# Patient Record
Sex: MALE | Race: WHITE | NOT HISPANIC OR LATINO | Employment: OTHER | URBAN - NONMETROPOLITAN AREA
[De-identification: names, ages, dates, MRNs, and addresses within clinical notes are randomized per-mention and may not be internally consistent; named-entity substitution may affect disease eponyms.]

---

## 2017-10-03 ENCOUNTER — HOSPITAL ENCOUNTER (EMERGENCY)
Facility: HOSPITAL | Age: 47
Discharge: LEFT BEFORE DISCHARGE | End: 2017-10-03
Attending: FAMILY MEDICINE

## 2017-10-03 VITALS
OXYGEN SATURATION: 95 % | DIASTOLIC BLOOD PRESSURE: 79 MMHG | WEIGHT: 178 LBS | BODY MASS INDEX: 25.48 KG/M2 | SYSTOLIC BLOOD PRESSURE: 121 MMHG | TEMPERATURE: 98.8 F | RESPIRATION RATE: 16 BRPM | HEIGHT: 70 IN | HEART RATE: 90 BPM

## 2017-10-03 LAB
ANION GAP SERPL CALCULATED.3IONS-SCNC: 14 MMOL/L (ref 4–13)
BASOPHILS # BLD AUTO: 0.03 10*3/MM3 (ref 0–0.2)
BASOPHILS NFR BLD AUTO: 0.3 % (ref 0–2)
BUN BLD-MCNC: 16 MG/DL (ref 5–21)
BUN/CREAT SERPL: 22.9 (ref 7–25)
CALCIUM SPEC-SCNC: 9.3 MG/DL (ref 8.4–10.4)
CHLORIDE SERPL-SCNC: 98 MMOL/L (ref 98–110)
CO2 SERPL-SCNC: 21 MMOL/L (ref 24–31)
CREAT BLD-MCNC: 0.7 MG/DL (ref 0.5–1.4)
DEPRECATED RDW RBC AUTO: 41.7 FL (ref 40–54)
EOSINOPHIL # BLD AUTO: 0.11 10*3/MM3 (ref 0–0.7)
EOSINOPHIL NFR BLD AUTO: 1.1 % (ref 0–4)
ERYTHROCYTE [DISTWIDTH] IN BLOOD BY AUTOMATED COUNT: 13.6 % (ref 12–15)
GFR SERPL CREATININE-BSD FRML MDRD: 120 ML/MIN/1.73
GLUCOSE BLD-MCNC: 612 MG/DL (ref 70–100)
GLUCOSE BLDC GLUCOMTR-MCNC: 591 MG/DL (ref 70–130)
HCT VFR BLD AUTO: 41.3 % (ref 40–52)
HGB BLD-MCNC: 14.8 G/DL (ref 14–18)
HOLD SPECIMEN: NORMAL
HOLD SPECIMEN: NORMAL
LYMPHOCYTES # BLD AUTO: 2.72 10*3/MM3 (ref 0.72–4.86)
LYMPHOCYTES NFR BLD AUTO: 26.9 % (ref 15–45)
MCH RBC QN AUTO: 30.6 PG (ref 28–32)
MCHC RBC AUTO-ENTMCNC: 35.8 G/DL (ref 33–36)
MCV RBC AUTO: 85.5 FL (ref 82–95)
MONOCYTES # BLD AUTO: 0.76 10*3/MM3 (ref 0.19–1.3)
MONOCYTES NFR BLD AUTO: 7.5 % (ref 4–12)
NEUTROPHILS # BLD AUTO: 6.49 10*3/MM3 (ref 1.87–8.4)
NEUTROPHILS NFR BLD AUTO: 64.2 % (ref 39–78)
PLATELET # BLD AUTO: 242 10*3/MM3 (ref 130–400)
PMV BLD AUTO: 11.5 FL (ref 6–12)
POTASSIUM BLD-SCNC: 4.5 MMOL/L (ref 3.5–5.3)
RBC # BLD AUTO: 4.83 10*6/MM3 (ref 4.8–5.9)
SODIUM BLD-SCNC: 133 MMOL/L (ref 135–145)
WBC NRBC COR # BLD: 10.11 10*3/MM3 (ref 4.8–10.8)
WHOLE BLOOD HOLD SPECIMEN: NORMAL
WHOLE BLOOD HOLD SPECIMEN: NORMAL

## 2017-10-03 PROCEDURE — 80048 BASIC METABOLIC PNL TOTAL CA: CPT | Performed by: FAMILY MEDICINE

## 2017-10-03 PROCEDURE — 85025 COMPLETE CBC W/AUTO DIFF WBC: CPT | Performed by: FAMILY MEDICINE

## 2017-10-03 PROCEDURE — 82962 GLUCOSE BLOOD TEST: CPT

## 2017-10-03 PROCEDURE — 99211 OFF/OP EST MAY X REQ PHY/QHP: CPT

## 2017-10-03 RX ORDER — SODIUM CHLORIDE 0.9 % (FLUSH) 0.9 %
10 SYRINGE (ML) INJECTION AS NEEDED
Status: DISCONTINUED | OUTPATIENT
Start: 2017-10-03 | End: 2017-10-03 | Stop reason: HOSPADM

## 2017-10-03 RX ADMIN — SODIUM CHLORIDE 1000 ML: 9 INJECTION, SOLUTION INTRAVENOUS at 19:11

## 2017-10-04 NOTE — ED NOTES
"Pt. Become upset when this nurse went into room to collect urine sample and told him a flu swab was ordered. \" I've been sitting here for three hours and nobody's done nothing about my sugar. \" pt. Left AMA.     Kari Hou RN  10/03/17 1958    "

## 2019-05-03 ENCOUNTER — OFFICE VISIT (OUTPATIENT)
Dept: FAMILY MEDICINE CLINIC | Age: 49
End: 2019-05-03
Payer: MEDICAID

## 2019-05-03 ENCOUNTER — HOSPITAL ENCOUNTER (EMERGENCY)
Age: 49
Discharge: HOME | End: 2019-05-03
Attending: EMERGENCY MEDICINE
Payer: MEDICAID

## 2019-05-03 VITALS
TEMPERATURE: 97.7 F | RESPIRATION RATE: 16 BRPM | OXYGEN SATURATION: 98 % | BODY MASS INDEX: 24.77 KG/M2 | HEIGHT: 70 IN | HEART RATE: 99 BPM | DIASTOLIC BLOOD PRESSURE: 94 MMHG | WEIGHT: 173 LBS | SYSTOLIC BLOOD PRESSURE: 146 MMHG

## 2019-05-03 VITALS
HEART RATE: 90 BPM | OXYGEN SATURATION: 95 % | SYSTOLIC BLOOD PRESSURE: 134 MMHG | TEMPERATURE: 98 F | DIASTOLIC BLOOD PRESSURE: 80 MMHG | RESPIRATION RATE: 18 BRPM

## 2019-05-03 DIAGNOSIS — E08.65 DIABETES MELLITUS DUE TO UNDERLYING CONDITION WITH HYPERGLYCEMIA, WITH LONG-TERM CURRENT USE OF INSULIN (HCC): Primary | ICD-10-CM

## 2019-05-03 DIAGNOSIS — M79.642 BILATERAL HAND PAIN: ICD-10-CM

## 2019-05-03 DIAGNOSIS — F31.63 BIPOLAR DISORDER, CURRENT EPISODE MIXED, SEVERE, WITHOUT PSYCHOTIC FEATURES (HCC): ICD-10-CM

## 2019-05-03 DIAGNOSIS — F33.2 SEVERE EPISODE OF RECURRENT MAJOR DEPRESSIVE DISORDER, WITHOUT PSYCHOTIC FEATURES (HCC): ICD-10-CM

## 2019-05-03 DIAGNOSIS — Z12.11 SCREENING FOR COLON CANCER: ICD-10-CM

## 2019-05-03 DIAGNOSIS — Z91.14 NONCOMPLIANCE W/MEDICATION TREATMENT DUE TO INTERMIT USE OF MEDICATION: ICD-10-CM

## 2019-05-03 DIAGNOSIS — E11.42 TYPE 2 DIABETES MELLITUS WITH DIABETIC POLYNEUROPATHY, WITH LONG-TERM CURRENT USE OF INSULIN (HCC): Primary | ICD-10-CM

## 2019-05-03 DIAGNOSIS — Z79.4 TYPE 2 DIABETES MELLITUS WITH DIABETIC POLYNEUROPATHY, WITH LONG-TERM CURRENT USE OF INSULIN (HCC): Primary | ICD-10-CM

## 2019-05-03 DIAGNOSIS — M79.641 BILATERAL HAND PAIN: ICD-10-CM

## 2019-05-03 DIAGNOSIS — Z79.4 DIABETES MELLITUS DUE TO UNDERLYING CONDITION WITH HYPERGLYCEMIA, WITH LONG-TERM CURRENT USE OF INSULIN (HCC): Primary | ICD-10-CM

## 2019-05-03 DIAGNOSIS — F41.1 GAD (GENERALIZED ANXIETY DISORDER): ICD-10-CM

## 2019-05-03 LAB
ALBUMIN SERPL-MCNC: 4.3 G/DL (ref 3.5–5.2)
ALP BLD-CCNC: 97 U/L (ref 40–130)
ALT SERPL-CCNC: 17 U/L (ref 5–41)
ANION GAP SERPL CALCULATED.3IONS-SCNC: 14 MMOL/L (ref 7–19)
AST SERPL-CCNC: 13 U/L (ref 5–40)
BASOPHILS ABSOLUTE: 0 K/UL (ref 0–0.2)
BASOPHILS RELATIVE PERCENT: 0.4 % (ref 0–1)
BILIRUB SERPL-MCNC: 0.7 MG/DL (ref 0.2–1.2)
BILIRUBIN URINE: NEGATIVE
BLOOD, URINE: NEGATIVE
BUN BLDV-MCNC: 14 MG/DL (ref 6–20)
CALCIUM SERPL-MCNC: 9.7 MG/DL (ref 8.6–10)
CHLORIDE BLD-SCNC: 91 MMOL/L (ref 98–111)
CLARITY: CLEAR
CO2: 27 MMOL/L (ref 22–29)
COLOR: YELLOW
CREAT SERPL-MCNC: 0.7 MG/DL (ref 0.5–1.2)
EOSINOPHILS ABSOLUTE: 0.1 K/UL (ref 0–0.6)
EOSINOPHILS RELATIVE PERCENT: 0.8 % (ref 0–5)
GFR NON-AFRICAN AMERICAN: >60
GLUCOSE BLD-MCNC: 376 MG/DL
GLUCOSE BLD-MCNC: 376 MG/DL (ref 70–99)
GLUCOSE BLD-MCNC: 442 MG/DL
GLUCOSE BLD-MCNC: 442 MG/DL (ref 70–99)
GLUCOSE BLD-MCNC: 480 MG/DL (ref 74–109)
GLUCOSE URINE: >=1000 MG/DL
HBA1C MFR BLD: 10.8 % (ref 4–6)
HCT VFR BLD CALC: 46.5 % (ref 42–52)
HEMOGLOBIN: 16.2 G/DL (ref 14–18)
KETONES, URINE: NEGATIVE MG/DL
LEUKOCYTE ESTERASE, URINE: NEGATIVE
LYMPHOCYTES ABSOLUTE: 2.5 K/UL (ref 1.1–4.5)
LYMPHOCYTES RELATIVE PERCENT: 29.7 % (ref 20–40)
MCH RBC QN AUTO: 31.4 PG (ref 27–31)
MCHC RBC AUTO-ENTMCNC: 34.8 G/DL (ref 33–37)
MCV RBC AUTO: 90.1 FL (ref 80–94)
MONOCYTES ABSOLUTE: 0.7 K/UL (ref 0–0.9)
MONOCYTES RELATIVE PERCENT: 7.8 % (ref 0–10)
NEUTROPHILS ABSOLUTE: 5.1 K/UL (ref 1.5–7.5)
NEUTROPHILS RELATIVE PERCENT: 60.7 % (ref 50–65)
NITRITE, URINE: NEGATIVE
PDW BLD-RTO: 12.8 % (ref 11.5–14.5)
PERFORMED ON: ABNORMAL
PERFORMED ON: ABNORMAL
PH UA: 6.5 (ref 5–8)
PLATELET # BLD: 255 K/UL (ref 130–400)
PMV BLD AUTO: 10.6 FL (ref 9.4–12.4)
POTASSIUM REFLEX MAGNESIUM: 4.3 MMOL/L (ref 3.5–5)
PROTEIN UA: NEGATIVE MG/DL
RBC # BLD: 5.16 M/UL (ref 4.7–6.1)
SODIUM BLD-SCNC: 132 MMOL/L (ref 136–145)
SPECIFIC GRAVITY UA: 1.03 (ref 1–1.03)
TOTAL PROTEIN: 7.6 G/DL (ref 6.6–8.7)
URINE REFLEX TO CULTURE: ABNORMAL
UROBILINOGEN, URINE: 0.2 E.U./DL
WBC # BLD: 8.4 K/UL (ref 4.8–10.8)

## 2019-05-03 PROCEDURE — 80053 COMPREHEN METABOLIC PANEL: CPT

## 2019-05-03 PROCEDURE — 99204 OFFICE O/P NEW MOD 45 MIN: CPT | Performed by: NURSE PRACTITIONER

## 2019-05-03 PROCEDURE — 82948 REAGENT STRIP/BLOOD GLUCOSE: CPT

## 2019-05-03 PROCEDURE — 99284 EMERGENCY DEPT VISIT MOD MDM: CPT

## 2019-05-03 PROCEDURE — 85025 COMPLETE CBC W/AUTO DIFF WBC: CPT

## 2019-05-03 PROCEDURE — 99284 EMERGENCY DEPT VISIT MOD MDM: CPT | Performed by: NURSE PRACTITIONER

## 2019-05-03 PROCEDURE — 83036 HEMOGLOBIN GLYCOSYLATED A1C: CPT

## 2019-05-03 PROCEDURE — 81003 URINALYSIS AUTO W/O SCOPE: CPT

## 2019-05-03 PROCEDURE — 2580000003 HC RX 258: Performed by: NURSE PRACTITIONER

## 2019-05-03 PROCEDURE — 6370000000 HC RX 637 (ALT 250 FOR IP): Performed by: NURSE PRACTITIONER

## 2019-05-03 PROCEDURE — 96160 PT-FOCUSED HLTH RISK ASSMT: CPT | Performed by: NURSE PRACTITIONER

## 2019-05-03 PROCEDURE — 36415 COLL VENOUS BLD VENIPUNCTURE: CPT

## 2019-05-03 RX ORDER — BLOOD-GLUCOSE METER
1 KIT MISCELLANEOUS DAILY
Qty: 1 KIT | Refills: 0 | Status: SHIPPED | OUTPATIENT
Start: 2019-05-03 | End: 2021-07-26

## 2019-05-03 RX ORDER — LANCETS 21 GAUGE
EACH MISCELLANEOUS
Qty: 100 EACH | Refills: 5 | Status: SHIPPED | OUTPATIENT
Start: 2019-05-03 | End: 2019-12-13 | Stop reason: SDUPTHER

## 2019-05-03 RX ORDER — 0.9 % SODIUM CHLORIDE 0.9 %
1000 INTRAVENOUS SOLUTION INTRAVENOUS ONCE
Status: COMPLETED | OUTPATIENT
Start: 2019-05-03 | End: 2019-05-03

## 2019-05-03 RX ORDER — LISINOPRIL 10 MG/1
10 TABLET ORAL DAILY
Qty: 30 TABLET | Refills: 2 | Status: SHIPPED | OUTPATIENT
Start: 2019-05-03 | End: 2019-05-21

## 2019-05-03 RX ORDER — RISPERIDONE 1 MG/1
1 TABLET, FILM COATED ORAL NIGHTLY
Qty: 30 TABLET | Refills: 5 | Status: SHIPPED | OUTPATIENT
Start: 2019-05-03 | End: 2019-05-21 | Stop reason: SDUPTHER

## 2019-05-03 RX ADMIN — INSULIN HUMAN 10 UNITS: 100 INJECTION, SOLUTION PARENTERAL at 17:56

## 2019-05-03 RX ADMIN — SODIUM CHLORIDE 1000 ML: 9 INJECTION, SOLUTION INTRAVENOUS at 17:56

## 2019-05-03 SDOH — HEALTH STABILITY: MENTAL HEALTH: HOW OFTEN DO YOU HAVE A DRINK CONTAINING ALCOHOL?: NOT ASKED

## 2019-05-03 ASSESSMENT — ENCOUNTER SYMPTOMS
CONSTIPATION: 0
NAUSEA: 0
BLOOD IN STOOL: 0
DIARRHEA: 0
EYE DISCHARGE: 0
PHOTOPHOBIA: 0
ABDOMINAL DISTENTION: 0
WHEEZING: 0
STRIDOR: 0
EYE REDNESS: 0
VOMITING: 0
CHEST TIGHTNESS: 0
RECTAL PAIN: 0
BACK PAIN: 0
SINUS PAIN: 0
SHORTNESS OF BREATH: 0
VOMITING: 0
SHORTNESS OF BREATH: 0
COLOR CHANGE: 0
APNEA: 0
NAUSEA: 0
ABDOMINAL PAIN: 0
SORE THROAT: 0
ABDOMINAL PAIN: 0
EYE PAIN: 0

## 2019-05-03 ASSESSMENT — ANXIETY QUESTIONNAIRES
7. FEELING AFRAID AS IF SOMETHING AWFUL MIGHT HAPPEN: 3-NEARLY EVERY DAY
6. BECOMING EASILY ANNOYED OR IRRITABLE: 3-NEARLY EVERY DAY
3. WORRYING TOO MUCH ABOUT DIFFERENT THINGS: 3-NEARLY EVERY DAY
5. BEING SO RESTLESS THAT IT IS HARD TO SIT STILL: 3-NEARLY EVERY DAY
4. TROUBLE RELAXING: 3-NEARLY EVERY DAY
GAD7 TOTAL SCORE: 21
1. FEELING NERVOUS, ANXIOUS, OR ON EDGE: 3-NEARLY EVERY DAY
2. NOT BEING ABLE TO STOP OR CONTROL WORRYING: 3-NEARLY EVERY DAY

## 2019-05-03 ASSESSMENT — PATIENT HEALTH QUESTIONNAIRE - PHQ9
7. TROUBLE CONCENTRATING ON THINGS, SUCH AS READING THE NEWSPAPER OR WATCHING TELEVISION: 3
SUM OF ALL RESPONSES TO PHQ QUESTIONS 1-9: 27
9. THOUGHTS THAT YOU WOULD BE BETTER OFF DEAD, OR OF HURTING YOURSELF: 3
6. FEELING BAD ABOUT YOURSELF - OR THAT YOU ARE A FAILURE OR HAVE LET YOURSELF OR YOUR FAMILY DOWN: 3
1. LITTLE INTEREST OR PLEASURE IN DOING THINGS: 3
4. FEELING TIRED OR HAVING LITTLE ENERGY: 3
2. FEELING DOWN, DEPRESSED OR HOPELESS: 3
10. IF YOU CHECKED OFF ANY PROBLEMS, HOW DIFFICULT HAVE THESE PROBLEMS MADE IT FOR YOU TO DO YOUR WORK, TAKE CARE OF THINGS AT HOME, OR GET ALONG WITH OTHER PEOPLE: 3
3. TROUBLE FALLING OR STAYING ASLEEP: 3
8. MOVING OR SPEAKING SO SLOWLY THAT OTHER PEOPLE COULD HAVE NOTICED. OR THE OPPOSITE, BEING SO FIGETY OR RESTLESS THAT YOU HAVE BEEN MOVING AROUND A LOT MORE THAN USUAL: 3
5. POOR APPETITE OR OVEREATING: 3
SUM OF ALL RESPONSES TO PHQ9 QUESTIONS 1 & 2: 6
SUM OF ALL RESPONSES TO PHQ QUESTIONS 1-9: 27

## 2019-05-03 ASSESSMENT — PAIN DESCRIPTION - LOCATION: LOCATION: FOOT

## 2019-05-03 ASSESSMENT — PAIN DESCRIPTION - PAIN TYPE: TYPE: ACUTE PAIN

## 2019-05-03 NOTE — ED TRIAGE NOTES
Pt has been without any medicines for about 2-3 days, some medications longer. Pt trying to get established with PCP for chronic illnesses.  Glucose over 600, told to come to ED

## 2019-05-03 NOTE — PROGRESS NOTES
[address] [place] [address]  Dept: [phone]  Dept Fax: [phone]  Loc: [phone]    Amos Primrose is a 46 y.o. male who presentstoday for his medical conditions/complaints as noted below. Amos Primrose is c/o of Diabetes (out of insulin x 2 days. states was running 500-600)        HPI:     HPI  Pt is new to practice. He moved from [facility]. He has been taking 40 units of lantus for his diabetes management from [facility] in [facility]. I have no records to review. He states he ran out of insulin \"a few days ago\" and his glucose has been running 500-600 lately, but when asked about other readings, he says his glucometer is broken and he needs a new one. He also states he quit all of his medications cold turkey due to personal choice. He c/o numbness and tingling in both hands and both feet. He c/o ADHD symptoms and wants adderall for that. He is a  and he cannot stay focused. He is depressed and banda thinks of suicide on his PHQ9. He c/o bug bites on his arms and legs from camping over the last few days. He says he picked several ticks off of himself.      Past Medical History:   Diagnosis Date    ADHD     Anxiety     Depression     Diabetes ([address]       Past Surgical History:   Procedure Laterality Date    NECK SURGERY         Family History   Adopted: Yes       Social History     Tobacco Use    Smoking status: Current Every Day Smoker     Packs/day: 1.50     Types: Cigarettes     Start date: 5/3/1999    Smokeless tobacco: Former User   Substance Use Topics    Alcohol use: Not Currently      Current Outpatient Medications   Medication Sig Dispense Refill    insulin glargine (LANTUS) 100 UNIT/ML injection pen Inject 30 Units into the skin nightly 15 pen 2    risperiDONE (RISPERDAL) 1 MG tablet Take 1 tablet by mouth nightly 30 tablet 5    lisinopril (PRINIVIL;ZESTRIL) 10 MG tablet Take 1 tablet by mouth are equal, round, and reactive to light. Conjunctivae are normal.   Neck: Normal range of motion. Neck supple. Cardiovascular: Normal rate, regular rhythm and normal heart sounds. Pulmonary/Chest: Effort normal and breath sounds normal. No respiratory distress. Neurological: He is alert and oriented to person, place, and time. Skin: Skin is warm and dry. He is not diaphoretic. Psychiatric: His behavior is normal. Thought content normal.   PHQ9, anxiety scale and mood disorder all show severe and positive for bipolar disorder. Nursing note and vitals reviewed. BP (!) 146/94 (Site: Right Upper Arm, Position: Sitting, Cuff Size: Medium Adult)   Pulse 99   Temp 97.7 °F (36.5 °C) (Oral)   Resp 16   Ht 5' 9.5\" (1.765 m)   Wt 173 lb (78.5 kg)   SpO2 98%   BMI 25.18 kg/m²     Assessment:       Diagnosis Orders   1. Type 2 diabetes mellitus with diabetic polyneuropathy, with long-term current use of insulin ([facility])  CBC    Comprehensive Metabolic Panel    Lipid Panel    Urinalysis    TSH without Reflex    Testosterone Free and Total Male    Hemoglobin A1C    Microalbumin / Creatinine Urine Ratio    Urine Drug Screen    Vitamin D 25 Hydroxy    HIV Rapid 1&2   2. Bipolar disorder, current episode mixed, severe, without psychotic features [facility])  [id number] [facility]   3. Severe episode of recurrent major depressive disorder, without psychotic features [facility])  [address] [facility]   4. KRYSTLE (generalized anxiety disorder)  [id number] [facility]   5.  Screening for colon cancer  [facility]  Dillon Sanabria MD, Gastroenterology, [place]       Plan:      Orders Placed This Encounter   Procedures    CBC     Standing Status:   Future     Standing Expiration Date:   5/2/2020    Comprehensive Metabolic Panel     Standing Status:   Future     Standing Expiration Date:   5/3/2020    Lipid Panel     Standing Status:   Future     Standing Expiration Date:   5/2/2020     Order Specific Question:   Is Patient Fasting?/# of Hours     Answer:   15    Urinalysis     Standing Status:   Future     Standing Expiration Date:   5/2/2020     Order Specific Question:   SPECIFY(EX-CATH,MIDSTREAM,CYSTO,ETC)? Answer:   midstream    TSH without Reflex     Standing Status:   Future     Standing Expiration Date:   5/2/2020    Testosterone Free and Total Male     Standing Status:   Future     Standing Expiration Date:   5/3/2020    Hemoglobin A1C     Standing Status:   Future     Standing Expiration Date:   5/2/2020    Microalbumin / Creatinine Urine Ratio     Standing Status:   Future     Standing Expiration Date:   5/3/2020    Urine Drug Screen     Standing Status:   Future     Standing Expiration Date:   5/3/2020    Vitamin D 25 Hydroxy     Standing Status:   Future     Standing Expiration Date:   5/3/2020    HIV Rapid 1&2     Standing Status:   Future     Standing Expiration Date:   5/3/2020     Order Specific Question:   Specify Date & Time of Incident     Answer:   [address] [facility], [place]     Referral Priority:   Routine     Referral Type:   Eval and Treat     Referral Reason:   Specialty Services Required     Requested Specialty:   Psychiatry     Number of Visits Requested:   1   Amira Cee MD, Gastroenterology, [place]     Referral Priority:   Routine     Referral Type:   Eval and Treat     Referral Reason:   Specialty Services Required     Referred to Provider:   Julia Vargas MD     Requested Specialty:   Gastroenterology     Number of Visits Requested:   1       No follow-ups on file.     Orders Placed This Encounter   Procedures    CBC     Standing Status:   Future     Standing Expiration Date:   5/2/2020    Comprehensive Metabolic Panel     Standing Status:   Future     Standing Expiration Date:   5/3/2020    Lipid Panel     Standing Status:   Future     Standing Expiration Date:   5/2/2020     Order Specific Question:   Is Patient Fasting?/# of Hours     Answer:   15    Urinalysis Standing Status:   Future     Standing Expiration Date:   5/2/2020     Order Specific Question:   SPECIFY(EX-CATH,MIDSTREAM,CYSTO,ETC)?      Answer:   midstream    TSH without Reflex     Standing Status:   Future     Standing Expiration Date:   5/2/2020    Testosterone Free and Total Male     Standing Status:   Future     Standing Expiration Date:   5/3/2020    Hemoglobin A1C     Standing Status:   Future     Standing Expiration Date:   5/2/2020    Microalbumin / Creatinine Urine Ratio     Standing Status:   Future     Standing Expiration Date:   5/3/2020    Urine Drug Screen     Standing Status:   Future     Standing Expiration Date:   5/3/2020    Vitamin D 25 Hydroxy     Standing Status:   Future     Standing Expiration Date:   5/3/2020    HIV Rapid 1&2     Standing Status:   Future     Standing Expiration Date:   5/3/2020     Order Specific Question:   Specify Date & Time of Incident     Answer:   [facility]     Referral Priority:   Routine     Referral Type:   Eval and Treat     Referral Reason:   Specialty Services Required     Requested Specialty:   Psychiatry     Number of Visits Requested:   1   Shruthi Donovan MD, Gastroenterology, [place]     Referral Priority:   Routine     Referral Type:   Eval and Treat     Referral Reason:   Specialty Services Required     Referred to Provider:   Mateo Junior MD     Requested Specialty:   Gastroenterology     Number of Visits Requested:   1     Orders Placed This Encounter   Medications    insulin glargine (LANTUS) 100 UNIT/ML injection pen     Sig: Inject 30 Units into the skin nightly     Dispense:  15 pen     Refill:  2    risperiDONE (RISPERDAL) 1 MG tablet     Sig: Take 1 tablet by mouth nightly     Dispense:  30 tablet     Refill:  5    lisinopril (PRINIVIL;ZESTRIL) 10 MG tablet     Sig: Take 1 tablet by mouth daily     Dispense:  30 tablet     Refill:  2    EASY TOUCH SAFETY LANCETS 21G MISC     Sig: CHECK GLUCOSE TID AND PRN     Dispense:  100 each     Refill:  5    blood glucose test strips (ASCENSIA AUTODISC VI;ONE TOUCH ULTRA TEST VI) strip     Si each by In Vitro route daily DX: Diabetes  PLEASE DISPENSE WHATEVER BRAND FORMULARY PREFERS     Dispense:  100 each     Refill:  5    blood glucose test strips (ASCENSIA AUTODISC VI;ONE TOUCH ULTRA TEST VI) strip     Si each by In Vitro route daily DX: Diabetes     Dispense:  100 each     Refill:  5    glucose monitoring kit (FREESTYLE) monitoring kit     Si kit by Does not apply route daily     Dispense:  1 kit     Refill:  0       records release needed. Refer to [facility] for counseling re: medication adherence importance and possibly ADHD testing. Refer to psych due to severe mental/emotional issues. Refer to ER for glucose today: unable to register on office glucometer. Pt refused ambulance, wants to go with female friend to [place], glucose and suicidal ideation will need to be addressed and treated inpatient. Add lisinopril to daily meds. Monitor glucose tid and prn. Colon cancer screening needed. Fasting labwork due.            Electronically signed by ALEX Mojica on 5/3/2019 at 4:15 PM

## 2019-05-03 NOTE — ED PROVIDER NOTES
[address] [facility] EMERGENCY DEPT  eMERGENCYdEPARTMENT eNCOUnter      Pt Name: Jose Manzo  MRN: [id number]  [place] 1970  Date of evaluation: 5/3/2019  ALEX Yancey CNP    CHIEF COMPLAINT       Chief Complaint   Patient presents with    Hyperglycemia         HISTORY OF PRESENT ILLNESS  (Location/Symptom, Timing/Onset, Context/Setting, Quality, Duration, Modifying Factors, Severity.)   Jose Manzo is a 46 y.o. male who presents to the emergency department with complaints of hyperglycemia. Patient says that he went to a urgent care in [address] [place] [address] today and was told his blood sugar was over 600 and that he needed to go to the emergency department for evaluation. He says that he does not have a primary care establishment has been going to different urgent cares for med refills. He says he has been noncompliant on his medications and is currently only taking Lantus. As he takes Lantus 40 units in the a.m. and 40 units at night and that he has been using Lantus for sliding scale. He says his vision has changed in that it is blurry, he complains of polyuria and polydipsia. The history is provided by the patient. Nursing Notes were reviewed and I agree. REVIEW OF SYSTEMS    (2-9 systems for level 4, 10 or more for level 5)     Review of Systems   Constitutional: Negative for activity change, appetite change, chills, diaphoresis, fatigue and fever. HENT: Negative for congestion, ear pain, nosebleeds, sinus pain and sore throat. Eyes: Negative for photophobia, pain, discharge and redness. Respiratory: Negative for apnea, chest tightness, shortness of breath, wheezing and stridor. Cardiovascular: Negative for chest pain, palpitations and leg swelling. Gastrointestinal: Negative for abdominal distention, abdominal pain, blood in stool, constipation, diarrhea, nausea, rectal pain and vomiting. Endocrine: Positive for polydipsia, polyphagia and polyuria.  Negative for cold metFORMIN (GLUCOPHAGE) 500 MG tablet Take 1 tablet by mouth 2 times daily (with meals), Disp-60 tablet, R-5Normal       !! - Potential duplicate medications found. Please discuss with provider. ALLERGIES     Patient has no known allergies. FAMILY HISTORY       Family History   Adopted: Yes          SOCIAL HISTORY       Social History     Socioeconomic History    Marital status: Single     Spouse name: None    Number of children: None    Years of education: None    Highest education level: None   Occupational History    None   Social Needs    Financial resource strain: None    Food insecurity:     Worry: None     Inability: None    Transportation needs:     Medical: None     Non-medical: None   Tobacco Use    Smoking status: Current Every Day Smoker     Packs/day: 1.50     Types: Cigarettes     Start date: 5/3/1999    Smokeless tobacco: Former User   Substance and Sexual Activity    Alcohol use: Not Currently    Drug use: None    Sexual activity: None   Lifestyle    Physical activity:     Days per week: None     Minutes per session: None    Stress: None   Relationships    Social connections:     Talks on phone: None     Gets together: None     Attends Alevism service: None     Active member of club or organization: None     Attends meetings of clubs or organizations: None     Relationship status: None    Intimate partner violence:     Fear of current or ex partner: None     Emotionally abused: None     Physically abused: None     Forced sexual activity: None   Other Topics Concern    None   Social History Narrative    None       SCREENINGS           PHYSICAL EXAM    (up to 7 forlevel 4, 8 or more for level 5)     ED Triage Vitals [05/03/19 1645]   BP Temp Temp src Pulse Resp SpO2 Height Weight   (!) 146/94 97.8 °F (36.6 °C) -- 94 18 96 % -- --       Physical Exam   Constitutional: He is oriented to person, place, and time. He appears well-developed and well-nourished.  No distress. HENT:   Head: Normocephalic and atraumatic. Nose: Nose normal.   Mouth/Throat: Oropharynx is clear and moist.   Eyes: Pupils are equal, round, and reactive to light. Conjunctivae and EOM are normal. Right eye exhibits no discharge. Left eye exhibits no discharge. No scleral icterus. Neck: Normal range of motion. Neck supple. No JVD present. No tracheal deviation present. Cardiovascular: Normal rate, regular rhythm, normal heart sounds and intact distal pulses. Exam reveals no gallop and no friction rub. No murmur heard. Pulmonary/Chest: Effort normal and breath sounds normal. No stridor. No respiratory distress. He has no wheezes. He has no rales. He exhibits no tenderness. Abdominal: Soft. Bowel sounds are normal. He exhibits no distension and no mass. There is no tenderness. There is no rebound and no guarding. No hernia. Musculoskeletal: Normal range of motion. He exhibits no edema, tenderness or deformity. Neurological: He is alert and oriented to person, place, and time. A sensory deficit (Neuropathy noted to bilateral feet- had decreased sensation to palpation) is present. No cranial nerve deficit. Coordination normal.   Skin: Skin is warm and dry. Capillary refill takes less than 2 seconds. No rash noted. He is not diaphoretic. No erythema. There were no plans or open areas on the lower extremities. Psychiatric: He has a normal mood and affect. His behavior is normal.   Nursing note and vitals reviewed.         DIAGNOSTIC RESULTS     RADIOLOGY:   Non-plain film images such as CT, Ultrasound and MRI are read by the radiologist. Plain radiographic images are visualized and preliminarilyinterpreted by No att. providers found with the below findings:      Interpretation per the Radiologist below, if available at the time of this note:    No orders to display       LABS:  Labs Reviewed   CBC WITH AUTO DIFFERENTIAL - Abnormal; Notable for the following components:       Result Value MCH 31.4 (*)     All other components within normal limits   COMPREHENSIVE METABOLIC PANEL W/ REFLEX TO MG FOR LOW K - Abnormal; Notable for the following components:    Sodium 132 (*)     Chloride 91 (*)     Glucose 480 (*)     All other components within normal limits   HEMOGLOBIN A1C - Abnormal; Notable for the following components:    Hemoglobin A1C 10.8 (*)     All other components within normal limits   URINE RT REFLEX TO CULTURE - Abnormal; Notable for the following components:    Glucose, Ur >=1000 (*)     All other components within normal limits   POCT GLUCOSE - Abnormal; Notable for the following components:    POC Glucose 442 (*)     All other components within normal limits   POCT GLUCOSE - Abnormal; Notable for the following components:    POC Glucose 376 (*)     All other components within normal limits   POCT GLUCOSE - Normal   POCT GLUCOSE - Normal       All other labs were within normal range or notreturned as of this dictation. RE-ASSESSMENT        EMERGENCY DEPARTMENT COURSE and DIFFERENTIAL DIAGNOSIS/MDM:   Vitals:    Vitals:    05/03/19 1822 05/03/19 1832 05/03/19 1901 05/03/19 1937   BP:  (!) 156/87 (!) 143/80 134/80   Pulse:    90   Resp:    18   Temp:    98 °F (36.7 °C)   SpO2: 95% 91% 94% 95%           MDM  Number of Diagnoses or Management Options  Diabetes mellitus due to underlying condition with hyperglycemia, with long-term current use of insulin [facility]):   Diagnosis management comments: Patient presented to the ED for evaluation of hyperglycemia. He reported non-compliance but says he wants to start taking medication as directed and acheive better control of glucose. Patient was given 1 L normal saline and 10 units of regular insulin. Which are decreased from 442 to 376. CBC, CMP unremarkable. A hemoglobin A1c was obtained and was 10.8. Dr. GODOY [facility] was consulted on the case. Discussed with patient the importance of diabetic compliance.  Discussed both microvascular and microvascular complications that could result from noncompliance. Discussed proper foot care. Stressed importance of following up with primary care. Patient says he wants to start seeing primary care at the [address] [place] [address] in [place], [place]. Patient was seen in urgent care today and had meds called in to confirm this with [facility] before he left that he did have refills on his medications awaiting him. Patient is well-appearing, stable for discharge and follow-up without fail with his/her medical doctor. I had a detailed discussion with the patient and/or guardian regarding the historical points, exam findings, and any diagnostic results supporting the discharge diagnosis. The patient was educated on care and need for follow-up. Strict return instructions including red flag signs and symptoms were discussed with the patient. Medications for discharge discussed, and adverse effects reviewed. Questions invited and answered. Patient shows understanding of the discharge information and agrees to follow-up. PROCEDURES:    Procedures      FINAL IMPRESSION      1.  Diabetes mellitus due to underlying condition with hyperglycemia, with long-term current use of insulin [facility])          DISPOSITION/PLAN   DISPOSITION Decision To Discharge 05/03/2019 07:15:59 PM      PATIENT REFERRED TO:  Estiven Mckee MD  [address] [place] [address]  [phone]          [address] [facility] EMERGENCY DEPT  [facility]  [phone]    As needed, If symptoms worsen      DISCHARGE MEDICATIONS:  Discharge Medication List as of 5/3/2019  7:16 PM          (Please note that portions of this note were completed with a voice recognition program.  Efforts were made to edit the dictations but occasionallywords are mis-transcribed.)    ALEX Alfred CNP, APRN - CNP  05/03/19 7834

## 2019-05-21 ENCOUNTER — HOSPITAL ENCOUNTER (OUTPATIENT)
Dept: GENERAL RADIOLOGY | Age: 49
Discharge: HOME | End: 2019-05-21
Payer: MEDICAID

## 2019-05-21 ENCOUNTER — OFFICE VISIT (OUTPATIENT)
Dept: FAMILY MEDICINE CLINIC | Age: 49
End: 2019-05-21
Payer: MEDICAID

## 2019-05-21 VITALS
WEIGHT: 172 LBS | DIASTOLIC BLOOD PRESSURE: 84 MMHG | TEMPERATURE: 97.8 F | OXYGEN SATURATION: 99 % | RESPIRATION RATE: 20 BRPM | SYSTOLIC BLOOD PRESSURE: 152 MMHG | HEART RATE: 96 BPM | BODY MASS INDEX: 25.04 KG/M2

## 2019-05-21 DIAGNOSIS — Z79.4 TYPE 2 DIABETES MELLITUS WITH DIABETIC POLYNEUROPATHY, WITH LONG-TERM CURRENT USE OF INSULIN (HCC): ICD-10-CM

## 2019-05-21 DIAGNOSIS — E11.42 TYPE 2 DIABETES MELLITUS WITH DIABETIC POLYNEUROPATHY, WITH LONG-TERM CURRENT USE OF INSULIN (HCC): ICD-10-CM

## 2019-05-21 DIAGNOSIS — Z79.4 TYPE 2 DIABETES MELLITUS WITH COMPLICATION, WITH LONG-TERM CURRENT USE OF INSULIN (HCC): Primary | ICD-10-CM

## 2019-05-21 DIAGNOSIS — R07.81 RIB PAIN: ICD-10-CM

## 2019-05-21 DIAGNOSIS — F31.63 BIPOLAR DISORDER, CURRENT EPISODE MIXED, SEVERE, WITHOUT PSYCHOTIC FEATURES (HCC): ICD-10-CM

## 2019-05-21 DIAGNOSIS — I10 ESSENTIAL HYPERTENSION: ICD-10-CM

## 2019-05-21 DIAGNOSIS — E11.8 TYPE 2 DIABETES MELLITUS WITH COMPLICATION, WITH LONG-TERM CURRENT USE OF INSULIN (HCC): Primary | ICD-10-CM

## 2019-05-21 PROCEDURE — 71100 X-RAY EXAM RIBS UNI 2 VIEWS: CPT

## 2019-05-21 PROCEDURE — 99214 OFFICE O/P EST MOD 30 MIN: CPT | Performed by: NURSE PRACTITIONER

## 2019-05-21 RX ORDER — LANCETS 21 GAUGE
1 EACH MISCELLANEOUS 2 TIMES DAILY
Qty: 100 EACH | Refills: 5 | Status: SHIPPED | OUTPATIENT
Start: 2019-05-21 | End: 2019-08-05 | Stop reason: SDUPTHER

## 2019-05-21 RX ORDER — RISPERIDONE 2 MG/1
2 TABLET, FILM COATED ORAL NIGHTLY
Qty: 30 TABLET | Refills: 0 | Status: ON HOLD | OUTPATIENT
Start: 2019-05-21 | End: 2019-07-15 | Stop reason: HOSPADM

## 2019-05-21 RX ORDER — LISINOPRIL 10 MG/1
5 TABLET ORAL DAILY
Qty: 30 TABLET | Refills: 2 | Status: SHIPPED | OUTPATIENT
Start: 2019-05-21 | End: 2019-12-13 | Stop reason: SDUPTHER

## 2019-05-21 RX ORDER — GLUCOSAMINE HCL/CHONDROITIN SU 500-400 MG
CAPSULE ORAL
Qty: 100 STRIP | Refills: 5 | Status: SHIPPED | OUTPATIENT
Start: 2019-05-21 | End: 2019-06-19 | Stop reason: SDUPTHER

## 2019-05-21 RX ORDER — NABUMETONE 500 MG/1
500 TABLET, FILM COATED ORAL 2 TIMES DAILY
Qty: 60 TABLET | Refills: 3 | Status: SHIPPED | OUTPATIENT
Start: 2019-05-21 | End: 2019-12-13 | Stop reason: SDUPTHER

## 2019-05-21 RX ORDER — GLIMEPIRIDE 2 MG/1
2 TABLET ORAL
Qty: 30 TABLET | Refills: 5 | Status: SHIPPED | OUTPATIENT
Start: 2019-05-21 | End: 2019-12-13 | Stop reason: SDUPTHER

## 2019-05-21 NOTE — PROGRESS NOTES
SUBJECTIVE:  Here today for Diabetic   Patient ID: Wesley Salas is a 46 y.o. male. HPI:   HPI   Long discussion patient went to the emergency room;   he is not taking his lisinopril states he does not have blood pressure problems unless his blood sugar is elevated however his glucose is always elevated I told him his A1c which was 10.8. And he told me that was good. He states that in [place] he was on like a another type of insulin that he took along with his meals I told him he needed to get in checking his blood sugars in order to see where with the adjustments need to be made he's come in here stating that his girlfriend took his monitor  I have made arrangements for him to get another one. He also states the Risperdal is not helping him with his minutes I showed him where the referrals have been made to neurology for the pain in his hands the psychiatry for his mood and GI for a colonoscopy screening. He is unable to take the Glucophage was it's causing severe diarrhea so I switched him on to Amaryl. He states he's having some burning of his feet and he used to be on Neurontin which is very possible he needed to go back on it however I would like to see what his blood sugar does in his feet pain if it improves when his blood sugar goes down  He is to keep a record of his blood sugar for a week and then return to his PCP  Taking Lantus 40 in am and 40 at night. Lost meter due to girlfriend took stuff. Does know what readings are. Metformin causes diarrhea   He also states yesterday he was trying to put a transmission in a vehicle and the abi slipped and he caught the transmission with his left arm hitting his chest wall and Islas Terrance now he's having tenderness and rib pain    Past Medical History:   Diagnosis Date    ADHD     Anxiety     Depression     Diabetes ([facility] [address]       Prior to Visit Medications    Medication Sig Taking?  Authorizing Provider   EASY TOUCH SAFETY LANCETS 21G MISC 1 Device by Does Normal rate, regular rhythm and normal heart sounds. Pulmonary/Chest: Effort normal and breath sounds normal.       Abdominal: Normal appearance. Neurological: He is alert and oriented to person, place, and time. Skin: Skin is warm and dry. Psychiatric: He has a normal mood and affect. His behavior is normal.      BP (!) 152/84 (Site: Right Upper Arm, Position: Sitting, Cuff Size: Medium Adult)   Pulse 96   Temp 97.8 °F (36.6 °C) (Oral)   Resp 20   Wt 172 lb (78 kg)   SpO2 99%   BMI 25.04 kg/m²      ASSESSMENT:      ICD-10-CM    1. Type 2 diabetes mellitus with complication, with long-term current use of insulin (HCC) E11.8 DME Order for Glucometer as OP    Z79.4 EASY TOUCH SAFETY LANCETS 21G MISC     blood glucose monitor strips     glimepiride (AMARYL) 2 MG tablet   2. Bipolar disorder, current episode mixed, severe, without psychotic features ([facility] [address] F31.63 risperiDONE (RISPERDAL) 2 MG tablet   3. Rib pain R07.81 XR RIBS LEFT (2 VIEWS)     nabumetone (RELAFEN) 500 MG tablet   4. Type 2 diabetes mellitus with diabetic polyneuropathy, with long-term current use of insulin (HCC) E11.42 lisinopril (PRINIVIL;ZESTRIL) 10 MG tablet    Z79.4    5. Essential hypertension I10 lisinopril (PRINIVIL;ZESTRIL) 10 MG tablet       PLAN:    Julio Primrose: Pain (widespread pain all over . earlier this month saw Diogo Jeffries she sent him to ER for elevated blood sugar . he was to follow up . he has pain in feet , out of focus , C/O ADHD . feels like he has a cracked rib . ) and Diabetes (elevated blood sugars )      Diagnosis and orders for this visit are above.

## 2019-06-05 ENCOUNTER — OFFICE VISIT (OUTPATIENT)
Dept: PSYCHIATRY | Age: 49
End: 2019-06-05
Payer: MEDICAID

## 2019-06-05 VITALS
HEART RATE: 95 BPM | OXYGEN SATURATION: 100 % | HEIGHT: 70 IN | SYSTOLIC BLOOD PRESSURE: 149 MMHG | DIASTOLIC BLOOD PRESSURE: 94 MMHG | WEIGHT: 170 LBS | BODY MASS INDEX: 24.34 KG/M2

## 2019-06-05 DIAGNOSIS — F31.32 BIPOLAR AFFECTIVE DISORDER, CURRENTLY DEPRESSED, MODERATE (HCC): ICD-10-CM

## 2019-06-05 DIAGNOSIS — F41.9 ANXIETY: Primary | ICD-10-CM

## 2019-06-05 PROCEDURE — 90791 PSYCH DIAGNOSTIC EVALUATION: CPT | Performed by: COUNSELOR

## 2019-06-05 NOTE — PROGRESS NOTES
Initial Session Note  Елена Floor Monica Sampson 52, [facility]  6/5/2019  11:28 AM      Time spent with Patient: 38 minutes  This is patient's first  Therapy appointment. Reason for Consult:  depression, anxiety and stress  Referring Provider: Oneyda Lopez, APRN  [facility], [address]    Pt provided informed consent for the behavioral health program. Discussed with patient model of service to include the limits of confidentiality (i.e. abuse reporting, suicide intervention, etc.) and short-term intervention focused approach. Discussed no show and late cancellation policy. Pt indicated understanding. Randy Hobbs ,a 46 y.o. male, for initial evaluation visit. Reason:    Pt was referred for medication management as he's been off psychiatric medications for 16 months. He reports he had been taking Klonopin, Adderall and \"mood pills\" for anger, aggression, depression and lack of focus. He has passive SI frequently that are fleeting. Denies intent or plan at this time. Denies HI and AVH. Social History:  Originally from [place], lived in [place] for a while and recently moved back to [place].     Current Medications:  Scheduled Meds:   Current Outpatient Medications:     EASY TOUCH SAFETY LANCETS 21G MISC, 1 Device by Does not apply route 2 times daily, Disp: 100 each, Rfl: 5    blood glucose monitor strips, Test 2 times a day & as needed for symptoms of irregular blood glucose.  E11.8, Disp: 100 strip, Rfl: 5    glimepiride (AMARYL) 2 MG tablet, Take 1 tablet by mouth every morning (before breakfast), Disp: 30 tablet, Rfl: 5    risperiDONE (RISPERDAL) 2 MG tablet, Take 1 tablet by mouth nightly, Disp: 30 tablet, Rfl: 0    nabumetone (RELAFEN) 500 MG tablet, Take 1 tablet by mouth 2 times daily, Disp: 60 tablet, Rfl: 3    lisinopril (PRINIVIL;ZESTRIL) 10 MG tablet, Take 0.5 tablets by mouth daily, Disp: 30 tablet, Rfl: 2    insulin glargine (LANTUS) 100 UNIT/ML injection pen, Inject 30 Units into the skin nightly, Disp: 15 pen, Rfl: 2    EASY TOUCH SAFETY LANCETS 21G MISC, CHECK GLUCOSE TID AND PRN, Disp: 100 each, Rfl: 5    blood glucose test strips (ASCENSIA AUTODISC VI;ONE TOUCH ULTRA TEST VI) strip, 1 each by In Vitro route daily DX: Diabetes  PLEASE DISPENSE WHATEVER BRAND FORMULARY PREFERS, Disp: 100 each, Rfl: 5    blood glucose test strips (ASCENSIA AUTODISC VI;ONE TOUCH ULTRA TEST VI) strip, 1 each by In Vitro route daily DX: Diabetes, Disp: 100 each, Rfl: 5    glucose monitoring kit (FREESTYLE) monitoring kit, 1 kit by Does not apply route daily, Disp: 1 kit, Rfl: 0      History:     Past Psychiatric History:   Previous therapy: Dr. Christiane Amos in [place], [place]  Previous psychiatric treatment and medication trials: yes  [facility] hospitalizations: [facility]  Previous diagnoses: depression, anxiety, ADHD  Previous suicide attempts: 2-3 years ago tried to overdose with heroin but it clotted in his veins. Family history of mental illness: unknown due to being adopted  History of violence: throws tantrums like a kid- throw things/hit things  Education: GED- 12th grade education  Other Pertinent History: found out he was adopted when he was 3yo because his sister was mad at him and told him he was adopted. Legal Issues- Any current charges/court dates: meth and child support charges. 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   Use of Alcohol: denies  Tobacco use: sometimes 2ppd  Legal consequences of chemical use: yes  Patient feels she ought to cut down on drinking and/or drug use:no  Patient has been annoyed by others criticizing her drinking or drug use: yes  Patient has felt bad or guilty about her drinking or drug use:yes  Patient has had a drink or used drugs as an eye opener first thing in the morning to steady nerves, get rid of a hangover or get the day started: denies  Use of OTC: denies    There is no problem list on file for this patient. Social History     Socioeconomic History    Marital status: Single     Spouse name: Not on file    Number of children: Not on file    Years of education: Not on file    Highest education level: Not on file   Occupational History    Not on file   Social Needs    Financial resource strain: Not on file    Food insecurity:     Worry: Not on file     Inability: Not on file    Transportation needs:     Medical: Not on file     Non-medical: Not on file   Tobacco Use    Smoking status: Current Every Day Smoker     Packs/day: 1.50     Types: Cigarettes     Start date: 5/3/1999    Smokeless tobacco: Former User   Substance and Sexual Activity    Alcohol use: Not Currently    Drug use: Not on file    Sexual activity: Not on file   Lifestyle    Physical activity:     Days per week: Not on file     Minutes per session: Not on file    Stress: Not on file   Relationships    Social connections:     Talks on phone: Not on file     Gets together: Not on file     Attends Jain service: Not on file     Active member of club or organization: Not on file     Attends meetings of clubs or organizations: Not on file     Relationship status: Not on file    Intimate partner violence:     Fear of current or ex partner: Not on file     Emotionally abused: Not on file     Physically abused: Not on file     Forced sexual activity: Not on file   Other Topics Concern    Not on file   Social History Narrative    Not on file       Psychiatric Review Of Systems:   Sleep (specify as to how it has changed): interrupted sleep  appetite changes (specify): snacks all day  weight changes (specify): fluctuates  energy/anergy: low  interest/pleasure/anhedonia: no  anxiety/panic: yes  guilty/hopeless: yes  S.I.B.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  If he gets angry he will take off in one of the cars going 100mph. any drugs: no  alcohol: no     Mental Status Evaluation:     Appearance:  age appropriate, bearded, casually dressed and tattooed   Behavior:  Within Normal Limits   Speech:  normal pitch and normal volume   Mood:  anxious and depressed   Affect:  labile   Thought Process:  within normal limits   Thought Content:  Passive SI   Sensorium:  person, place, time/date and situation   Cognition:  grossly intact   Insight:  good   Judgment:  good     Suicidal Intentions: No  Suicidal Plan:  No    Other Pertinent Information:  Social Support system: mother Danis Crocker  [phone]  Current relationship:yes- Lesly Gonzalez on others for help: yes, staying with his mother currently   Independent self care:yes   Employment history:  full-time    Assessment - Diagnosis - Goals: Axis I: Depression, Anxiety and ADHD  Axis II: Deferred  Axis III: See above    Plan:  1. Scheduled to see NP for med management  2.  CBT to target depression/anxiety     Pt interventions:  Provided education, Discussed self-care (sleep, nutrition, rewarding activities, social support, exercise), Established rapport, Conducted functional assessment, [place]-setting to identify pt's primary goals for [facility] visit / overall health, Supportive techniques and Emphasized self-care as important for managing overall health       Elayne Fuller, [facility]

## 2019-06-14 NOTE — TELEPHONE ENCOUNTER
Pt called and states is at pharmacy and needs pen needles for his insulin.  Called pharmacy and authorized pen needles to go with insulin order in may for 15 pens with 2 refills

## 2019-06-19 ENCOUNTER — OFFICE VISIT (OUTPATIENT)
Dept: FAMILY MEDICINE CLINIC | Age: 49
End: 2019-06-19
Payer: MEDICAID

## 2019-06-19 VITALS
BODY MASS INDEX: 23.96 KG/M2 | WEIGHT: 167 LBS | SYSTOLIC BLOOD PRESSURE: 126 MMHG | TEMPERATURE: 98 F | RESPIRATION RATE: 16 BRPM | HEART RATE: 100 BPM | OXYGEN SATURATION: 98 % | DIASTOLIC BLOOD PRESSURE: 88 MMHG

## 2019-06-19 DIAGNOSIS — E11.65 TYPE 2 DIABETES MELLITUS WITH HYPERGLYCEMIA, WITH LONG-TERM CURRENT USE OF INSULIN (HCC): Primary | ICD-10-CM

## 2019-06-19 DIAGNOSIS — G63 POLYNEUROPATHY ASSOCIATED WITH UNDERLYING DISEASE (HCC): ICD-10-CM

## 2019-06-19 DIAGNOSIS — E11.65 TYPE 2 DIABETES MELLITUS WITH HYPERGLYCEMIA, WITH LONG-TERM CURRENT USE OF INSULIN (HCC): ICD-10-CM

## 2019-06-19 DIAGNOSIS — Z79.4 TYPE 2 DIABETES MELLITUS WITH HYPERGLYCEMIA, WITH LONG-TERM CURRENT USE OF INSULIN (HCC): Primary | ICD-10-CM

## 2019-06-19 DIAGNOSIS — Z79.4 TYPE 2 DIABETES MELLITUS WITH HYPERGLYCEMIA, WITH LONG-TERM CURRENT USE OF INSULIN (HCC): ICD-10-CM

## 2019-06-19 LAB
AMPHETAMINE SCREEN, URINE: NEGATIVE
ANION GAP SERPL CALCULATED.3IONS-SCNC: 12 MMOL/L (ref 7–19)
BARBITURATE SCREEN URINE: NEGATIVE
BENZODIAZEPINE SCREEN, URINE: NEGATIVE
BILIRUBIN URINE: NEGATIVE
BLOOD, URINE: NEGATIVE
BUN BLDV-MCNC: 15 MG/DL (ref 6–20)
CALCIUM SERPL-MCNC: 8.8 MG/DL (ref 8.6–10)
CANNABINOID SCREEN URINE: NEGATIVE
CHLORIDE BLD-SCNC: 95 MMOL/L (ref 98–111)
CLARITY: CLEAR
CO2: 23 MMOL/L (ref 22–29)
COCAINE METABOLITE SCREEN URINE: NEGATIVE
COLOR: YELLOW
CREAT SERPL-MCNC: 0.8 MG/DL (ref 0.5–1.2)
GFR NON-AFRICAN AMERICAN: >60
GLUCOSE BLD-MCNC: 601 MG/DL (ref 74–109)
GLUCOSE URINE: >=1000 MG/DL
HBA1C MFR BLD: 11.6 % (ref 4–6)
KETONES, URINE: NEGATIVE MG/DL
LEUKOCYTE ESTERASE, URINE: NEGATIVE
Lab: NORMAL
NITRITE, URINE: NEGATIVE
OPIATE SCREEN URINE: NEGATIVE
PH UA: 6 (ref 5–8)
POTASSIUM SERPL-SCNC: 4.4 MMOL/L (ref 3.5–5)
PROTEIN UA: NEGATIVE MG/DL
SODIUM BLD-SCNC: 130 MMOL/L (ref 136–145)
SPECIFIC GRAVITY UA: 1.04 (ref 1–1.03)
UROBILINOGEN, URINE: 0.2 E.U./DL

## 2019-06-19 PROCEDURE — 99214 OFFICE O/P EST MOD 30 MIN: CPT | Performed by: NURSE PRACTITIONER

## 2019-06-19 RX ORDER — GLUCOSAMINE HCL/CHONDROITIN SU 500-400 MG
CAPSULE ORAL
Qty: 200 STRIP | Refills: 5 | Status: SHIPPED | OUTPATIENT
Start: 2019-06-19 | End: 2019-08-05 | Stop reason: SDUPTHER

## 2019-06-19 RX ORDER — LANCETS 30 GAUGE
1 EACH MISCELLANEOUS 4 TIMES DAILY
Qty: 200 EACH | Refills: 5 | Status: SHIPPED | OUTPATIENT
Start: 2019-06-19 | End: 2021-07-26

## 2019-06-19 RX ORDER — GABAPENTIN 300 MG/1
300 CAPSULE ORAL NIGHTLY
Qty: 90 CAPSULE | Refills: 0 | Status: ON HOLD | OUTPATIENT
Start: 2019-06-19 | End: 2019-07-15 | Stop reason: HOSPADM

## 2019-06-19 ASSESSMENT — ENCOUNTER SYMPTOMS: SHORTNESS OF BREATH: 0
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
## 2019-06-19 NOTE — PROGRESS NOTES
[facility] It is the [employer] EZ. Pt states needs strips and lancets. Called #100 test strips and lancets to [facility] in [place] with 5 refills. Pt to test 4 times a day .

## 2019-06-20 ENCOUNTER — TELEPHONE (OUTPATIENT)
Dept: FAMILY MEDICINE CLINIC | Age: 49
End: 2019-06-20

## 2019-06-20 NOTE — TELEPHONE ENCOUNTER
Patient's girlfriend (not on hipaa) left a message saying that Anisha Ac was supposed to send in a glucometer, test strips, lancets and humulin to the pharmacy. I have tried to call patient and the home number is his mother (not on hipaa). I have left a voicemail on cell phone asking patient to call the office. Medication and diabetic supplies were sent to [facility] on 6/19/19.

## 2019-06-25 ENCOUNTER — OFFICE VISIT (OUTPATIENT)
Dept: PSYCHIATRY | Age: 49
End: 2019-06-25
Payer: MEDICAID

## 2019-06-25 VITALS
WEIGHT: 170 LBS | BODY MASS INDEX: 24.34 KG/M2 | OXYGEN SATURATION: 93 % | DIASTOLIC BLOOD PRESSURE: 83 MMHG | HEIGHT: 70 IN | SYSTOLIC BLOOD PRESSURE: 154 MMHG | HEART RATE: 106 BPM

## 2019-06-25 DIAGNOSIS — F41.9 ANXIETY: ICD-10-CM

## 2019-06-25 DIAGNOSIS — F31.32 BIPOLAR AFFECTIVE DISORDER, CURRENTLY DEPRESSED, MODERATE (HCC): Primary | ICD-10-CM

## 2019-06-25 PROCEDURE — 90832 PSYTX W PT 30 MINUTES: CPT | Performed by: COUNSELOR

## 2019-06-25 NOTE — PROGRESS NOTES
Therapy Progress Note  [facility]  6/25/2019  9:07 AM      Time spent with Patient: 28 minutes  This is patient's second  Therapy appointment. Reason for Consult:  depression, anxiety, stress and agitation  Referring Provider: No referring provider defined for this encounter. Irene Barone ,a 46 y.o. male, for initial evaluation visit. Pt provided informed consent for the behavioral health program. Discussed with patient model of service to include the limits of confidentiality (i.e. abuse reporting, suicide intervention, etc.) and short-term intervention focused approach. Discussed no show and late cancellation policy. Pt indicated understanding. S:  Pt's biggest stressor is his diabetes. \"I told the doctor It stays so high I never have energy and I just can't keep living like this. I'm in pain all the time. \" He complains of frustration with having decreased energy and having more work piled on him because another employee was fired. Pt's goal is to get his own place, keep a steady income to be able to get custody of his 3 yo and 3 yo as their mother lost custody of them 2 weeks ago. Denies SI, HI and AVH at this time. MSE:    Appearance    alert, cooperative; hunches over when getting out of chair due to pain, casually dressed, shaky at times.   Appetite fluctuates  Sleep disturbance Yes  Fatigue Yes  Loss of pleasure No  Impulsive behavior No  Speech    Slurred, hyperverbal  Mood    Anxious  Depressed  Low self-esteem  Irritability  Affect    depressed affect  Thought Content    cognitive distortions  Thought Process    linear  Associations    logical connections  Insight    Good  Judgment    Intact  Orientation    oriented to person, place, time, and general circumstances  Memory    recent and remote memory intact  Attention/Concentration    impaired  Morbid ideation No  Suicide Assessment    no suicidal ideation      History:  Social History     Socioeconomic History    Marital status: Single     Spouse name: Not on file    Number of children: Not on file    Years of education: Not on file    Highest education level: Not on file   Occupational History    Not on file   Social Needs    Financial resource strain: Not on file    Food insecurity:     Worry: Not on file     Inability: Not on file    Transportation needs:     Medical: Not on file     Non-medical: Not on file   Tobacco Use    Smoking status: Current Every Day Smoker     Packs/day: 1.50     Types: Cigarettes     Start date: 5/3/1999    Smokeless tobacco: Former User   Substance and Sexual Activity    Alcohol use: Not Currently    Drug use: Not on file    Sexual activity: Not on file   Lifestyle    Physical activity:     Days per week: Not on file     Minutes per session: Not on file    Stress: Not on file   Relationships    Social connections:     Talks on phone: Not on file     Gets together: Not on file     Attends Religion service: Not on file     Active member of club or organization: Not on file     Attends meetings of clubs or organizations: Not on file     Relationship status: Not on file    Intimate partner violence:     Fear of current or ex partner: Not on file     Emotionally abused: Not on file     Physically abused: Not on file     Forced sexual activity: Not on file   Other Topics Concern    Not on file   Social History Narrative    Not on file       Medications:   Current Outpatient Medications   Medication Sig Dispense Refill    gabapentin (NEURONTIN) 300 MG capsule Take 1 capsule by mouth nightly for 90 days. 90 capsule 0    insulin regular (HUMULIN R) 100 UNIT/ML injection See Sliding Scale orders 1 vial 5    glucagon 1 MG injection Inject 1 mg into the muscle See Admin Instructions Follow package directions for low blood sugar.  1 kit 3    Insulin Syringe-Needle U-100 29G X 1/2\" 0.3 ML MISC 1 each by Does not apply route daily 100 each 3    Insulin Syringes, Disposable, U-100 0.3 ML MISC 1 each by Start date: 5/3/1999    Smokeless tobacco: Former User   Substance and Sexual Activity    Alcohol use: Not Currently    Drug use: Not on file    Sexual activity: Not on file   Lifestyle    Physical activity:     Days per week: Not on file     Minutes per session: Not on file    Stress: Not on file   Relationships    Social connections:     Talks on phone: Not on file     Gets together: Not on file     Attends Cheondoism service: Not on file     Active member of club or organization: Not on file     Attends meetings of clubs or organizations: Not on file     Relationship status: Not on file    Intimate partner violence:     Fear of current or ex partner: Not on file     Emotionally abused: Not on file     Physically abused: Not on file     Forced sexual activity: Not on file   Other Topics Concern    Not on file   Social History Narrative    Not on file       TOBACCO:   reports that he has been smoking cigarettes. He started smoking about 20 years ago. He has been smoking about 1.50 packs per day. He has quit using smokeless tobacco.  ETOH:   reports that he drank alcohol. Family History:   Family History   Adopted: Yes       Diagnosis:        Diagnosis Date    ADHD     Anxiety     Depression     Diabetes ([facility]      Problems with primary support group, Problems related to the social environment, Occupational problems, Housing problems, Economic problems, Problems related to interaction with the legal system/ crime and Other psychosocial and environmental problems    Plan:  1. Scheduled to see NP for medication management  2. CBT to target depression and anxiety  3.  Discuss triggers    Pt interventions:  Provided education, Discussed self-care (sleep, nutrition, rewarding activities, social support, exercise), Established rapport, Supportive techniques, Emphasized self-care as important for managing overall health, CBT to target depression/anxiety and Identified maladaptive thoughts      Elayne Fuller

## 2019-07-09 ENCOUNTER — OFFICE VISIT (OUTPATIENT)
Dept: PSYCHIATRY | Age: 49
End: 2019-07-09
Payer: MEDICAID

## 2019-07-09 ENCOUNTER — HOSPITAL ENCOUNTER (INPATIENT)
Age: 49
LOS: 5 days | Discharge: HOME | DRG: 885 | End: 2019-07-15
Attending: EMERGENCY MEDICINE | Admitting: PSYCHIATRY & NEUROLOGY
Payer: MEDICAID

## 2019-07-09 DIAGNOSIS — F31.4 BIPOLAR DISORDER, CURRENT EPISODE DEPRESSED, SEVERE, WITHOUT PSYCHOTIC FEATURES (HCC): Primary | ICD-10-CM

## 2019-07-09 DIAGNOSIS — L02.414 ABSCESS OF ARM, LEFT: ICD-10-CM

## 2019-07-09 DIAGNOSIS — R45.850 HOMICIDAL IDEATION: Primary | ICD-10-CM

## 2019-07-09 DIAGNOSIS — E11.65 TYPE 2 DIABETES MELLITUS WITH HYPERGLYCEMIA, WITH LONG-TERM CURRENT USE OF INSULIN (HCC): ICD-10-CM

## 2019-07-09 DIAGNOSIS — F41.9 ANXIETY: ICD-10-CM

## 2019-07-09 DIAGNOSIS — Z79.4 TYPE 2 DIABETES MELLITUS WITH HYPERGLYCEMIA, WITH LONG-TERM CURRENT USE OF INSULIN (HCC): ICD-10-CM

## 2019-07-09 LAB
ACETAMINOPHEN LEVEL: <15 UG/ML
ALBUMIN SERPL-MCNC: 4 G/DL (ref 3.5–5.2)
ALP BLD-CCNC: 93 U/L (ref 40–130)
ALT SERPL-CCNC: 18 U/L (ref 5–41)
AMPHETAMINE SCREEN, URINE: POSITIVE
ANION GAP SERPL CALCULATED.3IONS-SCNC: 10 MMOL/L (ref 7–19)
AST SERPL-CCNC: 12 U/L (ref 5–40)
BARBITURATE SCREEN URINE: NEGATIVE
BASOPHILS ABSOLUTE: 0 K/UL (ref 0–0.2)
BASOPHILS RELATIVE PERCENT: 0.4 % (ref 0–1)
BENZODIAZEPINE SCREEN, URINE: NEGATIVE
BILIRUB SERPL-MCNC: 0.7 MG/DL (ref 0.2–1.2)
BUN BLDV-MCNC: 19 MG/DL (ref 6–20)
CALCIUM SERPL-MCNC: 9.4 MG/DL (ref 8.6–10)
CANNABINOID SCREEN URINE: NEGATIVE
CHLORIDE BLD-SCNC: 94 MMOL/L (ref 98–111)
CO2: 28 MMOL/L (ref 22–29)
COCAINE METABOLITE SCREEN URINE: NEGATIVE
CREAT SERPL-MCNC: 0.7 MG/DL (ref 0.5–1.2)
EOSINOPHILS ABSOLUTE: 0.1 K/UL (ref 0–0.6)
EOSINOPHILS RELATIVE PERCENT: 1 % (ref 0–5)
ETHANOL: <10 MG/DL (ref 0–0.08)
GFR NON-AFRICAN AMERICAN: >60
GLUCOSE BLD-MCNC: 199 MG/DL
GLUCOSE BLD-MCNC: 199 MG/DL (ref 70–99)
GLUCOSE BLD-MCNC: 236 MG/DL (ref 70–99)
GLUCOSE BLD-MCNC: 249 MG/DL
GLUCOSE BLD-MCNC: 249 MG/DL (ref 70–99)
GLUCOSE BLD-MCNC: 444 MG/DL (ref 74–109)
HCT VFR BLD CALC: 44.2 % (ref 42–52)
HEMOGLOBIN: 15.2 G/DL (ref 14–18)
LYMPHOCYTES ABSOLUTE: 3.3 K/UL (ref 1.1–4.5)
LYMPHOCYTES RELATIVE PERCENT: 31.5 % (ref 20–40)
Lab: ABNORMAL
MCH RBC QN AUTO: 30.8 PG (ref 27–31)
MCHC RBC AUTO-ENTMCNC: 34.4 G/DL (ref 33–37)
MCV RBC AUTO: 89.5 FL (ref 80–94)
MONOCYTES ABSOLUTE: 0.6 K/UL (ref 0–0.9)
MONOCYTES RELATIVE PERCENT: 5.5 % (ref 0–10)
NEUTROPHILS ABSOLUTE: 6.4 K/UL (ref 1.5–7.5)
NEUTROPHILS RELATIVE PERCENT: 61.2 % (ref 50–65)
OPIATE SCREEN URINE: NEGATIVE
PDW BLD-RTO: 12.8 % (ref 11.5–14.5)
PERFORMED ON: ABNORMAL
PLATELET # BLD: 293 K/UL (ref 130–400)
PMV BLD AUTO: 10.4 FL (ref 9.4–12.4)
POTASSIUM REFLEX MAGNESIUM: 4.5 MMOL/L (ref 3.5–5)
RBC # BLD: 4.94 M/UL (ref 4.7–6.1)
SALICYLATE, SERUM: 3.1 MG/DL (ref 3–10)
SODIUM BLD-SCNC: 132 MMOL/L (ref 136–145)
TOTAL PROTEIN: 7.3 G/DL (ref 6.6–8.7)
WBC # BLD: 10.4 K/UL (ref 4.8–10.8)

## 2019-07-09 PROCEDURE — 99285 EMERGENCY DEPT VISIT HI MDM: CPT

## 2019-07-09 PROCEDURE — 82948 REAGENT STRIP/BLOOD GLUCOSE: CPT

## 2019-07-09 PROCEDURE — 6360000002 HC RX W HCPCS: Performed by: EMERGENCY MEDICINE

## 2019-07-09 PROCEDURE — 96372 THER/PROPH/DIAG INJ SC/IM: CPT

## 2019-07-09 PROCEDURE — 2580000003 HC RX 258: Performed by: NURSE PRACTITIONER

## 2019-07-09 PROCEDURE — 85025 COMPLETE CBC W/AUTO DIFF WBC: CPT

## 2019-07-09 PROCEDURE — G0480 DRUG TEST DEF 1-7 CLASSES: HCPCS

## 2019-07-09 PROCEDURE — 96376 TX/PRO/DX INJ SAME DRUG ADON: CPT

## 2019-07-09 PROCEDURE — 6370000000 HC RX 637 (ALT 250 FOR IP): Performed by: EMERGENCY MEDICINE

## 2019-07-09 PROCEDURE — 80053 COMPREHEN METABOLIC PANEL: CPT

## 2019-07-09 PROCEDURE — 36415 COLL VENOUS BLD VENIPUNCTURE: CPT

## 2019-07-09 PROCEDURE — 6370000000 HC RX 637 (ALT 250 FOR IP)

## 2019-07-09 PROCEDURE — 80307 DRUG TEST PRSMV CHEM ANLYZR: CPT

## 2019-07-09 PROCEDURE — 96374 THER/PROPH/DIAG INJ IV PUSH: CPT

## 2019-07-09 PROCEDURE — 6370000000 HC RX 637 (ALT 250 FOR IP): Performed by: NURSE PRACTITIONER

## 2019-07-09 PROCEDURE — 90839 PSYTX CRISIS INITIAL 60 MIN: CPT | Performed by: COUNSELOR

## 2019-07-09 RX ORDER — ZIPRASIDONE MESYLATE 20 MG/ML
20 INJECTION, POWDER, LYOPHILIZED, FOR SOLUTION INTRAMUSCULAR ONCE
Status: COMPLETED | OUTPATIENT
Start: 2019-07-09 | End: 2019-07-09

## 2019-07-09 RX ORDER — 0.9 % SODIUM CHLORIDE 0.9 %
1000 INTRAVENOUS SOLUTION INTRAVENOUS ONCE
Status: COMPLETED | OUTPATIENT
Start: 2019-07-09 | End: 2019-07-09

## 2019-07-09 RX ORDER — OLANZAPINE 10 MG/1
10 TABLET, ORALLY DISINTEGRATING ORAL ONCE
Status: COMPLETED | OUTPATIENT
Start: 2019-07-09 | End: 2019-07-09

## 2019-07-09 RX ORDER — LORAZEPAM 1 MG/1
1 TABLET ORAL ONCE
Status: COMPLETED | OUTPATIENT
Start: 2019-07-09 | End: 2019-07-09

## 2019-07-09 RX ORDER — NICOTINE 21 MG/24HR
1 PATCH, TRANSDERMAL 24 HOURS TRANSDERMAL DAILY
Status: DISCONTINUED | OUTPATIENT
Start: 2019-07-09 | End: 2019-07-15 | Stop reason: HOSPADM

## 2019-07-09 RX ORDER — NICOTINE 21 MG/24HR
PATCH, TRANSDERMAL 24 HOURS TRANSDERMAL
Status: COMPLETED
Start: 2019-07-09 | End: 2019-07-10

## 2019-07-09 RX ADMIN — SODIUM CHLORIDE 1000 ML: 9 INJECTION, SOLUTION INTRAVENOUS at 15:38

## 2019-07-09 RX ADMIN — INSULIN HUMAN 10 UNITS: 100 INJECTION, SOLUTION PARENTERAL at 15:38

## 2019-07-09 RX ADMIN — INSULIN HUMAN 5 UNITS: 100 INJECTION, SOLUTION PARENTERAL at 18:14

## 2019-07-09 RX ADMIN — LORAZEPAM 1 MG: 1 TABLET ORAL at 14:58

## 2019-07-09 RX ADMIN — OLANZAPINE 10 MG: 10 TABLET, ORALLY DISINTEGRATING ORAL at 18:15

## 2019-07-09 RX ADMIN — ZIPRASIDONE MESYLATE 20 MG: 20 INJECTION, POWDER, LYOPHILIZED, FOR SOLUTION INTRAMUSCULAR at 22:25

## 2019-07-09 ASSESSMENT — ENCOUNTER SYMPTOMS
WHEEZING: 0
SORE THROAT: 0
APNEA: 0
ABDOMINAL PAIN: 0
COLOR CHANGE: 0
BACK PAIN: 0
CHEST TIGHTNESS: 0
STRIDOR: 0
EYE PAIN: 0
CONSTIPATION: 0
SINUS PAIN: 0
NAUSEA: 0
ABDOMINAL DISTENTION: 0
DIARRHEA: 0
RECTAL PAIN: 0
SHORTNESS OF BREATH: 0
PHOTOPHOBIA: 0
EYE REDNESS: 0
VOMITING: 0
EYE DISCHARGE: 0
BLOOD IN STOOL: 0

## 2019-07-09 ASSESSMENT — PAIN SCALES - GENERAL: PAINLEVEL_OUTOF10: 4

## 2019-07-09 NOTE — PROGRESS NOTES
capsule 0    insulin regular (HUMULIN R) 100 UNIT/ML injection See Sliding Scale orders 1 vial 5    glucagon 1 MG injection Inject 1 mg into the muscle See Admin Instructions Follow package directions for low blood sugar. 1 kit 3    Insulin Syringe-Needle U-100 29G X 1/2\" 0.3 ML MISC 1 each by Does not apply route daily 100 each 3    Insulin Syringes, Disposable, U-100 0.3 ML MISC 1 each by Does not apply route daily 100 each 3    blood glucose monitor kit and supplies True metrix glucose monitor, test strips and lancets. Pt may test 4 times a day. DX:E11.65, Z79.4 1 kit 0    blood glucose monitor strips Test 4 times a day & as needed for symptoms of irregular blood glucose. Please give True Metrix test strips and lancets 200 strip 5    Lancets MISC 1 each by Does not apply route 4 times daily Test 4 times daily. DX: E11.65, Z79.4  True Metrix 200 each 5    EASY TOUCH SAFETY LANCETS 21G MISC 1 Device by Does not apply route 2 times daily 100 each 5    glimepiride (AMARYL) 2 MG tablet Take 1 tablet by mouth every morning (before breakfast) 30 tablet 5    risperiDONE (RISPERDAL) 2 MG tablet Take 1 tablet by mouth nightly 30 tablet 0    nabumetone (RELAFEN) 500 MG tablet Take 1 tablet by mouth 2 times daily 60 tablet 3    lisinopril (PRINIVIL;ZESTRIL) 10 MG tablet Take 0.5 tablets by mouth daily 30 tablet 2    insulin glargine (LANTUS) 100 UNIT/ML injection pen Inject 30 Units into the skin nightly (Patient taking differently: Inject 40 Units into the skin 2 times daily ) 15 pen 2    EASY TOUCH SAFETY LANCETS 21G MISC CHECK GLUCOSE TID AND  each 5    glucose monitoring kit (FREESTYLE) monitoring kit 1 kit by Does not apply route daily 1 kit 0     No current facility-administered medications for this visit.         Social History:   Social History     Socioeconomic History    Marital status: Single     Spouse name: Not on file    Number of children: Not on file    Years of education: Not on file  Highest education level: Not on file   Occupational History    Not on file   Social Needs    Financial resource strain: Not on file    Food insecurity:     Worry: Not on file     Inability: Not on file    Transportation needs:     Medical: Not on file     Non-medical: Not on file   Tobacco Use    Smoking status: Current Every Day Smoker     Packs/day: 1.50     Types: Cigarettes     Start date: 5/3/1999    Smokeless tobacco: Former User   Substance and Sexual Activity    Alcohol use: Not Currently    Drug use: Not on file    Sexual activity: Not on file   Lifestyle    Physical activity:     Days per week: Not on file     Minutes per session: Not on file    Stress: Not on file   Relationships    Social connections:     Talks on phone: Not on file     Gets together: Not on file     Attends Mandaeism service: Not on file     Active member of club or organization: Not on file     Attends meetings of clubs or organizations: Not on file     Relationship status: Not on file    Intimate partner violence:     Fear of current or ex partner: Not on file     Emotionally abused: Not on file     Physically abused: Not on file     Forced sexual activity: Not on file   Other Topics Concern    Not on file   Social History Narrative    Not on file       TOBACCO:   reports that he has been smoking cigarettes. He started smoking about 20 years ago. He has been smoking about 1.50 packs per day. He has quit using smokeless tobacco.  ETOH:   reports that he drank alcohol. Family History:   Family History   Adopted: Yes       Diagnosis:    Bipolar D/O      Diagnosis Date    ADHD     Anxiety     Depression     Diabetes ([facility] [address]      Problems related to the social environment, Occupational problems, Housing problems, Economic problems and Other psychosocial and environmental problems    Plan:  Taken to ER for mental health evaluation. Pt is voluntary for admission.  He is unable to see a medication provider in this

## 2019-07-09 NOTE — ED NOTES
No beds available at this facility.  Will need to transfer per Dr Sheryl Stallworth, RN  07/09/19 0510

## 2019-07-09 NOTE — ED PROVIDER NOTES
[facility] [place] EMERGENCY DEPT  eMERGENCYdEPARTMENT eNCOUnter      Pt Name: Timmy Oscar  MRN: [id number]  [place] 1970  Date of evaluation: 7/9/2019  ALEX Daniels NP    CHIEF COMPLAINT       Chief Complaint   Patient presents with    Mental Health Problem     Pt c/o anger issues with HI and SI Pt sent per [address] provider states increased anxiety and depression. HISTORY OF PRESENT ILLNESS  (Location/Symptom, Timing/Onset, Context/Setting, Quality, Duration, Modifying Factors, Severity.)   Timmy Oscar is a 46 y.o. male who presents to the emergency department for evaluation of worsening bipolar symptoms with increasing anger. Patient says he has been diagnosed with bipolar and ADHD in the past and has recently been off medications because he has moved. Patient denies suicidal or homicidal ideation at this time but says his anger is worsening. Patient says he has been unable to sleep at night due to racing thoughts. Complains of worsening anxiety. Says he is attempted suicide approximately 2 years ago but denies any new attempts. Patient says he wants to get help before his anger gets out of control. The history is provided by the patient. Nursing Notes were reviewed and I agree. REVIEW OF SYSTEMS    (2-9 systems for level 4, 10 or more for level 5)     Review of Systems   Constitutional: Negative for activity change, appetite change, chills, diaphoresis, fatigue and fever. HENT: Negative for congestion, ear pain, nosebleeds, sinus pain and sore throat. Eyes: Negative for photophobia, pain, discharge and redness. Respiratory: Negative for apnea, chest tightness, shortness of breath, wheezing and stridor. Cardiovascular: Negative for chest pain, palpitations and leg swelling. Gastrointestinal: Negative for abdominal distention, abdominal pain, blood in stool, constipation, diarrhea, nausea, rectal pain and vomiting.    Endocrine: Negative for cold is agitated. Thought content is not paranoid and not delusional. Cognition and memory are normal. He expresses no homicidal and no suicidal ideation. He expresses no suicidal plans and no homicidal plans. Nursing note and vitals reviewed. DIAGNOSTIC RESULTS     RADIOLOGY:   Non-plain film images such as CT, Ultrasound and MRI are read by the radiologist. Plain radiographic images are visualized and preliminarilyinterpreted by Nina Sofia MD with the below findings:        Interpretation per the Radiologist below, if available at the time of this note:    No orders to display       LABS:  Labs Reviewed   COMPREHENSIVE METABOLIC PANEL W/ REFLEX TO MG FOR LOW K - Abnormal; Notable for the following components:       Result Value    Sodium 132 (*)     Chloride 94 (*)     Glucose 444 (*)     All other components within normal limits   URINE DRUG SCREEN - Abnormal; Notable for the following components:    Amphetamine Screen, Urine Positive (*)     All other components within normal limits   POCT GLUCOSE - Abnormal; Notable for the following components:    POC Glucose 236 (*)     All other components within normal limits   POCT GLUCOSE - Abnormal; Notable for the following components:    POC Glucose 249 (*)     All other components within normal limits   POCT GLUCOSE - Abnormal; Notable for the following components:    POC Glucose 199 (*)     All other components within normal limits   POCT GLUCOSE - Normal   POCT GLUCOSE - Normal   CBC WITH AUTO DIFFERENTIAL   ETHANOL   SALICYLATE LEVEL   ACETAMINOPHEN LEVEL       All other labs were within normal range or notreturned as of this dictation.     RE-ASSESSMENT          EMERGENCY DEPARTMENT COURSE and DIFFERENTIAL DIAGNOSIS/MDM:   Vitals:    Vitals:    07/09/19 1416 07/09/19 1431 07/09/19 2240   BP: 139/86 (!) 144/85 (!) 147/91   Pulse: 98 89 88   Resp: 20 16 16   Temp: 98.2 °F (36.8 °C) 98.7 °F (37.1 °C) 98.6 °F (37 °C)   TempSrc:  Oral Temporal   SpO2: 95% 93% 96%

## 2019-07-10 ENCOUNTER — TELEPHONE (OUTPATIENT)
Dept: PSYCHIATRY | Age: 49
End: 2019-07-10

## 2019-07-10 PROBLEM — F31.32 BIPOLAR 1 DISORDER, DEPRESSED, MODERATE (HCC): Status: ACTIVE | Noted: 2019-07-10

## 2019-07-10 LAB
GLUCOSE BLD-MCNC: 203 MG/DL (ref 74–109)
GLUCOSE BLD-MCNC: 220 MG/DL (ref 70–99)
GLUCOSE BLD-MCNC: 239 MG/DL (ref 70–99)
GLUCOSE BLD-MCNC: 274 MG/DL
GLUCOSE BLD-MCNC: 274 MG/DL (ref 70–99)
GLUCOSE BLD-MCNC: 322 MG/DL
GLUCOSE BLD-MCNC: 322 MG/DL (ref 70–99)
GLUCOSE BLD-MCNC: 384 MG/DL (ref 70–99)
PERFORMED ON: ABNORMAL

## 2019-07-10 PROCEDURE — 99285 EMERGENCY DEPT VISIT HI MDM: CPT | Performed by: EMERGENCY MEDICINE

## 2019-07-10 PROCEDURE — 6370000000 HC RX 637 (ALT 250 FOR IP): Performed by: FAMILY MEDICINE

## 2019-07-10 PROCEDURE — 82948 REAGENT STRIP/BLOOD GLUCOSE: CPT

## 2019-07-10 PROCEDURE — 6370000000 HC RX 637 (ALT 250 FOR IP): Performed by: NURSE PRACTITIONER

## 2019-07-10 PROCEDURE — 6370000000 HC RX 637 (ALT 250 FOR IP): Performed by: EMERGENCY MEDICINE

## 2019-07-10 PROCEDURE — 36415 COLL VENOUS BLD VENIPUNCTURE: CPT

## 2019-07-10 PROCEDURE — 82947 ASSAY GLUCOSE BLOOD QUANT: CPT

## 2019-07-10 PROCEDURE — 1240000000 HC EMOTIONAL WELLNESS R&B

## 2019-07-10 RX ORDER — INSULIN GLARGINE 100 [IU]/ML
30 INJECTION, SOLUTION SUBCUTANEOUS NIGHTLY
Status: DISCONTINUED | OUTPATIENT
Start: 2019-07-10 | End: 2019-07-10

## 2019-07-10 RX ORDER — RISPERIDONE 1 MG/1
2 TABLET, FILM COATED ORAL NIGHTLY
Status: DISCONTINUED | OUTPATIENT
Start: 2019-07-10 | End: 2019-07-11

## 2019-07-10 RX ORDER — GABAPENTIN 300 MG/1
300 CAPSULE ORAL NIGHTLY
Status: DISCONTINUED | OUTPATIENT
Start: 2019-07-10 | End: 2019-07-11

## 2019-07-10 RX ORDER — GLIMEPIRIDE 2 MG/1
2 TABLET ORAL ONCE
Status: COMPLETED | OUTPATIENT
Start: 2019-07-10 | End: 2019-07-10

## 2019-07-10 RX ORDER — LISINOPRIL 5 MG/1
5 TABLET ORAL DAILY
Status: DISCONTINUED | OUTPATIENT
Start: 2019-07-10 | End: 2019-07-15 | Stop reason: HOSPADM

## 2019-07-10 RX ORDER — GLIMEPIRIDE 2 MG/1
2 TABLET ORAL
Status: DISCONTINUED | OUTPATIENT
Start: 2019-07-11 | End: 2019-07-15 | Stop reason: HOSPADM

## 2019-07-10 RX ORDER — INSULIN GLARGINE 100 [IU]/ML
40 INJECTION, SOLUTION SUBCUTANEOUS NIGHTLY
Status: DISCONTINUED | OUTPATIENT
Start: 2019-07-10 | End: 2019-07-13

## 2019-07-10 RX ORDER — NICOTINE POLACRILEX 4 MG
15 LOZENGE BUCCAL PRN
Status: DISCONTINUED | OUTPATIENT
Start: 2019-07-10 | End: 2019-07-15 | Stop reason: HOSPADM

## 2019-07-10 RX ORDER — ACETAMINOPHEN 325 MG/1
650 TABLET ORAL ONCE
Status: COMPLETED | OUTPATIENT
Start: 2019-07-10 | End: 2019-07-10

## 2019-07-10 RX ORDER — ACETAMINOPHEN 325 MG/1
650 TABLET ORAL EVERY 4 HOURS PRN
Status: DISCONTINUED | OUTPATIENT
Start: 2019-07-10 | End: 2019-07-15 | Stop reason: HOSPADM

## 2019-07-10 RX ORDER — INSULIN GLARGINE 100 [IU]/ML
40 INJECTION, SOLUTION SUBCUTANEOUS ONCE
Status: COMPLETED | OUTPATIENT
Start: 2019-07-10 | End: 2019-07-10

## 2019-07-10 RX ADMIN — GABAPENTIN 300 MG: 300 CAPSULE ORAL at 21:25

## 2019-07-10 RX ADMIN — INSULIN GLARGINE 40 UNITS: 100 INJECTION, SOLUTION SUBCUTANEOUS at 11:53

## 2019-07-10 RX ADMIN — RISPERIDONE 2 MG: 1 TABLET, FILM COATED ORAL at 21:25

## 2019-07-10 RX ADMIN — INSULIN GLARGINE 40 UNITS: 100 INJECTION, SOLUTION SUBCUTANEOUS at 21:25

## 2019-07-10 RX ADMIN — LISINOPRIL 5 MG: 5 TABLET ORAL at 18:24

## 2019-07-10 RX ADMIN — ACETAMINOPHEN 650 MG: 325 TABLET ORAL at 08:39

## 2019-07-10 RX ADMIN — GLIMEPIRIDE 2 MG: 2 TABLET ORAL at 08:35

## 2019-07-10 ASSESSMENT — SLEEP AND FATIGUE QUESTIONNAIRES
DIFFICULTY FALLING ASLEEP: YES
DO YOU USE A SLEEP AID: NO
SLEEP PATTERN: DIFFICULTY FALLING ASLEEP
DIFFICULTY ARISING: NO
RESTFUL SLEEP: NO
DIFFICULTY STAYING ASLEEP: YES
DO YOU HAVE DIFFICULTY SLEEPING: YES
AVERAGE NUMBER OF SLEEP HOURS: 2

## 2019-07-10 ASSESSMENT — LIFESTYLE VARIABLES: HISTORY_ALCOHOL_USE: NO

## 2019-07-10 ASSESSMENT — PATIENT HEALTH QUESTIONNAIRE - PHQ9: SUM OF ALL RESPONSES TO PHQ QUESTIONS 1-9: 18

## 2019-07-10 ASSESSMENT — PAIN SCALES - GENERAL: PAINLEVEL_OUTOF10: 2
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
## 2019-07-11 PROBLEM — F31.9 BIPOLAR DEPRESSION (HCC): Status: ACTIVE | Noted: 2019-07-11

## 2019-07-11 LAB
CHOLESTEROL, TOTAL: 163 MG/DL (ref 160–199)
GLUCOSE BLD-MCNC: 148 MG/DL (ref 74–109)
GLUCOSE BLD-MCNC: 164 MG/DL (ref 70–99)
GLUCOSE BLD-MCNC: 222 MG/DL (ref 70–99)
GLUCOSE BLD-MCNC: 272 MG/DL (ref 70–99)
GLUCOSE BLD-MCNC: 301 MG/DL (ref 74–109)
GLUCOSE BLD-MCNC: 363 MG/DL (ref 70–99)
HBA1C MFR BLD: 11 % (ref 4–6)
HDLC SERPL-MCNC: 28 MG/DL (ref 55–121)
LDL CHOLESTEROL CALCULATED: ABNORMAL MG/DL
LDL CHOLESTEROL DIRECT: 100 MG/DL
PERFORMED ON: ABNORMAL
TRIGL SERPL-MCNC: 433 MG/DL (ref 0–149)
TSH REFLEX FT4: 1.56 UIU/ML (ref 0.35–5.5)
VITAMIN B-12: 484 PG/ML (ref 211–946)
VITAMIN D 25-HYDROXY: 19.8 NG/ML

## 2019-07-11 PROCEDURE — 84443 ASSAY THYROID STIM HORMONE: CPT

## 2019-07-11 PROCEDURE — 82607 VITAMIN B-12: CPT

## 2019-07-11 PROCEDURE — 99223 1ST HOSP IP/OBS HIGH 75: CPT | Performed by: PSYCHIATRY & NEUROLOGY

## 2019-07-11 PROCEDURE — 80061 LIPID PANEL: CPT

## 2019-07-11 PROCEDURE — 6370000000 HC RX 637 (ALT 250 FOR IP): Performed by: NURSE PRACTITIONER

## 2019-07-11 PROCEDURE — 83036 HEMOGLOBIN GLYCOSYLATED A1C: CPT

## 2019-07-11 PROCEDURE — 82948 REAGENT STRIP/BLOOD GLUCOSE: CPT

## 2019-07-11 PROCEDURE — 6370000000 HC RX 637 (ALT 250 FOR IP): Performed by: PSYCHIATRY & NEUROLOGY

## 2019-07-11 PROCEDURE — 82947 ASSAY GLUCOSE BLOOD QUANT: CPT

## 2019-07-11 PROCEDURE — 82306 VITAMIN D 25 HYDROXY: CPT

## 2019-07-11 PROCEDURE — 36415 COLL VENOUS BLD VENIPUNCTURE: CPT

## 2019-07-11 PROCEDURE — 83721 ASSAY OF BLOOD LIPOPROTEIN: CPT

## 2019-07-11 PROCEDURE — 6370000000 HC RX 637 (ALT 250 FOR IP): Performed by: FAMILY MEDICINE

## 2019-07-11 PROCEDURE — 1240000000 HC EMOTIONAL WELLNESS R&B

## 2019-07-11 RX ORDER — ERGOCALCIFEROL (VITAMIN D2) 1250 MCG
50000 CAPSULE ORAL WEEKLY
Qty: 11 CAPSULE | Refills: 0 | Status: SHIPPED | OUTPATIENT
Start: 2019-07-11 | End: 2019-07-15

## 2019-07-11 RX ORDER — TRAZODONE HYDROCHLORIDE 100 MG/1
100 TABLET ORAL NIGHTLY
Status: DISCONTINUED | OUTPATIENT
Start: 2019-07-11 | End: 2019-07-15 | Stop reason: HOSPADM

## 2019-07-11 RX ORDER — GABAPENTIN 300 MG/1
300 CAPSULE ORAL 3 TIMES DAILY
Status: DISCONTINUED | OUTPATIENT
Start: 2019-07-11 | End: 2019-07-12

## 2019-07-11 RX ORDER — ERGOCALCIFEROL 1.25 MG/1
50000 CAPSULE ORAL WEEKLY
Status: DISCONTINUED | OUTPATIENT
Start: 2019-07-11 | End: 2019-07-15 | Stop reason: HOSPADM

## 2019-07-11 RX ORDER — LANOLIN ALCOHOL/MO/W.PET/CERES
3 CREAM (GRAM) TOPICAL NIGHTLY
Status: DISCONTINUED | OUTPATIENT
Start: 2019-07-11 | End: 2019-07-12

## 2019-07-11 RX ORDER — BUPROPION HYDROCHLORIDE 100 MG/1
100 TABLET, EXTENDED RELEASE ORAL 2 TIMES DAILY
Status: DISCONTINUED | OUTPATIENT
Start: 2019-07-11 | End: 2019-07-12

## 2019-07-11 RX ORDER — LANOLIN ALCOHOL/MO/W.PET/CERES
3 CREAM (GRAM) TOPICAL NIGHTLY PRN
Status: DISCONTINUED | OUTPATIENT
Start: 2019-07-11 | End: 2019-07-11

## 2019-07-11 RX ORDER — HYDROXYZINE HYDROCHLORIDE 25 MG/1
25 TABLET, FILM COATED ORAL EVERY 6 HOURS PRN
Status: DISCONTINUED | OUTPATIENT
Start: 2019-07-11 | End: 2019-07-15 | Stop reason: HOSPADM

## 2019-07-11 RX ADMIN — GLIMEPIRIDE 2 MG: 2 TABLET ORAL at 08:31

## 2019-07-11 RX ADMIN — HYDROXYZINE HYDROCHLORIDE 25 MG: 25 TABLET, FILM COATED ORAL at 17:04

## 2019-07-11 RX ADMIN — TRAZODONE HYDROCHLORIDE 100 MG: 100 TABLET ORAL at 20:55

## 2019-07-11 RX ADMIN — HYDROXYZINE HYDROCHLORIDE 25 MG: 25 TABLET, FILM COATED ORAL at 10:53

## 2019-07-11 RX ADMIN — INSULIN LISPRO 1 UNITS: 100 INJECTION, SOLUTION INTRAVENOUS; SUBCUTANEOUS at 12:25

## 2019-07-11 RX ADMIN — GABAPENTIN 300 MG: 300 CAPSULE ORAL at 20:55

## 2019-07-11 RX ADMIN — INSULIN LISPRO 3 UNITS: 100 INJECTION, SOLUTION INTRAVENOUS; SUBCUTANEOUS at 08:31

## 2019-07-11 RX ADMIN — ERGOCALCIFEROL 50000 UNITS: 1.25 CAPSULE ORAL at 16:51

## 2019-07-11 RX ADMIN — INSULIN LISPRO 2 UNITS: 100 INJECTION, SOLUTION INTRAVENOUS; SUBCUTANEOUS at 16:52

## 2019-07-11 RX ADMIN — INSULIN LISPRO 3 UNITS: 100 INJECTION, SOLUTION INTRAVENOUS; SUBCUTANEOUS at 20:56

## 2019-07-11 RX ADMIN — LISINOPRIL 5 MG: 5 TABLET ORAL at 08:31

## 2019-07-11 RX ADMIN — Medication 3 MG: at 20:55

## 2019-07-11 RX ADMIN — LURASIDONE HYDROCHLORIDE 40 MG: 40 TABLET, FILM COATED ORAL at 16:51

## 2019-07-11 RX ADMIN — INSULIN GLARGINE 40 UNITS: 100 INJECTION, SOLUTION SUBCUTANEOUS at 20:55

## 2019-07-11 RX ADMIN — METFORMIN HYDROCHLORIDE 500 MG: 500 TABLET ORAL at 16:51

## 2019-07-11 RX ADMIN — METFORMIN HYDROCHLORIDE 500 MG: 500 TABLET ORAL at 08:31

## 2019-07-11 RX ADMIN — BUPROPION HYDROCHLORIDE 100 MG: 100 TABLET, FILM COATED, EXTENDED RELEASE ORAL at 16:51

## 2019-07-11 RX ADMIN — GABAPENTIN 300 MG: 300 CAPSULE ORAL at 15:13

## 2019-07-11 NOTE — PROGRESS NOTES
SW placed call to patients friend for collateral and number is not reachable for [employer] at [phone]
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
## 2019-07-11 NOTE — H&P
HISTORY and PHYSICAL      CHIEF COMPLAINT:  Depression, SI    Reason for Admission:  Depression, SI    History Obtained From:  patient    HISTORY OF PRESENT ILLNESS:      The patient is a 46 y.o. male who is admitted to the [place] [address] unit with worsening mood issues. He has no c/o CP or SOA. No abdominal pain or N/V. No dysuria. No weakness or HA. No new pain issues. No fevers. Past Medical History:        Diagnosis Date    ADHD     Anxiety     Depression     Diabetes ([address]      Past Surgical History:        Procedure Laterality Date    NECK SURGERY           Medications Prior to Admission:    Medications Prior to Admission: metFORMIN (GLUCOPHAGE) 500 MG tablet, Take 500 mg by mouth 2 times daily (with meals)  insulin glargine (BASAGLAR KWIKPEN) 100 UNIT/ML injection pen, Inject 30 Units into the skin nightly  blood glucose monitor kit and supplies, True metrix glucose monitor, test strips and lancets. Pt may test 4 times a day. DX:E11.65, Z79.4  gabapentin (NEURONTIN) 300 MG capsule, Take 1 capsule by mouth nightly for 90 days. insulin regular (HUMULIN R) 100 UNIT/ML injection, See Sliding Scale orders  glucagon 1 MG injection, Inject 1 mg into the muscle See Admin Instructions Follow package directions for low blood sugar. Insulin Syringe-Needle U-100 29G X 1/2\" 0.3 ML MISC, 1 each by Does not apply route daily  Insulin Syringes, Disposable, U-100 0.3 ML MISC, 1 each by Does not apply route daily  blood glucose monitor strips, Test 4 times a day & as needed for symptoms of irregular blood glucose. Please give True Metrix test strips and lancets  Lancets MISC, 1 each by Does not apply route 4 times daily Test 4 times daily.  DX: E11.65, Z79.4 True Metrix  EASY TOUCH SAFETY LANCETS 21G MISC, 1 Device by Does not apply route 2 times daily  glimepiride (AMARYL) 2 MG tablet, Take 1 tablet by mouth every morning (before breakfast)  risperiDONE (RISPERDAL) 2

## 2019-07-11 NOTE — PROGRESS NOTES
[facility] Adult Unit Daily Assessment  Nursing Progress Note    Room: [facility]   Name: Rosa Bautista   Age: 46 y.o. Gender: male   Dx: <principal problem not specified>  Precautions: close watch and suicide risk  Inpatient Status: voluntary       Sleep: reports poor sleep prior to arrival   Sleep Quality Poor   Hours Slept: see rounding sheet   Sleep Medications: Yes  PRN Sleep Meds: No       Other PRN Meds: No   Med Compliant: Yes   Accu-Chek: Yes AC & HS   Oxygen: No  CIWA/CINA: No          SI denies suicidal ideation    HI Negative for homicidal ideation        AVH:Absent      Depression: 10   Anxiety: 10       Appetite: no change from normal    Social: No   Speech: normal   Appearance: appropriately dressed and healthy looking    Group Participation: No  Participation LevelNone    Participation QualityResistant    Notes: Pt calm and cooperative, A&Ox4 at med pass. Pt in bed most of the evening. Reports a bump on his arm he would like to show Dr. Luis Eduardo Best.     Electronically signed by Broderick Cheney RN on 7/10/19 at 11:32 PM

## 2019-07-12 LAB
GLUCOSE BLD-MCNC: 162 MG/DL (ref 70–99)
GLUCOSE BLD-MCNC: 199 MG/DL (ref 70–99)
GLUCOSE BLD-MCNC: 218 MG/DL (ref 70–99)
GLUCOSE BLD-MCNC: 362 MG/DL (ref 70–99)
PERFORMED ON: ABNORMAL

## 2019-07-12 PROCEDURE — 1240000000 HC EMOTIONAL WELLNESS R&B

## 2019-07-12 PROCEDURE — 6370000000 HC RX 637 (ALT 250 FOR IP): Performed by: FAMILY MEDICINE

## 2019-07-12 PROCEDURE — 99233 SBSQ HOSP IP/OBS HIGH 50: CPT | Performed by: PSYCHIATRY & NEUROLOGY

## 2019-07-12 PROCEDURE — 6370000000 HC RX 637 (ALT 250 FOR IP): Performed by: PSYCHIATRY & NEUROLOGY

## 2019-07-12 PROCEDURE — 6370000000 HC RX 637 (ALT 250 FOR IP): Performed by: NURSE PRACTITIONER

## 2019-07-12 PROCEDURE — 82948 REAGENT STRIP/BLOOD GLUCOSE: CPT

## 2019-07-12 RX ORDER — LANOLIN ALCOHOL/MO/W.PET/CERES
6 CREAM (GRAM) TOPICAL NIGHTLY
Status: DISCONTINUED | OUTPATIENT
Start: 2019-07-12 | End: 2019-07-15 | Stop reason: HOSPADM

## 2019-07-12 RX ORDER — BUPROPION HYDROCHLORIDE 150 MG/1
150 TABLET, EXTENDED RELEASE ORAL 2 TIMES DAILY
Status: DISCONTINUED | OUTPATIENT
Start: 2019-07-12 | End: 2019-07-15 | Stop reason: HOSPADM

## 2019-07-12 RX ORDER — GABAPENTIN 400 MG/1
400 CAPSULE ORAL 3 TIMES DAILY
Status: DISCONTINUED | OUTPATIENT
Start: 2019-07-12 | End: 2019-07-15 | Stop reason: HOSPADM

## 2019-07-12 RX ORDER — OXYMETAZOLINE HYDROCHLORIDE 0.05 G/100ML
2 SPRAY NASAL 2 TIMES DAILY PRN
Status: DISPENSED | OUTPATIENT
Start: 2019-07-12 | End: 2019-07-15

## 2019-07-12 RX ADMIN — INSULIN LISPRO 1 UNITS: 100 INJECTION, SOLUTION INTRAVENOUS; SUBCUTANEOUS at 18:24

## 2019-07-12 RX ADMIN — Medication 6 MG: at 20:48

## 2019-07-12 RX ADMIN — INSULIN LISPRO 1 UNITS: 100 INJECTION, SOLUTION INTRAVENOUS; SUBCUTANEOUS at 12:12

## 2019-07-12 RX ADMIN — GABAPENTIN 300 MG: 300 CAPSULE ORAL at 09:06

## 2019-07-12 RX ADMIN — BUPROPION HYDROCHLORIDE 150 MG: 150 TABLET, EXTENDED RELEASE ORAL at 15:10

## 2019-07-12 RX ADMIN — GLIMEPIRIDE 2 MG: 2 TABLET ORAL at 06:15

## 2019-07-12 RX ADMIN — ACETAMINOPHEN 650 MG: 325 TABLET ORAL at 09:01

## 2019-07-12 RX ADMIN — LURASIDONE HYDROCHLORIDE 40 MG: 40 TABLET, FILM COATED ORAL at 09:05

## 2019-07-12 RX ADMIN — Medication 2 SPRAY: at 15:10

## 2019-07-12 RX ADMIN — INSULIN LISPRO 2 UNITS: 100 INJECTION, SOLUTION INTRAVENOUS; SUBCUTANEOUS at 09:10

## 2019-07-12 RX ADMIN — INSULIN LISPRO 3 UNITS: 100 INJECTION, SOLUTION INTRAVENOUS; SUBCUTANEOUS at 20:53

## 2019-07-12 RX ADMIN — LISINOPRIL 5 MG: 5 TABLET ORAL at 09:07

## 2019-07-12 RX ADMIN — INSULIN GLARGINE 40 UNITS: 100 INJECTION, SOLUTION SUBCUTANEOUS at 20:54

## 2019-07-12 RX ADMIN — GABAPENTIN 400 MG: 400 CAPSULE ORAL at 20:48

## 2019-07-12 RX ADMIN — GABAPENTIN 400 MG: 400 CAPSULE ORAL at 15:10

## 2019-07-12 RX ADMIN — HYDROXYZINE HYDROCHLORIDE 25 MG: 25 TABLET, FILM COATED ORAL at 09:04

## 2019-07-12 RX ADMIN — BUPROPION HYDROCHLORIDE 100 MG: 100 TABLET, FILM COATED, EXTENDED RELEASE ORAL at 09:06

## 2019-07-12 RX ADMIN — TRAZODONE HYDROCHLORIDE 100 MG: 100 TABLET ORAL at 20:47

## 2019-07-12 ASSESSMENT — PAIN SCALES - GENERAL: PAINLEVEL_OUTOF10: 7

## 2019-07-12 NOTE — PROGRESS NOTES
3. Disposition:    Discharge patient home when he will be in stable mental condition and adjustment psychotropic medications will be done.      Amount of time spent with patient:    35 minutes with greater than 50% of the time spent in counseling and collaboration of care

## 2019-07-12 NOTE — PROGRESS NOTES
[facility] Adult Unit Daily Assessment  Nursing Progress Note    Room: [address] [place] [address]   Name: Rick Sapp   Age: 46 y.o. Gender: male   Dx: <principal problem not specified>  Precautions: suicide risk and AP  Inpatient Status: voluntary       Sleep: Yes,   Sleep Quality Good   Hours Slept: asleep at this time   Sleep Medications: Yes, Melatonin 3mg & Trazodone 100mg at HS  PRN Sleep Meds: No       Other PRN Meds: No   Med Compliant: Yes   Accu-Chek: Yes, 363 this HS   Oxygen: No  CIWA/CINA: No          SI denies suicidal ideation    HI Negative for homicidal ideation        AVH:Absent      Depression: 10   Anxiety: 10       Appetite: good    Social: No   Speech: normal   Appearance: appropriately dressed and healthy looking    Group Participation: No  Participation LevelNone    Participation Mai Sauce, refused to complete wrap-up sheet    Notes: Pt stayed in his room this evening, talking with roommate at times. Pt irritable. Pt suspicious of insulin.       Electronically signed by Carmine Helton RN on 7/11/2019 at 11:58 PM

## 2019-07-12 NOTE — PLAN OF CARE
Problem: Anger Management/Homicidal Ideation:  Goal: Able to display appropriate communication and problem solving  Description  Able to display appropriate communication and problem solving  Outcome: Ongoing  Goal: Ability to verbalize frustrations and anger appropriately will improve  Description  Ability to verbalize frustrations and anger appropriately will improve  Outcome: Ongoing  Goal: Absence of angry outbursts  Description  Absence of angry outbursts  Outcome: Ongoing  Goal: Absence of homicidal ideation  Description  Absence of homicidal ideation  Outcome: Ongoing  Goal: Participates in care planning  Description  Participates in care planning  Outcome: Ongoing  Goal: Patient specific goal  Description  Patient specific goal  Outcome: Ongoing     Problem: Altered Mood, Depressive Behavior:  Goal: Able to verbalize acceptance of life and situations over which he or she has no control  Description  Able to verbalize acceptance of life and situations over which he or she has no control  7/11/2019 1912 by Tirso Carrasco RN  Outcome: Ongoing  7/11/2019 1525 by Kita Goncalves  Outcome: Ongoing  Note:                                                                       Group Therapy Note    Date: 7/11/2019  Start Time: 1430  End Time:  1530  Number of Participants: 7    Type of Group: Recovery    Wellness Binder Information  Module Name:  Social Wellness  Session Number:  4    Patient's Goal:  To raise awareness of aspects of healthy relationships    Notes:  Pt demonstrated improved awareness of aspects of healthy relationships by actively participating in group discussion.     Status After Intervention:  Unchanged    Participation Level: Interactive    Participation Quality: Attentive      Speech:  normal      Thought Process/Content: Logical      Affective Functioning: Congruent      Mood: anxious and depressed      Level of consciousness:  Alert and Oriented x4      Response to Learning: Able to verbalize self-harm  Description  Absence of self-harm  Outcome: Ongoing  Goal: Patient specific goal  Description  Patient specific goal  Outcome: Ongoing  Goal: Participates in care planning  Description  Participates in care planning  Outcome: Ongoing

## 2019-07-13 PROBLEM — F31.9 BIPOLAR DEPRESSION (HCC): Status: RESOLVED | Noted: 2019-07-11 | Resolved: 2019-07-13

## 2019-07-13 LAB
GLUCOSE BLD-MCNC: 180 MG/DL (ref 70–99)
GLUCOSE BLD-MCNC: 265 MG/DL (ref 70–99)
GLUCOSE BLD-MCNC: 311 MG/DL (ref 70–99)
GLUCOSE BLD-MCNC: 333 MG/DL (ref 70–99)
PERFORMED ON: ABNORMAL

## 2019-07-13 PROCEDURE — 87075 CULTR BACTERIA EXCEPT BLOOD: CPT

## 2019-07-13 PROCEDURE — 6370000000 HC RX 637 (ALT 250 FOR IP): Performed by: FAMILY MEDICINE

## 2019-07-13 PROCEDURE — 99231 SBSQ HOSP IP/OBS SF/LOW 25: CPT | Performed by: NURSE PRACTITIONER

## 2019-07-13 PROCEDURE — 6370000000 HC RX 637 (ALT 250 FOR IP): Performed by: NURSE PRACTITIONER

## 2019-07-13 PROCEDURE — 99252 IP/OBS CONSLTJ NEW/EST SF 35: CPT | Performed by: SURGERY

## 2019-07-13 PROCEDURE — 6370000000 HC RX 637 (ALT 250 FOR IP): Performed by: PSYCHIATRY & NEUROLOGY

## 2019-07-13 PROCEDURE — 2500000003 HC RX 250 WO HCPCS: Performed by: SURGERY

## 2019-07-13 PROCEDURE — 87070 CULTURE OTHR SPECIMN AEROBIC: CPT

## 2019-07-13 PROCEDURE — 87205 SMEAR GRAM STAIN: CPT

## 2019-07-13 PROCEDURE — 6370000000 HC RX 637 (ALT 250 FOR IP): Performed by: SURGERY

## 2019-07-13 PROCEDURE — 82948 REAGENT STRIP/BLOOD GLUCOSE: CPT

## 2019-07-13 PROCEDURE — 1240000000 HC EMOTIONAL WELLNESS R&B

## 2019-07-13 PROCEDURE — 10061 I&D ABSCESS COMP/MULTIPLE: CPT | Performed by: SURGERY

## 2019-07-13 RX ORDER — IBUPROFEN 400 MG/1
400 TABLET ORAL EVERY 6 HOURS PRN
Status: DISCONTINUED | OUTPATIENT
Start: 2019-07-13 | End: 2019-07-15 | Stop reason: HOSPADM

## 2019-07-13 RX ORDER — LIDOCAINE HYDROCHLORIDE 10 MG/ML
5 INJECTION, SOLUTION EPIDURAL; INFILTRATION; INTRACAUDAL; PERINEURAL ONCE
Status: COMPLETED | OUTPATIENT
Start: 2019-07-13 | End: 2019-07-13

## 2019-07-13 RX ORDER — HYDROCODONE BITARTRATE AND ACETAMINOPHEN 5; 325 MG/1; MG/1
1 TABLET ORAL EVERY 6 HOURS PRN
Status: DISCONTINUED | OUTPATIENT
Start: 2019-07-13 | End: 2019-07-14

## 2019-07-13 RX ORDER — INSULIN GLARGINE 100 [IU]/ML
45 INJECTION, SOLUTION SUBCUTANEOUS NIGHTLY
Status: DISCONTINUED | OUTPATIENT
Start: 2019-07-13 | End: 2019-07-15 | Stop reason: HOSPADM

## 2019-07-13 RX ORDER — SULFAMETHOXAZOLE AND TRIMETHOPRIM 800; 160 MG/1; MG/1
1 TABLET ORAL EVERY 12 HOURS SCHEDULED
Status: DISCONTINUED | OUTPATIENT
Start: 2019-07-13 | End: 2019-07-15 | Stop reason: HOSPADM

## 2019-07-13 RX ADMIN — GABAPENTIN 400 MG: 400 CAPSULE ORAL at 20:40

## 2019-07-13 RX ADMIN — LIDOCAINE HYDROCHLORIDE 5 ML: 10 INJECTION, SOLUTION EPIDURAL; INFILTRATION; INTRACAUDAL; PERINEURAL at 14:36

## 2019-07-13 RX ADMIN — METFORMIN HYDROCHLORIDE 500 MG: 500 TABLET ORAL at 16:52

## 2019-07-13 RX ADMIN — IBUPROFEN 400 MG: 400 TABLET ORAL at 11:00

## 2019-07-13 RX ADMIN — INSULIN LISPRO 3 UNITS: 100 INJECTION, SOLUTION INTRAVENOUS; SUBCUTANEOUS at 16:57

## 2019-07-13 RX ADMIN — HYDROXYZINE HYDROCHLORIDE 25 MG: 25 TABLET, FILM COATED ORAL at 18:02

## 2019-07-13 RX ADMIN — HYDROCODONE BITARTRATE AND ACETAMINOPHEN 1 TABLET: 5; 325 TABLET ORAL at 16:51

## 2019-07-13 RX ADMIN — INSULIN LISPRO 4 UNITS: 100 INJECTION, SOLUTION INTRAVENOUS; SUBCUTANEOUS at 08:15

## 2019-07-13 RX ADMIN — SULFAMETHOXAZOLE AND TRIMETHOPRIM 1 TABLET: 800; 160 TABLET ORAL at 21:05

## 2019-07-13 RX ADMIN — ACETAMINOPHEN 650 MG: 325 TABLET ORAL at 20:52

## 2019-07-13 RX ADMIN — INSULIN LISPRO 2 UNITS: 100 INJECTION, SOLUTION INTRAVENOUS; SUBCUTANEOUS at 20:49

## 2019-07-13 RX ADMIN — GABAPENTIN 400 MG: 400 CAPSULE ORAL at 08:16

## 2019-07-13 RX ADMIN — BUPROPION HYDROCHLORIDE 150 MG: 150 TABLET, EXTENDED RELEASE ORAL at 16:52

## 2019-07-13 RX ADMIN — GLIMEPIRIDE 2 MG: 2 TABLET ORAL at 06:50

## 2019-07-13 RX ADMIN — Medication 6 MG: at 20:51

## 2019-07-13 RX ADMIN — INSULIN LISPRO 1 UNITS: 100 INJECTION, SOLUTION INTRAVENOUS; SUBCUTANEOUS at 12:03

## 2019-07-13 RX ADMIN — LISINOPRIL 5 MG: 5 TABLET ORAL at 08:17

## 2019-07-13 RX ADMIN — SULFAMETHOXAZOLE AND TRIMETHOPRIM 1 TABLET: 800; 160 TABLET ORAL at 11:00

## 2019-07-13 RX ADMIN — GABAPENTIN 400 MG: 400 CAPSULE ORAL at 14:34

## 2019-07-13 RX ADMIN — TRAZODONE HYDROCHLORIDE 100 MG: 100 TABLET ORAL at 20:40

## 2019-07-13 RX ADMIN — LURASIDONE HYDROCHLORIDE 40 MG: 40 TABLET, FILM COATED ORAL at 08:17

## 2019-07-13 RX ADMIN — BUPROPION HYDROCHLORIDE 150 MG: 150 TABLET, EXTENDED RELEASE ORAL at 08:17

## 2019-07-13 RX ADMIN — INSULIN GLARGINE 45 UNITS: 100 INJECTION, SOLUTION SUBCUTANEOUS at 20:47

## 2019-07-13 ASSESSMENT — ENCOUNTER SYMPTOMS
EYE PAIN: 0
ABDOMINAL DISTENTION: 0
SHORTNESS OF BREATH: 0
COUGH: 0
ABDOMINAL PAIN: 0
COLOR CHANGE: 1
EYE REDNESS: 0
SORE THROAT: 0

## 2019-07-13 ASSESSMENT — PAIN SCALES - GENERAL
PAINLEVEL_OUTOF10: 8
PAINLEVEL_OUTOF10: 5
PAINLEVEL_OUTOF10: 4

## 2019-07-13 NOTE — PROGRESS NOTES
Progress Note  Carla Ku  7/13/2019 5:22 AM  Subjective:   Admit Date:   7/9/2019      CC/ADMIT DX:       Interval History:   Reviewed overnight events and nursing notes. He has no medical concerns. I have reviewed all labs/diagnostics from the last 24hrs. ROS:   I have done a 10 point ROS and all are negative, except what is mentioned in the HPI. DIET CARB CONTROL;    Medications:      insulin glargine  45 Units Subcutaneous Nightly    melatonin  6 mg Oral Nightly    buPROPion  150 mg Oral BID    gabapentin  400 mg Oral TID    vitamin D  50,000 Units Oral Weekly    lurasidone  40 mg Oral Daily    traZODone  100 mg Oral Nightly    glimepiride  2 mg Oral QAM AC    lisinopril  5 mg Oral Daily    metFORMIN  500 mg Oral BID WC    insulin lispro  0-6 Units Subcutaneous TID WC    insulin lispro  0-3 Units Subcutaneous Nightly    nicotine  1 patch Transdermal Daily           Objective:   Vitals: /78   Pulse 85   Temp 97.6 °F (36.4 °C) (Temporal)   Resp 20   Ht 5' 11\" (1.803 m)   Wt 180 lb (81.6 kg)   SpO2 100%   BMI 25.10 kg/m²  No intake or output data in the 24 hours ending 07/13/19 0522  General appearance: alert and cooperative with exam  Lungs: clear to auscultation bilaterally  Heart: RRR  Abdomen: soft, non-tender; bowel sounds normal; no masses,  no organomegaly  Extremities: extremities normal, atraumatic, no cyanosis or edema  Neurologic:  No obvious focal neurologic deficits. Assessment and Plan: Active Problems:    Bipolar 1 disorder, depressed, moderate (HCC)    Bipolar depression ([facility] [address]  Resolved Problems:    * No resolved hospital problems. *    Vit D Def    DM2    Plan:  1. Continue present medication(s)   2. Follow with Psych  3. Adjust insulin and follow        Discharge planning:   home  Reviewed treatment plans with the patient and/or family.              Electronically signed by William Tejeda MD on 7/13/2019 at 5:22 AM

## 2019-07-13 NOTE — PLAN OF CARE
Patient has remained free of self harm this shift. Electronically signed by Octavio Hermosillo RN on 7/13/2019 at 11:10 AM

## 2019-07-13 NOTE — PROGRESS NOTES
[address] [place] [address]      Psychiatric Progress Note    Name:  Marya Tijerina  Date:  7/13/2019  Age:  46 y.o. Sex:  male  Ethnicity:   Primary Care Physician:  Carolyn Pearce MD   Patient Care Team:  Patient Care Team:  Medhat Fitzgerald MD as PCP - General (Family Medicine)  Medhat Fitzgerald MD as PCP - [facility] Empaneled Provider  Chief Complaint: Ralph Mason was feeling pretty depressed. \"        Historian:patient  Complaint Type: anxiety, decreased appetite, depression, fatigue, loss of interest in favorite activities, sleep disturbance and tobacco use  Course of Symptoms: ongoing  Precipitating Factors: history of mental illness        Subjective  Patient reports sleep as \" not that good but that is normal for me. \" He reports passive SI. He denies HI at this time. He reports auditory hallucinations when he is \"alone. \" Today he reports pain in his left upper extremity and has an abscess. He reports that this is his third abscess that he has had to have drained, one that was located on his neck and the other on his left ankle. He reports that he is not \"feeling well today. \"  Patient has been calm and cooperative with staff and peers. Patient has been compliant with medications. Patient has been attending groups. Patient reports appetite as \"good. \"  Patient reports no side effects from medications.           Previous Psychiatric/Substance Use History      Medical History:  Past Medical History:   Diagnosis Date    ADHD     Anxiety     Depression     Diabetes ([facility] [address]         WARNER History:   Social History     Substance and Sexual Activity   Alcohol Use Not Currently         Social History     Substance and Sexual Activity   Drug Use Not on file        Social History     Tobacco Use   Smoking Status Current Every Day Smoker    Packs/day: 1.50    Types: Cigarettes    Start date: 5/3/1999   Smokeless Tobacco Former User        Family History:     Family History   Adopted: Yes         Vital Signs:  Last set of tests and vitals:  Vitals:    07/13/19 0726   BP: 120/78   Pulse: 80   Resp: 20   Temp: 97.3 °F (36.3 °C)   SpO2: 98%          Mental Status:  Level of consciousness:  within normal limits and awake  Appearance:  well-appearing, street clothes, in chair, good grooming and good hygiene  Behavior/Motor:  no abnormalities noted  Attitude toward examiner:  cooperative, attentive and good eye contact  Speech:  normal rate and normal volume  Mood: \"I am feeling the same. \"  Affect:  flat  Thought processes:  linear and goal directed  Thought content:  Homocidal ideation :denies  Suicidal Ideation:  passive  Delusions:  no evidence of delusions  Perceptual Disturbance:  auditory  Cognition:  oriented to person, place, and time  Concentration : fair  Memory intact for recent and remote  Fund of knowledge:  average  Abstract thinking:  adequate  Insight:  fair  Judgment:  fair     insulin glargine  45 Units Subcutaneous Nightly    melatonin  6 mg Oral Nightly    buPROPion  150 mg Oral BID    gabapentin  400 mg Oral TID    vitamin D  50,000 Units Oral Weekly    lurasidone  40 mg Oral Daily    traZODone  100 mg Oral Nightly    glimepiride  2 mg Oral QAM AC    lisinopril  5 mg Oral Daily    metFORMIN  500 mg Oral BID WC    insulin lispro  0-6 Units Subcutaneous TID WC    insulin lispro  0-3 Units Subcutaneous Nightly    nicotine  1 patch Transdermal Daily       Current Medications:  Current Facility-Administered Medications   Medication Dose Route Frequency Provider Last Rate Last Dose    insulin glargine (LANTUS) injection vial 45 Units  45 Units Subcutaneous Nightly Namrata Ratliff MD        ibuprofen (ADVIL;MOTRIN) tablet 400 mg  400 mg Oral Q6H PRN ALEX Gordon        oxymetazoline (AFRIN) 0.05 % nasal spray 2 spray  2 spray Each Nostril BID PRN Leonard Walton MD   2 spray at 07/12/19 1510    melatonin tablet 6 mg  6 mg Oral Nightly Leonard Walton MD   6 mg at 07/12/19 2048    buPROPion [facility] Plan:    Encourage group therapy  15 minute safety checks  Medical monitoring by Dr. Moi Antoine and associates  Continue current therapy and medications  Consult Surgeon on call for left upper arm abcess    Amount of time spent with patient:  15 minutes with greater than 50% of the time spent in counseling and collaboration of care.     ALEX Barrett  Clinician Signature: signed electronically

## 2019-07-14 LAB
GLUCOSE BLD-MCNC: 159 MG/DL (ref 70–99)
GLUCOSE BLD-MCNC: 207 MG/DL (ref 70–99)
GLUCOSE BLD-MCNC: 231 MG/DL (ref 70–99)
GLUCOSE BLD-MCNC: 246 MG/DL (ref 70–99)
PERFORMED ON: ABNORMAL

## 2019-07-14 PROCEDURE — 6370000000 HC RX 637 (ALT 250 FOR IP): Performed by: NURSE PRACTITIONER

## 2019-07-14 PROCEDURE — 6370000000 HC RX 637 (ALT 250 FOR IP): Performed by: PSYCHIATRY & NEUROLOGY

## 2019-07-14 PROCEDURE — 6370000000 HC RX 637 (ALT 250 FOR IP): Performed by: SURGERY

## 2019-07-14 PROCEDURE — 6370000000 HC RX 637 (ALT 250 FOR IP): Performed by: FAMILY MEDICINE

## 2019-07-14 PROCEDURE — 82948 REAGENT STRIP/BLOOD GLUCOSE: CPT

## 2019-07-14 PROCEDURE — 1240000000 HC EMOTIONAL WELLNESS R&B

## 2019-07-14 RX ORDER — HYDROCODONE BITARTRATE AND ACETAMINOPHEN 7.5; 325 MG/1; MG/1
1 TABLET ORAL EVERY 6 HOURS PRN
Status: DISCONTINUED | OUTPATIENT
Start: 2019-07-14 | End: 2019-07-15 | Stop reason: HOSPADM

## 2019-07-14 RX ADMIN — IBUPROFEN 400 MG: 400 TABLET ORAL at 13:34

## 2019-07-14 RX ADMIN — HYDROCODONE BITARTRATE AND ACETAMINOPHEN 1 TABLET: 7.5; 325 TABLET ORAL at 23:03

## 2019-07-14 RX ADMIN — GABAPENTIN 400 MG: 400 CAPSULE ORAL at 19:53

## 2019-07-14 RX ADMIN — SULFAMETHOXAZOLE AND TRIMETHOPRIM 1 TABLET: 800; 160 TABLET ORAL at 19:53

## 2019-07-14 RX ADMIN — IBUPROFEN 400 MG: 400 TABLET ORAL at 19:52

## 2019-07-14 RX ADMIN — GABAPENTIN 400 MG: 400 CAPSULE ORAL at 13:34

## 2019-07-14 RX ADMIN — LISINOPRIL 5 MG: 5 TABLET ORAL at 08:45

## 2019-07-14 RX ADMIN — TRAZODONE HYDROCHLORIDE 100 MG: 100 TABLET ORAL at 19:53

## 2019-07-14 RX ADMIN — HYDROXYZINE HYDROCHLORIDE 25 MG: 25 TABLET, FILM COATED ORAL at 17:03

## 2019-07-14 RX ADMIN — HYDROCODONE BITARTRATE AND ACETAMINOPHEN 1 TABLET: 7.5; 325 TABLET ORAL at 16:52

## 2019-07-14 RX ADMIN — INSULIN LISPRO 1 UNITS: 100 INJECTION, SOLUTION INTRAVENOUS; SUBCUTANEOUS at 20:01

## 2019-07-14 RX ADMIN — GLIMEPIRIDE 2 MG: 2 TABLET ORAL at 06:32

## 2019-07-14 RX ADMIN — HYDROCODONE BITARTRATE AND ACETAMINOPHEN 1 TABLET: 5; 325 TABLET ORAL at 06:36

## 2019-07-14 RX ADMIN — Medication 6 MG: at 19:53

## 2019-07-14 RX ADMIN — LURASIDONE HYDROCHLORIDE 40 MG: 40 TABLET, FILM COATED ORAL at 08:45

## 2019-07-14 RX ADMIN — HYDROXYZINE HYDROCHLORIDE 25 MG: 25 TABLET, FILM COATED ORAL at 11:36

## 2019-07-14 RX ADMIN — SULFAMETHOXAZOLE AND TRIMETHOPRIM 1 TABLET: 800; 160 TABLET ORAL at 08:45

## 2019-07-14 RX ADMIN — GABAPENTIN 400 MG: 400 CAPSULE ORAL at 08:45

## 2019-07-14 RX ADMIN — BUPROPION HYDROCHLORIDE 150 MG: 150 TABLET, EXTENDED RELEASE ORAL at 08:45

## 2019-07-14 RX ADMIN — INSULIN LISPRO 2 UNITS: 100 INJECTION, SOLUTION INTRAVENOUS; SUBCUTANEOUS at 08:44

## 2019-07-14 RX ADMIN — INSULIN GLARGINE 45 UNITS: 100 INJECTION, SOLUTION SUBCUTANEOUS at 20:00

## 2019-07-14 RX ADMIN — HYDROCODONE BITARTRATE AND ACETAMINOPHEN 1 TABLET: 5; 325 TABLET ORAL at 11:36

## 2019-07-14 RX ADMIN — BUPROPION HYDROCHLORIDE 150 MG: 150 TABLET, EXTENDED RELEASE ORAL at 16:51

## 2019-07-14 RX ADMIN — INSULIN LISPRO 2 UNITS: 100 INJECTION, SOLUTION INTRAVENOUS; SUBCUTANEOUS at 11:28

## 2019-07-14 ASSESSMENT — PAIN SCALES - GENERAL
PAINLEVEL_OUTOF10: 4
PAINLEVEL_OUTOF10: 8
PAINLEVEL_OUTOF10: 10
PAINLEVEL_OUTOF10: 5
PAINLEVEL_OUTOF10: 7
PAINLEVEL_OUTOF10: 7

## 2019-07-14 NOTE — PROGRESS NOTES
Noted slight redness,swollen,warm to touch and tender with touch to left upper arm. No drainage. Patient stated he had notice the redness and swelling about six days ago.

## 2019-07-14 NOTE — GROUP NOTE
Group Therapy Note    Date: July 14    Group Start Time: 1430  Group End Time: 1525  Group Topic: Cognitive Skills    [facility]    Josh Lambert             Patient's Goal: Building Happiness    Notes:  SW used verbal prompts to encourage pt to attend group but pt refused.       Discipline Responsible: Psychoeducational Specialist      Signature:  Josh Lambert

## 2019-07-15 VITALS
TEMPERATURE: 96.9 F | WEIGHT: 180 LBS | OXYGEN SATURATION: 99 % | BODY MASS INDEX: 25.2 KG/M2 | RESPIRATION RATE: 20 BRPM | HEART RATE: 78 BPM | SYSTOLIC BLOOD PRESSURE: 103 MMHG | DIASTOLIC BLOOD PRESSURE: 61 MMHG | HEIGHT: 71 IN

## 2019-07-15 LAB
GLUCOSE BLD-MCNC: 189 MG/DL (ref 70–99)
GLUCOSE BLD-MCNC: 408 MG/DL (ref 70–99)
PERFORMED ON: ABNORMAL
PERFORMED ON: ABNORMAL

## 2019-07-15 PROCEDURE — 99239 HOSP IP/OBS DSCHRG MGMT >30: CPT | Performed by: PSYCHIATRY & NEUROLOGY

## 2019-07-15 PROCEDURE — 6370000000 HC RX 637 (ALT 250 FOR IP): Performed by: NURSE PRACTITIONER

## 2019-07-15 PROCEDURE — 6370000000 HC RX 637 (ALT 250 FOR IP): Performed by: SURGERY

## 2019-07-15 PROCEDURE — 5130000000 HC BRIDGE APPOINTMENT

## 2019-07-15 PROCEDURE — 82948 REAGENT STRIP/BLOOD GLUCOSE: CPT

## 2019-07-15 PROCEDURE — 6370000000 HC RX 637 (ALT 250 FOR IP): Performed by: PSYCHIATRY & NEUROLOGY

## 2019-07-15 RX ORDER — BUPROPION HYDROCHLORIDE 150 MG/1
150 TABLET, EXTENDED RELEASE ORAL 2 TIMES DAILY
Qty: 60 TABLET | Refills: 1 | Status: SHIPPED | OUTPATIENT
Start: 2019-07-15 | End: 2019-12-13 | Stop reason: SDUPTHER

## 2019-07-15 RX ORDER — GABAPENTIN 400 MG/1
400 CAPSULE ORAL 3 TIMES DAILY
Qty: 84 CAPSULE | Refills: 0 | Status: ON HOLD | OUTPATIENT
Start: 2019-07-15 | End: 2020-10-30 | Stop reason: HOSPADM

## 2019-07-15 RX ORDER — HYDROXYZINE HYDROCHLORIDE 25 MG/1
25 TABLET, FILM COATED ORAL EVERY 6 HOURS PRN
Qty: 120 TABLET | Refills: 0 | Status: SHIPPED | OUTPATIENT
Start: 2019-07-15 | End: 2019-08-14

## 2019-07-15 RX ORDER — ERGOCALCIFEROL (VITAMIN D2) 1250 MCG
50000 CAPSULE ORAL WEEKLY
Qty: 11 CAPSULE | Refills: 0 | Status: ON HOLD | OUTPATIENT
Start: 2019-07-15 | End: 2020-10-30

## 2019-07-15 RX ORDER — HYDROCODONE BITARTRATE AND ACETAMINOPHEN 7.5; 325 MG/1; MG/1
1 TABLET ORAL EVERY 6 HOURS PRN
Qty: 12 TABLET | Refills: 0 | Status: SHIPPED | OUTPATIENT
Start: 2019-07-15 | End: 2019-07-18

## 2019-07-15 RX ORDER — INSULIN GLARGINE 100 [IU]/ML
45 INJECTION, SOLUTION SUBCUTANEOUS NIGHTLY
Qty: 1 VIAL | Refills: 3 | Status: SHIPPED | OUTPATIENT
Start: 2019-07-15 | End: 2019-08-05 | Stop reason: SDUPTHER

## 2019-07-15 RX ORDER — LANOLIN ALCOHOL/MO/W.PET/CERES
6 CREAM (GRAM) TOPICAL NIGHTLY
Qty: 60 TABLET | Refills: 1 | Status: SHIPPED | OUTPATIENT
Start: 2019-07-15 | End: 2021-07-26

## 2019-07-15 RX ORDER — GABAPENTIN 400 MG/1
400 CAPSULE ORAL 3 TIMES DAILY
Qty: 42 CAPSULE | Refills: 0 | Status: SHIPPED | OUTPATIENT
Start: 2019-07-15 | End: 2019-07-15

## 2019-07-15 RX ORDER — TRAZODONE HYDROCHLORIDE 100 MG/1
100 TABLET ORAL NIGHTLY
Qty: 30 TABLET | Refills: 1 | Status: SHIPPED | OUTPATIENT
Start: 2019-07-15 | End: 2019-12-13 | Stop reason: SDUPTHER

## 2019-07-15 RX ORDER — SULFAMETHOXAZOLE AND TRIMETHOPRIM 800; 160 MG/1; MG/1
1 TABLET ORAL EVERY 12 HOURS SCHEDULED
Qty: 20 TABLET | Refills: 0 | Status: SHIPPED | OUTPATIENT
Start: 2019-07-15 | End: 2019-07-25

## 2019-07-15 RX ADMIN — HYDROCODONE BITARTRATE AND ACETAMINOPHEN 1 TABLET: 7.5; 325 TABLET ORAL at 06:17

## 2019-07-15 RX ADMIN — LURASIDONE HYDROCHLORIDE 40 MG: 40 TABLET, FILM COATED ORAL at 09:00

## 2019-07-15 RX ADMIN — LISINOPRIL 5 MG: 5 TABLET ORAL at 09:00

## 2019-07-15 RX ADMIN — GABAPENTIN 400 MG: 400 CAPSULE ORAL at 09:00

## 2019-07-15 RX ADMIN — SULFAMETHOXAZOLE AND TRIMETHOPRIM 1 TABLET: 800; 160 TABLET ORAL at 08:59

## 2019-07-15 RX ADMIN — BUPROPION HYDROCHLORIDE 150 MG: 150 TABLET, EXTENDED RELEASE ORAL at 09:00

## 2019-07-15 RX ADMIN — GLIMEPIRIDE 2 MG: 2 TABLET ORAL at 06:17

## 2019-07-15 RX ADMIN — HYDROCODONE BITARTRATE AND ACETAMINOPHEN 1 TABLET: 7.5; 325 TABLET ORAL at 11:22

## 2019-07-15 ASSESSMENT — PAIN SCALES - GENERAL
PAINLEVEL_OUTOF10: 8
PAINLEVEL_OUTOF10: 10

## 2019-07-15 NOTE — DISCHARGE SUMMARY
Medication changes were made and patient tolerated well with no side effects. During the hospital stay patient's home medications have been restarted. Patient has been started on Latuda 40 mg once a day for bipolar depression. Also patient has been started on bupropion SR for depression and symptom of ADHD. Dose of bupropion has been titrated up as indicated and tolerated by patient. At time of discharge dose of bupropion SR is 150 mg twice a day. Dose of gabapentin has been increased from 300 mg at bedtime to 400 mg 3 times a day for peripheral neuropathic pain, related to poorly controlled diabetes. Also, patient has been started on trazodone 100 mg and melatonin 6 mg at bedtime for insomnia with positive effect. While in the hospital patient has been diagnosed with vitamin D deficiency and he has been started on vitamin D 17256 units weekly. During the hospital stay patient complained for headache due to having chronic sinusitis and he has been provided Afrin 0.05% nasal spray twice a day with moderate beneficial effect. On 07/13/19 patient started to complain to left upper extremity pain. He has been evaluated and it was found that patient has abscess of the left upper extremity. Patient has been consulted by surgery, which performed I&D of the abscess, and recommended Bactrim 800-160 mg twice a day for next 10 days for infection and NORCO 7.5-325 mg every 6 hours as needed for pain. Patient attended and participated in groups. The patient did interact well with other patients and staff on the unit. Behaviorally he was not a problem. Patient was compliant with his medications. Patient was sleeping through the night. This patient is not suicidal, homicidal or psychotic at discharge. He does not present a danger to self or others. With the above mentioned medications changes as well as psychotherapeutic interventions, the patient reported considerable improvement in his condition.  On 07/15/19 it Content:  No Suicidal Ideation, No Homicidal Ideation, No Auditory or Visual Hallucinations, NO Overt Delusions  Insight:  Present  Judgement:  Normal  Memory is intact for both remote and recent  Intellectual Functioning:  Within the [address] of Knowledge:  Adequate  Attention and Concentration:  Adequate      Discharge Exam:  Please, see medical note    Disposition: home    Patient Instructions:   Current Discharge Medication List      START taking these medications    Details   ergocalciferol (ERGOCALCIFEROL) 17966 units capsule Take 1 capsule by mouth once a week  Qty: 11 capsule, Refills: 0      sulfamethoxazole-trimethoprim (BACTRIM DS;SEPTRA DS) 800-160 MG per tablet Take 1 tablet by mouth every 12 hours for 10 days  Qty: 20 tablet, Refills: 0      melatonin 3 MG TABS tablet Take 2 tablets by mouth nightly  Qty: 60 tablet, Refills: 1      lurasidone (LATUDA) 40 MG TABS tablet Take 1 tablet by mouth daily  Qty: 30 tablet, Refills: 1      traZODone (DESYREL) 100 MG tablet Take 1 tablet by mouth nightly  Qty: 30 tablet, Refills: 1      buPROPion (WELLBUTRIN SR) 150 MG extended release tablet Take 1 tablet by mouth 2 times daily  Qty: 60 tablet, Refills: 1      hydrOXYzine (ATARAX) 25 MG tablet Take 1 tablet by mouth every 6 hours as needed for Anxiety  Qty: 120 tablet, Refills: 0      HYDROcodone-acetaminophen (NORCO) 7.5-325 MG per tablet Take 1 tablet by mouth every 6 hours as needed for Pain for up to 3 days. Qty: 12 tablet, Refills: 0    Comments: Reduce doses taken as pain becomes manageable  Associated Diagnoses: Abscess of arm, left         CONTINUE these medications which have CHANGED    Details   insulin glargine (LANTUS) 100 UNIT/ML injection vial Inject 45 Units into the skin nightly  Qty: 1 vial, Refills: 3      gabapentin (NEURONTIN) 400 MG capsule Take 1 capsule by mouth 3 times daily for 14 days.   Qty: 42 capsule, Refills: 0    Associated Diagnoses: Type 2 diabetes mellitus with each, Refills: 5    Associated Diagnoses: Type 2 diabetes mellitus with hyperglycemia, with long-term current use of insulin ([address]      ! ! EASY TOUCH SAFETY LANCETS 21G MISC 1 Device by Does not apply route 2 times daily  Qty: 100 each, Refills: 5    Associated Diagnoses: Type 2 diabetes mellitus with complication, with long-term current use of insulin ([facility])      glimepiride (AMARYL) 2 MG tablet Take 1 tablet by mouth every morning (before breakfast)  Qty: 30 tablet, Refills: 5    Associated Diagnoses: Type 2 diabetes mellitus with complication, with long-term current use of insulin ([facility])      nabumetone (RELAFEN) 500 MG tablet Take 1 tablet by mouth 2 times daily  Qty: 60 tablet, Refills: 3    Associated Diagnoses: Rib pain      lisinopril (PRINIVIL;ZESTRIL) 10 MG tablet Take 0.5 tablets by mouth daily  Qty: 30 tablet, Refills: 2    Associated Diagnoses: Type 2 diabetes mellitus with diabetic polyneuropathy, with long-term current use of insulin ([address] Essential hypertension      !! EASY TOUCH SAFETY LANCETS 21G MISC CHECK GLUCOSE TID AND PRN  Qty: 100 each, Refills: 5    Associated Diagnoses: Type 2 diabetes mellitus with diabetic polyneuropathy, with long-term current use of insulin ([facility])      glucose monitoring kit (FREESTYLE) monitoring kit 1 kit by Does not apply route daily  Qty: 1 kit, Refills: 0    Associated Diagnoses: Type 2 diabetes mellitus with diabetic polyneuropathy, with long-term current use of insulin ([address]       ! ! - Potential duplicate medications found. Please discuss with provider. STOP taking these medications       metFORMIN (GLUCOPHAGE) 500 MG tablet Comments:   Reason for Stopping:         risperiDONE (RISPERDAL) 2 MG tablet Comments:   Reason for Stopping:             Activity: activity as tolerated  Diet: diabetic diet  Wound Care: as directed    Follow-up with Dedrick Lang provider in [facility] outpatient clinic in 2 weeks.     Time worked: More than 31 minutes    Participation:

## 2019-07-15 NOTE — PROGRESS NOTES
Bridge Appointment completed: Reviewed Discharge Instructions with patient. Patient verbalizes understanding and agreement with the discharge plan using the teachback method. Pt received and verbalizes understanding of discharge instructions including follow up appointments and medication instructions. Pt denies SI, HI, and AVH at time of discharge. Pt discharged with documented belongings.     Referral for Outpatient Tobacco Cessation Counseling, upon discharge (omar X if applicable and completed):    ( )  Hospital staff assisted patient to call Quit Line or faxed referral                                   during hospitalization                  ( )  Recognizing danger situations (included triggers and roadblocks), if not completed on admission                    ( )  Coping skills (new ways to manage stress, exercise, relaxation techniques, changing routine, distraction), if not completed on admission                                                           ( )  Basic information about quitting (benefits of quitting, techniques in how to quit, available resources, if not completed on admission  ( ) Referral for counseling faxed to [facility]   ( ) Patient refused referral  (x) Patient refused counseling  ( ) Patient refused smoking cessation medication upon discharge    Vaccinations (omar X if applicable and completed): N/A  ( ) Patient states already received influenza vaccine elsewhere  ( ) Patient received influenza vaccine during this hospitalization  ( ) Patient refused influenza vaccine at this time

## 2019-07-17 LAB
ANAEROBIC CULTURE: ABNORMAL
GRAM STAIN RESULT: ABNORMAL
ORGANISM: ABNORMAL
WOUND/ABSCESS: ABNORMAL

## 2019-07-22 ENCOUNTER — OFFICE VISIT (OUTPATIENT)
Dept: PSYCHIATRY | Age: 49
End: 2019-07-22
Payer: MEDICAID

## 2019-07-22 DIAGNOSIS — F31.32 BIPOLAR AFFECTIVE DISORDER, CURRENTLY DEPRESSED, MODERATE (HCC): Primary | ICD-10-CM

## 2019-07-22 DIAGNOSIS — F63.81 INTERMITTENT EXPLOSIVE DISORDER IN ADULT: ICD-10-CM

## 2019-07-22 PROCEDURE — 90832 PSYTX W PT 30 MINUTES: CPT | Performed by: COUNSELOR

## 2019-07-22 NOTE — PROGRESS NOTES
by mouth nightly 30 tablet 1    buPROPion (WELLBUTRIN SR) 150 MG extended release tablet Take 1 tablet by mouth 2 times daily 60 tablet 1    hydrOXYzine (ATARAX) 25 MG tablet Take 1 tablet by mouth every 6 hours as needed for Anxiety 120 tablet 0    gabapentin (NEURONTIN) 400 MG capsule Take 1 capsule by mouth 3 times daily for 28 days. 84 capsule 0    insulin regular (HUMULIN R) 100 UNIT/ML injection See Sliding Scale orders 1 vial 5    glucagon 1 MG injection Inject 1 mg into the muscle See Admin Instructions Follow package directions for low blood sugar. 1 kit 3    Insulin Syringe-Needle U-100 29G X 1/2\" 0.3 ML MISC 1 each by Does not apply route daily 100 each 3    Insulin Syringes, Disposable, U-100 0.3 ML MISC 1 each by Does not apply route daily 100 each 3    blood glucose monitor kit and supplies True metrix glucose monitor, test strips and lancets. Pt may test 4 times a day. DX:E11.65, Z79.4 1 kit 0    blood glucose monitor strips Test 4 times a day & as needed for symptoms of irregular blood glucose. Please give True Metrix test strips and lancets 200 strip 5    Lancets MISC 1 each by Does not apply route 4 times daily Test 4 times daily. DX: E11.65, Z79.4  True Metrix 200 each 5    EASY TOUCH SAFETY LANCETS 21G MISC 1 Device by Does not apply route 2 times daily 100 each 5    glimepiride (AMARYL) 2 MG tablet Take 1 tablet by mouth every morning (before breakfast) 30 tablet 5    nabumetone (RELAFEN) 500 MG tablet Take 1 tablet by mouth 2 times daily 60 tablet 3    lisinopril (PRINIVIL;ZESTRIL) 10 MG tablet Take 0.5 tablets by mouth daily 30 tablet 2    EASY TOUCH SAFETY LANCETS 21G MISC CHECK GLUCOSE TID AND  each 5    glucose monitoring kit (FREESTYLE) monitoring kit 1 kit by Does not apply route daily 1 kit 0     No current facility-administered medications for this visit.         Social History:   Social History     Socioeconomic History    Marital status: Single     Spouse name: Not on file    Number of children: Not on file    Years of education: Not on file    Highest education level: Not on file   Occupational History    Not on file   Social Needs    Financial resource strain: Not on file    Food insecurity:     Worry: Not on file     Inability: Not on file    Transportation needs:     Medical: Not on file     Non-medical: Not on file   Tobacco Use    Smoking status: Current Every Day Smoker     Packs/day: 1.50     Types: Cigarettes     Start date: 5/3/1999    Smokeless tobacco: Former User   Substance and Sexual Activity    Alcohol use: Not Currently    Drug use: Not on file    Sexual activity: Not on file   Lifestyle    Physical activity:     Days per week: Not on file     Minutes per session: Not on file    Stress: Not on file   Relationships    Social connections:     Talks on phone: Not on file     Gets together: Not on file     Attends Jainism service: Not on file     Active member of club or organization: Not on file     Attends meetings of clubs or organizations: Not on file     Relationship status: Not on file    Intimate partner violence:     Fear of current or ex partner: Not on file     Emotionally abused: Not on file     Physically abused: Not on file     Forced sexual activity: Not on file   Other Topics Concern    Not on file   Social History Narrative    Not on file       TOBACCO:   reports that he has been smoking cigarettes. He started smoking about 20 years ago. He has been smoking about 1.50 packs per day. He has quit using smokeless tobacco.  ETOH:   reports that he drank alcohol. Family History:   Family History   Adopted: Yes       Diagnosis:        Diagnosis Date    ADHD     Anxiety     Depression     [facility] ([facility]      Problems related to the social environment, Occupational problems, Economic problems and Other psychosocial and environmental problems    Plan:  1. Continue medication management  2. CBT to target depression/anxiety  3.

## 2019-08-05 ENCOUNTER — OFFICE VISIT (OUTPATIENT)
Dept: PSYCHIATRY | Age: 49
End: 2019-08-05
Payer: MEDICAID

## 2019-08-05 ENCOUNTER — TELEPHONE (OUTPATIENT)
Dept: FAMILY MEDICINE CLINIC | Age: 49
End: 2019-08-05

## 2019-08-05 DIAGNOSIS — F63.81 INTERMITTENT EXPLOSIVE DISORDER IN ADULT: ICD-10-CM

## 2019-08-05 DIAGNOSIS — E11.8 TYPE 2 DIABETES MELLITUS WITH COMPLICATION, WITH LONG-TERM CURRENT USE OF INSULIN (HCC): ICD-10-CM

## 2019-08-05 DIAGNOSIS — Z79.4 TYPE 2 DIABETES MELLITUS WITH HYPERGLYCEMIA, WITH LONG-TERM CURRENT USE OF INSULIN (HCC): ICD-10-CM

## 2019-08-05 DIAGNOSIS — F31.32 BIPOLAR AFFECTIVE DISORDER, CURRENTLY DEPRESSED, MODERATE (HCC): Primary | ICD-10-CM

## 2019-08-05 DIAGNOSIS — E11.65 TYPE 2 DIABETES MELLITUS WITH HYPERGLYCEMIA, WITH LONG-TERM CURRENT USE OF INSULIN (HCC): ICD-10-CM

## 2019-08-05 DIAGNOSIS — Z79.4 TYPE 2 DIABETES MELLITUS WITH COMPLICATION, WITH LONG-TERM CURRENT USE OF INSULIN (HCC): ICD-10-CM

## 2019-08-05 PROCEDURE — 90832 PSYTX W PT 30 MINUTES: CPT | Performed by: COUNSELOR

## 2019-08-05 RX ORDER — LANCETS 21 GAUGE
1 EACH MISCELLANEOUS 2 TIMES DAILY
Qty: 100 EACH | Refills: 5 | Status: SHIPPED | OUTPATIENT
Start: 2019-08-05 | End: 2021-07-26

## 2019-08-05 RX ORDER — INSULIN GLARGINE 100 [IU]/ML
45 INJECTION, SOLUTION SUBCUTANEOUS NIGHTLY
Qty: 1 VIAL | Refills: 3 | Status: SHIPPED | OUTPATIENT
Start: 2019-08-05 | End: 2019-08-06 | Stop reason: SDUPTHER

## 2019-08-05 RX ORDER — GLUCOSAMINE HCL/CHONDROITIN SU 500-400 MG
CAPSULE ORAL
Qty: 200 STRIP | Refills: 5 | Status: SHIPPED | OUTPATIENT
Start: 2019-08-05 | End: 2019-08-06 | Stop reason: SDUPTHER

## 2019-08-05 NOTE — PROGRESS NOTES
Therapy Progress Note  [facility]  8/5/2019  2:45 PM      Time spent with Patient: 24 minutes  This is patient's fifth  Therapy appointment. Reason for Consult:  depression, anxiety and stress  Referring Provider: No referring provider defined for this encounter. Ernestina Otero ,a 44 y.o. male, for initial evaluation visit. Pt provided informed consent for the behavioral health program. Discussed with patient model of service to include the limits of confidentiality (i.e. abuse reporting, suicide intervention, etc.) and short-term intervention focused approach. Discussed no show and late cancellation policy. Pt indicated understanding. S:  Pt is happy to report he got his own place. States he always gets into trouble when he is alone so he needs to find a support system. Discussed [facility] and [facility]. He use to go to [employer] but said it wasn't for him. He's trying to find a job. Denies SI, HI and AVH at this time. MSE:    Appearance    alert, cooperative, smiling; casually dressed  Appetite \"I'm not eating. \"  Sleep disturbance \"it's gone to hell. \"  Fatigue No  Loss of pleasure No  Impulsive behavior No  Speech    normal rate and normal volume  Mood    Anxious  Depressed  Low self-esteem  Affect    anxiety  Thought Content    cognitive distortions  Thought Process    linear  Associations    logical connections  Insight    Good  Judgment    Intact  Orientation    oriented to person, place, time, and general circumstances  Memory    recent and remote memory intact  Attention/Concentration    intact  Morbid ideation No  Suicide Assessment    no suicidal ideation      History:  Social History     Socioeconomic History    Marital status: Single     Spouse name: Not on file    Number of children: Not on file    Years of education: Not on file    Highest education level: Not on file   Occupational History    Not on file   Social Needs    Financial resource strain: Not on file   [facility] Food insecurity:     Worry: Not on file     Inability: Not on file    Transportation needs:     Medical: Not on file     Non-medical: Not on file   Tobacco Use    Smoking status: Current Every Day Smoker     Packs/day: 1.50     Types: Cigarettes     Start date: 5/3/1999    Smokeless tobacco: Former User   Substance and Sexual Activity    Alcohol use: Not Currently    Drug use: Not on file    Sexual activity: Not on file   Lifestyle    Physical activity:     Days per week: Not on file     Minutes per session: Not on file    Stress: Not on file   Relationships    Social connections:     Talks on phone: Not on file     Gets together: Not on file     Attends Buddhism service: Not on file     Active member of club or organization: Not on file     Attends meetings of clubs or organizations: Not on file     Relationship status: Not on file    Intimate partner violence:     Fear of current or ex partner: Not on file     Emotionally abused: Not on file     Physically abused: Not on file     Forced sexual activity: Not on file   Other Topics Concern    Not on file   Social History Narrative    Not on file       Medications:   Current Outpatient Medications   Medication Sig Dispense Refill    ergocalciferol (ERGOCALCIFEROL) 54008 units capsule Take 1 capsule by mouth once a week 11 capsule 0    melatonin 3 MG TABS tablet Take 2 tablets by mouth nightly 60 tablet 1    lurasidone (LATUDA) 40 MG TABS tablet Take 1 tablet by mouth daily 30 tablet 1    insulin glargine (LANTUS) 100 UNIT/ML injection vial Inject 45 Units into the skin nightly 1 vial 3    traZODone (DESYREL) 100 MG tablet Take 1 tablet by mouth nightly 30 tablet 1    buPROPion (WELLBUTRIN SR) 150 MG extended release tablet Take 1 tablet by mouth 2 times daily 60 tablet 1    hydrOXYzine (ATARAX) 25 MG tablet Take 1 tablet by mouth every 6 hours as needed for Anxiety 120 tablet 0    gabapentin (NEURONTIN) 400 MG capsule Take 1 capsule by mouth 3

## 2019-08-06 ENCOUNTER — TELEPHONE (OUTPATIENT)
Dept: PSYCHIATRY | Age: 49
End: 2019-08-06

## 2019-08-06 ENCOUNTER — TELEPHONE (OUTPATIENT)
Dept: FAMILY MEDICINE CLINIC | Age: 49
End: 2019-08-06

## 2019-08-06 DIAGNOSIS — Z79.4 TYPE 2 DIABETES MELLITUS WITH HYPERGLYCEMIA, WITH LONG-TERM CURRENT USE OF INSULIN (HCC): ICD-10-CM

## 2019-08-06 DIAGNOSIS — E11.65 TYPE 2 DIABETES MELLITUS WITH HYPERGLYCEMIA, WITH LONG-TERM CURRENT USE OF INSULIN (HCC): ICD-10-CM

## 2019-08-06 RX ORDER — GLUCOSAMINE HCL/CHONDROITIN SU 500-400 MG
CAPSULE ORAL
Qty: 200 STRIP | Refills: 5 | Status: SHIPPED | OUTPATIENT
Start: 2019-08-06 | End: 2019-12-13 | Stop reason: SDUPTHER

## 2019-08-06 RX ORDER — INSULIN GLARGINE 100 [IU]/ML
45 INJECTION, SOLUTION SUBCUTANEOUS NIGHTLY
Qty: 1 VIAL | Refills: 3 | Status: SHIPPED | OUTPATIENT
Start: 2019-08-06 | End: 2019-12-13 | Stop reason: SDUPTHER

## 2019-08-06 NOTE — TELEPHONE ENCOUNTER
Received fax from [address] regarding pt's Lantus not covered by insurance and requesting an alternative. Called the pharmacy and informed staff that fax was sent to Dr Georgiana Nava who is an inpt psychiatrist and that the RX now needs to be filled by pt's PCP and that pt had appt with Dr Gina Saldaña in July. She will explore the issue.

## 2019-08-22 ENCOUNTER — TELEPHONE (OUTPATIENT)
Dept: PSYCHIATRY | Age: 49
End: 2019-08-22

## 2019-09-17 ENCOUNTER — TELEPHONE (OUTPATIENT)
Dept: PSYCHIATRY | Age: 49
End: 2019-09-17

## 2019-12-13 ENCOUNTER — NURSE ONLY (OUTPATIENT)
Dept: FAMILY MEDICINE CLINIC | Age: 49
End: 2019-12-13
Payer: MEDICAID

## 2019-12-13 DIAGNOSIS — E11.65 TYPE 2 DIABETES MELLITUS WITH HYPERGLYCEMIA, WITH LONG-TERM CURRENT USE OF INSULIN (HCC): ICD-10-CM

## 2019-12-13 DIAGNOSIS — Z79.4 TYPE 2 DIABETES MELLITUS WITH HYPERGLYCEMIA, WITH LONG-TERM CURRENT USE OF INSULIN (HCC): ICD-10-CM

## 2019-12-13 DIAGNOSIS — Z79.4 TYPE 2 DIABETES MELLITUS WITH DIABETIC POLYNEUROPATHY, WITH LONG-TERM CURRENT USE OF INSULIN (HCC): ICD-10-CM

## 2019-12-13 DIAGNOSIS — I10 ESSENTIAL HYPERTENSION: ICD-10-CM

## 2019-12-13 DIAGNOSIS — E11.42 TYPE 2 DIABETES MELLITUS WITH DIABETIC POLYNEUROPATHY, WITH LONG-TERM CURRENT USE OF INSULIN (HCC): ICD-10-CM

## 2019-12-13 DIAGNOSIS — Z79.4 TYPE 2 DIABETES MELLITUS WITH COMPLICATION, WITH LONG-TERM CURRENT USE OF INSULIN (HCC): ICD-10-CM

## 2019-12-13 DIAGNOSIS — E11.8 TYPE 2 DIABETES MELLITUS WITH COMPLICATION, WITH LONG-TERM CURRENT USE OF INSULIN (HCC): ICD-10-CM

## 2019-12-13 DIAGNOSIS — Z91.89 COMPLIANCE WITH MEDICATION REGIMEN: ICD-10-CM

## 2019-12-13 DIAGNOSIS — R07.81 RIB PAIN: ICD-10-CM

## 2019-12-13 DIAGNOSIS — Z91.14 NONCOMPLIANCE W/MEDICATION TREATMENT DUE TO INTERMIT USE OF MEDICATION: Primary | ICD-10-CM

## 2019-12-13 LAB
ALBUMIN SERPL-MCNC: 4.4 G/DL (ref 3.5–5.2)
ALP BLD-CCNC: 66 U/L (ref 40–130)
ALT SERPL-CCNC: 15 U/L (ref 5–41)
AMPHETAMINE SCREEN, URINE: ABNORMAL
ANION GAP SERPL CALCULATED.3IONS-SCNC: 15 MMOL/L (ref 7–19)
AST SERPL-CCNC: 11 U/L (ref 5–40)
BARBITURATE SCREEN, URINE: ABNORMAL
BENZODIAZEPINE SCREEN, URINE: ABNORMAL
BILIRUB SERPL-MCNC: 1 MG/DL (ref 0.2–1.2)
BILIRUBIN URINE: NEGATIVE
BLOOD, URINE: NEGATIVE
BUN BLDV-MCNC: 21 MG/DL (ref 6–20)
BUPRENORPHINE URINE: ABNORMAL
CALCIUM SERPL-MCNC: 9.6 MG/DL (ref 8.6–10)
CHLORIDE BLD-SCNC: 101 MMOL/L (ref 98–111)
CLARITY: CLEAR
CO2: 25 MMOL/L (ref 22–29)
COCAINE METABOLITE SCREEN URINE: ABNORMAL
COLOR: YELLOW
CREAT SERPL-MCNC: 0.6 MG/DL (ref 0.5–1.2)
GABAPENTIN SCREEN, URINE: ABNORMAL
GFR NON-AFRICAN AMERICAN: >60
GLUCOSE BLD-MCNC: 234 MG/DL (ref 74–109)
GLUCOSE URINE: >=1000 MG/DL
HBA1C MFR BLD: 6.6 % (ref 4–6)
HCT VFR BLD CALC: 42.9 % (ref 42–52)
HEMOGLOBIN: 14.5 G/DL (ref 14–18)
KETONES, URINE: NEGATIVE MG/DL
LEUKOCYTE ESTERASE, URINE: NEGATIVE
MCH RBC QN AUTO: 31.5 PG (ref 27–31)
MCHC RBC AUTO-ENTMCNC: 33.8 G/DL (ref 33–37)
MCV RBC AUTO: 93.1 FL (ref 80–94)
MDMA URINE: ABNORMAL
METHADONE SCREEN, URINE: ABNORMAL
METHAMPHETAMINE, URINE: ABNORMAL
NITRITE, URINE: NEGATIVE
OPIATE SCREEN URINE: ABNORMAL
OXYCODONE SCREEN URINE: ABNORMAL
PDW BLD-RTO: 14 % (ref 11.5–14.5)
PH UA: 7 (ref 5–8)
PHENCYCLIDINE SCREEN URINE: ABNORMAL
PLATELET # BLD: 220 K/UL (ref 130–400)
PMV BLD AUTO: 11 FL (ref 9.4–12.4)
POTASSIUM SERPL-SCNC: 3.9 MMOL/L (ref 3.5–5)
PROPOXYPHENE SCREEN, URINE: ABNORMAL
PROTEIN UA: NEGATIVE MG/DL
RBC # BLD: 4.61 M/UL (ref 4.7–6.1)
SODIUM BLD-SCNC: 141 MMOL/L (ref 136–145)
SPECIFIC GRAVITY UA: 1.04 (ref 1–1.03)
THC SCREEN, URINE: ABNORMAL
TOTAL PROTEIN: 7.5 G/DL (ref 6.6–8.7)
TRICYCLIC ANTIDEPRESSANTS, UR: ABNORMAL
UROBILINOGEN, URINE: 0.2 E.U./DL
WBC # BLD: 9.5 K/UL (ref 4.8–10.8)

## 2019-12-13 PROCEDURE — 80305 DRUG TEST PRSMV DIR OPT OBS: CPT | Performed by: NURSE PRACTITIONER

## 2019-12-13 RX ORDER — BUPROPION HYDROCHLORIDE 150 MG/1
150 TABLET, EXTENDED RELEASE ORAL 2 TIMES DAILY
Qty: 60 TABLET | Refills: 2 | Status: ON HOLD | OUTPATIENT
Start: 2019-12-13 | End: 2020-10-30 | Stop reason: HOSPADM

## 2019-12-13 RX ORDER — GLIMEPIRIDE 2 MG/1
2 TABLET ORAL
Qty: 30 TABLET | Refills: 5 | Status: ON HOLD | OUTPATIENT
Start: 2019-12-13 | End: 2020-10-30 | Stop reason: HOSPADM

## 2019-12-13 RX ORDER — LISINOPRIL 10 MG/1
10 TABLET ORAL DAILY
Qty: 30 TABLET | Refills: 2 | Status: ON HOLD | OUTPATIENT
Start: 2019-12-13 | End: 2020-10-30 | Stop reason: HOSPADM

## 2019-12-13 RX ORDER — NABUMETONE 500 MG/1
500 TABLET, FILM COATED ORAL 2 TIMES DAILY
Qty: 60 TABLET | Refills: 2 | Status: ON HOLD | OUTPATIENT
Start: 2019-12-13 | End: 2020-10-30 | Stop reason: HOSPADM

## 2019-12-13 RX ORDER — TRAZODONE HYDROCHLORIDE 100 MG/1
100 TABLET ORAL NIGHTLY
Qty: 30 TABLET | Refills: 2 | Status: ON HOLD | OUTPATIENT
Start: 2019-12-13 | End: 2020-10-30 | Stop reason: HOSPADM

## 2019-12-13 RX ORDER — LANCETS 21 GAUGE
EACH MISCELLANEOUS
Qty: 100 EACH | Refills: 5 | Status: SHIPPED | OUTPATIENT
Start: 2019-12-13 | End: 2021-07-26

## 2019-12-13 RX ORDER — GLUCOSAMINE HCL/CHONDROITIN SU 500-400 MG
CAPSULE ORAL
Qty: 200 STRIP | Refills: 5 | Status: SHIPPED | OUTPATIENT
Start: 2019-12-13 | End: 2021-07-26

## 2019-12-13 RX ORDER — INSULIN GLARGINE 100 [IU]/ML
45 INJECTION, SOLUTION SUBCUTANEOUS NIGHTLY
Qty: 1 VIAL | Refills: 3 | Status: ON HOLD | OUTPATIENT
Start: 2019-12-13 | End: 2020-10-30 | Stop reason: HOSPADM

## 2020-05-12 RX ORDER — INSULIN GLARGINE 100 [IU]/ML
INJECTION, SOLUTION SUBCUTANEOUS
Qty: 15 ML | Refills: 0 | OUTPATIENT
Start: 2020-05-12

## 2020-06-29 NOTE — TELEPHONE ENCOUNTER
Left message instructing him to come in to get refills. He needs to establish care with another physician before medications can be sent in for him.

## 2020-10-07 ENCOUNTER — NURSE TRIAGE (OUTPATIENT)
Dept: OTHER | Facility: CLINIC | Age: 50
End: 2020-10-07

## 2020-10-07 NOTE — TELEPHONE ENCOUNTER
Caller complaining of confusion, bilateral numbness to hands / feet, difficulty breathing, changes in speech and vision. States he has had these symptoms for \"awhile\". Unable to elaborate as to an accurate time frame. Recommended to call 911. Caller declines and states his family who present with him will drive him to the ED now. Reason for Disposition   [1] SEVERE weakness (i.e., unable to walk or barely able to walk, requires support) AND [2] new onset or worsening    Answer Assessment - Initial Assessment Questions  1. SYMPTOM: \"What is the main symptom you are concerned about? \" (e.g., weakness, numbness)      Confusion, weakness to hands and feet, fatigue. 2. ONSET: \"When did this start? \" (minutes, hours, days; while sleeping)  Unsure. 3. LAST NORMAL: \"When was the last time you were normal (no symptoms)? \"     Unsure. 4. PATTERN \"Does this come and go, or has it been constant since it started? \"  \"Is it present now? \"     Constant. Yes is present now. 5. CARDIAC SYMPTOMS: \"Have you had any of the following symptoms: chest pain, difficulty breathing, palpitations? \"    Difficulty breathing. 6. NEUROLOGIC SYMPTOMS: \"Have you had any of the following symptoms: headache, dizziness, vision loss, double vision, changes in speech, unsteady on your feet? \"      Unsteady on feet, changes in speech, vision loss, double vision. 7. OTHER SYMPTOMS: \"Do you have any other symptoms? \"      Numbness to hands and feet. 8. PREGNANCY: \"Is there any chance you are pregnant? \" \"When was your last menstrual period? \"   Denies. Protocols used: NEUROLOGIC DEFICIT-ADULT-AH    Please do not respond to the triage nurse through this encounter. Any subsequent communication should be directly with the patient.

## 2020-10-22 ENCOUNTER — OFFICE VISIT (OUTPATIENT)
Dept: PRIMARY CARE CLINIC | Age: 50
End: 2020-10-22
Payer: MEDICAID

## 2020-10-22 ENCOUNTER — HOSPITAL ENCOUNTER (INPATIENT)
Age: 50
LOS: 3 days | Discharge: PSYCHIATRIC OR SUBSTANCE USE FACILITY | DRG: 638 | End: 2020-10-25
Attending: EMERGENCY MEDICINE | Admitting: INTERNAL MEDICINE
Payer: MEDICAID

## 2020-10-22 VITALS
HEART RATE: 112 BPM | BODY MASS INDEX: 21.73 KG/M2 | DIASTOLIC BLOOD PRESSURE: 86 MMHG | HEIGHT: 70 IN | SYSTOLIC BLOOD PRESSURE: 122 MMHG | TEMPERATURE: 97.6 F | WEIGHT: 151.8 LBS | RESPIRATION RATE: 14 BRPM | OXYGEN SATURATION: 97 %

## 2020-10-22 PROBLEM — R73.9 HYPERGLYCEMIA: Status: ACTIVE | Noted: 2020-10-22

## 2020-10-22 PROBLEM — E11.65 CONTROLLED TYPE 2 DIABETES MELLITUS WITH HYPERGLYCEMIA (HCC): Status: ACTIVE | Noted: 2020-10-22

## 2020-10-22 PROBLEM — E11.42 DIABETIC PERIPHERAL NEUROPATHY (HCC): Status: ACTIVE | Noted: 2020-10-22

## 2020-10-22 PROBLEM — R45.851 SUICIDAL IDEATION: Status: ACTIVE | Noted: 2020-10-22

## 2020-10-22 LAB
ALBUMIN SERPL-MCNC: 3.8 G/DL (ref 3.5–5.2)
ALP BLD-CCNC: 91 U/L (ref 40–130)
ALT SERPL-CCNC: 29 U/L (ref 5–41)
AMPHETAMINE SCREEN, URINE: NEGATIVE
ANION GAP SERPL CALCULATED.3IONS-SCNC: 11 MMOL/L (ref 7–19)
AST SERPL-CCNC: 15 U/L (ref 5–40)
BARBITURATE SCREEN URINE: NEGATIVE
BASOPHILS ABSOLUTE: 0 K/UL (ref 0–0.2)
BASOPHILS RELATIVE PERCENT: 0.2 % (ref 0–1)
BENZODIAZEPINE SCREEN, URINE: NEGATIVE
BILIRUB SERPL-MCNC: 0.8 MG/DL (ref 0.2–1.2)
BILIRUBIN URINE: NEGATIVE
BLOOD, URINE: NEGATIVE
BUN BLDV-MCNC: 16 MG/DL (ref 6–20)
CALCIUM SERPL-MCNC: 9.4 MG/DL (ref 8.6–10)
CANNABINOID SCREEN URINE: NEGATIVE
CHLORIDE BLD-SCNC: 90 MMOL/L (ref 98–111)
CHP ED QC CHECK: NORMAL
CLARITY: CLEAR
CO2: 24 MMOL/L (ref 22–29)
COCAINE METABOLITE SCREEN URINE: NEGATIVE
COLOR: YELLOW
CREAT SERPL-MCNC: 0.7 MG/DL (ref 0.5–1.2)
EOSINOPHILS ABSOLUTE: 0.1 K/UL (ref 0–0.6)
EOSINOPHILS RELATIVE PERCENT: 1 % (ref 0–5)
ETHANOL: <10 MG/DL (ref 0–0.08)
GFR AFRICAN AMERICAN: >59
GFR NON-AFRICAN AMERICAN: >60
GLUCOSE BLD-MCNC: 280 MG/DL (ref 70–99)
GLUCOSE BLD-MCNC: 335 MG/DL (ref 70–99)
GLUCOSE BLD-MCNC: 509 MG/DL (ref 70–99)
GLUCOSE BLD-MCNC: 634 MG/DL (ref 74–109)
GLUCOSE BLD-MCNC: NORMAL MG/DL
GLUCOSE URINE: =>1000 MG/DL
HBA1C MFR BLD: 13.1 % (ref 4–6)
HCT VFR BLD CALC: 43.2 % (ref 42–52)
HEMOGLOBIN: 15.2 G/DL (ref 14–18)
IMMATURE GRANULOCYTES #: 0.1 K/UL
KETONES, URINE: NEGATIVE MG/DL
LEUKOCYTE ESTERASE, URINE: NEGATIVE
LYMPHOCYTES ABSOLUTE: 3.2 K/UL (ref 1.1–4.5)
LYMPHOCYTES RELATIVE PERCENT: 30.3 % (ref 20–40)
Lab: NORMAL
MCH RBC QN AUTO: 30.4 PG (ref 27–31)
MCHC RBC AUTO-ENTMCNC: 35.2 G/DL (ref 33–37)
MCV RBC AUTO: 86.4 FL (ref 80–94)
MONOCYTES ABSOLUTE: 0.5 K/UL (ref 0–0.9)
MONOCYTES RELATIVE PERCENT: 5.1 % (ref 0–10)
NEUTROPHILS ABSOLUTE: 6.6 K/UL (ref 1.5–7.5)
NEUTROPHILS RELATIVE PERCENT: 62.9 % (ref 50–65)
NITRITE, URINE: NEGATIVE
OPIATE SCREEN URINE: NEGATIVE
PDW BLD-RTO: 13 % (ref 11.5–14.5)
PERFORMED ON: ABNORMAL
PH UA: 5 (ref 5–8)
PLATELET # BLD: 296 K/UL (ref 130–400)
PMV BLD AUTO: 10 FL (ref 9.4–12.4)
POTASSIUM REFLEX MAGNESIUM: 4.7 MMOL/L (ref 3.5–5)
PROTEIN UA: NEGATIVE MG/DL
RBC # BLD: 5 M/UL (ref 4.7–6.1)
SODIUM BLD-SCNC: 125 MMOL/L (ref 136–145)
SPECIFIC GRAVITY UA: 1.04 (ref 1–1.03)
TOTAL PROTEIN: 7 G/DL (ref 6.6–8.7)
UROBILINOGEN, URINE: 0.2 E.U./DL
WBC # BLD: 10.5 K/UL (ref 4.8–10.8)

## 2020-10-22 PROCEDURE — 2580000003 HC RX 258: Performed by: PHYSICIAN ASSISTANT

## 2020-10-22 PROCEDURE — 85025 COMPLETE CBC W/AUTO DIFF WBC: CPT

## 2020-10-22 PROCEDURE — 1210000000 HC MED SURG R&B

## 2020-10-22 PROCEDURE — 96372 THER/PROPH/DIAG INJ SC/IM: CPT

## 2020-10-22 PROCEDURE — 99284 EMERGENCY DEPT VISIT MOD MDM: CPT

## 2020-10-22 PROCEDURE — G0444 DEPRESSION SCREEN ANNUAL: HCPCS | Performed by: NURSE PRACTITIONER

## 2020-10-22 PROCEDURE — 36415 COLL VENOUS BLD VENIPUNCTURE: CPT

## 2020-10-22 PROCEDURE — 93005 ELECTROCARDIOGRAM TRACING: CPT | Performed by: EMERGENCY MEDICINE

## 2020-10-22 PROCEDURE — 80307 DRUG TEST PRSMV CHEM ANLYZR: CPT

## 2020-10-22 PROCEDURE — 6370000000 HC RX 637 (ALT 250 FOR IP): Performed by: PHYSICIAN ASSISTANT

## 2020-10-22 PROCEDURE — 99214 OFFICE O/P EST MOD 30 MIN: CPT | Performed by: NURSE PRACTITIONER

## 2020-10-22 PROCEDURE — G0378 HOSPITAL OBSERVATION PER HR: HCPCS

## 2020-10-22 PROCEDURE — 6360000002 HC RX W HCPCS: Performed by: PHYSICIAN ASSISTANT

## 2020-10-22 PROCEDURE — G0480 DRUG TEST DEF 1-7 CLASSES: HCPCS

## 2020-10-22 PROCEDURE — 99999 PR OFFICE/OUTPT VISIT,PROCEDURE ONLY: CPT | Performed by: EMERGENCY MEDICINE

## 2020-10-22 PROCEDURE — 83036 HEMOGLOBIN GLYCOSYLATED A1C: CPT

## 2020-10-22 PROCEDURE — 80053 COMPREHEN METABOLIC PANEL: CPT

## 2020-10-22 PROCEDURE — 82962 GLUCOSE BLOOD TEST: CPT | Performed by: NURSE PRACTITIONER

## 2020-10-22 PROCEDURE — 82947 ASSAY GLUCOSE BLOOD QUANT: CPT

## 2020-10-22 PROCEDURE — 81003 URINALYSIS AUTO W/O SCOPE: CPT

## 2020-10-22 PROCEDURE — 2580000003 HC RX 258: Performed by: EMERGENCY MEDICINE

## 2020-10-22 PROCEDURE — 6370000000 HC RX 637 (ALT 250 FOR IP): Performed by: EMERGENCY MEDICINE

## 2020-10-22 RX ORDER — NICOTINE 21 MG/24HR
1 PATCH, TRANSDERMAL 24 HOURS TRANSDERMAL DAILY
Status: DISCONTINUED | OUTPATIENT
Start: 2020-10-22 | End: 2020-10-25 | Stop reason: HOSPADM

## 2020-10-22 RX ORDER — NICOTINE POLACRILEX 4 MG
15 LOZENGE BUCCAL PRN
Status: DISCONTINUED | OUTPATIENT
Start: 2020-10-22 | End: 2020-10-25 | Stop reason: HOSPADM

## 2020-10-22 RX ORDER — 0.9 % SODIUM CHLORIDE 0.9 %
1000 INTRAVENOUS SOLUTION INTRAVENOUS ONCE
Status: COMPLETED | OUTPATIENT
Start: 2020-10-22 | End: 2020-10-22

## 2020-10-22 RX ORDER — POTASSIUM CHLORIDE 7.45 MG/ML
10 INJECTION INTRAVENOUS PRN
Status: DISCONTINUED | OUTPATIENT
Start: 2020-10-22 | End: 2020-10-25 | Stop reason: HOSPADM

## 2020-10-22 RX ORDER — POLYETHYLENE GLYCOL 3350 17 G/17G
17 POWDER, FOR SOLUTION ORAL DAILY PRN
Status: DISCONTINUED | OUTPATIENT
Start: 2020-10-22 | End: 2020-10-25 | Stop reason: HOSPADM

## 2020-10-22 RX ORDER — SODIUM CHLORIDE 9 MG/ML
INJECTION, SOLUTION INTRAVENOUS CONTINUOUS
Status: DISCONTINUED | OUTPATIENT
Start: 2020-10-22 | End: 2020-10-25

## 2020-10-22 RX ORDER — INSULIN GLARGINE 100 [IU]/ML
20 INJECTION, SOLUTION SUBCUTANEOUS NIGHTLY
Status: DISCONTINUED | OUTPATIENT
Start: 2020-10-22 | End: 2020-10-23

## 2020-10-22 RX ORDER — ACETAMINOPHEN 325 MG/1
650 TABLET ORAL EVERY 6 HOURS PRN
Status: DISCONTINUED | OUTPATIENT
Start: 2020-10-22 | End: 2020-10-25 | Stop reason: HOSPADM

## 2020-10-22 RX ORDER — TRAZODONE HYDROCHLORIDE 50 MG/1
100 TABLET ORAL NIGHTLY
Status: DISCONTINUED | OUTPATIENT
Start: 2020-10-22 | End: 2020-10-25 | Stop reason: HOSPADM

## 2020-10-22 RX ORDER — DEXTROSE MONOHYDRATE 50 MG/ML
100 INJECTION, SOLUTION INTRAVENOUS PRN
Status: DISCONTINUED | OUTPATIENT
Start: 2020-10-22 | End: 2020-10-25 | Stop reason: HOSPADM

## 2020-10-22 RX ORDER — LISINOPRIL 10 MG/1
10 TABLET ORAL DAILY
Status: DISCONTINUED | OUTPATIENT
Start: 2020-10-22 | End: 2020-10-24

## 2020-10-22 RX ORDER — POTASSIUM CHLORIDE 20 MEQ/1
40 TABLET, EXTENDED RELEASE ORAL PRN
Status: DISCONTINUED | OUTPATIENT
Start: 2020-10-22 | End: 2020-10-25 | Stop reason: HOSPADM

## 2020-10-22 RX ORDER — LANOLIN ALCOHOL/MO/W.PET/CERES
6 CREAM (GRAM) TOPICAL NIGHTLY
Status: DISCONTINUED | OUTPATIENT
Start: 2020-10-22 | End: 2020-10-25 | Stop reason: HOSPADM

## 2020-10-22 RX ORDER — MAGNESIUM SULFATE IN WATER 40 MG/ML
2 INJECTION, SOLUTION INTRAVENOUS PRN
Status: DISCONTINUED | OUTPATIENT
Start: 2020-10-22 | End: 2020-10-25 | Stop reason: HOSPADM

## 2020-10-22 RX ORDER — ACETAMINOPHEN 650 MG/1
650 SUPPOSITORY RECTAL EVERY 6 HOURS PRN
Status: DISCONTINUED | OUTPATIENT
Start: 2020-10-22 | End: 2020-10-25 | Stop reason: HOSPADM

## 2020-10-22 RX ORDER — SODIUM CHLORIDE 0.9 % (FLUSH) 0.9 %
10 SYRINGE (ML) INJECTION EVERY 12 HOURS SCHEDULED
Status: DISCONTINUED | OUTPATIENT
Start: 2020-10-22 | End: 2020-10-25 | Stop reason: HOSPADM

## 2020-10-22 RX ORDER — ONDANSETRON 2 MG/ML
4 INJECTION INTRAMUSCULAR; INTRAVENOUS EVERY 6 HOURS PRN
Status: DISCONTINUED | OUTPATIENT
Start: 2020-10-22 | End: 2020-10-25 | Stop reason: HOSPADM

## 2020-10-22 RX ORDER — DEXTROSE MONOHYDRATE 25 G/50ML
12.5 INJECTION, SOLUTION INTRAVENOUS PRN
Status: DISCONTINUED | OUTPATIENT
Start: 2020-10-22 | End: 2020-10-25 | Stop reason: HOSPADM

## 2020-10-22 RX ORDER — SODIUM CHLORIDE 0.9 % (FLUSH) 0.9 %
10 SYRINGE (ML) INJECTION PRN
Status: DISCONTINUED | OUTPATIENT
Start: 2020-10-22 | End: 2020-10-25 | Stop reason: HOSPADM

## 2020-10-22 RX ORDER — PROMETHAZINE HYDROCHLORIDE 25 MG/1
12.5 TABLET ORAL EVERY 6 HOURS PRN
Status: DISCONTINUED | OUTPATIENT
Start: 2020-10-22 | End: 2020-10-25 | Stop reason: HOSPADM

## 2020-10-22 RX ADMIN — INSULIN GLARGINE 20 UNITS: 100 INJECTION, SOLUTION SUBCUTANEOUS at 22:21

## 2020-10-22 RX ADMIN — Medication 6 MG: at 22:20

## 2020-10-22 RX ADMIN — SODIUM CHLORIDE: 9 INJECTION, SOLUTION INTRAVENOUS at 22:22

## 2020-10-22 RX ADMIN — ENOXAPARIN SODIUM 40 MG: 40 INJECTION SUBCUTANEOUS at 22:21

## 2020-10-22 RX ADMIN — ACETAMINOPHEN 650 MG: 325 TABLET ORAL at 22:20

## 2020-10-22 RX ADMIN — INSULIN LISPRO 20 UNITS: 100 INJECTION, SOLUTION INTRAVENOUS; SUBCUTANEOUS at 17:42

## 2020-10-22 RX ADMIN — TRAZODONE HYDROCHLORIDE 100 MG: 50 TABLET ORAL at 22:20

## 2020-10-22 RX ADMIN — SODIUM CHLORIDE, PRESERVATIVE FREE 10 ML: 5 INJECTION INTRAVENOUS at 22:25

## 2020-10-22 RX ADMIN — SODIUM CHLORIDE 1000 ML: 9 INJECTION, SOLUTION INTRAVENOUS at 17:43

## 2020-10-22 RX ADMIN — INSULIN LISPRO 5 UNITS: 100 INJECTION, SOLUTION INTRAVENOUS; SUBCUTANEOUS at 22:21

## 2020-10-22 RX ADMIN — LISINOPRIL 10 MG: 10 TABLET ORAL at 22:20

## 2020-10-22 ASSESSMENT — PAIN DESCRIPTION - DESCRIPTORS
DESCRIPTORS_2: BURNING
DESCRIPTORS: BURNING

## 2020-10-22 ASSESSMENT — PAIN DESCRIPTION - ORIENTATION
ORIENTATION: RIGHT
ORIENTATION_2: LEFT

## 2020-10-22 ASSESSMENT — ENCOUNTER SYMPTOMS
SINUS PRESSURE: 0
BLOOD IN STOOL: 0
EYE DISCHARGE: 0
SHORTNESS OF BREATH: 0
EYE DISCHARGE: 0
PHOTOPHOBIA: 0
SORE THROAT: 0
CHEST TIGHTNESS: 0
CONSTIPATION: 0
EYE REDNESS: 0
ABDOMINAL PAIN: 0
FOREIGN BODY SENSATION: 0
DIARRHEA: 0
NAUSEA: 0
CHOKING: 0
SINUS PAIN: 0
SORE THROAT: 0
SHORTNESS OF BREATH: 0
COUGH: 0
EYE ITCHING: 0
APNEA: 0
FACIAL SWELLING: 0
RHINORRHEA: 0
BLURRED VISION: 1
DOUBLE VISION: 0
DIARRHEA: 0
VOICE CHANGE: 0

## 2020-10-22 ASSESSMENT — COLUMBIA-SUICIDE SEVERITY RATING SCALE - C-SSRS
5. HAVE YOU STARTED TO WORK OUT OR WORKED OUT THE DETAILS OF HOW TO KILL YOURSELF? DO YOU INTEND TO CARRY OUT THIS PLAN?: YES
4. HAVE YOU HAD THESE THOUGHTS AND HAD SOME INTENTION OF ACTING ON THEM?: YES
1. WITHIN THE PAST MONTH, HAVE YOU WISHED YOU WERE DEAD OR WISHED YOU COULD GO TO SLEEP AND NOT WAKE UP?: YES
2. HAVE YOU ACTUALLY HAD ANY THOUGHTS OF KILLING YOURSELF?: YES
7. DID THIS OCCUR IN THE LAST THREE MONTHS: YES
3. HAVE YOU BEEN THINKING ABOUT HOW YOU MIGHT KILL YOURSELF?: YES
6. HAVE YOU EVER DONE ANYTHING, STARTED TO DO ANYTHING, OR PREPARED TO DO ANYTHING TO END YOUR LIFE?: YES

## 2020-10-22 ASSESSMENT — PAIN DESCRIPTION - ONSET
ONSET_2: ON-GOING
ONSET: ON-GOING

## 2020-10-22 ASSESSMENT — PATIENT HEALTH QUESTIONNAIRE - PHQ9
8. MOVING OR SPEAKING SO SLOWLY THAT OTHER PEOPLE COULD HAVE NOTICED. OR THE OPPOSITE, BEING SO FIGETY OR RESTLESS THAT YOU HAVE BEEN MOVING AROUND A LOT MORE THAN USUAL: 2
SUM OF ALL RESPONSES TO PHQ QUESTIONS 1-9: 18
SUM OF ALL RESPONSES TO PHQ QUESTIONS 1-9: 21
4. FEELING TIRED OR HAVING LITTLE ENERGY: 3
SUM OF ALL RESPONSES TO PHQ QUESTIONS 1-9: 21
5. POOR APPETITE OR OVEREATING: 2
3. TROUBLE FALLING OR STAYING ASLEEP: 2
6. FEELING BAD ABOUT YOURSELF - OR THAT YOU ARE A FAILURE OR HAVE LET YOURSELF OR YOUR FAMILY DOWN: 3
7. TROUBLE CONCENTRATING ON THINGS, SUCH AS READING THE NEWSPAPER OR WATCHING TELEVISION: 3
DEPRESSION UNABLE TO ASSESS: FUNCTIONAL CAPACITY MOTIVATION LIMITS ACCURACY
10. IF YOU CHECKED OFF ANY PROBLEMS, HOW DIFFICULT HAVE THESE PROBLEMS MADE IT FOR YOU TO DO YOUR WORK, TAKE CARE OF THINGS AT HOME, OR GET ALONG WITH OTHER PEOPLE: 3
2. FEELING DOWN, DEPRESSED OR HOPELESS: 3
9. THOUGHTS THAT YOU WOULD BE BETTER OFF DEAD, OR OF HURTING YOURSELF: 3

## 2020-10-22 ASSESSMENT — PAIN SCALES - GENERAL
PAINLEVEL_OUTOF10: 10
PAINLEVEL_OUTOF10: 3

## 2020-10-22 ASSESSMENT — PAIN DESCRIPTION - LOCATION
LOCATION_2: LEG
LOCATION: LEG

## 2020-10-22 ASSESSMENT — PAIN DESCRIPTION - PROGRESSION
CLINICAL_PROGRESSION_2: NOT CHANGED
CLINICAL_PROGRESSION: NOT CHANGED

## 2020-10-22 ASSESSMENT — PAIN DESCRIPTION - DURATION: DURATION_2: INTERMITTENT

## 2020-10-22 ASSESSMENT — PAIN DESCRIPTION - PAIN TYPE
TYPE: CHRONIC PAIN
TYPE_2: CHRONIC PAIN

## 2020-10-22 ASSESSMENT — PAIN DESCRIPTION - FREQUENCY: FREQUENCY: INTERMITTENT

## 2020-10-22 ASSESSMENT — PAIN - FUNCTIONAL ASSESSMENT: PAIN_FUNCTIONAL_ASSESSMENT: PREVENTS OR INTERFERES SOME ACTIVE ACTIVITIES AND ADLS

## 2020-10-22 ASSESSMENT — PAIN DESCRIPTION - INTENSITY: RATING_2: 3

## 2020-10-22 NOTE — ED TRIAGE NOTES
SI with plan to shoot himself HI as well no particular problem.  AH/VH variety of voices and visions per patient

## 2020-10-22 NOTE — ED PROVIDER NOTES
[facility]  eMERGENCY dEPARTMENT eNCOUnter      Pt Name: Mohit Maxwell  MRN: [id number]  [place] 1970  Date of evaluation: 10/22/2020  Provider: Phuong Maher MD    CHIEF COMPLAINT       Chief Complaint   Patient presents with    Mental Health Problem         HISTORY OF PRESENT ILLNESS   (Location/Symptom, Timing/Onset,Context/Setting, Quality, Duration, Modifying Factors, Severity)  Note limiting factors. Mohit Maxwell is a 44 y.o. male who presents to the emergency department mental health evaluation    61-year-old male with depression and suicidal ideations. States he lost his will to live. Stopped all medications including his insulin and psychiatric meds about 2 months ago. Records show admission about 14 months ago here to behavioral health. Losing weight. Has been out of bed in several days. Mother called a friend to check on him. And encouraged him to come in for care. Patient is voluntary at this time. The history is provided by the patient and medical records. NursingNotes were reviewed. REVIEW OF SYSTEMS    (2-9 systems for level 4, 10 or more for level 5)     Review of Systems   Constitutional: Positive for unexpected weight change. HENT: Negative for congestion, drooling, facial swelling, nosebleeds, sinus pressure, sore throat and voice change. Eyes: Negative for discharge. Respiratory: Negative for apnea, choking and shortness of breath. Cardiovascular: Negative for chest pain and leg swelling. Gastrointestinal: Negative for blood in stool, constipation, diarrhea and nausea. Endocrine: Positive for polydipsia. Genitourinary: Negative for dysuria and enuresis. Musculoskeletal: Negative for joint swelling. Skin: Negative for rash and wound. Neurological: Negative for seizures and syncope. Psychiatric/Behavioral: Positive for dysphoric mood and suicidal ideas. Negative for behavioral problems and hallucinations.    All other systems reviewed and are negative. A complete review of systems was performed and is negative except as noted above in the HPI. PAST MEDICAL HISTORY     Past Medical History:   Diagnosis Date    ADHD     Anxiety     Depression     Diabetes ([facility] [address]          SURGICAL HISTORY       Past Surgical History:   Procedure Laterality Date    NECK SURGERY           CURRENT MEDICATIONS       Previous Medications    BLOOD GLUCOSE MONITOR KIT AND SUPPLIES    True metrix glucose monitor, test strips and lancets. Pt may test 4 times a day. DX:E11.65, Z79.4    BLOOD GLUCOSE MONITOR STRIPS    Test 4 times a day & as needed for symptoms of irregular blood glucose. Please give True Metrix test strips and lancets    BUPROPION (WELLBUTRIN SR) 150 MG EXTENDED RELEASE TABLET    Take 1 tablet by mouth 2 times daily    EASY TOUCH SAFETY LANCETS 21G MISC    1 Device by Does not apply route 2 times daily    EASY TOUCH SAFETY LANCETS 21G MISC    CHECK GLUCOSE TID AND PRN    ERGOCALCIFEROL (ERGOCALCIFEROL) 87596 UNITS CAPSULE    Take 1 capsule by mouth once a week    GABAPENTIN (NEURONTIN) 400 MG CAPSULE    Take 1 capsule by mouth 3 times daily for 28 days. GLIMEPIRIDE (AMARYL) 2 MG TABLET    Take 1 tablet by mouth every morning (before breakfast)    GLUCAGON 1 MG INJECTION    Inject 1 mg into the muscle See Admin Instructions Follow package directions for low blood sugar. GLUCOSE MONITORING KIT (FREESTYLE) MONITORING KIT    1 kit by Does not apply route daily    INSULIN GLARGINE (LANTUS) 100 UNIT/ML INJECTION VIAL    Inject 45 Units into the skin nightly    INSULIN REGULAR (HUMULIN R) 100 UNIT/ML INJECTION    See Sliding Scale orders    INSULIN SYRINGE-NEEDLE U-100 29G X 1/2\" 0.3 ML MISC    1 each by Does not apply route daily    INSULIN SYRINGES, DISPOSABLE, U-100 0.3 ML MISC    1 each by Does not apply route daily    LANCETS MISC    1 each by Does not apply route 4 times daily Test 4 times daily.  DX: E11.65, Z79.4  True Metrix    LISINOPRIL (PRINIVIL;ZESTRIL) 10 MG TABLET    Take 1 tablet by mouth daily    LURASIDONE (LATUDA) 40 MG TABS TABLET    Take 1 tablet by mouth daily    MELATONIN 3 MG TABS TABLET    Take 2 tablets by mouth nightly    NABUMETONE (RELAFEN) 500 MG TABLET    Take 1 tablet by mouth 2 times daily For pain/inflammation    TRAZODONE (DESYREL) 100 MG TABLET    Take 1 tablet by mouth nightly       ALLERGIES     Patient has no known allergies.     FAMILY HISTORY       Family History   Adopted: Yes          SOCIAL HISTORY       Social History     Socioeconomic History    Marital status: Single     Spouse name: Not on file    Number of children: Not on file    Years of education: Not on file    Highest education level: Not on file   Occupational History    Not on file   Social Needs    Financial resource strain: Not on file    Food insecurity     Worry: Not on file     Inability: Not on file    Transportation needs     Medical: Not on file     Non-medical: Not on file   Tobacco Use    Smoking status: Current Every Day Smoker     Packs/day: 1.50     Types: Cigarettes     Start date: 5/3/1999    Smokeless tobacco: Former User   Substance and Sexual Activity    Alcohol use: Not Currently    Drug use: Yes     Types: Methamphetamines     Comment: a week a go    Sexual activity: Not on file   Lifestyle    Physical activity     Days per week: Not on file     Minutes per session: Not on file    Stress: Not on file   Relationships    Social connections     Talks on phone: Not on file     Gets together: Not on file     Attends Oriental orthodox service: Not on file     Active member of club or organization: Not on file     Attends meetings of clubs or organizations: Not on file     Relationship status: Not on file    Intimate partner violence     Fear of current or ex partner: Not on file     Emotionally abused: Not on file     Physically abused: Not on file     Forced sexual activity: Not on file   Other Topics Concern    Not on file Social History Narrative    Not on file       SCREENINGS             PHYSICAL EXAM    (up to 7 for level 4, 8 or more for level 5)     ED Triage Vitals   BP Temp Temp src Pulse Resp SpO2 Height Weight   10/22/20 1512 10/22/20 1510 -- 10/22/20 1512 10/22/20 1512 10/22/20 1512 10/22/20 1512 10/22/20 1512   (!) 142/92 98.1 °F (36.7 °C)  107 18 98 % 5' 10\" (1.778 m) 151 lb (68.5 kg)       Physical Exam  Vitals signs and nursing note reviewed. Constitutional:       General: He is not in acute distress. Appearance: He is well-developed. HENT:      Head: Normocephalic and atraumatic. Right Ear: External ear normal.      Left Ear: External ear normal.   Eyes:      General: Lids are normal.      Extraocular Movements: Extraocular movements intact. Conjunctiva/sclera: Conjunctivae normal.      Pupils: Pupils are equal, round, and reactive to light. Comments: I do not see much light reflex in the left eye. There is a small rim superiorly of retinal reflex. Full retinal reflex of the right eye visualized. No eye pain. But the pupils are reactive equally   Neck:      Musculoskeletal: Normal range of motion and neck supple. Cardiovascular:      Rate and Rhythm: Normal rate and regular rhythm. Heart sounds: Normal heart sounds. Pulmonary:      Effort: Pulmonary effort is normal.      Breath sounds: Normal breath sounds. Abdominal:      General: Bowel sounds are normal.      Palpations: Abdomen is soft. Musculoskeletal: Normal range of motion. Skin:     General: Skin is warm and dry. Neurological:      Mental Status: He is alert and oriented to person, place, and time.          DIAGNOSTIC RESULTS     EKG: All EKG's are interpreted by the Emergency Department Physician who either signs or Co-signs this chart in the absence of a cardiologist.        RADIOLOGY:   Non-plain film images such as CT, Ultrasound and MRI are read by the radiologist. Plainradiographic images are visualized and preliminarily interpreted by the emergency physician with the below findings:        Interpretation per the Radiologist below, if available at the time of this note:    No orders to display         ED BEDSIDE ULTRASOUND:   Performed by ED Physician - none    LABS:  Labs Reviewed   COMPREHENSIVE METABOLIC PANEL W/ REFLEX TO MG FOR LOW K - Abnormal; Notable for the following components:       Result Value    Sodium 125 (*)     Chloride 90 (*)     Glucose 634 (*)     All other components within normal limits    Narrative:     CALL  Delatorre  [facility] tel. ,  Chemistry results called to and read back by Mami/RN/ER, 10/22/2020 17:14,  by BRI   HEMOGLOBIN A1C - Abnormal; Notable for the following components:    Hemoglobin A1C 13.1 (*)     All other components within normal limits   POCT GLUCOSE - Abnormal; Notable for the following components:    POC Glucose 509 (*)     All other components within normal limits   CBC WITH AUTO DIFFERENTIAL   ETHANOL   URINE RT REFLEX TO CULTURE   URINE DRUG SCREEN   POCT GLUCOSE   POCT GLUCOSE   POCT GLUCOSE   POCT GLUCOSE   POCT GLUCOSE   POCT GLUCOSE   POCT GLUCOSE   POCT GLUCOSE   POCT GLUCOSE   POCT GLUCOSE   POCT GLUCOSE   POCT GLUCOSE   POCT GLUCOSE       All other labs were within normal range or not returned as of this dictation. EMERGENCY DEPARTMENT COURSE and DIFFERENTIALDIAGNOSIS/MDM:   Vitals:    Vitals:    10/22/20 1510 10/22/20 1512   BP:  (!) 142/92   Pulse:  107   Resp:  18   Temp: 98.1 °F (36.7 °C)    SpO2:  98%   Weight:  151 lb (68.5 kg)   Height:  5' 10\" (1.778 m)       MDM  Number of Diagnoses or Management Options  Depression with suicidal ideation:   Uncontrolled type 2 diabetes mellitus with hyperglycemia [facility]):   Diagnosis management comments: Psychiatry was consulted but they suggest medical admission and stability before they see they will be happy to see as a consult. I spoke with Dr. Ailyn De Paz of ophthalmology on-call at [facility].   He states the patient needs to be medically stabilized and could see the retinal specialist Dr. Olvin Morgan next week if that could be part of his discharge planning. CONSULTS:  IP CONSULT TO PSYCHIATRY  IP CONSULT TO HOSPITALIST  IP CONSULT TO OPHTHALMOLOGY    PROCEDURES:  Unless otherwise notedbelow, none     Procedures    FINAL IMPRESSION     1. Depression with suicidal ideation    2. Uncontrolled type 2 diabetes mellitus with hyperglycemia ([address]    3. Vision loss, left eye    4.  Diabetic retinopathy of left eye associated with type 2 diabetes mellitus, macular edema presence unspecified, unspecified retinopathy severity ([address]          DISPOSITION/PLAN   DISPOSITION Decision To Admit 10/22/2020 05:37:58 PM      PATIENT REFERRED TO:  @FUP@    DISCHARGE MEDICATIONS:  New Prescriptions    No medications on file          (Please note that portions of this note were completed with a voice recognition program.  Efforts were made to edit the dictations butoccasionally words are mis-transcribed.)    Aaliyah Reed MD (electronically signed)  AttendingEmergency Physician          Edwar Clifton MD  10/22/20 0183

## 2020-10-22 NOTE — H&P
10/22/2020 12/13/19   ALEX Choudhury   glimepiride (AMARYL) 2 MG tablet Take 1 tablet by mouth every morning (before breakfast)  Patient not taking: Reported on 10/22/2020 12/13/19   ALEX Choudhury   insulin glargine (LANTUS) 100 UNIT/ML injection vial Inject 45 Units into the skin nightly  Patient not taking: Reported on 10/22/2020 12/13/19   ALEX Choudhury   insulin regular (HUMULIN R) 100 UNIT/ML injection See Sliding Scale orders  Patient not taking: Reported on 10/22/2020 12/13/19   ALEX Choudhury   Insulin Syringe-Needle U-100 29G X 1/2\" 0.3 ML MISC 1 each by Does not apply route daily  Patient not taking: Reported on 10/22/2020 12/13/19   ALEX Choudhury   Insulin Syringes, Disposable, U-100 0.3 ML MISC 1 each by Does not apply route daily  Patient not taking: Reported on 10/22/2020 12/13/19   ALEX Choudhury   lisinopril (PRINIVIL;ZESTRIL) 10 MG tablet Take 1 tablet by mouth daily  Patient not taking: Reported on 10/22/2020 12/13/19   ALEX Choudhury   traZODone (DESYREL) 100 MG tablet Take 1 tablet by mouth nightly  Patient not taking: Reported on 10/22/2020 12/13/19   ALEX Choudhury   buPROPion [facility]) 150 MG extended release tablet Take 1 tablet by mouth 2 times daily  Patient not taking: Reported on 10/22/2020 12/13/19   ALEX Choudhury   nabumetone (RELAFEN) 500 MG tablet Take 1 tablet by mouth 2 times daily For pain/inflammation  Patient not taking: Reported on 10/22/2020 12/13/19   ALEX Choudhury   lurasidone (LATUDA) 40 MG TABS tablet Take 1 tablet by mouth daily  Patient not taking: Reported on 10/22/2020 12/13/19   ALEX Choudhury   EASY TOUCH SAFETY LANCETS 21G MISC 1 Device by Does not apply route 2 times daily  Patient not taking: Reported on 10/22/2020 8/5/19   ALEX Choudhury   ergocalciferol (ERGOCALCIFEROL) 56937 units capsule Take 1 capsule by mouth once a week 7/15/19 9/24/19  Chepe Schreiber MD   melatonin 3 MG TABS tablet Take 2 tablets by mouth nightly  Patient not taking: Reported on 10/22/2020 7/15/19   Anusha Kirk MD   gabapentin (NEURONTIN) 400 MG capsule Take 1 capsule by mouth 3 times daily for 28 days. 7/15/19 8/12/19  Anusha Kirk MD   glucagon 1 MG injection Inject 1 mg into the muscle See Admin Instructions Follow package directions for low blood sugar. Patient not taking: Reported on 10/22/2020 6/19/19   ALEX Presley   Lancets MISC 1 each by Does not apply route 4 times daily Test 4 times daily. DX: E11.65, Z79.4  True Metrix  Patient not taking: Reported on 10/22/2020 6/19/19   ALEX Presley   glucose monitoring kit (FREESTYLE) monitoring kit 1 kit by Does not apply route daily  Patient not taking: Reported on 10/22/2020 5/3/19   ALEX Presley     Allergies:    Patient has no known allergies. Social History:    The patient currently lives at home. Tobacco:   reports that he has been smoking cigarettes. He started smoking about 21 years ago. He has been smoking about 1.50 packs per day. He has quit using smokeless tobacco.  Alcohol:   reports previous alcohol use.   Illicit Drugs: denies    Family History:      Adopted: Yes     Review of Systems:   Constitutional / general: Denies fever / chills / sweats +fatigue  Head:  Denies headache / neck stiffness / trauma / visual change  Eyes:  Denies blurry vision / acute visual change or loss / itching / redness  ENT: Denies sore throat / hoarseness / nasal drainage / ear pain  CV:  Denies chest pain / palpitations/ orthopnea   Respiratory:  Denies cough / shortness of breath / sputum / hemoptysis  GI: Denies nausea / vomiting / abdominal pain / diarrhea / constipation  :  Denies dysuria / hesitancy / urgency / hematuria   Neuro: Denies paralysis / syncope / seizure / dysphagia / headache / paresthesias  Musculoskeletal: +muscle weakness /Denies joint stiffness / pain  Vascular: Denies edema / claudication / varicosities  Heme / endocrine: Denies easy bruising / bleeding / excessive sweating / heat or cold intolerance  Psychiatric:  + depression / +anxiety /+ insomnia /  +auditory hallucinations  Skin:  Denies new rashes / lesions / skin hair or nail changes    14 point review of systems is negative except as specifically addressed above. Physical Examination:  BP (!) 142/92   Pulse 107   Temp 98.1 °F (36.7 °C)   Resp 18   Ht 5' 10\" (1.778 m)   Wt 151 lb (68.5 kg)   SpO2 98%   BMI 21.67 kg/m²   General appearance: 44year old male, no acute distress, resting comfortably in bed, well nourished  Head: Normocephalic, without obvious abnormality, atraumatic  Eyes: conjunctivae/corneas clear. PERRL, EOM's intact. Ears: normal external ears and nose, throat without exudate  Neck: no adenopathy, no carotid bruit, no JVD, supple, symmetrical, trachea midline   Lungs: clear to auscultation bilaterally,no rales or wheezes   Heart: tachycardic, regular rhythm, S1, S2 normal, no murmur  Abdomen:soft, non-tender; non-distended, normal bowel sounds no masses, no organomegaly  Extremities: No lower extremity edema,  No erythema, no tenderness to palpation  Skin: Skin color, texture, turgor normal. No rashes or lesions  Lymphatic: No palpable lymph node enlargment  Neurologic: Alert and oriented X 3, normal strength and tone. Normal symmetric reflexes. No facial asymmetry or lateralizing weakness.  He does have decreased sensation BLE's as well as bilateral finger tips   Psychiatric: Flat affect, appears melancholic, makes appropriate eye contact, endorses suicidal ideation, auditory hallucinations    Diagnostic Data:  CBC:  Recent Labs     10/22/20  1644   WBC 10.5   HGB 15.2   HCT 43.2        BMP:  Recent Labs     10/22/20  1644   *   K 4.7   CL 90*   CO2 24   BUN 16   CREATININE 0.7   CALCIUM 9.4     Recent Labs     10/22/20  1644   AST 15   ALT 29   BILITOT 0.8   ALKPHOS 91     Urinalysis:  Lab Results   Component Value Date    NITRU Negative 10/22/2020    BLOODU Negative 10/22/2020    SPECGRAV 1.038 10/22/2020    GLUCOSEU =>1000 10/22/2020     A1C:   Recent Labs     10/22/20  1633   LABA1C 13.1*     Assessment/Plan:  Principal Problem:    Uncontrolled Diabetes mellitus II with Hyperglycemia w/ likely pseudohyponatremia-continue IVF's, SSI, Lantus, Hypoglycemia tx orders, accuchecks. No anion gap, no ketones on UA, he is not acidotic. Aggressive treatment hyperglycemia with close monitoring, repeat BMP later this evening. A1C 13.1.  Check lipid panel in AM     Active Problems:    Bipolar 1 disorder, depressed, moderate (HCC)-noted      Suicidal ideation-will consult Psychiatry for admission to [facility] unit once medically stable      Left eye pain w/ blurred vision and loss of red reflex-Ophthalmology contacted and stated patient OK for admission to Hospital and once medically stable for discharge can follow up as OP in their office with Dr. Chelle Vera      Diabetic peripheral neuropathy-noted     Further orders per clinical course/attending     Signed:  Isidro Lane PA-C

## 2020-10-22 NOTE — PATIENT INSTRUCTIONS
Patient is being released with sister and Mother to be transported to [facility] for evaluation. Glucose too high for meter to read here in the office. Also needs psychiatric evaluation for depression and suicidal ideations. Patient is agreeable to go to the ER for evaluation.

## 2020-10-22 NOTE — PROGRESS NOTES
[address] [facility]  [address] [place] [address] [address] [facility] [address]  Dept: [phone]  Dept Fax: [phone]  Loc: [phone]    Malgorzata Ornelas is a 44 y.o. male who presents today for his medical conditions/complaintsas noted below. Malgorzata Ornelas is c/o of Eye Problem (left eye is blurry, can not see out of it at all. ); Diabetes (diabetic and has not kept up with medication); Fatigue (x2-3 weeks ); and Depression (Patient states he is suicidal x 3 months)      HPI:   Has not been taking his medications for about two months. He states that he has been depressed for the past two months and has just wants to give up on everything. He stopped taking his diabetes medication and all of his Depression medications. He states that he has had suicidal thoughts in the past. Patient states that he does not have a plan to harm himself today but needs to see someone. He has not been taking his diabetes medication for about two months and is now is having trouble with his vision. He states that he just wants to stay in bed. His mother had a friend come and see about him today because he has not returned her phone call and she was worried about him. He states that he just wants to give up. Eye Problem    Both eyes are affected. This is a recurrent problem. The current episode started 1 to 4 weeks ago. The problem occurs constantly. The problem has been unchanged. There was no injury mechanism. Associated symptoms include blurred vision. Pertinent negatives include no eye discharge, double vision, eye redness, fever, foreign body sensation, itching, photophobia or recent URI. He has tried nothing for the symptoms. Diabetes   His disease course has been worsening. There are no hypoglycemic associated symptoms. Pertinent negatives for hypoglycemia include no confusion, dizziness or headaches. Associated symptoms include blurred vision, polydipsia and polyuria.  Pertinent negatives for diabetes include no chest pain, no fatigue and no foot paresthesias. There are no hypoglycemic complications. Symptoms are worsening. Current diabetic treatments: He stopped taking his diabetic medications. Past Medical History:   Diagnosis Date    ADHD     Anxiety     Depression     Diabetes ([address]      Past Surgical History:   Procedure Laterality Date    NECK SURGERY       Family History   Adopted: Yes     Social History     Tobacco Use    Smoking status: Current Every Day Smoker     Packs/day: 1.50     Types: Cigarettes     Start date: 5/3/1999    Smokeless tobacco: Former User   Substance Use Topics    Alcohol use: Not Currently      Current Outpatient Medications on File Prior to Visit   Medication Sig Dispense Refill    blood glucose monitor kit and supplies True metrix glucose monitor, test strips and lancets. Pt may test 4 times a day. DX:E11.65, Z79.4 (Patient not taking: Reported on 10/22/2020) 1 kit 0    blood glucose monitor strips Test 4 times a day & as needed for symptoms of irregular blood glucose.  Please give True Metrix test strips and lancets (Patient not taking: Reported on 10/22/2020) 200 strip 5    EASY TOUCH SAFETY LANCETS 21G MISC CHECK GLUCOSE TID AND PRN (Patient not taking: Reported on 10/22/2020) 100 each 5    glimepiride (AMARYL) 2 MG tablet Take 1 tablet by mouth every morning (before breakfast) (Patient not taking: Reported on 10/22/2020) 30 tablet 5    insulin glargine (LANTUS) 100 UNIT/ML injection vial Inject 45 Units into the skin nightly (Patient not taking: Reported on 10/22/2020) 1 vial 3    insulin regular (HUMULIN R) 100 UNIT/ML injection See Sliding Scale orders (Patient not taking: Reported on 10/22/2020) 1 vial 5    Insulin Syringe-Needle U-100 29G X 1/2\" 0.3 ML MISC 1 each by Does not apply route daily (Patient not taking: Reported on 10/22/2020) 100 each 3    Insulin Syringes, Disposable, U-100 0.3 ML MISC 1 each by Does not apply route daily (Patient not taking: Reported on 10/22/2020) 100 each 3    lisinopril (PRINIVIL;ZESTRIL) 10 MG tablet Take 1 tablet by mouth daily (Patient not taking: Reported on 10/22/2020) 30 tablet 2    traZODone (DESYREL) 100 MG tablet Take 1 tablet by mouth nightly (Patient not taking: Reported on 10/22/2020) 30 tablet 2    buPROPion (WELLBUTRIN SR) 150 MG extended release tablet Take 1 tablet by mouth 2 times daily (Patient not taking: Reported on 10/22/2020) 60 tablet 2    nabumetone (RELAFEN) 500 MG tablet Take 1 tablet by mouth 2 times daily For pain/inflammation (Patient not taking: Reported on 10/22/2020) 60 tablet 2    lurasidone (LATUDA) 40 MG TABS tablet Take 1 tablet by mouth daily (Patient not taking: Reported on 10/22/2020) 30 tablet 2    EASY TOUCH SAFETY LANCETS 21G MISC 1 Device by Does not apply route 2 times daily (Patient not taking: Reported on 10/22/2020) 100 each 5    ergocalciferol (ERGOCALCIFEROL) 49357 units capsule Take 1 capsule by mouth once a week 11 capsule 0    melatonin 3 MG TABS tablet Take 2 tablets by mouth nightly (Patient not taking: Reported on 10/22/2020) 60 tablet 1    gabapentin (NEURONTIN) 400 MG capsule Take 1 capsule by mouth 3 times daily for 28 days. 84 capsule 0    glucagon 1 MG injection Inject 1 mg into the muscle See Admin Instructions Follow package directions for low blood sugar. (Patient not taking: Reported on 10/22/2020) 1 kit 3    Lancets MISC 1 each by Does not apply route 4 times daily Test 4 times daily. DX: E11.65, Z79.4  True Metrix (Patient not taking: Reported on 10/22/2020) 200 each 5    glucose monitoring kit (FREESTYLE) monitoring kit 1 kit by Does not apply route daily (Patient not taking: Reported on 10/22/2020) 1 kit 0     No current facility-administered medications on file prior to visit.        No Known Allergies  Health Maintenance   Topic Date Due    Pneumococcal 0-64 years Vaccine (1 of 1 - PPSV23) 07/27/1974    Diabetic foot exam  07/27/1978    Diabetic retinal exam  07/27/1978    HIV screen  07/27/1983    Diabetic microalbuminuria test  07/27/1986    Hepatitis B vaccine (1 of 3 - Risk 3-dose series) 07/27/1987    DTaP/Tdap/Td vaccine (1 - Tdap) 07/27/1987    Shingles Vaccine (1 of 2) 07/27/2018    Colon cancer screen colonoscopy  07/27/2018    Lipid screen  07/11/2020    Flu vaccine (1) 09/01/2020    A1C test (Diabetic or Prediabetic)  12/13/2020    Potassium monitoring  12/13/2020    Creatinine monitoring  12/13/2020    Hepatitis A vaccine  Aged Out    Hib vaccine  Aged Out    Meningococcal (ACWY) vaccine  Aged Out       Subjective:   Review of Systems   Constitutional: Negative for chills, fatigue and fever. HENT: Negative for congestion, ear pain, postnasal drip, rhinorrhea, sinus pain and sore throat. Eyes: Positive for blurred vision. Negative for double vision, photophobia, discharge, redness and itching. Respiratory: Negative for cough, chest tightness and shortness of breath. Cardiovascular: Negative for chest pain. Gastrointestinal: Negative for abdominal pain and diarrhea. Endocrine: Positive for polydipsia and polyuria. Genitourinary: Negative for dysuria, frequency, hematuria and urgency. Musculoskeletal: Positive for myalgias. Negative for arthralgias. Skin: Negative for rash. Neurological: Negative for dizziness, light-headedness, numbness and headaches. Psychiatric/Behavioral: Positive for suicidal ideas (Patient states that he has had suicidial ideations in the past.  He does not have plan today to harm himself or anyone else. ). Negative for agitation, behavioral problems and confusion. Objective:   /86 (Site: Right Upper Arm)   Pulse 112   Temp 97.6 °F (36.4 °C) (Temporal)   Resp 14   Ht 5' 10\" (1.778 m)   Wt 151 lb 12.8 oz (68.9 kg)   SpO2 97%   BMI 21.78 kg/m²    Physical Exam  Vitals signs and nursing note reviewed. Constitutional:       General: He is not in acute distress. Appearance: Normal appearance. He is not ill-appearing. HENT:      Head: Normocephalic. Right Ear: Tympanic membrane and ear canal normal.      Left Ear: Tympanic membrane and ear canal normal.      Mouth/Throat:      Mouth: Mucous membranes are moist.   Eyes:      Extraocular Movements: Extraocular movements intact. Conjunctiva/sclera: Conjunctivae normal.      Pupils: Pupils are equal, round, and reactive to light. Neck:      Musculoskeletal: Normal range of motion and neck supple. Cardiovascular:      Rate and Rhythm: Normal rate and regular rhythm. Pulses: Normal pulses. Heart sounds: Normal heart sounds. No murmur. Pulmonary:      Effort: Pulmonary effort is normal. No respiratory distress. Breath sounds: Normal breath sounds. No wheezing. Abdominal:      General: Bowel sounds are normal.   Skin:     General: Skin is warm and dry. Capillary Refill: Capillary refill takes less than 2 seconds. Neurological:      Mental Status: He is alert. Psychiatric:         Attention and Perception: Attention normal.         Mood and Affect: Mood is depressed. Mood is not anxious. Speech: Speech normal.         Behavior: Behavior is cooperative. Thought Content: Thought content does not include suicidal (No plan today but has stated that he has had thoughts in the past.) plan. Cognition and Memory: Cognition normal.         Results for orders placed or performed in visit on 10/22/20   POCT Glucose   Result Value Ref Range    Glucose      QC OK? Assessment:      Diagnosis Orders   1. Depression, unspecified depression type     2. Other specified diabetes mellitus with other specified complication, unspecified whether long term insulin use ([facility] [address]     3. Suicidal risk         Plan:   Allison Tavares was seen today for eye problem, diabetes, fatigue and depression.     Diagnoses and all orders for this visit:    Depression, unspecified depression type    Other specified diabetes mellitus with other specified complication, unspecified whether long term insulin use ([facility] [address]    POCT glucose was too high for machine to read in the office today. Patient refused ambulance. (Refusal of treatment for scanned into chart) Mother and sister will bring him to the ER for stat labs and evaluation of blood sugar. Suicidal risk   Patient denies plan to harm himself or anyone else today however he has had suicidal thoughts in the past and he has stopped taking his insulin and other diabetes medications for the last couple of months. He called his mother and sister to come to the clinic. He refused ambulance to take him to the ER for evaluation, but stated that he would go with his sister and mother. Mother and sister are at the clinic and are agreeable to bring him to [place] ER for evaluation of his blood sugar as well as psychiatric evaluation. Patient is stable and calm in the office and willing to go to the ER for help. Mother and sister are agreeable to transport him to [place]. Other orders  -     POCT Glucose  Glucose too high for meter in office to read. No follow-ups on file. Patient given educational materials- see patient instructions. Discussed use, benefit, and side effects of prescribedmedications. All patient questions answered. Pt voiced understanding.      Electronically signed by ALEX Mosqueda CNP on 10/22/2020 at 2:40 PM

## 2020-10-23 LAB
ANION GAP SERPL CALCULATED.3IONS-SCNC: 10 MMOL/L (ref 7–19)
ANION GAP SERPL CALCULATED.3IONS-SCNC: 7 MMOL/L (ref 7–19)
BUN BLDV-MCNC: 16 MG/DL (ref 6–20)
BUN BLDV-MCNC: 19 MG/DL (ref 6–20)
CALCIUM SERPL-MCNC: 8.6 MG/DL (ref 8.6–10)
CALCIUM SERPL-MCNC: 8.7 MG/DL (ref 8.6–10)
CHLORIDE BLD-SCNC: 103 MMOL/L (ref 98–111)
CHLORIDE BLD-SCNC: 98 MMOL/L (ref 98–111)
CHOLESTEROL, FASTING: 157 MG/DL (ref 160–199)
CO2: 25 MMOL/L (ref 22–29)
CO2: 25 MMOL/L (ref 22–29)
CREAT SERPL-MCNC: 0.7 MG/DL (ref 0.5–1.2)
CREAT SERPL-MCNC: 0.7 MG/DL (ref 0.5–1.2)
EKG P AXIS: 66 DEGREES
EKG P-R INTERVAL: 136 MS
EKG Q-T INTERVAL: 342 MS
EKG QRS DURATION: 92 MS
EKG QTC CALCULATION (BAZETT): 422 MS
EKG T AXIS: 62 DEGREES
GFR AFRICAN AMERICAN: >59
GFR AFRICAN AMERICAN: >59
GFR NON-AFRICAN AMERICAN: >60
GFR NON-AFRICAN AMERICAN: >60
GLUCOSE BLD-MCNC: 232 MG/DL (ref 74–109)
GLUCOSE BLD-MCNC: 265 MG/DL (ref 70–99)
GLUCOSE BLD-MCNC: 266 MG/DL (ref 70–99)
GLUCOSE BLD-MCNC: 267 MG/DL (ref 70–99)
GLUCOSE BLD-MCNC: 316 MG/DL (ref 70–99)
GLUCOSE BLD-MCNC: 329 MG/DL (ref 70–99)
GLUCOSE BLD-MCNC: 334 MG/DL (ref 74–109)
HCT VFR BLD CALC: 37.7 % (ref 42–52)
HDLC SERPL-MCNC: 31 MG/DL (ref 55–121)
HEMOGLOBIN: 13.2 G/DL (ref 14–18)
LDL CHOLESTEROL CALCULATED: 75 MG/DL
MCH RBC QN AUTO: 30.6 PG (ref 27–31)
MCHC RBC AUTO-ENTMCNC: 35 G/DL (ref 33–37)
MCV RBC AUTO: 87.3 FL (ref 80–94)
PDW BLD-RTO: 12.8 % (ref 11.5–14.5)
PERFORMED ON: ABNORMAL
PLATELET # BLD: 259 K/UL (ref 130–400)
PMV BLD AUTO: 9.9 FL (ref 9.4–12.4)
POTASSIUM REFLEX MAGNESIUM: 4.3 MMOL/L (ref 3.5–5)
POTASSIUM SERPL-SCNC: 4 MMOL/L (ref 3.5–5)
RBC # BLD: 4.32 M/UL (ref 4.7–6.1)
SODIUM BLD-SCNC: 133 MMOL/L (ref 136–145)
SODIUM BLD-SCNC: 135 MMOL/L (ref 136–145)
TRIGLYCERIDE, FASTING: 257 MG/DL (ref 0–149)
WBC # BLD: 9.2 K/UL (ref 4.8–10.8)

## 2020-10-23 PROCEDURE — 80048 BASIC METABOLIC PNL TOTAL CA: CPT

## 2020-10-23 PROCEDURE — 93010 ELECTROCARDIOGRAM REPORT: CPT | Performed by: INTERNAL MEDICINE

## 2020-10-23 PROCEDURE — 96372 THER/PROPH/DIAG INJ SC/IM: CPT

## 2020-10-23 PROCEDURE — 6370000000 HC RX 637 (ALT 250 FOR IP): Performed by: EMERGENCY MEDICINE

## 2020-10-23 PROCEDURE — 6360000002 HC RX W HCPCS: Performed by: PHYSICIAN ASSISTANT

## 2020-10-23 PROCEDURE — 6370000000 HC RX 637 (ALT 250 FOR IP): Performed by: PSYCHIATRY & NEUROLOGY

## 2020-10-23 PROCEDURE — 2580000003 HC RX 258: Performed by: PHYSICIAN ASSISTANT

## 2020-10-23 PROCEDURE — 36415 COLL VENOUS BLD VENIPUNCTURE: CPT

## 2020-10-23 PROCEDURE — 99253 IP/OBS CNSLTJ NEW/EST LOW 45: CPT | Performed by: PSYCHIATRY & NEUROLOGY

## 2020-10-23 PROCEDURE — 6370000000 HC RX 637 (ALT 250 FOR IP): Performed by: HOSPITALIST

## 2020-10-23 PROCEDURE — 1210000000 HC MED SURG R&B

## 2020-10-23 PROCEDURE — 82947 ASSAY GLUCOSE BLOOD QUANT: CPT

## 2020-10-23 PROCEDURE — G0378 HOSPITAL OBSERVATION PER HR: HCPCS

## 2020-10-23 PROCEDURE — 85027 COMPLETE CBC AUTOMATED: CPT

## 2020-10-23 PROCEDURE — 80061 LIPID PANEL: CPT

## 2020-10-23 PROCEDURE — 6370000000 HC RX 637 (ALT 250 FOR IP): Performed by: PHYSICIAN ASSISTANT

## 2020-10-23 RX ORDER — GABAPENTIN 400 MG/1
400 CAPSULE ORAL 3 TIMES DAILY
Status: DISCONTINUED | OUTPATIENT
Start: 2020-10-23 | End: 2020-10-25

## 2020-10-23 RX ORDER — INSULIN GLARGINE 100 [IU]/ML
20 INJECTION, SOLUTION SUBCUTANEOUS 2 TIMES DAILY
Status: DISCONTINUED | OUTPATIENT
Start: 2020-10-23 | End: 2020-10-24

## 2020-10-23 RX ORDER — BUPROPION HYDROCHLORIDE 100 MG/1
100 TABLET, EXTENDED RELEASE ORAL 2 TIMES DAILY
Status: DISCONTINUED | OUTPATIENT
Start: 2020-10-23 | End: 2020-10-25 | Stop reason: HOSPADM

## 2020-10-23 RX ORDER — GABAPENTIN 400 MG/1
400 CAPSULE ORAL 3 TIMES DAILY
Status: DISCONTINUED | OUTPATIENT
Start: 2020-10-23 | End: 2020-10-23

## 2020-10-23 RX ORDER — OXYCODONE HYDROCHLORIDE 5 MG/1
2.5 TABLET ORAL ONCE
Status: COMPLETED | OUTPATIENT
Start: 2020-10-23 | End: 2020-10-23

## 2020-10-23 RX ORDER — NALOXONE HYDROCHLORIDE 0.4 MG/ML
0.4 INJECTION, SOLUTION INTRAMUSCULAR; INTRAVENOUS; SUBCUTANEOUS PRN
Status: DISCONTINUED | OUTPATIENT
Start: 2020-10-23 | End: 2020-10-25 | Stop reason: HOSPADM

## 2020-10-23 RX ORDER — OXYCODONE HYDROCHLORIDE 5 MG/1
2.5 TABLET ORAL EVERY 4 HOURS PRN
Status: DISCONTINUED | OUTPATIENT
Start: 2020-10-23 | End: 2020-10-25 | Stop reason: HOSPADM

## 2020-10-23 RX ADMIN — OXYCODONE 2.5 MG: 5 TABLET ORAL at 04:32

## 2020-10-23 RX ADMIN — SODIUM CHLORIDE: 9 INJECTION, SOLUTION INTRAVENOUS at 22:23

## 2020-10-23 RX ADMIN — SODIUM CHLORIDE, PRESERVATIVE FREE 10 ML: 5 INJECTION INTRAVENOUS at 22:23

## 2020-10-23 RX ADMIN — INSULIN LISPRO 4 UNITS: 100 INJECTION, SOLUTION INTRAVENOUS; SUBCUTANEOUS at 22:23

## 2020-10-23 RX ADMIN — INSULIN GLARGINE 20 UNITS: 100 INJECTION, SOLUTION SUBCUTANEOUS at 12:46

## 2020-10-23 RX ADMIN — INSULIN LISPRO 8 UNITS: 100 INJECTION, SOLUTION INTRAVENOUS; SUBCUTANEOUS at 10:05

## 2020-10-23 RX ADMIN — INSULIN LISPRO 6 UNITS: 100 INJECTION, SOLUTION INTRAVENOUS; SUBCUTANEOUS at 12:47

## 2020-10-23 RX ADMIN — BUPROPION HYDROCHLORIDE 100 MG: 100 TABLET, FILM COATED, EXTENDED RELEASE ORAL at 12:44

## 2020-10-23 RX ADMIN — INSULIN GLARGINE 20 UNITS: 100 INJECTION, SOLUTION SUBCUTANEOUS at 22:23

## 2020-10-23 RX ADMIN — GABAPENTIN 400 MG: 400 CAPSULE ORAL at 17:59

## 2020-10-23 RX ADMIN — GABAPENTIN 400 MG: 400 CAPSULE ORAL at 22:24

## 2020-10-23 RX ADMIN — LISINOPRIL 10 MG: 10 TABLET ORAL at 10:04

## 2020-10-23 RX ADMIN — INSULIN LISPRO 6 UNITS: 100 INJECTION, SOLUTION INTRAVENOUS; SUBCUTANEOUS at 17:59

## 2020-10-23 RX ADMIN — ENOXAPARIN SODIUM 40 MG: 40 INJECTION SUBCUTANEOUS at 22:24

## 2020-10-23 RX ADMIN — GABAPENTIN 400 MG: 400 CAPSULE ORAL at 10:04

## 2020-10-23 RX ADMIN — BUPROPION HYDROCHLORIDE 100 MG: 100 TABLET, FILM COATED, EXTENDED RELEASE ORAL at 17:59

## 2020-10-23 RX ADMIN — OXYCODONE 2.5 MG: 5 TABLET ORAL at 22:24

## 2020-10-23 RX ADMIN — GABAPENTIN 400 MG: 400 CAPSULE ORAL at 04:32

## 2020-10-23 RX ADMIN — TRAZODONE HYDROCHLORIDE 100 MG: 50 TABLET ORAL at 22:24

## 2020-10-23 RX ADMIN — OXYCODONE 2.5 MG: 5 TABLET ORAL at 02:10

## 2020-10-23 RX ADMIN — LURASIDONE HYDROCHLORIDE 40 MG: 40 TABLET, FILM COATED ORAL at 12:44

## 2020-10-23 RX ADMIN — Medication 6 MG: at 22:24

## 2020-10-23 ASSESSMENT — PAIN DESCRIPTION - ORIENTATION
ORIENTATION: RIGHT
ORIENTATION_2: LEFT
ORIENTATION: RIGHT

## 2020-10-23 ASSESSMENT — PAIN - FUNCTIONAL ASSESSMENT
PAIN_FUNCTIONAL_ASSESSMENT: PREVENTS OR INTERFERES SOME ACTIVE ACTIVITIES AND ADLS

## 2020-10-23 ASSESSMENT — PAIN DESCRIPTION - INTENSITY
RATING_2: 3
RATING_2: 10
RATING_2: 10
RATING_2: 3

## 2020-10-23 ASSESSMENT — PAIN DESCRIPTION - PROGRESSION
CLINICAL_PROGRESSION: NOT CHANGED
CLINICAL_PROGRESSION_2: NOT CHANGED
CLINICAL_PROGRESSION: NOT CHANGED

## 2020-10-23 ASSESSMENT — PAIN DESCRIPTION - FREQUENCY
FREQUENCY: INTERMITTENT

## 2020-10-23 ASSESSMENT — PAIN DESCRIPTION - DESCRIPTORS
DESCRIPTORS_2: BURNING
DESCRIPTORS_2: BURNING
DESCRIPTORS: BURNING
DESCRIPTORS: BURNING
DESCRIPTORS_2: BURNING
DESCRIPTORS: BURNING
DESCRIPTORS_2: BURNING
DESCRIPTORS: BURNING

## 2020-10-23 ASSESSMENT — PAIN DESCRIPTION - LOCATION
LOCATION_2: LEG
LOCATION: LEG
LOCATION_2: LEG
LOCATION: LEG
LOCATION_2: LEG
LOCATION: LEG
LOCATION: LEG
LOCATION_2: LEG

## 2020-10-23 ASSESSMENT — PAIN SCALES - GENERAL
PAINLEVEL_OUTOF10: 0
PAINLEVEL_OUTOF10: 3
PAINLEVEL_OUTOF10: 10
PAINLEVEL_OUTOF10: 3
PAINLEVEL_OUTOF10: 0
PAINLEVEL_OUTOF10: 0
PAINLEVEL_OUTOF10: 10

## 2020-10-23 ASSESSMENT — PAIN DESCRIPTION - PAIN TYPE
TYPE: CHRONIC PAIN
TYPE: CHRONIC PAIN
TYPE_2: CHRONIC PAIN
TYPE: CHRONIC PAIN
TYPE_2: CHRONIC PAIN
TYPE_2: CHRONIC PAIN
TYPE: CHRONIC PAIN
TYPE_2: CHRONIC PAIN

## 2020-10-23 ASSESSMENT — PAIN DESCRIPTION - ONSET
ONSET: ON-GOING
ONSET_2: ON-GOING
ONSET: ON-GOING
ONSET_2: ON-GOING
ONSET: ON-GOING
ONSET: ON-GOING
ONSET_2: ON-GOING
ONSET_2: ON-GOING

## 2020-10-23 ASSESSMENT — PAIN DESCRIPTION - DURATION
DURATION_2: INTERMITTENT

## 2020-10-23 NOTE — PLAN OF CARE
Problem: Suicide risk  Description: Suicide risk  Goal: Provide patient with safe environment  Description: Provide patient with safe environment  Outcome: Ongoing     Problem: Pain:  Description: Pain management should include both nonpharmacologic and pharmacologic interventions. Goal: Pain level will decrease  Description: Pain level will decrease  Outcome: Ongoing  Goal: Control of acute pain  Description: Control of acute pain  Outcome: Ongoing  Goal: Control of chronic pain  Description: Control of chronic pain  Outcome: Ongoing     Problem: Falls - Risk of:  Goal: Will remain free from falls  Description: Will remain free from falls  Outcome: Ongoing  Goal: Absence of physical injury  Description: Absence of physical injury  Outcome: Ongoing     Problem: Bleeding:  Goal: Will show no signs and symptoms of excessive bleeding  Description: Will show no signs and symptoms of excessive bleeding  Outcome: Ongoing     Problem:  Activity:  Goal: Risk for activity intolerance will decrease  Description: Risk for activity intolerance will decrease  Outcome: Ongoing     Problem: Coping:  Goal: Ability to adjust to condition or change in health will improve  Description: Ability to adjust to condition or change in health will improve  Outcome: Ongoing     Problem: Fluid Volume:  Goal: Ability to maintain a balanced intake and output will improve  Description: Ability to maintain a balanced intake and output will improve  Outcome: Ongoing  Goal: Ability to achieve a balanced intake and output will improve  Description: Ability to achieve a balanced intake and output will improve  Outcome: Ongoing     Problem: Health Behavior:  Goal: Ability to identify and utilize available resources and services will improve  Description: Ability to identify and utilize available resources and services will improve  Outcome: Ongoing  Goal: Ability to manage health-related needs will improve  Description: Ability to manage health-related needs will improve  Outcome: Ongoing  Goal: Ability to identify and utilize available support systems will improve  Description: Ability to identify and utilize available support systems will improve  Outcome: Ongoing  Goal: Compliance with therapeutic regimen will improve  Description: Compliance with therapeutic regimen will improve  Outcome: Ongoing  Goal: Ability to keep healthcare appointments will improve  Description: Ability to keep healthcare appointments will improve  Outcome: Ongoing     Problem: Metabolic:  Goal: Ability to maintain appropriate glucose levels will improve  Description: Ability to maintain appropriate glucose levels will improve  Outcome: Ongoing     Problem: Nutritional:  Goal: Maintenance of adequate nutrition will improve  Description: Maintenance of adequate nutrition will improve  Outcome: Ongoing  Goal: Progress toward achieving an optimal weight will improve  Description: Progress toward achieving an optimal weight will improve  Outcome: Ongoing     Problem: Physical Regulation:  Goal: Complications related to the disease process, condition or treatment will be avoided or minimized  Description: Complications related to the disease process, condition or treatment will be avoided or minimized  Outcome: Ongoing  Goal: Diagnostic test results will improve  Description: Diagnostic test results will improve  Outcome: Ongoing  Goal: Ability to maintain clinical measurements within normal limits will improve  Description: Ability to maintain clinical measurements within normal limits will improve  Outcome: Ongoing  Goal: Will show no signs and symptoms of electrolyte imbalance  Description: Will show no signs and symptoms of electrolyte imbalance  Outcome: Ongoing     Problem: Skin Integrity:  Goal: Risk for impaired skin integrity will decrease  Description: Risk for impaired skin integrity will decrease  Outcome: Ongoing     Problem: Tissue Perfusion:  Goal: Adequacy of tissue perfusion will improve  Description: Adequacy of tissue perfusion will improve  Outcome: Ongoing     Problem: Discharge Planning:  Goal: Discharged to appropriate level of care  Description: Discharged to appropriate level of care  Outcome: Ongoing     Problem: Serum Glucose Level - Abnormal:  Goal: Ability to maintain appropriate glucose levels will improve  Description: Ability to maintain appropriate glucose levels will improve  Outcome: Ongoing     Problem: Sensory Perception - Impaired:  Goal: Ability to maintain a stable neurologic state will improve  Description: Ability to maintain a stable neurologic state will improve  Outcome: Ongoing     Problem: ABCDS Injury Assessment  Goal: Absence of physical injury  Outcome: Ongoing     Problem: SAFETY  Goal: Free from accidental physical injury  Outcome: Ongoing  Goal: Free from intentional harm  Outcome: Ongoing     Problem: DAILY CARE  Goal: Daily care needs are met  Outcome: Ongoing     Problem: PAIN  Goal: Patient's pain/discomfort is manageable  Outcome: Ongoing     Problem: SKIN INTEGRITY  Goal: Skin integrity is maintained or improved  Outcome: Ongoing     Problem: KNOWLEDGE DEFICIT  Goal: Patient/S.O. demonstrates understanding of disease process, treatment plan, medications, and discharge instructions.   Outcome: Ongoing     Problem: DISCHARGE BARRIERS  Goal: Patient's continuum of care needs are met  Outcome: Ongoing     Problem: Health Maintenance - Impaired:  Goal: Ability to perform activities of daily living will improve  Description: Ability to perform activities of daily living will improve  Outcome: Ongoing  Goal: Able to sleep without medication for appropriate length of time  Description: Able to sleep without medication for appropriate length of time  Outcome: Ongoing     Problem: Mood - Altered:  Goal: Mood stable  Description: Mood stable  Outcome: Ongoing     Problem: Self-Esteem - Low:  Goal: Demonstrates positive self-esteem  Description: Demonstrates positive self-esteem  Outcome: Ongoing     Problem: Cerebrospinal Fluid Leakage - Risk Of:  Goal: Demonstration of organized thought processes  Description: Demonstration of organized thought processes  Outcome: Ongoing     Problem: Violence - Risk of, Self/Other-Directed:  Goal: Knowledge of developmental care interventions  Description: Absence of violence  Outcome: Ongoing

## 2020-10-23 NOTE — PLAN OF CARE
Nutrition Problem #1: Unintended weight loss, Altered nutrition-related lab values  Intervention: Food and/or Nutrient Delivery: Modify Current Diet  Nutritional Goals: po intake 75% or greater. Weight stable.   Accuchek's under 200
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
## 2020-10-23 NOTE — PROGRESS NOTES
4 Eyes Skin Assessment    Karolyn Steel is being assessed upon: Admission    I agree that I, Johann Deshpande, along with Pop Quinteros RN (either 2 RN's or 1 LPN and 1 RN) have performed a thorough Head to Toe Skin Assessment on the patient. ALL assessment sites listed below have been assessed. Areas assessed by both nurses:     [x]   Head, Face, and Ears   [x]   Shoulders, Back, and Chest  [x]   Arms, Elbows, and Hands   [x]   Coccyx, Sacrum, and Ischium  [x]   Legs, Feet, and Heels    Does the Patient have Skin Breakdown?  No    Isaiah Prevention initiated: NA  Wound Care Orders initiated: NA    WOC nurse consulted for Pressure Injury (Stage 3,4, Unstageable, DTI, NWPT, and Complex wounds) and New or Established Ostomies: NA        Primary Nurse eSignature: Johann Deshpande RN on 10/23/2020 at 1:03 AM      Co-Signer eSignature: {Esignature:[id number]}

## 2020-10-23 NOTE — PROGRESS NOTES
Comprehensive Nutrition Assessment    Type and Reason for Visit:  Initial, Positive Nutrition Screen    Nutrition Recommendations/Plan: double protein portions. When accuchek's decrease, give 5 carb's per meal    Nutrition Assessment:  Pt appears this. Aware has not been eating well or taking medication for about 2 months. Pt meets criteria for moderate malnutrition AEB depletion of fat and muscle mass. PO intake is good. Pt asking for additional food \"I'm hungry. \"  Started double protein portions. Malnutrition Assessment:  Malnutrition Status: Moderate malnutrition    Context:  Chronic Illness     Findings of the 6 clinical characteristics of malnutrition:  Energy Intake:  No significant decrease in energy intake  Weight Loss:  7 - Greater than 7.5% over 3 months     Body Fat Loss:  1 - Mild body fat loss     Muscle Mass Loss:  1 - Mild muscle mass loss    Fluid Accumulation:  No significant fluid accumulation Extremities   Strength:  Not Performed    Estimated Daily Nutrient Needs:  Energy (kcal):  6490-0311 kcals (25-30 kcals/kg); Weight Used for Energy Requirements:  Current     Protein (g):  68-136g; Weight Used for Protein Requirements:  Current        Fluid (ml/day):  1712-2055ml; Weight Used for Fluid Requirements:  Current      Nutrition Related Findings:  thin appearing gentleman      Wounds:  None       Current Nutrition Therapies:    DIET CARB CONTROL; Safety Tray; Safety Tray (Disposables)    Anthropometric Measures:  · Height: 5' 10\" (177.8 cm)  · Current Body Weight: 151 lb (68.5 kg)   · Admission Body Weight: 151 lb 12 oz (68.8 kg)    · Usual Body Weight: (170-180 lbs)     · Ideal Body Weight: 166 lbs; % Ideal Body Weight 91 %   · BMI: 21.7  · BMI Categories: Normal Weight (BMI 18.5-24. 9)       Nutrition Diagnosis:   · Unintended weight loss, Altered nutrition-related lab values related to inadequate protein-energy intake, endocrine dysfuntion, other (comment)(noncompliance) as evidenced by lab values, weight loss 7.5% in 3 months      Nutrition Interventions:   Food and/or Nutrient Delivery:  Modify Current Diet  Nutrition Education/Counseling:  Counseling initiated(education declined)   Coordination of Nutrition Care:  Continued Inpatient Monitoring    Goals:  po intake 75% or greater. Weight stable.   Accuchek's under 200       Nutrition Monitoring and Evaluation:   Behavioral-Environmental Outcomes:  Readiness for Change   Food/Nutrient Intake Outcomes:  Food and Nutrient Intake  Physical Signs/Symptoms Outcomes:  Biochemical Data, Skin, Weight     Discharge Planning:    Continue current diet     Electronically signed by Rianna Olivas MS, RD, LD on 10/23/20 at 1:47 PM CDT    Contact: [phone]

## 2020-10-23 NOTE — PROGRESS NOTES
Ivette Israel arrived to room # 12  Presented with: SUICIDAL IDEATION  Mental Status: Patient is oriented, alert, coherent, logical, thought processes intact and able to concentrate and follow conversation. Vitals:    10/22/20 2157   BP: 132/83   Pulse: 100   Resp: 18   Temp: 98.7 °F (37.1 °C)   SpO2: 97%     Patient safety contract and falls prevention contract reviewed with patient Yes. Oriented Patient to room. Call light within reach. Yes.   Needs, issues or concerns expressed at this time: no.      Electronically signed by Mary Montano RN on 10/23/2020 at 1:02 AM

## 2020-10-23 NOTE — PROGRESS NOTES
appears stated age, cooperative and no distress  Head: Normocephalic, without obvious abnormality, atraumatic  Eyes: conjunctivae/corneas clear. PERRL, EOM's intact. Ears: normal external ears and nose, throat without exudate  Neck: no adenopathy, no carotid bruit, no JVD, supple, symmetrical, trachea midline   Lungs: clear to auscultation bilaterally,no rales or wheezes   Heart: regular rate and rhythm, S1, S2 normal, no murmur  Abdomen:soft, non-tender; non-distended, normal bowel sounds no masses, no organomegaly  Extremities:No lower extremity edema,  No erythema, no tenderness to palpation  Skin: Skin color, texture, turgor normal. No rashes or lesions  Lymphatic: No palpable lymph node enlargment  Neurologic: Alert and oriented X 3, normal strength and tone.  No focal deficits  Psychiatric: Alert and oriented, thought content appropriate, normal insight, mood appropriate    Medications:      sodium chloride 125 mL/hr at 10/22/20 2222    dextrose        gabapentin  400 mg Oral TID    insulin glargine  20 Units Subcutaneous BID    buPROPion  100 mg Oral BID    lurasidone  40 mg Oral Daily    nicotine  1 patch Transdermal Daily    traZODone  100 mg Oral Nightly    melatonin  6 mg Oral Nightly    sodium chloride flush  10 mL Intravenous 2 times per day    enoxaparin  40 mg Subcutaneous Nightly    lisinopril  10 mg Oral Daily    insulin lispro  0-12 Units Subcutaneous TID WC    insulin lispro  0-6 Units Subcutaneous Nightly     oxyCODONE, naloxone, sodium chloride flush, acetaminophen **OR** acetaminophen, polyethylene glycol, promethazine **OR** ondansetron, potassium chloride **OR** potassium alternative oral replacement **OR** potassium chloride, magnesium sulfate, glucose, dextrose, glucagon (rDNA), dextrose  DIET CARB CONTROL; Safety Tray; Safety Tray (Disposables)     Lab and other Data:     Recent Labs     10/22/20  1644 10/23/20  0507   WBC 10.5 9.2   HGB 15.2 13.2*    259     Recent Labs     10/22/20  1644 10/23/20  0507 10/23/20  1402   * 133* 135*   K 4.7 4.3 4.0   CL 90* 98 103   CO2 24 25 25   BUN 16 19 16   CREATININE 0.7 0.7 0.7   GLUCOSE 634* 334* 232*     Recent Labs     10/22/20  1644   AST 15   ALT 29   BILITOT 0.8   ALKPHOS 91     UA:  Recent Labs     10/22/20  1640   COLORU YELLOW   PHUR 5.0   CLARITYU Clear   SPECGRAV 1.038   LEUKOCYTESUR Negative   UROBILINOGEN 0.2   BILIRUBINUR Negative   BLOODU Negative   GLUCOSEU =>1000     A1C:   Recent Labs     10/22/20  1633   LABA1C 13.1*       Assessment/Plan   Principal Problem:    Uncontrolled Diabetes mellitus II with Hyperglycemia w/ pseudohyponatremia-continue IVF's, SSI, Lantus increased to 20 BID, Hypoglycemia tx orders, accuchecks. A1C 13.1. Glucose with improving control.  Consider statin with Triglycerides 257     Active Problems:    Bipolar 1 disorder, depressed, moderate (HCC)-noted, psychiatry following       Suicidal ideation-Plans to admit to [facility] unit in AM if stable overnight       Left eye pain w/ blurred vision and loss of red reflex-Ophthalmology contacted and stated patient OK for admission to Hospital and once medically stable for discharge can follow up as OP in their office with Dr. Mukund Cordero Hypertension-zestril continued, well controleld       Diabetic peripheral neuropathy-noted     DVT Prophylaxis: Lovenox    Manisha Daniel PA-C

## 2020-10-24 LAB
ANION GAP SERPL CALCULATED.3IONS-SCNC: 7 MMOL/L (ref 7–19)
BUN BLDV-MCNC: 16 MG/DL (ref 6–20)
CALCIUM SERPL-MCNC: 7.9 MG/DL (ref 8.6–10)
CHLORIDE BLD-SCNC: 108 MMOL/L (ref 98–111)
CO2: 25 MMOL/L (ref 22–29)
CREAT SERPL-MCNC: 0.6 MG/DL (ref 0.5–1.2)
GFR AFRICAN AMERICAN: >59
GFR NON-AFRICAN AMERICAN: >60
GLUCOSE BLD-MCNC: 229 MG/DL (ref 74–109)
GLUCOSE BLD-MCNC: 243 MG/DL (ref 70–99)
GLUCOSE BLD-MCNC: 282 MG/DL (ref 70–99)
GLUCOSE BLD-MCNC: 298 MG/DL (ref 70–99)
GLUCOSE BLD-MCNC: 325 MG/DL (ref 70–99)
HCT VFR BLD CALC: 38.7 % (ref 42–52)
HEMOGLOBIN: 12.8 G/DL (ref 14–18)
MCH RBC QN AUTO: 30.7 PG (ref 27–31)
MCHC RBC AUTO-ENTMCNC: 33.1 G/DL (ref 33–37)
MCV RBC AUTO: 92.8 FL (ref 80–94)
PDW BLD-RTO: 13.1 % (ref 11.5–14.5)
PERFORMED ON: ABNORMAL
PLATELET # BLD: 226 K/UL (ref 130–400)
PMV BLD AUTO: 10.2 FL (ref 9.4–12.4)
POTASSIUM REFLEX MAGNESIUM: 4.1 MMOL/L (ref 3.5–5)
RBC # BLD: 4.17 M/UL (ref 4.7–6.1)
REASON FOR REJECTION: NORMAL
REJECTED TEST: NORMAL
SODIUM BLD-SCNC: 140 MMOL/L (ref 136–145)
WBC # BLD: 8.4 K/UL (ref 4.8–10.8)

## 2020-10-24 PROCEDURE — 6360000002 HC RX W HCPCS: Performed by: PHYSICIAN ASSISTANT

## 2020-10-24 PROCEDURE — 6370000000 HC RX 637 (ALT 250 FOR IP): Performed by: PHYSICIAN ASSISTANT

## 2020-10-24 PROCEDURE — 6370000000 HC RX 637 (ALT 250 FOR IP): Performed by: PSYCHIATRY & NEUROLOGY

## 2020-10-24 PROCEDURE — 96372 THER/PROPH/DIAG INJ SC/IM: CPT

## 2020-10-24 PROCEDURE — 80048 BASIC METABOLIC PNL TOTAL CA: CPT

## 2020-10-24 PROCEDURE — 6370000000 HC RX 637 (ALT 250 FOR IP): Performed by: EMERGENCY MEDICINE

## 2020-10-24 PROCEDURE — 85027 COMPLETE CBC AUTOMATED: CPT

## 2020-10-24 PROCEDURE — G0378 HOSPITAL OBSERVATION PER HR: HCPCS

## 2020-10-24 PROCEDURE — 6370000000 HC RX 637 (ALT 250 FOR IP): Performed by: INTERNAL MEDICINE

## 2020-10-24 PROCEDURE — 82947 ASSAY GLUCOSE BLOOD QUANT: CPT

## 2020-10-24 PROCEDURE — 6370000000 HC RX 637 (ALT 250 FOR IP): Performed by: HOSPITALIST

## 2020-10-24 PROCEDURE — 36415 COLL VENOUS BLD VENIPUNCTURE: CPT

## 2020-10-24 PROCEDURE — 1210000000 HC MED SURG R&B

## 2020-10-24 RX ORDER — INSULIN GLARGINE 100 [IU]/ML
25 INJECTION, SOLUTION SUBCUTANEOUS 2 TIMES DAILY
Status: DISCONTINUED | OUTPATIENT
Start: 2020-10-24 | End: 2020-10-25

## 2020-10-24 RX ORDER — ATORVASTATIN CALCIUM 20 MG/1
20 TABLET, FILM COATED ORAL NIGHTLY
Status: DISCONTINUED | OUTPATIENT
Start: 2020-10-24 | End: 2020-10-25 | Stop reason: HOSPADM

## 2020-10-24 RX ORDER — LISINOPRIL 5 MG/1
5 TABLET ORAL DAILY
Status: DISCONTINUED | OUTPATIENT
Start: 2020-10-25 | End: 2020-10-25 | Stop reason: HOSPADM

## 2020-10-24 RX ADMIN — INSULIN LISPRO 4 UNITS: 100 INJECTION, SOLUTION INTRAVENOUS; SUBCUTANEOUS at 12:38

## 2020-10-24 RX ADMIN — OXYCODONE 2.5 MG: 5 TABLET ORAL at 09:46

## 2020-10-24 RX ADMIN — INSULIN LISPRO 6 UNITS: 100 INJECTION, SOLUTION INTRAVENOUS; SUBCUTANEOUS at 17:24

## 2020-10-24 RX ADMIN — BUPROPION HYDROCHLORIDE 100 MG: 100 TABLET, FILM COATED, EXTENDED RELEASE ORAL at 17:24

## 2020-10-24 RX ADMIN — INSULIN HUMAN 5 UNITS: 100 INJECTION, SOLUTION PARENTERAL at 12:39

## 2020-10-24 RX ADMIN — GABAPENTIN 400 MG: 400 CAPSULE ORAL at 09:41

## 2020-10-24 RX ADMIN — INSULIN GLARGINE 25 UNITS: 100 INJECTION, SOLUTION SUBCUTANEOUS at 22:30

## 2020-10-24 RX ADMIN — TRAZODONE HYDROCHLORIDE 100 MG: 50 TABLET ORAL at 22:04

## 2020-10-24 RX ADMIN — INSULIN LISPRO 8 UNITS: 100 INJECTION, SOLUTION INTRAVENOUS; SUBCUTANEOUS at 09:41

## 2020-10-24 RX ADMIN — OXYCODONE 2.5 MG: 5 TABLET ORAL at 17:25

## 2020-10-24 RX ADMIN — INSULIN GLARGINE 20 UNITS: 100 INJECTION, SOLUTION SUBCUTANEOUS at 12:38

## 2020-10-24 RX ADMIN — BUPROPION HYDROCHLORIDE 100 MG: 100 TABLET, FILM COATED, EXTENDED RELEASE ORAL at 09:41

## 2020-10-24 RX ADMIN — ENOXAPARIN SODIUM 40 MG: 40 INJECTION SUBCUTANEOUS at 22:04

## 2020-10-24 RX ADMIN — GABAPENTIN 400 MG: 400 CAPSULE ORAL at 12:38

## 2020-10-24 RX ADMIN — GABAPENTIN 400 MG: 400 CAPSULE ORAL at 22:04

## 2020-10-24 RX ADMIN — LISINOPRIL 10 MG: 10 TABLET ORAL at 09:40

## 2020-10-24 RX ADMIN — ATORVASTATIN CALCIUM 20 MG: 20 TABLET, FILM COATED ORAL at 22:04

## 2020-10-24 RX ADMIN — Medication 6 MG: at 22:04

## 2020-10-24 RX ADMIN — LURASIDONE HYDROCHLORIDE 40 MG: 40 TABLET, FILM COATED ORAL at 09:40

## 2020-10-24 RX ADMIN — INSULIN HUMAN 5 UNITS: 100 INJECTION, SOLUTION PARENTERAL at 17:24

## 2020-10-24 RX ADMIN — INSULIN LISPRO 5 UNITS: 100 INJECTION, SOLUTION INTRAVENOUS; SUBCUTANEOUS at 22:20

## 2020-10-24 ASSESSMENT — PAIN DESCRIPTION - DESCRIPTORS
DESCRIPTORS: BURNING
DESCRIPTORS: BURNING

## 2020-10-24 ASSESSMENT — PAIN DESCRIPTION - PAIN TYPE
TYPE: CHRONIC PAIN
TYPE: CHRONIC PAIN

## 2020-10-24 ASSESSMENT — PAIN DESCRIPTION - ORIENTATION
ORIENTATION: RIGHT
ORIENTATION: RIGHT

## 2020-10-24 ASSESSMENT — PAIN SCALES - GENERAL
PAINLEVEL_OUTOF10: 0
PAINLEVEL_OUTOF10: 3
PAINLEVEL_OUTOF10: 3
PAINLEVEL_OUTOF10: 2
PAINLEVEL_OUTOF10: 0
PAINLEVEL_OUTOF10: 0

## 2020-10-24 ASSESSMENT — PAIN DESCRIPTION - FREQUENCY
FREQUENCY: INTERMITTENT
FREQUENCY: INTERMITTENT

## 2020-10-24 ASSESSMENT — PAIN DESCRIPTION - PROGRESSION
CLINICAL_PROGRESSION: NOT CHANGED
CLINICAL_PROGRESSION: NOT CHANGED

## 2020-10-24 ASSESSMENT — PAIN - FUNCTIONAL ASSESSMENT
PAIN_FUNCTIONAL_ASSESSMENT: PREVENTS OR INTERFERES SOME ACTIVE ACTIVITIES AND ADLS
PAIN_FUNCTIONAL_ASSESSMENT: PREVENTS OR INTERFERES SOME ACTIVE ACTIVITIES AND ADLS

## 2020-10-24 ASSESSMENT — PAIN DESCRIPTION - ONSET
ONSET: ON-GOING
ONSET: ON-GOING

## 2020-10-24 ASSESSMENT — PAIN DESCRIPTION - LOCATION
LOCATION: LEG
LOCATION: LEG

## 2020-10-24 NOTE — PLAN OF CARE
Problem: Suicide risk  Description: Suicide risk  Goal: Provide patient with safe environment  Description: Provide patient with safe environment  Outcome: Ongoing     Problem: Pain:  Description: Pain management should include both nonpharmacologic and pharmacologic interventions. Goal: Pain level will decrease  Description: Pain level will decrease  Outcome: Ongoing  Goal: Control of acute pain  Description: Control of acute pain  Outcome: Ongoing  Goal: Control of chronic pain  Description: Control of chronic pain  Outcome: Ongoing     Problem: Falls - Risk of:  Goal: Will remain free from falls  Description: Will remain free from falls  Outcome: Ongoing  Goal: Absence of physical injury  Description: Absence of physical injury  Outcome: Ongoing     Problem: Bleeding:  Goal: Will show no signs and symptoms of excessive bleeding  Description: Will show no signs and symptoms of excessive bleeding  Outcome: Ongoing     Problem:  Activity:  Goal: Risk for activity intolerance will decrease  Description: Risk for activity intolerance will decrease  Outcome: Ongoing     Problem: Coping:  Goal: Ability to adjust to condition or change in health will improve  Description: Ability to adjust to condition or change in health will improve  Outcome: Ongoing     Problem: Fluid Volume:  Goal: Ability to maintain a balanced intake and output will improve  Description: Ability to maintain a balanced intake and output will improve  Outcome: Ongoing  Goal: Ability to achieve a balanced intake and output will improve  Description: Ability to achieve a balanced intake and output will improve  Outcome: Ongoing     Problem: Health Behavior:  Goal: Ability to identify and utilize available resources and services will improve  Description: Ability to identify and utilize available resources and services will improve  Outcome: Ongoing  Goal: Ability to manage health-related needs will improve  Description: Ability to manage tissue perfusion will improve  Description: Adequacy of tissue perfusion will improve  Outcome: Ongoing     Problem: Discharge Planning:  Goal: Discharged to appropriate level of care  Description: Discharged to appropriate level of care  Outcome: Ongoing     Problem: Serum Glucose Level - Abnormal:  Goal: Ability to maintain appropriate glucose levels will improve  Description: Ability to maintain appropriate glucose levels will improve  Outcome: Ongoing     Problem: Sensory Perception - Impaired:  Goal: Ability to maintain a stable neurologic state will improve  Description: Ability to maintain a stable neurologic state will improve  Outcome: Ongoing     Problem: ABCDS Injury Assessment  Goal: Absence of physical injury  Outcome: Ongoing     Problem: SAFETY  Goal: Free from accidental physical injury  Outcome: Ongoing  Goal: Free from intentional harm  Outcome: Ongoing     Problem: DAILY CARE  Goal: Daily care needs are met  Outcome: Ongoing     Problem: PAIN  Goal: Patient's pain/discomfort is manageable  Outcome: Ongoing     Problem: SKIN INTEGRITY  Goal: Skin integrity is maintained or improved  Outcome: Ongoing     Problem: KNOWLEDGE DEFICIT  Goal: Patient/S.O. demonstrates understanding of disease process, treatment plan, medications, and discharge instructions.   Outcome: Ongoing     Problem: DISCHARGE BARRIERS  Goal: Patient's continuum of care needs are met  Outcome: Ongoing     Problem: Health Maintenance - Impaired:  Goal: Ability to perform activities of daily living will improve  Description: Ability to perform activities of daily living will improve  Outcome: Ongoing  Goal: Able to sleep without medication for appropriate length of time  Description: Able to sleep without medication for appropriate length of time  Outcome: Ongoing     Problem: Mood - Altered:  Goal: Mood stable  Description: Mood stable  Outcome: Ongoing     Problem: Self-Esteem - Low:  Goal: Demonstrates positive self-esteem  Description: Demonstrates positive self-esteem  Outcome: Ongoing     Problem: Cerebrospinal Fluid Leakage - Risk Of:  Goal: Demonstration of organized thought processes  Description: Demonstration of organized thought processes  Outcome: Ongoing     Problem: Violence - Risk of, Self/Other-Directed:  Goal: Knowledge of developmental care interventions  Description: Absence of violence  Outcome: Ongoing     Problem: Nutrition  Goal: Optimal nutrition therapy  Outcome: Ongoing

## 2020-10-24 NOTE — PLAN OF CARE
Problem: Suicide risk  Goal: Provide patient with safe environment  Description: Provide patient with safe environment  10/24/2020 1543 by Maria Del Carmen Hampton RN  Outcome: Ongoing  10/24/2020 0934 by Carina Marquez RN  Outcome: Ongoing     Problem: Pain:  Goal: Pain level will decrease  Description: Pain level will decrease  10/24/2020 1543 by Maria Del Carmen Hampton RN  Outcome: Ongoing  10/24/2020 0934 by Carina Marquez RN  Outcome: Ongoing  Goal: Control of acute pain  Description: Control of acute pain  10/24/2020 1543 by Maria Del Carmen Hampton RN  Outcome: Ongoing  10/24/2020 0934 by Carina Marquez RN  Outcome: Ongoing  Goal: Control of chronic pain  Description: Control of chronic pain  10/24/2020 1543 by Maria Del Carmen Hampton RN  Outcome: Ongoing  10/24/2020 0934 by Carina Marquez RN  Outcome: Ongoing     Problem: Falls - Risk of:  Goal: Will remain free from falls  Description: Will remain free from falls  10/24/2020 1543 by Maria Del Carmen Hampton RN  Outcome: Ongoing  10/24/2020 0934 by Carina Marquez RN  Outcome: Ongoing  Goal: Absence of physical injury  Description: Absence of physical injury  10/24/2020 1543 by Maria Del Carmen Hampton RN  Outcome: Ongoing  10/24/2020 0934 by Carina Marquez RN  Outcome: Ongoing     Problem: Bleeding:  Goal: Will show no signs and symptoms of excessive bleeding  Description: Will show no signs and symptoms of excessive bleeding  10/24/2020 1543 by Maria Del Carmen Hampton RN  Outcome: Ongoing  10/24/2020 0934 by Carina Marquez RN  Outcome: Ongoing     Problem:  Activity:  Goal: Risk for activity intolerance will decrease  Description: Risk for activity intolerance will decrease  10/24/2020 1543 by Maria Del Carmen Hampton RN  Outcome: Ongoing  10/24/2020 0934 by Carina Marquez RN  Outcome: Ongoing     Problem: Coping:  Goal: Ability to adjust to condition or change in health will improve  Description: Ability to adjust to condition or change in health will improve  10/24/2020 1543 by Maria Del Carmen Hampton RN  Outcome: Ongoing  10/24/2020 0934 by Kirk Cuellar RN  Outcome: Ongoing     Problem: Fluid Volume:  Goal: Ability to maintain a balanced intake and output will improve  Description: Ability to maintain a balanced intake and output will improve  10/24/2020 1543 by Gisela Tyson RN  Outcome: Ongoing  10/24/2020 0934 by Kirk Cuellar RN  Outcome: Ongoing  Goal: Ability to achieve a balanced intake and output will improve  Description: Ability to achieve a balanced intake and output will improve  10/24/2020 1543 by Gisela Tyson RN  Outcome: Ongoing  10/24/2020 0934 by Kirk Cuellar RN  Outcome: Ongoing     Problem: Health Behavior:  Goal: Ability to identify and utilize available resources and services will improve  Description: Ability to identify and utilize available resources and services will improve  10/24/2020 1543 by Gisela Tyson RN  Outcome: Ongoing  10/24/2020 0934 by Kirk Cuellar RN  Outcome: Ongoing  Goal: Ability to manage health-related needs will improve  Description: Ability to manage health-related needs will improve  10/24/2020 1543 by Gisela Tyson RN  Outcome: Ongoing  10/24/2020 0934 by Kirk Cuellar RN  Outcome: Ongoing  Goal: Ability to identify and utilize available support systems will improve  Description: Ability to identify and utilize available support systems will improve  10/24/2020 1543 by Gisela Tyson RN  Outcome: Ongoing  10/24/2020 0934 by Kirk Cuellar RN  Outcome: Ongoing  Goal: Compliance with therapeutic regimen will improve  Description: Compliance with therapeutic regimen will improve  10/24/2020 1543 by Gisela Tyson RN  Outcome: Ongoing  10/24/2020 0934 by Kirk Cuellar RN  Outcome: Ongoing  Goal: Ability to keep healthcare appointments will improve  Description: Ability to keep healthcare appointments will improve  10/24/2020 1543 by Gisela Tyson RN  Outcome: Ongoing  10/24/2020 0934 by Kirk Cuellar RN  Outcome: Ongoing     Problem: Metabolic:  Goal: Ability to maintain appropriate glucose levels will improve  Description: Ability to maintain appropriate glucose levels will improve  10/24/2020 1543 by Oly Gardner RN  Outcome: Ongoing  10/24/2020 0934 by Nick Nix RN  Outcome: Ongoing     Problem: Nutritional:  Goal: Maintenance of adequate nutrition will improve  Description: Maintenance of adequate nutrition will improve  10/24/2020 1543 by Oly Gardner RN  Outcome: Ongoing  10/24/2020 0934 by Nick Nix RN  Outcome: Ongoing  Goal: Progress toward achieving an optimal weight will improve  Description: Progress toward achieving an optimal weight will improve  10/24/2020 1543 by Oly Gardner RN  Outcome: Ongoing  10/24/2020 0934 by Nick Nix RN  Outcome: Ongoing     Problem: Physical Regulation:  Goal: Complications related to the disease process, condition or treatment will be avoided or minimized  Description: Complications related to the disease process, condition or treatment will be avoided or minimized  10/24/2020 1543 by Oly Gardner RN  Outcome: Ongoing  10/24/2020 0934 by Nick Nix RN  Outcome: Ongoing  Goal: Diagnostic test results will improve  Description: Diagnostic test results will improve  10/24/2020 1543 by Oly Gardner RN  Outcome: Ongoing  10/24/2020 0934 by Nick Nix RN  Outcome: Ongoing  Goal: Ability to maintain clinical measurements within normal limits will improve  Description: Ability to maintain clinical measurements within normal limits will improve  10/24/2020 1543 by Oly Gardner RN  Outcome: Ongoing  10/24/2020 0934 by Nick Nix RN  Outcome: Ongoing  Goal: Will show no signs and symptoms of electrolyte imbalance  Description: Will show no signs and symptoms of electrolyte imbalance  10/24/2020 1543 by Oly Gardner RN  Outcome: Ongoing  10/24/2020 0934 by Nick Nix RN  Outcome: Ongoing     Problem: Skin Integrity:  Goal: Risk for impaired skin integrity will decrease  Description: Risk for impaired skin integrity will decrease  10/24/2020 1543 by Prashant Menjivar RN  Outcome: Ongoing  10/24/2020 0934 by Ursula Preciado RN  Outcome: Ongoing     Problem: Tissue Perfusion:  Goal: Adequacy of tissue perfusion will improve  Description: Adequacy of tissue perfusion will improve  10/24/2020 1543 by Prashant Menjivar RN  Outcome: Ongoing  10/24/2020 0934 by Ursula Preciado RN  Outcome: Ongoing     Problem: Discharge Planning:  Goal: Discharged to appropriate level of care  Description: Discharged to appropriate level of care  10/24/2020 1543 by Prashant Menjivar RN  Outcome: Ongoing  10/24/2020 0934 by Ursula Preciado RN  Outcome: Ongoing     Problem: Serum Glucose Level - Abnormal:  Goal: Ability to maintain appropriate glucose levels will improve  Description: Ability to maintain appropriate glucose levels will improve  10/24/2020 1543 by Prashant Menjivar RN  Outcome: Ongoing  10/24/2020 0934 by Ursula Preciado RN  Outcome: Ongoing     Problem: Sensory Perception - Impaired:  Goal: Ability to maintain a stable neurologic state will improve  Description: Ability to maintain a stable neurologic state will improve  10/24/2020 1543 by Prashant Menjivar RN  Outcome: Ongoing  10/24/2020 0934 by Ursula Preciado RN  Outcome: Ongoing     Problem: ABCDS Injury Assessment  Goal: Absence of physical injury  10/24/2020 1543 by Prashant Menjivar RN  Outcome: Ongoing  10/24/2020 0934 by Ursula Preciado RN  Outcome: Ongoing     Problem: SAFETY  Goal: Free from accidental physical injury  10/24/2020 1543 by Prashant Menjivar RN  Outcome: Ongoing  10/24/2020 0934 by Ursula Preciado RN  Outcome: Ongoing  Goal: Free from intentional harm  10/24/2020 1543 by Prashant Menjivar RN  Outcome: Ongoing  10/24/2020 0934 by Ursula Preciado RN  Outcome: Ongoing     Problem: DAILY CARE  Goal: Daily care needs are met  10/24/2020 1543 by Prashant Menjivar RN  Outcome: Ongoing  10/24/2020 0934 by Ursula Preciado RN  Outcome: Ongoing     Problem: PAIN  Goal: Patient's pain/discomfort is manageable  10/24/2020 1543 by Rafael Simon RN  Outcome: Ongoing  10/24/2020 0934 by Jerica Hairston RN  Outcome: Ongoing     Problem: SKIN INTEGRITY  Goal: Skin integrity is maintained or improved  10/24/2020 1543 by Rafael Simon RN  Outcome: Ongoing  10/24/2020 0934 by Jerica Hairston RN  Outcome: Ongoing     Problem: KNOWLEDGE DEFICIT  Goal: Patient/S.O. demonstrates understanding of disease process, treatment plan, medications, and discharge instructions.   10/24/2020 1543 by Rafael Simon RN  Outcome: Ongoing  10/24/2020 0934 by Jerica Hairston RN  Outcome: Ongoing     Problem: DISCHARGE BARRIERS  Goal: Patient's continuum of care needs are met  10/24/2020 1543 by Rafael Simon RN  Outcome: Ongoing  10/24/2020 0934 by Jerica Hairston RN  Outcome: Ongoing     Problem: Health Maintenance - Impaired:  Goal: Ability to perform activities of daily living will improve  Description: Ability to perform activities of daily living will improve  10/24/2020 1543 by Rafael Simon RN  Outcome: Ongoing  10/24/2020 0934 by Jerica Hairston RN  Outcome: Ongoing  Goal: Able to sleep without medication for appropriate length of time  Description: Able to sleep without medication for appropriate length of time  10/24/2020 1543 by Rafael Simon RN  Outcome: Ongoing  10/24/2020 0934 by Jerica Hairston RN  Outcome: Ongoing     Problem: Mood - Altered:  Goal: Mood stable  Description: Mood stable  10/24/2020 1543 by Rafael Simon RN  Outcome: Ongoing  10/24/2020 0934 by Jerica Hairston RN  Outcome: Ongoing     Problem: Self-Esteem - Low:  Goal: Demonstrates positive self-esteem  Description: Demonstrates positive self-esteem  10/24/2020 1543 by Rafael Simon RN  Outcome: Ongoing  10/24/2020 0934 by Jerica Hairston RN  Outcome: Ongoing     Problem: Cerebrospinal Fluid Leakage - Risk Of:  Goal: Demonstration of organized thought processes  Description: Demonstration of organized thought processes  10/24/2020 1543 by Oly Gardner RN  Outcome: Ongoing  10/24/2020 0934 by Nick Nix RN  Outcome: Ongoing     Problem: Violence - Risk of, Self/Other-Directed:  Goal: Knowledge of developmental care interventions  Description: Absence of violence  10/24/2020 1543 by Oly Gardner RN  Outcome: Ongoing  10/24/2020 0934 by Nick Nix RN  Outcome: Ongoing     Problem: Nutrition  Goal: Optimal nutrition therapy  10/24/2020 0934 by Nick Nix, RN  Outcome: Ongoing

## 2020-10-24 NOTE — PROGRESS NOTES
[facility]      Patient:  Mohit Maxwell  YOB: 1970  Date of Service: 10/24/2020  MRN: [id number]   Acct: [de-identified]   Primary Care Physician: Kirsten Patel MD  Advance Directive: Full Code  Admit Date: 10/22/2020       Hospital Day: 2  Portions of this note have been copied forward, however, changed to reflect the most current clinical status of this patient. CHIEF COMPLAINT Suicidal ideation    SUBJECTIVE: Mr. Amara Hendrickson complains of neuropathic pain in bilateral lower extremities. Otherwise has no new complaints. Denies N/V, abdominal pain or dyspnea. Cumulative Hospital Course: The patient is a 44 y.o. male with PMH uncontrolled DM II, ADHD, Anxiety, depression who presented to [address] [place] [address] ED complaining of depression and suicidal ideation. Patient states he cannot see his children often which makes him feel depressed. He states he feels depressed and has been hearing voices on occasion. States he needs mental help. He appears melancholic. Stopped taking any medications at least 2 months ago but states it has possibly been longer. Has had decreased appetite with increase thirst. Stated he has thought about shooting himself. He additionally complains of left eye pain and blurry vision that has has been worsening over the past several months with loss of red reflex on left side. Ophthalmology was contacted by ED Physician who state patient is OK for admission and follow up can be arranged with Dr. Tiff Stephens once patient is discharged and medically stable. Patient was admitted to Hospitalist service, continued on IVF's, Lantus, sliding scale and meal time insulin. Pseudohyponatremia resolved with improvement in glucose. Psychiatry was consulted for suicidal ideation. Review of Systems:   14 point review of systems is negative except as specifically addressed above.     Objective:   VITALS:  /75   Pulse 64   Temp 98.2 °F (36.8 °C) (Temporal)   Resp 18   Ht 5' 10\" (1.778 m)   Wt 151 lb (68.5 alternative oral replacement **OR** potassium chloride, magnesium sulfate, glucose, dextrose, glucagon (rDNA), dextrose  DIET CARB CONTROL; Safety Tray; Safety Tray (Disposables)     Lab and other Data:     Recent Labs     10/22/20  1644 10/23/20  0507 10/24/20  0243   WBC 10.5 9.2 8.4   HGB 15.2 13.2* 12.8*    259 226     Recent Labs     10/23/20  0507 10/23/20  1402 10/24/20  0803   * 135* 140   K 4.3 4.0 4.1   CL 98 103 108   CO2 25 25 25   BUN 19 16 16   CREATININE 0.7 0.7 0.6   GLUCOSE 334* 232* 229*     Recent Labs     10/22/20  1644   AST 15   ALT 29   BILITOT 0.8   ALKPHOS 91     UA:  Recent Labs     10/22/20  1640   COLORU YELLOW   PHUR 5.0   CLARITYU Clear   SPECGRAV 1.038   LEUKOCYTESUR Negative   UROBILINOGEN 0.2   BILIRUBINUR Negative   BLOODU Negative   GLUCOSEU =>1000     A1C:   Recent Labs     10/22/20  1633   LABA1C 13.1*       Assessment/Plan   Principal Problem:    Uncontrolled Diabetes mellitus II with Hyperglycemia w/ pseudohyponatremia-continue IVF's, SSI, Lantus increased to 25 BID, Hypoglycemia tx orders, meal time insulin, accuchecks. A1C 13.1.  Will add statin for dyslipidemia        Active Problems:    Bipolar 1 disorder, depressed, moderate (HCC)-noted, psychiatry following       Suicidal ideation-admit to [facility] unit       Left eye pain w/ blurred vision and loss of red reflex-Ophthalmology contacted and stated patient OK for admission to Hospital and once medically stable for discharge can follow up as OP in their office with Dr. Anna Arreguin Hypertension-zestril continued, well controlled        Diabetic peripheral neuropathy-noted, Neurontin continued     DVT Prophylaxis: Lovenox    Joycelyn Mcqueen PA-C

## 2020-10-25 ENCOUNTER — HOSPITAL ENCOUNTER (INPATIENT)
Age: 50
LOS: 5 days | Discharge: HOME | DRG: 638 | End: 2020-10-30
Attending: PSYCHIATRY & NEUROLOGY | Admitting: PSYCHIATRY & NEUROLOGY
Payer: MEDICAID

## 2020-10-25 VITALS
HEIGHT: 70 IN | TEMPERATURE: 98.5 F | HEART RATE: 87 BPM | BODY MASS INDEX: 21.62 KG/M2 | DIASTOLIC BLOOD PRESSURE: 73 MMHG | OXYGEN SATURATION: 98 % | RESPIRATION RATE: 18 BRPM | WEIGHT: 151 LBS | SYSTOLIC BLOOD PRESSURE: 130 MMHG

## 2020-10-25 LAB
ANION GAP SERPL CALCULATED.3IONS-SCNC: 7 MMOL/L (ref 7–19)
BUN BLDV-MCNC: 15 MG/DL (ref 6–20)
CALCIUM SERPL-MCNC: 8.5 MG/DL (ref 8.6–10)
CHLORIDE BLD-SCNC: 107 MMOL/L (ref 98–111)
CO2: 26 MMOL/L (ref 22–29)
CREAT SERPL-MCNC: 0.6 MG/DL (ref 0.5–1.2)
GFR AFRICAN AMERICAN: >59
GFR NON-AFRICAN AMERICAN: >60
GLUCOSE BLD-MCNC: 146 MG/DL (ref 70–99)
GLUCOSE BLD-MCNC: 151 MG/DL (ref 70–99)
GLUCOSE BLD-MCNC: 221 MG/DL (ref 74–109)
GLUCOSE BLD-MCNC: 278 MG/DL (ref 70–99)
GLUCOSE BLD-MCNC: 369 MG/DL (ref 70–99)
HCT VFR BLD CALC: 35.7 % (ref 42–52)
HEMOGLOBIN: 12.3 G/DL (ref 14–18)
MCH RBC QN AUTO: 30.4 PG (ref 27–31)
MCHC RBC AUTO-ENTMCNC: 34.5 G/DL (ref 33–37)
MCV RBC AUTO: 88.4 FL (ref 80–94)
PDW BLD-RTO: 13.1 % (ref 11.5–14.5)
PERFORMED ON: ABNORMAL
PLATELET # BLD: 212 K/UL (ref 130–400)
PMV BLD AUTO: 10.4 FL (ref 9.4–12.4)
POTASSIUM REFLEX MAGNESIUM: 4 MMOL/L (ref 3.5–5)
RBC # BLD: 4.04 M/UL (ref 4.7–6.1)
SARS-COV-2, NAAT: NOT DETECTED
SODIUM BLD-SCNC: 140 MMOL/L (ref 136–145)
WBC # BLD: 8.3 K/UL (ref 4.8–10.8)

## 2020-10-25 PROCEDURE — 82947 ASSAY GLUCOSE BLOOD QUANT: CPT

## 2020-10-25 PROCEDURE — 36415 COLL VENOUS BLD VENIPUNCTURE: CPT

## 2020-10-25 PROCEDURE — 85027 COMPLETE CBC AUTOMATED: CPT

## 2020-10-25 PROCEDURE — U0002 COVID-19 LAB TEST NON-CDC: HCPCS

## 2020-10-25 PROCEDURE — 6370000000 HC RX 637 (ALT 250 FOR IP): Performed by: INTERNAL MEDICINE

## 2020-10-25 PROCEDURE — 6370000000 HC RX 637 (ALT 250 FOR IP): Performed by: PSYCHIATRY & NEUROLOGY

## 2020-10-25 PROCEDURE — 6370000000 HC RX 637 (ALT 250 FOR IP): Performed by: EMERGENCY MEDICINE

## 2020-10-25 PROCEDURE — 1240000000 HC EMOTIONAL WELLNESS R&B

## 2020-10-25 PROCEDURE — 80048 BASIC METABOLIC PNL TOTAL CA: CPT

## 2020-10-25 PROCEDURE — 2580000003 HC RX 258: Performed by: PHYSICIAN ASSISTANT

## 2020-10-25 PROCEDURE — 6370000000 HC RX 637 (ALT 250 FOR IP): Performed by: HOSPITALIST

## 2020-10-25 PROCEDURE — G0378 HOSPITAL OBSERVATION PER HR: HCPCS

## 2020-10-25 PROCEDURE — 6370000000 HC RX 637 (ALT 250 FOR IP): Performed by: FAMILY MEDICINE

## 2020-10-25 PROCEDURE — 6370000000 HC RX 637 (ALT 250 FOR IP): Performed by: PHYSICIAN ASSISTANT

## 2020-10-25 RX ORDER — TRAZODONE HYDROCHLORIDE 100 MG/1
100 TABLET ORAL NIGHTLY
Status: CANCELLED | OUTPATIENT
Start: 2020-10-25

## 2020-10-25 RX ORDER — INSULIN GLARGINE 100 [IU]/ML
30 INJECTION, SOLUTION SUBCUTANEOUS 2 TIMES DAILY
Status: DISCONTINUED | OUTPATIENT
Start: 2020-10-25 | End: 2020-10-25 | Stop reason: HOSPADM

## 2020-10-25 RX ORDER — ACETAMINOPHEN 650 MG/1
650 SUPPOSITORY RECTAL EVERY 6 HOURS PRN
Status: CANCELLED | OUTPATIENT
Start: 2020-10-25

## 2020-10-25 RX ORDER — LISINOPRIL 5 MG/1
5 TABLET ORAL DAILY
Status: CANCELLED | OUTPATIENT
Start: 2020-10-26

## 2020-10-25 RX ORDER — ACETAMINOPHEN 325 MG/1
650 TABLET ORAL EVERY 6 HOURS PRN
Status: CANCELLED | OUTPATIENT
Start: 2020-10-25

## 2020-10-25 RX ORDER — INSULIN GLARGINE 100 [IU]/ML
30 INJECTION, SOLUTION SUBCUTANEOUS 2 TIMES DAILY
Status: DISCONTINUED | OUTPATIENT
Start: 2020-10-25 | End: 2020-10-27

## 2020-10-25 RX ORDER — BUPROPION HYDROCHLORIDE 100 MG/1
100 TABLET, EXTENDED RELEASE ORAL 2 TIMES DAILY
Status: CANCELLED | OUTPATIENT
Start: 2020-10-25

## 2020-10-25 RX ORDER — RISPERIDONE 1 MG/1
1 TABLET, FILM COATED ORAL NIGHTLY
Status: ON HOLD | COMMUNITY
End: 2020-10-30 | Stop reason: HOSPADM

## 2020-10-25 RX ORDER — POLYETHYLENE GLYCOL 3350 17 G/17G
17 POWDER, FOR SOLUTION ORAL DAILY PRN
Status: CANCELLED | OUTPATIENT
Start: 2020-10-25

## 2020-10-25 RX ORDER — TRAZODONE HYDROCHLORIDE 50 MG/1
50 TABLET ORAL NIGHTLY
Status: DISCONTINUED | OUTPATIENT
Start: 2020-10-25 | End: 2020-10-26

## 2020-10-25 RX ORDER — NICOTINE POLACRILEX 4 MG
15 LOZENGE BUCCAL PRN
Status: CANCELLED | OUTPATIENT
Start: 2020-10-25

## 2020-10-25 RX ORDER — NICOTINE 21 MG/24HR
1 PATCH, TRANSDERMAL 24 HOURS TRANSDERMAL DAILY
Status: DISCONTINUED | OUTPATIENT
Start: 2020-10-25 | End: 2020-10-30 | Stop reason: HOSPADM

## 2020-10-25 RX ORDER — NICOTINE 21 MG/24HR
1 PATCH, TRANSDERMAL 24 HOURS TRANSDERMAL DAILY
Status: CANCELLED | OUTPATIENT
Start: 2020-10-26

## 2020-10-25 RX ORDER — ACETAMINOPHEN 325 MG/1
650 TABLET ORAL EVERY 4 HOURS PRN
Status: DISCONTINUED | OUTPATIENT
Start: 2020-10-25 | End: 2020-10-30 | Stop reason: HOSPADM

## 2020-10-25 RX ORDER — LANOLIN ALCOHOL/MO/W.PET/CERES
6 CREAM (GRAM) TOPICAL NIGHTLY
Status: CANCELLED | OUTPATIENT
Start: 2020-10-25

## 2020-10-25 RX ORDER — POLYETHYLENE GLYCOL 3350 17 G/17G
17 POWDER, FOR SOLUTION ORAL DAILY PRN
Status: DISCONTINUED | OUTPATIENT
Start: 2020-10-25 | End: 2020-10-30 | Stop reason: HOSPADM

## 2020-10-25 RX ORDER — NALOXONE HYDROCHLORIDE 0.4 MG/ML
0.4 INJECTION, SOLUTION INTRAMUSCULAR; INTRAVENOUS; SUBCUTANEOUS PRN
Status: CANCELLED | OUTPATIENT
Start: 2020-10-25

## 2020-10-25 RX ORDER — CHOLECALCIFEROL (VITAMIN D3) 125 MCG
5 CAPSULE ORAL DAILY
Status: DISCONTINUED | OUTPATIENT
Start: 2020-10-25 | End: 2020-10-25

## 2020-10-25 RX ORDER — ATORVASTATIN CALCIUM 20 MG/1
20 TABLET, FILM COATED ORAL NIGHTLY
Status: CANCELLED | OUTPATIENT
Start: 2020-10-25

## 2020-10-25 RX ORDER — GABAPENTIN 600 MG/1
600 TABLET ORAL 3 TIMES DAILY
Status: CANCELLED | OUTPATIENT
Start: 2020-10-25

## 2020-10-25 RX ORDER — INSULIN GLARGINE 100 [IU]/ML
30 INJECTION, SOLUTION SUBCUTANEOUS 2 TIMES DAILY
Status: CANCELLED | OUTPATIENT
Start: 2020-10-25

## 2020-10-25 RX ORDER — DEXTROSE MONOHYDRATE 25 G/50ML
12.5 INJECTION, SOLUTION INTRAVENOUS PRN
Status: CANCELLED | OUTPATIENT
Start: 2020-10-25

## 2020-10-25 RX ORDER — GABAPENTIN 600 MG/1
600 TABLET ORAL 3 TIMES DAILY
Status: DISCONTINUED | OUTPATIENT
Start: 2020-10-25 | End: 2020-10-25 | Stop reason: HOSPADM

## 2020-10-25 RX ORDER — DEXTROSE MONOHYDRATE 50 MG/ML
100 INJECTION, SOLUTION INTRAVENOUS PRN
Status: CANCELLED | OUTPATIENT
Start: 2020-10-25

## 2020-10-25 RX ORDER — GABAPENTIN 300 MG/1
600 CAPSULE ORAL 3 TIMES DAILY
Status: DISCONTINUED | OUTPATIENT
Start: 2020-10-25 | End: 2020-10-28

## 2020-10-25 RX ORDER — OXYCODONE HYDROCHLORIDE 5 MG/1
2.5 TABLET ORAL EVERY 4 HOURS PRN
Status: CANCELLED | OUTPATIENT
Start: 2020-10-25

## 2020-10-25 RX ORDER — MECOBALAMIN 5000 MCG
5 TABLET,DISINTEGRATING ORAL DAILY
Status: DISCONTINUED | OUTPATIENT
Start: 2020-10-25 | End: 2020-10-30

## 2020-10-25 RX ADMIN — GABAPENTIN 600 MG: 300 CAPSULE ORAL at 23:24

## 2020-10-25 RX ADMIN — Medication 5 MG: at 21:13

## 2020-10-25 RX ADMIN — INSULIN LISPRO 6 UNITS: 100 INJECTION, SOLUTION INTRAVENOUS; SUBCUTANEOUS at 12:15

## 2020-10-25 RX ADMIN — LURASIDONE HYDROCHLORIDE 40 MG: 40 TABLET, FILM COATED ORAL at 07:57

## 2020-10-25 RX ADMIN — TRAZODONE HYDROCHLORIDE 50 MG: 50 TABLET ORAL at 21:13

## 2020-10-25 RX ADMIN — INSULIN GLARGINE 25 UNITS: 100 INJECTION, SOLUTION SUBCUTANEOUS at 07:57

## 2020-10-25 RX ADMIN — INSULIN HUMAN 5 UNITS: 100 INJECTION, SOLUTION PARENTERAL at 07:56

## 2020-10-25 RX ADMIN — INSULIN LISPRO 3 UNITS: 100 INJECTION, SOLUTION INTRAVENOUS; SUBCUTANEOUS at 21:13

## 2020-10-25 RX ADMIN — OXYCODONE 2.5 MG: 5 TABLET ORAL at 08:02

## 2020-10-25 RX ADMIN — Medication 30 UNITS: at 21:13

## 2020-10-25 RX ADMIN — GABAPENTIN 600 MG: 600 TABLET, FILM COATED ORAL at 14:45

## 2020-10-25 RX ADMIN — BUPROPION HYDROCHLORIDE 100 MG: 100 TABLET, FILM COATED, EXTENDED RELEASE ORAL at 07:57

## 2020-10-25 RX ADMIN — INSULIN HUMAN 8 UNITS: 100 INJECTION, SOLUTION PARENTERAL at 12:16

## 2020-10-25 RX ADMIN — GABAPENTIN 400 MG: 400 CAPSULE ORAL at 07:57

## 2020-10-25 RX ADMIN — LISINOPRIL 5 MG: 5 TABLET ORAL at 07:57

## 2020-10-25 RX ADMIN — INSULIN LISPRO 9 UNITS: 100 INJECTION, SOLUTION INTRAVENOUS; SUBCUTANEOUS at 07:57

## 2020-10-25 RX ADMIN — SODIUM CHLORIDE: 9 INJECTION, SOLUTION INTRAVENOUS at 00:04

## 2020-10-25 ASSESSMENT — SLEEP AND FATIGUE QUESTIONNAIRES
DO YOU HAVE DIFFICULTY SLEEPING: NO
DO YOU USE A SLEEP AID: NO

## 2020-10-25 ASSESSMENT — LIFESTYLE VARIABLES: HISTORY_ALCOHOL_USE: NO

## 2020-10-25 ASSESSMENT — PATIENT HEALTH QUESTIONNAIRE - PHQ9: SUM OF ALL RESPONSES TO PHQ QUESTIONS 1-9: 27

## 2020-10-25 ASSESSMENT — PAIN SCALES - GENERAL: PAINLEVEL_OUTOF10: 3

## 2020-10-25 NOTE — PROGRESS NOTES
Patient admitted with suicidal ideations from 3rd floor. [place]. He reports he has been in his house for the last 3 months not eating, and drinking koolaid only. He reports he has lost about 30# in that time. Reports he is a  and goes wherever he is needed to work, but has not worked in 3 months. Reports he has been  3 times with 7 children ranging from 24years of age to 3 and 32 years of age. He is a diabetic and was admitted with a blood sugar of greater than 1000. He reports he has 2 half sisters. Reports he was adopted as an infant from [place] and is \" still carrying that on his shoulder\". He reports \" my mother never liked me and just got me because my father wanted a boy. \" My sisters never let me forget it. \" \" Now we are close and talk to each other.

## 2020-10-25 NOTE — PLAN OF CARE
Problem: Suicide risk  Goal: Provide patient with safe environment  Description: Provide patient with safe environment  Outcome: Ongoing     Problem: Pain:  Goal: Pain level will decrease  Description: Pain level will decrease  Outcome: Ongoing  Goal: Control of acute pain  Description: Control of acute pain  Outcome: Ongoing  Goal: Control of chronic pain  Description: Control of chronic pain  Outcome: Ongoing     Problem: Falls - Risk of:  Goal: Will remain free from falls  Description: Will remain free from falls  Outcome: Ongoing  Goal: Absence of physical injury  Description: Absence of physical injury  Outcome: Ongoing     Problem: Bleeding:  Goal: Will show no signs and symptoms of excessive bleeding  Description: Will show no signs and symptoms of excessive bleeding  Outcome: Ongoing     Problem:  Activity:  Goal: Risk for activity intolerance will decrease  Description: Risk for activity intolerance will decrease  Outcome: Ongoing     Problem: Coping:  Goal: Ability to adjust to condition or change in health will improve  Description: Ability to adjust to condition or change in health will improve  Outcome: Ongoing     Problem: Fluid Volume:  Goal: Ability to maintain a balanced intake and output will improve  Description: Ability to maintain a balanced intake and output will improve  Outcome: Ongoing  Goal: Ability to achieve a balanced intake and output will improve  Description: Ability to achieve a balanced intake and output will improve  Outcome: Ongoing     Problem: Health Behavior:  Goal: Ability to identify and utilize available resources and services will improve  Description: Ability to identify and utilize available resources and services will improve  Outcome: Ongoing  Goal: Ability to manage health-related needs will improve  Description: Ability to manage health-related needs will improve  Outcome: Ongoing  Goal: Ability to identify and utilize available support systems will improve  Description: Ability to identify and utilize available support systems will improve  Outcome: Ongoing  Goal: Compliance with therapeutic regimen will improve  Description: Compliance with therapeutic regimen will improve  Outcome: Ongoing  Goal: Ability to keep healthcare appointments will improve  Description: Ability to keep healthcare appointments will improve  Outcome: Ongoing     Problem: Metabolic:  Goal: Ability to maintain appropriate glucose levels will improve  Description: Ability to maintain appropriate glucose levels will improve  Outcome: Ongoing     Problem: Nutritional:  Goal: Maintenance of adequate nutrition will improve  Description: Maintenance of adequate nutrition will improve  Outcome: Ongoing  Goal: Progress toward achieving an optimal weight will improve  Description: Progress toward achieving an optimal weight will improve  Outcome: Ongoing     Problem: Physical Regulation:  Goal: Complications related to the disease process, condition or treatment will be avoided or minimized  Description: Complications related to the disease process, condition or treatment will be avoided or minimized  Outcome: Ongoing  Goal: Diagnostic test results will improve  Description: Diagnostic test results will improve  Outcome: Ongoing  Goal: Ability to maintain clinical measurements within normal limits will improve  Description: Ability to maintain clinical measurements within normal limits will improve  Outcome: Ongoing  Goal: Will show no signs and symptoms of electrolyte imbalance  Description: Will show no signs and symptoms of electrolyte imbalance  Outcome: Ongoing     Problem: Skin Integrity:  Goal: Risk for impaired skin integrity will decrease  Description: Risk for impaired skin integrity will decrease  Outcome: Ongoing     Problem: Tissue Perfusion:  Goal: Adequacy of tissue perfusion will improve  Description: Adequacy of tissue perfusion will improve  Outcome: Ongoing     Problem: Discharge Planning:  Goal: Discharged to appropriate level of care  Description: Discharged to appropriate level of care  Outcome: Ongoing     Problem: Serum Glucose Level - Abnormal:  Goal: Ability to maintain appropriate glucose levels will improve  Description: Ability to maintain appropriate glucose levels will improve  Outcome: Ongoing     Problem: Sensory Perception - Impaired:  Goal: Ability to maintain a stable neurologic state will improve  Description: Ability to maintain a stable neurologic state will improve  Outcome: Ongoing     Problem: ABCDS Injury Assessment  Goal: Absence of physical injury  Outcome: Ongoing     Problem: SAFETY  Goal: Free from accidental physical injury  Outcome: Ongoing  Goal: Free from intentional harm  Outcome: Ongoing     Problem: DAILY CARE  Goal: Daily care needs are met  Outcome: Ongoing     Problem: PAIN  Goal: Patient's pain/discomfort is manageable  Outcome: Ongoing     Problem: SKIN INTEGRITY  Goal: Skin integrity is maintained or improved  Outcome: Ongoing     Problem: KNOWLEDGE DEFICIT  Goal: Patient/S.O. demonstrates understanding of disease process, treatment plan, medications, and discharge instructions.   Outcome: Ongoing     Problem: DISCHARGE BARRIERS  Goal: Patient's continuum of care needs are met  Outcome: Ongoing     Problem: Health Maintenance - Impaired:  Goal: Ability to perform activities of daily living will improve  Description: Ability to perform activities of daily living will improve  Outcome: Ongoing  Goal: Able to sleep without medication for appropriate length of time  Description: Able to sleep without medication for appropriate length of time  Outcome: Ongoing     Problem: Mood - Altered:  Goal: Mood stable  Description: Mood stable  Outcome: Ongoing     Problem: Self-Esteem - Low:  Goal: Demonstrates positive self-esteem  Description: Demonstrates positive self-esteem  Outcome: Ongoing     Problem: Cerebrospinal Fluid Leakage - Risk Of:  Goal: Demonstration of organized thought processes  Description: Demonstration of organized thought processes  Outcome: Ongoing     Problem: Violence - Risk of, Self/Other-Directed:  Goal: Knowledge of developmental care interventions  Description: Absence of violence  Outcome: Ongoing

## 2020-10-25 NOTE — SUICIDE SAFETY PLAN
SAFETY PLAN    A suicide Safety Plan is a document that supports someone when they are having thoughts of suicide. Warning Signs that indicate a suicidal crisis may be developing: What (situations, thoughts, feelings, body sensations, behaviors, etc.) do you experience that lets you know you are beginning to think about suicide? 1.aggrevation  2. frustration  3. isolation    Internal Coping Strategies:  What things can I do (relaxation techniques, hobbies, physical activities, etc.) to take my mind off my problems without contacting another person? 1. music      People and social settings that provide distraction: Who can I call or where can I go to distract me? 1. Name: music  Phone:   2. Name:  Phone:    3. Place:            4. Place:     People whom I can ask for help: Who can I call when I need help - for example, friends, family, clergy, someone else? 1. Name:   Caroline Levi, mother            Phone: [phone]  2. Name:   Phone:   3. Name:   Phone:     Professionals or [facility] I can contact during a crisis: Who can I call for help - for example, my doctor, my psychiatrist, my psychologist, a mental health provider, a suicide hotline? 1. Clinician Name: [id number]  Phone: [phone]      Clinician Pager or Emergency Contact #: [phone]    2. Clinician Name: [address]   Phone: [phone]      Clinician Pager or Emergency Contact #:[phone]    6. Suicide Prevention Lifeline: 9-073-523-TALK (6604)    4. [address] [place] [address] Emergency Services -  for example, [facility] suicide hotline, [facility] Hotline:       Emergency Services Address:  [facility] Emergency Department [address] [facility], [place], [address]    Emergency Services Phone:[phone]    Making the environment safe: How can I make my environment (house/apartment/living space) safer?  For example, can I remove guns, medications, and other items? 1. \" I feel like it is safe the way it is\"  2.

## 2020-10-25 NOTE — DISCHARGE SUMMARY
Mahogany Gardner  :  1970  MRN:  [id number]    Admit date:  10/22/2020  Discharge date:  10/25/2020    Discharging Physician: Rulon Leyden, MD    Advance Directive: Full Code    Consults: Dr. Paramjit Parker     Primary Care Physician:  Nohemy Kirk MD    Discharge Diagnoses:    Principal Problem:    Hyperglycemia  Active Problems:    Bipolar 1 disorder, depressed, moderate ([address]    Suicidal ideation    Controlled type 2 diabetes mellitus with hyperglycemia ([address]    Diabetic peripheral neuropathy ([address]  Resolved Problems:    * No resolved hospital problems. *    Portions of this note have been copied forward, however, changed to reflect the most current clinical status of this patient. Hospital Course: The patient is a 44 y. o. male with PMH uncontrolled DM II, ADHD, Anxiety, depression who presented to [facility] ED complaining of depression and suicidal ideation. Patient states he cannot see his children often which makes him feel depressed. He states he feels depressed and has been hearing voices on occasion. States he needs mental help. He appears melancholic. Stopped taking any medications at least 2 months ago but states it has possibly been longer. Has had decreased appetite with increase thirst. Stated he has thought about shooting himself. He additionally complains of left eye pain and blurry vision that has has been worsening over the past several months with loss of red reflex on left side. Ophthalmology was contacted by ED Physician who state patient is OK for admission and follow up can be arranged with Dr. Robert Monroy once patient is discharged and medically stable. Patient was admitted to Hospitalist service, continued on IVF's, Lantus, sliding scale and meal time insulin. Pseudohyponatremia resolved with improvement in glucose. Psychiatry was consulted for suicidal ideation. Adjustments have been made to lantus, meal time and sliding scale insulin.  Neurontin was also increased for neuropathic pain in Nightly Sarabjit Sumner MD   5 Units at 10/24/20 2220    oxyCODONE (ROXICODONE) immediate release tablet 2.5 mg  2.5 mg Oral Q4H PRN Antonio Jacobs MD   2.5 mg at 10/25/20 0802    naloxone [facility]) injection 0.4 mg  0.4 mg Intravenous PRN Antonio Jacobs MD        buPROPion [facility]) extended release tablet 100 mg  100 mg Oral BID Anusha Kirk MD   100 mg at 10/25/20 0757    lurasidone (LATUDA) tablet 40 mg  40 mg Oral Daily Anusha Kirk MD   40 mg at 10/25/20 0757    nicotine (NICODERM CQ) 21 MG/24HR 1 patch  1 patch Transdermal Daily Donte Vasquez MD   1 patch at 10/25/20 0757    traZODone (DESYREL) tablet 100 mg  100 mg Oral Nightly Guerline Torres PA-C   100 mg at 10/24/20 2204    melatonin tablet 6 mg  6 mg Oral Nightly Guerline Torres PA-C   6 mg at 10/24/20 2204    acetaminophen (TYLENOL) tablet 650 mg  650 mg Oral Q6H PRN Guerline Torres PA-C   650 mg at 10/22/20 2220    Or    acetaminophen (TYLENOL) suppository 650 mg  650 mg Rectal Q6H PRN Guerline Torres PA-C        polyethylene glycol (GLYCOLAX) packet 17 g  17 g Oral Daily PRN Guerline Torres PA-C        promethazine (PHENERGAN) tablet 12.5 mg  12.5 mg Oral Q6H PRN Guerline Torres PA-C        Or    ondansetron [facility]) injection 4 mg  4 mg Intravenous Q6H PRN Guerline Torres PA-C        enoxaparin (LOVENOX) injection 40 mg  40 mg Subcutaneous Nightly Guerline Torres PA-C   40 mg at 10/24/20 2204    glucose (GLUTOSE) 40 % oral gel 15 g  15 g Oral PRN Guerline Torres PA-C        dextrose 50 % IV solution  12.5 g Intravenous PRN Guerline Torres PA-C        glucagon (rDNA) injection 1 mg  1 mg Intramuscular PRN Guerline Torres PA-C        dextrose 5 % solution  100 mL/hr Intravenous PRN Guerline Torres PA-C         Discharge Instructions: Follow up with Nonnie Dandy, MD in 3-5 days. Take medications as directed. Resume activity as tolerated.     Diet: DIET CARB CONTROL; Safety Tray; Safety Tray (Disposables)

## 2020-10-26 PROBLEM — F31.9 BIPOLAR DEPRESSION (HCC): Status: ACTIVE | Noted: 2020-10-26

## 2020-10-26 LAB
GLUCOSE BLD-MCNC: 197 MG/DL (ref 70–99)
GLUCOSE BLD-MCNC: 229 MG/DL (ref 70–99)
GLUCOSE BLD-MCNC: 308 MG/DL (ref 70–99)
GLUCOSE BLD-MCNC: 320 MG/DL (ref 70–99)
PERFORMED ON: ABNORMAL

## 2020-10-26 PROCEDURE — 6370000000 HC RX 637 (ALT 250 FOR IP): Performed by: PSYCHIATRY & NEUROLOGY

## 2020-10-26 PROCEDURE — 99233 SBSQ HOSP IP/OBS HIGH 50: CPT | Performed by: PSYCHIATRY & NEUROLOGY

## 2020-10-26 PROCEDURE — 6370000000 HC RX 637 (ALT 250 FOR IP): Performed by: FAMILY MEDICINE

## 2020-10-26 PROCEDURE — 1240000000 HC EMOTIONAL WELLNESS R&B

## 2020-10-26 PROCEDURE — 82947 ASSAY GLUCOSE BLOOD QUANT: CPT

## 2020-10-26 RX ORDER — DOXEPIN HYDROCHLORIDE 50 MG/1
50 CAPSULE ORAL NIGHTLY
Status: DISCONTINUED | OUTPATIENT
Start: 2020-10-26 | End: 2020-10-28

## 2020-10-26 RX ORDER — HYDROXYZINE HYDROCHLORIDE 25 MG/1
25 TABLET, FILM COATED ORAL 3 TIMES DAILY PRN
Status: DISCONTINUED | OUTPATIENT
Start: 2020-10-26 | End: 2020-10-30 | Stop reason: HOSPADM

## 2020-10-26 RX ADMIN — INSULIN LISPRO 2 UNITS: 100 INJECTION, SOLUTION INTRAVENOUS; SUBCUTANEOUS at 07:49

## 2020-10-26 RX ADMIN — GABAPENTIN 600 MG: 300 CAPSULE ORAL at 07:45

## 2020-10-26 RX ADMIN — Medication 5 MG: at 21:04

## 2020-10-26 RX ADMIN — GABAPENTIN 600 MG: 300 CAPSULE ORAL at 21:04

## 2020-10-26 RX ADMIN — HYDROXYZINE HYDROCHLORIDE 25 MG: 25 TABLET, FILM COATED ORAL at 17:22

## 2020-10-26 RX ADMIN — INSULIN HUMAN 6 UNITS: 100 INJECTION, SOLUTION PARENTERAL at 16:38

## 2020-10-26 RX ADMIN — INSULIN LISPRO 4 UNITS: 100 INJECTION, SOLUTION INTRAVENOUS; SUBCUTANEOUS at 16:38

## 2020-10-26 RX ADMIN — Medication 30 UNITS: at 21:04

## 2020-10-26 RX ADMIN — INSULIN LISPRO 1 UNITS: 100 INJECTION, SOLUTION INTRAVENOUS; SUBCUTANEOUS at 11:40

## 2020-10-26 RX ADMIN — Medication 30 UNITS: at 07:50

## 2020-10-26 RX ADMIN — INSULIN LISPRO 2 UNITS: 100 INJECTION, SOLUTION INTRAVENOUS; SUBCUTANEOUS at 21:04

## 2020-10-26 RX ADMIN — GABAPENTIN 600 MG: 300 CAPSULE ORAL at 13:38

## 2020-10-26 RX ADMIN — DOXEPIN HYDROCHLORIDE 50 MG: 50 CAPSULE ORAL at 21:04

## 2020-10-26 RX ADMIN — ACETAMINOPHEN 650 MG: 325 TABLET, FILM COATED ORAL at 10:02

## 2020-10-26 RX ADMIN — INSULIN HUMAN 6 UNITS: 100 INJECTION, SOLUTION PARENTERAL at 11:22

## 2020-10-26 RX ADMIN — LURASIDONE HYDROCHLORIDE 40 MG: 40 TABLET, FILM COATED ORAL at 11:11

## 2020-10-26 RX ADMIN — INSULIN HUMAN 6 UNITS: 100 INJECTION, SOLUTION PARENTERAL at 07:49

## 2020-10-26 ASSESSMENT — PAIN - FUNCTIONAL ASSESSMENT
PAIN_FUNCTIONAL_ASSESSMENT: PREVENTS OR INTERFERES SOME ACTIVE ACTIVITIES AND ADLS
PAIN_FUNCTIONAL_ASSESSMENT: 0-10

## 2020-10-26 ASSESSMENT — PAIN SCALES - GENERAL
PAINLEVEL_OUTOF10: 8

## 2020-10-26 ASSESSMENT — PAIN DESCRIPTION - LOCATION: LOCATION: FOOT

## 2020-10-26 ASSESSMENT — PAIN DESCRIPTION - ORIENTATION: ORIENTATION: RIGHT;LEFT

## 2020-10-26 ASSESSMENT — PAIN DESCRIPTION - DESCRIPTORS: DESCRIPTORS: BURNING;CONSTANT
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
## 2020-10-26 NOTE — PROGRESS NOTES
[facility] Daily Shift Assessment  Nursing Progress Note    Room: [facility]/604-01 Name: Monty Burks Age: 44 y.o. Ethnicity:  Gender: male   Dx: Depression/SI  Precautions: suicide risk  CPAP: No Accu-Chek: No  MSE:  Status and Exam  Normal: No  Facial Expression: Flat  Affect: Congruent  Level of Consciousness: Alert  Mood:Normal: No  Mood: Depressed, Anxious  Motor Activity:Normal: No  Motor Activity: Decreased  Interview Behavior: Cooperative  Preception: [place] to Person, [place] to Time, [place] to Place, [place] to Situation  Attention:Normal: Yes  Thought Processes: Circumstantial  Thought Content:Normal: No  Thought Content: Poverty of Content  Hallucinations: None  Delusions: No  Memory:Normal: Yes  Insight and Judgment: No  Insight and Judgment: Poor Insight  Present Suicidal Ideation: No  Present Homicidal Ideation: No  Sleep: Yes, Good, no sleep issues Hours Slept: 8 Sched Sleep Meds: Yes PRN Sleep Meds: No Other PRN Meds: Yes Med Compliant: Yes Appetite: good Percent Meals: 75% Social: Yes ADLs: Yes Speech: normal Depression: 8 Anxiety: 8    Patient calm and cooperative, appearance clean, thought organized, alert/oriented, activities and self care done, reports no SI, HI or AVH.     Kalie Grayson RN

## 2020-10-26 NOTE — PLAN OF CARE
Group Therapy Note    Date: 10/26/2020  Start Time: 1100  End Time:  1279  Number of Participants: 1    Type of Group: Psychotherapy    Wellness Binder Information  Module Name:  emotional wellness  Session Number:  5    Patient's Goal:  obstacles to emotional wellness    Notes:  pt was verbally prompted to attend group. Pt refused. Information about obstacles to wellness was provided. Status After Intervention:      Participation Level:     Participation Quality:       Speech:         Thought Process/Content:       Affective Functioning:       Mood:       Level of consciousness:        Response to Learning:       Endings:     Modes of Intervention:       Discipline Responsible: Psychoeducational Specialist      Signature:  Nickolas Walters

## 2020-10-26 NOTE — PROGRESS NOTES
SAFETY PLAN    A suicide Safety Plan is a document that supports someone when they are having thoughts of suicide. Warning Signs that indicate a suicidal crisis may be developing: What (situations, thoughts, feelings, body sensations, behaviors, etc.) do you experience that lets you know you are beginning to think about suicide? 1. Self negativity  2. Trapped feeling  3. Internal Coping Strategies:  What things can I do (relaxation techniques, hobbies, physical activities, etc.) to take my mind off my problems without contacting another person? 1. Race car  2. Play with kids  3. Mediate    People and social settings that provide distraction: Who can I call or where can I go to distract me? 1. Name: [facility]  Phone:  2. Name:   Phone:    3. Place:             4. Place:     People whom I can ask for help: Who can I call when I need help - for example, friends, family, clergy, someone else? 1. Name: Mother                Phone:   2. Name:   Phone:   3. Name:   Phone:     Professionals or [address] [place] [address] I can contact during a crisis: Who can I call for help - for example, my doctor, my psychiatrist, my psychologist, a mental health provider, a suicide hotline? 1. Clinician Name: [id number]   Phone: [phone]      Clinician Pager or Emergency Contact #: [phone]    2. Clinician Name: [address]   Phone: [phone]      Clinician Pager or Emergency Contact #: [phone]    3. Suicide Prevention Lifeline: 8-853-381-TALK (8642)    4. Local Behavioral Health Emergency Services : [facility] Emergency Department      Emergency Services Address: [place] [address] [address] [facility]  Emergency Services Phone: [phone]    Making the environment safe: How can I make my environment (house/apartment/living space) safer? For example, can I remove guns, medications, and other items? 1. I am determined not to hurt myself  2.     I want to live for my children, and my dulce maria in God.

## 2020-10-26 NOTE — PLAN OF CARE
Problem: Health Behavior:  Goal: Ability to verbalize adaptive coping strategies to use when suicidal feelings occur will improve  Description: Ability to verbalize adaptive coping strategies to use when suicidal feelings occur will improve  Outcome: Ongoing  Goal: Ability to verbalize adaptive coping strategies to use when the urge to self-mutilate occurs will improve  Description: Ability to verbalize adaptive coping strategies to use when the urge to self-mutilate occurs will improve  Outcome: Ongoing     Problem: Safety:  Goal: Ability to contract for his/her safety will improve  Description: Ability to contract for his/her safety will improve  Outcome: Ongoing     Problem: Suicide risk  Goal: Provide patient with safe environment  Description: Provide patient with safe environment  Outcome: Ongoing     Problem: Pain:  Goal: Pain level will decrease  Description: Pain level will decrease  Outcome: Ongoing  Goal: Control of acute pain  Description: Control of acute pain  Outcome: Ongoing  Goal: Control of chronic pain  Description: Control of chronic pain  Outcome: Ongoing

## 2020-10-26 NOTE — H&P
Department of Psychiatry  Attending History and Physical        CHIEF COMPLAINT:  \"I am worried about everything\"    History obtained from: patient, chart    HISTORY OF PRESENT ILLNESS:    38-year-old white male with history of bipolar depression, anxiety disorder, ADHD, admitted to medical services secondary to hyperglycemia. Patient was medically stabilized and transferred to psychiatry for further management due to suicidal ideation. He is well-known to our service due to his previous admission to our unit in July 2019. When seen by our consult service on the medical floor, patient reported noncompliance with his psychotropics at home. He reported that his major life stressor is unemployment. He stated that his mother is his major social and financial support. Patient reported depressed mood, episodes of anxiety and anger issues, fatigue, poor quality of sleep, decreased motivation, poor focus. This morning, patient presents with dysphoric affect. He is laying in bed with a towel wrapped around his head. He is withdrawn. He endorses hopelessness and helplessness. He does not contract for safety. States he feels very depressed. \"I'm worried about everything. \"  Slept poorly last night and feels that trazodone did not work. Appetite is poor. Reports headache and chronic leg pain due to neuropathy. He is open to medication adjustment. PSYCHIATRIC HISTORY:    Diagnoses: Bipolar depression, anxiety, ADHD  Suicide attempts/gestures: x 3 - overdose on insulin, tried to inject air in his blood vessels, tried to get into the car accident   Prior hospitalizations: Twice to a hospital in [facility] in 2019  Medication trials: Gabapentin, risperidone, trazodone, Klonopin, Latuda, bupropion - h/o nausea but did well on it on previous admission, \"some stimulants\". Mental health contact: Lost to follow-up     SUBSTANCE USE HISTORY:  Denies alcohol use.  H/o methamphetamine abuse, quit 2 0.3 ML MISC 1 each by Does not apply route daily  Patient not taking: Reported on 10/22/2020 12/13/19   ALEX Mendez   lisinopril (PRINIVIL;ZESTRIL) 10 MG tablet Take 1 tablet by mouth daily  Patient not taking: Reported on 10/22/2020 12/13/19   ALEX Mendez   traZODone (DESYREL) 100 MG tablet Take 1 tablet by mouth nightly  Patient not taking: Reported on 10/22/2020 12/13/19   ALEX Mendez   buPROPion [facility]) 150 MG extended release tablet Take 1 tablet by mouth 2 times daily  Patient not taking: Reported on 10/22/2020 12/13/19   ALEX Mendez   nabumetone (RELAFEN) 500 MG tablet Take 1 tablet by mouth 2 times daily For pain/inflammation  Patient not taking: Reported on 10/22/2020 12/13/19   ALEX Mendez   lurasidone (LATUDA) 40 MG TABS tablet Take 1 tablet by mouth daily  Patient not taking: Reported on 10/22/2020 12/13/19   ALEX Mendez   EASY TOUCH SAFETY LANCETS 21G MISC 1 Device by Does not apply route 2 times daily  Patient not taking: Reported on 10/22/2020 8/5/19   ALEX Mendez   ergocalciferol (ERGOCALCIFEROL) 68479 units capsule Take 1 capsule by mouth once a week 7/15/19 9/24/19  Jose Enrique Bragg MD   melatonin 3 MG TABS tablet Take 2 tablets by mouth nightly  Patient not taking: Reported on 10/22/2020 7/15/19   Jose Enrique Bragg MD   gabapentin (NEURONTIN) 400 MG capsule Take 1 capsule by mouth 3 times daily for 28 days. 7/15/19 8/12/19  Jose Enrique Bragg MD   glucagon 1 MG injection Inject 1 mg into the muscle See Admin Instructions Follow package directions for low blood sugar. Patient not taking: Reported on 10/22/2020 6/19/19   ALEX Mendez   Lancets MISC 1 each by Does not apply route 4 times daily Test 4 times daily.  DX: E11.65, Z79.4  True Metrix  Patient not taking: Reported on 10/22/2020 6/19/19   ALEX Mendez   glucose monitoring kit (FREESTYLE) monitoring kit 1 kit by Does not apply route daily  Patient not taking: Reported on 10/22/2020 5/3/19   Licoyunigeovanna Robertson APRCORAL       Allergies:  Bupropion    Social History:  Lives alone. Unemployed. 7 children. Mother is his main support. Family History:   Family History   Adopted: Yes       REVIEW OF SYSTEMS:  General: No fevers, chills, night sweats, no recent weight loss or gain. Head: Headache. Eyes: No recent visual changes. Ears: No recent hearing changes. Nose: No increased congestion or change in sense of smell. Throat: No sore throat, no trouble swallowing. Cardiovascular: No chest pain or palpitations, or dizziness. Respiratory: No cough, wheezes, congestion, or shortness of breath. Gastrointestinal: No abdominal pain, nausea or vomiting, no diarrhea or constipation. Musculo-skeletal: No edema, deformities, or loss of functions. Neurological: No loss of consciousness, abnormal sensations, or weakness. Chronic leg pain - neuropathy. Skin: No rash, abrasions or bruises. PHYSICAL EXAM:  On observation  GENERAL APPEARANCE: Stated age, in NAD. HEAD: Normocephalic, atraumatic. CHEST: No deformities. MUSCULOSKELTAL: No obvious deformities, clubbing, cyanosis or edema. NEUROLOGICAL: Alert, oriented x 3, CN II-XII grossly intact. No abnormal movements or tremors. Gait stable. SKIN: Warm, dry, intact, no rash, abrasions, bruises. Vitals:  /62   Pulse 94   Temp 98.9 °F (37.2 °C) (Temporal)   Resp 14   SpO2 97%     Mental Status Examination:    Appearance:  Stated age, in hospital attire  Behavior: Calm, cooperative, withdrawn  Speech: Normal in tone, volume, and quality. No slurring, dysarthria or pressured speech noted. Mood: \"Depressed\"   Affect: Mood congruent. Thought Process: Appears linear. Thought Content: Denies suicidal ideation, however, endorses hopelessness and helplessness. No overt delusions or paranoia appreciated. Perceptions: Denies auditory or visual hallucinations at present time. Not responding to internal stimuli. Concentration: Intact. Orientation: to person, place, date, and situation. Language: Intact. Fund of information: Intact. Memory: Recent and remote appear intact. Neurovegitative: Poor appetite and sleep. Insight: Impaired. Judgment: Impaired. DATA:  Lab Results   Component Value Date    WBC 8.3 10/25/2020    HGB 12.3 (L) 10/25/2020    HCT 35.7 (L) 10/25/2020    MCV 88.4 10/25/2020     10/25/2020     Lab Results   Component Value Date     10/25/2020    K 4.0 10/25/2020     10/25/2020    CO2 26 10/25/2020    BUN 15 10/25/2020    CREATININE 0.6 10/25/2020    GLUCOSE 221 (H) 10/25/2020    CALCIUM 8.5 (L) 10/25/2020    PROT 7.0 10/22/2020    LABALBU 3.8 10/22/2020    BILITOT 0.8 10/22/2020    ALKPHOS 91 10/22/2020    AST 15 10/22/2020    ALT 29 10/22/2020    LABGLOM >60 10/25/2020    GFRAA >59 10/25/2020       ASSESSMENT AND PLAN:  DSM-5 DIAGNOSIS:   Impression:  Bipolar depression  Suicidal ideation  Generalized anxiety disorder  Intermittent explosive disorder   Benzodiazepine use disorder, severe, in sustained remission  Methamphetamine use disorder, severe, in early resmission    Medical issues  Diabetes  Peripheral neuropathy    Patient presents with impaired insight and judgement, hopelessness, and is meeting the criteria for inpatient psychiatric treatment. Plan:   -Admit to [facility] Unit and monitor on 15 minute checks. Suicide precautions.  Rafael Rothman reviewed. -Gather collateral information from family with release.  -Medical monitoring to be performed by Dr. Emily Murray and associates. Ensure glycemic control. Continue GBP for neuropathy. Check thyroid function and vitamin levels. -Acclimate to the unit. Provide supportive psychotherapy.  -Encourage participation in groups and therapeutic activities as appropriate. Work on coping skills. -Medications:    Restart Latuda for Bipolar depression. Consider restarting Wellbutrin.  Patient did well on both medications in the past. A trial of Doxepin for sleep. Discussed alternatives, benefits, & risks with patient including - but not limited to - black box warning regarding suicidality (in people under age 25 and decreased risk in those over 72), orthostatic hypotension, increased fall risk, HTN, syncope, arrhythmias, QT prolongation (& possible torsades), AV block, w/d sx if abruptly stopped, cognitive impairment, dizziness & increased fall risk, drowsiness, blurred vision, dry mouth, urinary retention/frequency, photosensitivity.  PRN Atrarax for episodic anxiety.     -The risks, benefits, side effects, indications, contraindications, and adverse effects of the medications have been discussed.  -The patient has verbalized understanding and has capacity to give informed consent.  -SW help evaluate home environment and provide outpatient resources.  -Discuss with treatment team.

## 2020-10-26 NOTE — PROGRESS NOTES
Group Therapy Note    Date: 10/25/2020  Start Time: 2000  End Time:  2030  Number of Participants: 6    Type of Group: Wrap-Up    Wellness Binder Information  Module Name:    Session Number:      Patient's Goal:  N/A    Notes:  Patient completed Wrap-Up sheet. Status After Intervention:  Unchanged    Participation Level:  Active Listener    Participation Quality: Appropriate      Speech:  normal      Thought Process/Content: Logical      Affective Functioning: Congruent      Mood: calm      Level of consciousness:  Alert      Response to Learning: Able to retain information      Endings: None Reported    Modes of Intervention: Education      Discipline Responsible: [address] [place] [address]      Signature:  Yolanda Denver

## 2020-10-26 NOTE — PROGRESS NOTES
Admission Note      Reason for admission/Target Symptom: Patient admitted to [facility] due to Has not been taking his medications for about two months. He states that he has been depressed for the past two months and has just wants to give up on everything. He stopped taking his diabetes medication and all of his Depression medications. He states that he has had suicidal thoughts in the past. Patient states that he does not have a plan to harm himself today but needs to see someone  Diagnoses: Bipolar depression, Intermittent explosive disorder, ADHD, per patient, Benzodiazepine use disorder, severe in sustained remission, Methamphetamine use disorder, severe, Peripheral neuropathy, related to poorly controlled diabetes, Suicidal ideations, Treatment noncompliance, Unemployment  UDS: Neg  BAL:  Neg    SW met with treatment team to discuss patient's treatment including care planning, discharge planning, and follow-up needs. Pt has been admitted to [facility]. Treatment team has identified patient's discharge needs as medication management and outpatient therapy/counseling. Pt confirmed  the need for ongoing treatment post inpatient stay. Pt was also provided a handout of contact information for drug and alcohol treatment centers and other community support service such as [facility], AA, and [facility].

## 2020-10-26 NOTE — PROGRESS NOTES
Treatment Team Note:    VLADIMIR met with [address] team to discuss Pts [address] plans. Progress/Behavior/Group Attendance: TBD    Target Symptoms/Reason for admission: Patient admitted to [facility] due to Has not been taking his medications for about two months. He states that he has been depressed for the past two months and has just wants to give up on everything. Katelyn Nathan stopped taking his diabetes medication and all of his Depression medications. He states that he has had suicidal thoughts in the past. Patient states that he does not have a plan to harm himself today but needs to see someone  Diagnoses: Bipolar depression, Intermittent explosive disorder, ADHD, per patient, Benzodiazepine use disorder, severe in sustained remission, Methamphetamine use disorder, severe, Peripheral neuropathy, related to poorly controlled diabetes, Suicidal ideations, Treatment noncompliance, Unemployment  UDS: Neg  BAL:  Neg     AftercarePlan: [address] lives with: VLADIMIR will meet with pt to gather information. Collateral obtained from: VLADIMIR will meet with pt to gather release of information.   On:    Family Session: CARMEN    Misc:

## 2020-10-27 LAB
GLUCOSE BLD-MCNC: 180 MG/DL (ref 70–99)
GLUCOSE BLD-MCNC: 261 MG/DL (ref 70–99)
GLUCOSE BLD-MCNC: 292 MG/DL (ref 70–99)
GLUCOSE BLD-MCNC: 299 MG/DL (ref 70–99)
PERFORMED ON: ABNORMAL
TSH REFLEX FT4: 2.25 UIU/ML (ref 0.35–5.5)
VITAMIN B-12: 499 PG/ML (ref 211–946)
VITAMIN D 25-HYDROXY: 19.8 NG/ML

## 2020-10-27 PROCEDURE — 6370000000 HC RX 637 (ALT 250 FOR IP): Performed by: PSYCHIATRY & NEUROLOGY

## 2020-10-27 PROCEDURE — 1240000000 HC EMOTIONAL WELLNESS R&B

## 2020-10-27 PROCEDURE — 36415 COLL VENOUS BLD VENIPUNCTURE: CPT

## 2020-10-27 PROCEDURE — 82607 VITAMIN B-12: CPT

## 2020-10-27 PROCEDURE — 82306 VITAMIN D 25 HYDROXY: CPT

## 2020-10-27 PROCEDURE — 6370000000 HC RX 637 (ALT 250 FOR IP): Performed by: FAMILY MEDICINE

## 2020-10-27 PROCEDURE — 84443 ASSAY THYROID STIM HORMONE: CPT

## 2020-10-27 PROCEDURE — 82947 ASSAY GLUCOSE BLOOD QUANT: CPT

## 2020-10-27 PROCEDURE — 99232 SBSQ HOSP IP/OBS MODERATE 35: CPT | Performed by: PSYCHIATRY & NEUROLOGY

## 2020-10-27 RX ORDER — INSULIN GLARGINE 100 [IU]/ML
34 INJECTION, SOLUTION SUBCUTANEOUS 2 TIMES DAILY
Status: DISCONTINUED | OUTPATIENT
Start: 2020-10-27 | End: 2020-10-30 | Stop reason: HOSPADM

## 2020-10-27 RX ORDER — ERGOCALCIFEROL 1.25 MG/1
50000 CAPSULE ORAL WEEKLY
Status: DISCONTINUED | OUTPATIENT
Start: 2020-10-27 | End: 2020-10-30 | Stop reason: HOSPADM

## 2020-10-27 RX ADMIN — GABAPENTIN 600 MG: 300 CAPSULE ORAL at 21:25

## 2020-10-27 RX ADMIN — Medication 5 MG: at 21:25

## 2020-10-27 RX ADMIN — INSULIN LISPRO 3 UNITS: 100 INJECTION, SOLUTION INTRAVENOUS; SUBCUTANEOUS at 07:53

## 2020-10-27 RX ADMIN — HYDROXYZINE HYDROCHLORIDE 25 MG: 25 TABLET, FILM COATED ORAL at 13:50

## 2020-10-27 RX ADMIN — INSULIN HUMAN 8 UNITS: 100 INJECTION, SOLUTION PARENTERAL at 07:52

## 2020-10-27 RX ADMIN — GABAPENTIN 600 MG: 300 CAPSULE ORAL at 13:42

## 2020-10-27 RX ADMIN — ACETAMINOPHEN 650 MG: 325 TABLET, FILM COATED ORAL at 19:34

## 2020-10-27 RX ADMIN — GABAPENTIN 600 MG: 300 CAPSULE ORAL at 07:50

## 2020-10-27 RX ADMIN — Medication 30 UNITS: at 07:53

## 2020-10-27 RX ADMIN — Medication 34 UNITS: at 21:25

## 2020-10-27 RX ADMIN — HYDROXYZINE HYDROCHLORIDE 25 MG: 25 TABLET, FILM COATED ORAL at 07:56

## 2020-10-27 RX ADMIN — INSULIN HUMAN 8 UNITS: 100 INJECTION, SOLUTION PARENTERAL at 12:18

## 2020-10-27 RX ADMIN — INSULIN LISPRO 1 UNITS: 100 INJECTION, SOLUTION INTRAVENOUS; SUBCUTANEOUS at 12:19

## 2020-10-27 RX ADMIN — HYDROXYZINE HYDROCHLORIDE 25 MG: 25 TABLET, FILM COATED ORAL at 21:32

## 2020-10-27 RX ADMIN — INSULIN LISPRO 2 UNITS: 100 INJECTION, SOLUTION INTRAVENOUS; SUBCUTANEOUS at 21:25

## 2020-10-27 RX ADMIN — ERGOCALCIFEROL 50000 UNITS: 1.25 CAPSULE ORAL at 13:41

## 2020-10-27 RX ADMIN — LURASIDONE HYDROCHLORIDE 40 MG: 40 TABLET, FILM COATED ORAL at 07:50

## 2020-10-27 RX ADMIN — DOXEPIN HYDROCHLORIDE 50 MG: 50 CAPSULE ORAL at 21:25

## 2020-10-27 RX ADMIN — INSULIN HUMAN 10 UNITS: 100 INJECTION, SOLUTION PARENTERAL at 18:00

## 2020-10-27 RX ADMIN — INSULIN LISPRO 3 UNITS: 100 INJECTION, SOLUTION INTRAVENOUS; SUBCUTANEOUS at 18:02

## 2020-10-27 ASSESSMENT — PAIN DESCRIPTION - DESCRIPTORS
DESCRIPTORS: SHARP

## 2020-10-27 ASSESSMENT — PAIN DESCRIPTION - ORIENTATION
ORIENTATION: RIGHT;LEFT

## 2020-10-27 ASSESSMENT — PAIN - FUNCTIONAL ASSESSMENT
PAIN_FUNCTIONAL_ASSESSMENT: ACTIVITIES ARE NOT PREVENTED

## 2020-10-27 ASSESSMENT — PAIN DESCRIPTION - PAIN TYPE
TYPE: CHRONIC PAIN

## 2020-10-27 ASSESSMENT — PAIN DESCRIPTION - FREQUENCY
FREQUENCY: CONTINUOUS

## 2020-10-27 ASSESSMENT — PAIN SCALES - GENERAL
PAINLEVEL_OUTOF10: 8
PAINLEVEL_OUTOF10: 7

## 2020-10-27 ASSESSMENT — PAIN DESCRIPTION - LOCATION
LOCATION: FOOT;LEG

## 2020-10-27 ASSESSMENT — PAIN DESCRIPTION - ONSET
ONSET: ON-GOING

## 2020-10-27 ASSESSMENT — PAIN DESCRIPTION - PROGRESSION
CLINICAL_PROGRESSION: NOT CHANGED

## 2020-10-27 NOTE — H&P
HISTORY and PHYSICAL      CHIEF COMPLAINT:  Depression, SI    Reason for Admission:  Depression, SI    History Obtained From:  patient    HISTORY OF PRESENT ILLNESS:      The patient is a 44 y.o. male who is admitted to the [place] [address] unit with worsening mood issues. He has no c/o CP or SOA. No abdominal pain or N/V. No dysuria. No weakness or HA. He has chronic foot pain. No fevers. Past Medical History:        Diagnosis Date    ADHD     Anxiety     Depression     Diabetes ([facility] [address]      Past Surgical History:        Procedure Laterality Date    NECK SURGERY           Medications Prior to Admission:    Medications Prior to Admission: risperiDONE (RISPERDAL) 1 MG tablet, Take 1 mg by mouth nightly  metFORMIN (GLUCOPHAGE) 500 MG tablet, Take 500 mg by mouth 2 times daily (with meals)  blood glucose monitor kit and supplies, True metrix glucose monitor, test strips and lancets. Pt may test 4 times a day. DX:E11.65, Z79.4 (Patient not taking: Reported on 10/22/2020)  blood glucose monitor strips, Test 4 times a day & as needed for symptoms of irregular blood glucose.  Please give True Metrix test strips and lancets (Patient not taking: Reported on 10/22/2020)  EASY TOUCH SAFETY LANCETS 21G MISC, CHECK GLUCOSE TID AND PRN (Patient not taking: Reported on 10/22/2020)  glimepiride (AMARYL) 2 MG tablet, Take 1 tablet by mouth every morning (before breakfast) (Patient not taking: Reported on 10/22/2020)  insulin glargine (LANTUS) 100 UNIT/ML injection vial, Inject 45 Units into the skin nightly (Patient not taking: Reported on 10/22/2020)  insulin regular (HUMULIN R) 100 UNIT/ML injection, See Sliding Scale orders (Patient not taking: Reported on 10/22/2020)  Insulin Syringe-Needle U-100 29G X 1/2\" 0.3 ML MISC, 1 each by Does not apply route daily (Patient not taking: Reported on 10/22/2020)  Insulin Syringes, Disposable, U-100 0.3 ML MISC, 1 each by Does not apply route daily (Patient not taking: Reported on 10/22/2020)  lisinopril (PRINIVIL;ZESTRIL) 10 MG tablet, Take 1 tablet by mouth daily (Patient not taking: Reported on 10/22/2020)  traZODone (DESYREL) 100 MG tablet, Take 1 tablet by mouth nightly (Patient not taking: Reported on 10/22/2020)  buPROPion (WELLBUTRIN SR) 150 MG extended release tablet, Take 1 tablet by mouth 2 times daily (Patient not taking: Reported on 10/22/2020)  nabumetone (RELAFEN) 500 MG tablet, Take 1 tablet by mouth 2 times daily For pain/inflammation (Patient not taking: Reported on 10/22/2020)  lurasidone (LATUDA) 40 MG TABS tablet, Take 1 tablet by mouth daily (Patient not taking: Reported on 10/22/2020)  EASY TOUCH SAFETY LANCETS 21G MISC, 1 Device by Does not apply route 2 times daily (Patient not taking: Reported on 10/22/2020)  ergocalciferol (ERGOCALCIFEROL) 11345 units capsule, Take 1 capsule by mouth once a week  melatonin 3 MG TABS tablet, Take 2 tablets by mouth nightly (Patient not taking: Reported on 10/22/2020)  gabapentin (NEURONTIN) 400 MG capsule, Take 1 capsule by mouth 3 times daily for 28 days. glucagon 1 MG injection, Inject 1 mg into the muscle See Admin Instructions Follow package directions for low blood sugar. (Patient not taking: Reported on 10/22/2020)  Lancets MISC, 1 each by Does not apply route 4 times daily Test 4 times daily. DX: E11.65, Z79.4 True Metrix (Patient not taking: Reported on 10/22/2020)  glucose monitoring kit (FREESTYLE) monitoring kit, 1 kit by Does not apply route daily (Patient not taking: Reported on 10/22/2020)    Allergies:  Bupropion    Social History:   TOBACCO:   reports that he has been smoking cigarettes. He started smoking about 21 years ago. He has been smoking about 1.50 packs per day. He has quit using smokeless tobacco.  ETOH:   reports previous alcohol use. DRUGS:   reports current drug use. Drug: Methamphetamines.   MARITAL STATUS:    OCCUPATION:  Not working  Patient currently lives alone      Family History:       Adopted: Yes     REVIEW OF SYSTEMS:  Constitutional: neg  CV: neg  Pulmonary: neg  GI: neg  : neg  Psych: depression, SI  Neuro: neg  Skin: neg  MusculoSkeletal: foot pain  HEENT: neg  Joints: neg    Vitals:  /75   Pulse 95   Temp 99 °F (37.2 °C) (Temporal)   Resp 16   SpO2 100%     PHYSICAL EXAM:  Gen: NAD, alert  HEENT: WNL  Lymph: no LAD  Neck: no JVD or masses  Chest: CTA bilat  CV: RRR  Abdomen: NT/ND  Extrem: no C/C/E  Neuro: non focal  Skin: no rashes  Joints: no redness    DATA:  I have reviewed the admission labs and imaging tests.     ASSESSMENT AND PLAN:      Patient Active Hospital Problem List:   Bipolar 1 disorder, depressed, moderate, SI--follow with Psych   DM2--follow with glucose, continue insulin   Anemia            Prosper Rousseau MD  7:44 PM 10/26/2020

## 2020-10-27 NOTE — PROGRESS NOTES
Group Therapy Note    Start Time: 800  End Time:  295  Number of Participants: 5    Type of Group: Community Meeting       Patient's Goal:        Notes:  Patient did not attend group. Participation Level:       Participation Quality:        Thought Process/Content:       Affective Functioning:       Mood:       Level of consciousness:        Modes of Intervention: Support      Discipline Responsible: Behavioral Health Tech II      Signature:  Murali Dunlap

## 2020-10-27 NOTE — PROGRESS NOTES
Treatment Team Note:     SW met with [address] team to discuss Pts [place] and [place] plans.      Progress/Behavior/Group Attendance: TBD     Target Symptoms/Reason for admission: Patient admitted to [facility] due to Has not been taking his medications for about two months. He states that he has been depressed for the past two months and has just wants to give up on everything. Sandra Ro stopped taking his diabetes medication and all of his Depression medications.  He states that he has had suicidal thoughts in the past. Patient states that he does not have a plan to harm himself today but needs to see someone  Diagnoses: Bipolar depression, Intermittent explosive disorder, ADHD, per patient, Benzodiazepine use disorder, severe in sustained remission, Methamphetamine use disorder, severe, Peripheral neuropathy, related to poorly controlled diabetes, Suicidal ideations, Treatment noncompliance, Unemployment  UDS: Neg  BAL:  Neg     AftercarePlan: [address] lives with: in a friends trailer      Collateral obtained from: mom  On: 10/27/2020     Family Session: CARMEN     Misc:

## 2020-10-27 NOTE — PROGRESS NOTES
[facility] Adult Unit Daily Assessment  Nursing Progress Note    Room: [facility]/612-02   Name: Jaylan Quintero   Age: 44 y.o. Gender: male   Dx: <principal problem not specified>  Precautions: suicide risk  Inpatient Status: voluntary       SLEEP:    Sleep Quality Fair  Sleep Medications: Doxepin, Melatonin   PRN Sleep Meds: No       MEDICAL:    Other PRN Meds: No   Med Compliant: Yes  Accu-Chek: 320, 2 units SS Humalog given, checked by two nurses  Oxygen/CPAP/BiPAP: No  CIWA/CINA: No   PAIN Assessment: yes, chronic pain, Neurontin prescribed  Side Effects from medication: No      PSYCH:    Depression: 5   Anxiety: 8   SI denies suicidal ideation   HI Negative for homicidal ideation      AVH:Absent      GENERAL:    Appetite: good    Social: Yes   Speech: normal   Appearance: appropriately dressed and appropriately groomed    GROUP:    Group Participation: Yes  Participation Quality: Active Listener    Notes:     Patient has been out in day area, social and interacts well, engages in conversation easily. During group, patient talked about his life choices and what led him to being on unit. Patient expresses that he has yet decided what he will do when discharged, reports his mother is a significant support system for him. Reported that once he was given Neurontin last night, the medication helped ease his pain.      Electronically signed by Wallace Szymanski LPN on 98/98/20 at 2:98 PM CDT

## 2020-10-27 NOTE — GROUP NOTE
Group Therapy Note    Date: 10/27/2020    Group Start Time: 1600  Group End Time: 36  Group Topic: Healthy Living/Wellness    [facility] ADULT [facility]    Robin Rothman RN                                                                          Group Therapy Note    Date: 10/27/20  Start Time: 16:00  End Time: 16:30    Number of Participants: 6    Type of Group: Nursing Group    Patient's Goal:  Learn about their medications    Notes:  participated. Status After Intervention:  Improved    Participation Level:  Active Listener    Participation Quality: Appropriate and Attentive    Speech:  normal    Thought Process/Content: Logical  Linear    Affective Functioning: Congruent    Mood: euthymic    Level of consciousness:  Alert, Oriented x4, and Attentive    Response to Learning: Progressing to goal    Modes of Intervention: Education and Support    Discipline Responsible: Registered Nurse    Signature:  Robin Rothman RN

## 2020-10-27 NOTE — PROGRESS NOTES
Collateral obtained from: patients mom [phone]      Immediate Stressors & Time Episode Began: Patient looked bad and that he has not been taking care of himself and not taking his medication to treat his diabetes. Patient level was so high that it couldn't been read and the doctors office. Patient mom bought him to the ER. Patient was refusing help. Patient was not taking his medication because he stated that it would make him more depressed    Diagnosis/Hx of compliance with meds:     Tx Hx/Past hospitalizations:  No previous admissions    Family hx of psychiatric issues: Patient is adopted so family history is unknown. Substance Abuse: Was on drugs several years ago. Pending Legal: Drugs related charges    Safety Issues (Weapons? Hx of attempts): No issues    Support system/Medication Managed by: The importance of medication management and locking extra medication in a secured location was explained and reccommended to collateral.     Additional Info: Patient is currently homeless and his had the option to live in his friends trailer.

## 2020-10-27 NOTE — PROGRESS NOTES
Department of Psychiatry  Attending Progress Note     Chief complaint: \"I'm ok\"    SUBJECTIVE:   Chart reviewed, discussed with the team.  Patient is somewhat more social.  Eating and sleeping well. No evidence of suicidality so far. Patient is observed resting in bed this morning. He is still withdrawn. Denies headache. States he is overall doing somewhat better today. Denies suicidal ideation. Does not offer much during the interview. Doing well on current medication regimen. Denies side effects. OBJECTIVE    Physical  Wt Readings from Last 3 Encounters:   10/22/20 151 lb (68.5 kg)   10/22/20 151 lb 12.8 oz (68.9 kg)   07/10/19 180 lb (81.6 kg)     Temp Readings from Last 3 Encounters:   10/26/20 99 °F (37.2 °C) (Temporal)   10/25/20 98.5 °F (36.9 °C)   10/22/20 97.6 °F (36.4 °C) (Temporal)     BP Readings from Last 3 Encounters:   10/26/20 129/75   10/25/20 130/73   10/22/20 122/86     Pulse Readings from Last 3 Encounters:   10/26/20 95   10/25/20 87   10/22/20 112        Review of Systems: 14-point review of systems negative except as described above    Mental Status Examination:   Appearance: Stated age, hygiene improved  Behavior: Withdrawn  Speech: Normal in tone, volume, and quality. No slurring, dysarthria or pressured speech noted. Mood: \"OK \"   Affect: Flat  Thought Process: Appears linear. Thought Content: Denies suicidal and homicidal ideation. No overt delusions or paranoia appreciated. Perceptions: Denies auditory or visual hallucinations at present time. Not responding to internal stimuli. Concentration: Intact. Orientation: to person, place, date, and situation. Language: Intact. Fund of information: Intact. Memory: Recent and remote appear intact. Neurovegitative: Improved appetite and sleep. Insight: Improving. Judgment: Improving.     Data  Lab Results   Component Value Date    WBC 8.3 10/25/2020    HGB 12.3 (L) 10/25/2020    HCT 35.7 (L) 10/25/2020    MCV 88.4 5444      ASSESSMENT AND PLAN  DSM 5 DIAGNOSIS  Impression  Bipolar depression  Generalized anxiety disorder  Intermittent explosive disorder   Benzodiazepine use disorder, severe, in sustained remission  Methamphetamine use disorder, severe, in early resmission     Medical issues  Diabetes  Peripheral neuropathy  Vitamin D deficiency    Patient is improving. Continue to observe. Engage in group activities. Plan:   1. Psychiatric Medications:   Increase Latuda to help with mood stabilization . Monitor for side effects. The risks, benefits, side effects, indications, contraindications, alternatives and adverse effects of the medications have been discussed with patient. 2. Continue to provide supportive psychotherapy. Encourage socialization and participation in recreational activities. Work on coping skills. 3. Medical Issues:    Continue medical monitoring by Dr. Eliu Mckee and associates. Ensure glycemic control. Vitamin supplementation. 4. Disposition:     to provide outpatient resources and facilitate disposition.      Amount of time spent with patient:      25 minutes with greater than 50 % of the time spent in counseling and collaboration of care

## 2020-10-27 NOTE — PROGRESS NOTES
Nutrition Assessment     Type and Reason for Visit: Positive Nutrition Screen    Nutrition Assessment:  Pt is nutritionally compromised AEB endocrine dysfunction and abnormal lab values. PO intake is good.     Estimated Daily Nutrient Needs:  Energy (kcal): 2458-7503 kcals/day; Weight Used for Energy Requirements:  Current     Protein (g):  g/PRO/day; Weight Used for Protein Requirements:  Current        Fluid (ml/day): 9921-9675 mL/day; Weight Used for Fluid Requirements:  1 ml/kcal       Current Nutrition Therapies:    DIET CARB CONTROL;    Nutrition Diagnosis:   · Altered nutrition-related lab values related to endocrine dysfuntion as evidenced by lab values    Nutrition Interventions:   Food and/or Nutrient Delivery:  Continue Current Diet  Coordination of Nutrition Care:  Continue to monitor while inpatient    Goals:  Pt will continue to consume % of meals with blood sugars 150 or below       Nutrition Monitoring and Evaluation:   Behavioral-Environmental Outcomes:  Readiness for Change   Food/Nutrient Intake Outcomes:  Food and Nutrient Intake  Physical Signs/Symptoms Outcomes:  Biochemical Data, Nutrition Focused Physical Findings, Weight     Electronically signed by Kev Conteh, MS, RD, LD on 10/27/20 at 2:09 PM CDT    Contact: [phone]

## 2020-10-27 NOTE — PROGRESS NOTES
[facility]HI Daily Shift Assessment  Nursing Progress Note    Room: 0612/612-02 Name: Anastasiia Maynard Age: 44 y.o. Ethnicity:  Gender: male   Dx: <principal problem not specified>  Precautions: suicide risk  CPAP: No Accu-Chek: Yes 299, 180  MSE:  Status and Exam  Normal: No  Facial Expression: Sad, Worried  Affect: Appropriate, Constricted  Level of Consciousness: Alert  Mood:Normal: No  Mood: Irritable  Motor Activity:Normal: No  Motor Activity: Decreased  Interview Behavior: Cooperative, Irritable  Preception: [place] to Person, [place] Mention to Time, [place] to Place, [place] to Situation  Attention:Normal: Yes  Thought Processes: Circumstantial  Thought Content:Normal: No  Thought Content: Preoccupations  Hallucinations: Auditory (Comment)  Delusions: No  Memory:Normal: Yes  Insight and Judgment: No  Insight and Judgment: Poor Insight, Poor Judgment  Present Suicidal Ideation: No  Present Homicidal Ideation: No  Sleep: No, Very poor, has interrupted sleep and has restless sleep Hours Slept: unknown  Other PRN Meds: Yes and Tylenol and atarax times 2 Med Compliant: Yes Appetite: good Percent Meals: 100% Social: No ADLs: Yes Speech: normal Depression: 10 Anxiety: 10    Robin Rothman RN        Patient complained that he was not able to sleep very well the night before because of noisy doors during the rounds. Patient stated that he was tired this morning and mostly slept during the day. Patient was worried about his demeanor and apologized for being grumpy this morning. Patient denied SI and HI but said that he has been suffering from auditory hallucinations. While he has had this since he got here, he has not admitted that to anyone until now. Patient said that when he is home they can be command voices telling him to harm himself but since he has been on this floor they have just been loud jammering.

## 2020-10-27 NOTE — PLAN OF CARE
Problem: Health Behavior:  Goal: Ability to verbalize adaptive coping strategies to use when suicidal feelings occur will improve  Description: Ability to verbalize adaptive coping strategies to use when suicidal feelings occur will improve  Outcome: Ongoing  Goal: Ability to verbalize adaptive coping strategies to use when the urge to self-mutilate occurs will improve  Description: Ability to verbalize adaptive coping strategies to use when the urge to self-mutilate occurs will improve  Outcome: Ongoing     Problem: Safety:  Goal: Ability to contract for his/her safety will improve  Description: Ability to contract for his/her safety will improve  Outcome: Ongoing     Problem: Suicide risk  Goal: Provide patient with safe environment  Description: Provide patient with safe environment  Outcome: Ongoing     Problem: Pain:  Goal: Pain level will decrease  Description: Pain level will decrease  Outcome: Ongoing  Goal: Control of acute pain  Description: Control of acute pain  Outcome: Ongoing  Goal: Control of chronic pain  Description: Control of chronic pain  Outcome: Ongoing     Problem: Falls - Risk of:  Goal: Will remain free from falls  Description: Will remain free from falls  Outcome: Ongoing  Goal: Absence of physical injury  Description: Absence of physical injury  Outcome: Ongoing

## 2020-10-28 LAB
GLUCOSE BLD-MCNC: 170 MG/DL (ref 70–99)
GLUCOSE BLD-MCNC: 180 MG/DL (ref 70–99)
GLUCOSE BLD-MCNC: 239 MG/DL (ref 70–99)
GLUCOSE BLD-MCNC: 296 MG/DL (ref 70–99)
PERFORMED ON: ABNORMAL

## 2020-10-28 PROCEDURE — 6370000000 HC RX 637 (ALT 250 FOR IP): Performed by: FAMILY MEDICINE

## 2020-10-28 PROCEDURE — 99231 SBSQ HOSP IP/OBS SF/LOW 25: CPT | Performed by: PSYCHIATRY & NEUROLOGY

## 2020-10-28 PROCEDURE — 82947 ASSAY GLUCOSE BLOOD QUANT: CPT

## 2020-10-28 PROCEDURE — 1240000000 HC EMOTIONAL WELLNESS R&B

## 2020-10-28 PROCEDURE — 6370000000 HC RX 637 (ALT 250 FOR IP): Performed by: PSYCHIATRY & NEUROLOGY

## 2020-10-28 RX ORDER — GABAPENTIN 300 MG/1
600 CAPSULE ORAL 4 TIMES DAILY
Status: DISCONTINUED | OUTPATIENT
Start: 2020-10-28 | End: 2020-10-30 | Stop reason: HOSPADM

## 2020-10-28 RX ADMIN — Medication 34 UNITS: at 21:29

## 2020-10-28 RX ADMIN — INSULIN HUMAN 10 UNITS: 100 INJECTION, SOLUTION PARENTERAL at 12:12

## 2020-10-28 RX ADMIN — GABAPENTIN 600 MG: 300 CAPSULE ORAL at 16:44

## 2020-10-28 RX ADMIN — GABAPENTIN 600 MG: 300 CAPSULE ORAL at 07:58

## 2020-10-28 RX ADMIN — GABAPENTIN 600 MG: 300 CAPSULE ORAL at 12:11

## 2020-10-28 RX ADMIN — Medication 34 UNITS: at 08:00

## 2020-10-28 RX ADMIN — INSULIN HUMAN 10 UNITS: 100 INJECTION, SOLUTION PARENTERAL at 16:44

## 2020-10-28 RX ADMIN — HYDROXYZINE HYDROCHLORIDE 25 MG: 25 TABLET, FILM COATED ORAL at 14:42

## 2020-10-28 RX ADMIN — INSULIN HUMAN 10 UNITS: 100 INJECTION, SOLUTION PARENTERAL at 08:00

## 2020-10-28 RX ADMIN — INSULIN LISPRO 2 UNITS: 100 INJECTION, SOLUTION INTRAVENOUS; SUBCUTANEOUS at 21:28

## 2020-10-28 RX ADMIN — INSULIN LISPRO 1 UNITS: 100 INJECTION, SOLUTION INTRAVENOUS; SUBCUTANEOUS at 12:12

## 2020-10-28 RX ADMIN — Medication 5 MG: at 21:28

## 2020-10-28 RX ADMIN — INSULIN LISPRO 1 UNITS: 100 INJECTION, SOLUTION INTRAVENOUS; SUBCUTANEOUS at 16:45

## 2020-10-28 RX ADMIN — DOXEPIN HYDROCHLORIDE 75 MG: 50 CAPSULE ORAL at 21:28

## 2020-10-28 RX ADMIN — LURASIDONE HYDROCHLORIDE 60 MG: 40 TABLET, FILM COATED ORAL at 07:57

## 2020-10-28 RX ADMIN — GABAPENTIN 600 MG: 300 CAPSULE ORAL at 21:28

## 2020-10-28 RX ADMIN — INSULIN LISPRO 2 UNITS: 100 INJECTION, SOLUTION INTRAVENOUS; SUBCUTANEOUS at 09:50

## 2020-10-28 ASSESSMENT — PAIN SCALES - GENERAL: PAINLEVEL_OUTOF10: 8

## 2020-10-28 NOTE — PROGRESS NOTES
Treatment Team Note:     SW met with [address] team to discuss Pts [place] and DC plans.      Progress/Behavior/Group Attendance: TBD     Target Symptoms/Reason for admission: Patient admitted to [facility] due to Has not been taking his medications for about two months. He states that he has been depressed for the past two months and has just wants to give up on everything. Steve Garcia stopped taking his diabetes medication and all of his Depression medications.  He states that he has had suicidal thoughts in the past. Patient states that he does not have a plan to harm himself today but needs to see someone  Diagnoses: Bipolar depression, Intermittent explosive disorder, ADHD, per patient, Benzodiazepine use disorder, severe in sustained remission, Methamphetamine use disorder, severe, Peripheral neuropathy, related to poorly controlled diabetes, Suicidal ideations, Treatment noncompliance, Unemployment  UDS: Neg  BAL:  Neg     AftercarePlan: [facility]     Pt lives with: in a friends trailer      Collateral obtained from: mom  On: 10/27/2020     Family Session: CARMEN     Misc:         Revision History

## 2020-10-28 NOTE — PROGRESS NOTES
Department of Psychiatry  Attending Progress Note     Chief complaint: \"I'm sleeping poorly\"    SUBJECTIVE:   Chart reviewed, discussed with the team.  No major issues overnight. Patient is social with staff. Goes to groups. Performs ADLs. Patient is observed resting in bed this morning. He is somewhat more engaged. Still somewhat flat. States his legs are hurting and he slept poorly last night. Denies suicidal ideation. Doing well on current medication regimen. Denies side effects. Planning to attend groups today. OBJECTIVE    Physical  Wt Readings from Last 3 Encounters:   10/22/20 151 lb (68.5 kg)   10/22/20 151 lb 12.8 oz (68.9 kg)   07/10/19 180 lb (81.6 kg)     Temp Readings from Last 3 Encounters:   10/28/20 97.7 °F (36.5 °C) (Temporal)   10/25/20 98.5 °F (36.9 °C)   10/22/20 97.6 °F (36.4 °C) (Temporal)     BP Readings from Last 3 Encounters:   10/28/20 127/81   10/25/20 130/73   10/22/20 122/86     Pulse Readings from Last 3 Encounters:   10/28/20 81   10/25/20 87   10/22/20 112        Review of Systems: 14-point review of systems negative except as described above    Mental Status Examination:   Appearance: Stated age, hygiene improved  Behavior: More engaged  Speech: Normal in tone, volume, and quality. No slurring, dysarthria or pressured speech noted. Mood: \"Tired\"   Affect: Flat  Thought Process: Appears linear. Thought Content: Denies suicidal and homicidal ideation. No overt delusions or paranoia appreciated. Perceptions: Denies auditory or visual hallucinations at present time. Not responding to internal stimuli. Concentration: Intact. Orientation: to person, place, date, and situation. Language: Intact. Fund of information: Intact. Memory: Recent and remote appear intact. Neurovegitative: Improved appetite and poor sleep. Insight: Improving. Judgment: Improving.     Data  Lab Results   Component Value Date    WBC 8.3 10/25/2020    HGB 12.3 (L) 10/25/2020    HCT 35.7 (L) 10/25/2020    MCV 88.4 10/25/2020     10/25/2020      Lab Results   Component Value Date     10/25/2020    K 4.0 10/25/2020     10/25/2020    CO2 26 10/25/2020    BUN 15 10/25/2020    CREATININE 0.6 10/25/2020    GLUCOSE 221 (H) 10/25/2020    CALCIUM 8.5 (L) 10/25/2020    PROT 7.0 10/22/2020    LABALBU 3.8 10/22/2020    BILITOT 0.8 10/22/2020    ALKPHOS 91 10/22/2020    AST 15 10/22/2020    ALT 29 10/22/2020    LABGLOM >60 10/25/2020    GFRAA >59 10/25/2020       Medications    Current Facility-Administered Medications:     gabapentin (NEURONTIN) capsule 600 mg, 600 mg, Oral, 4x Daily, Chelle Randolph MD    doxepin (SINEQUAN) capsule 75 mg, 75 mg, Oral, Nightly, Chelle Randolph MD    insulin glargine (LANTUS) injection vial 34 Units, 34 Units, Subcutaneous, BID, Arianna Osborn MD, 34 Units at 10/28/20 0800    insulin regular (HUMULIN R;NOVOLIN R) injection 10 Units, 10 Units, Subcutaneous, TID AC, Arianna Osborn MD, 10 Units at 10/28/20 0800    vitamin D (ERGOCALCIFEROL) capsule 50,000 Units, 50,000 Units, Oral, Weekly, Arianna Osborn MD, 50,000 Units at 10/27/20 1341    lurasidone (LATUDA) tablet 60 mg, 60 mg, Oral, Daily, Chelle Randolph MD, 60 mg at 10/28/20 0757    hydrOXYzine (ATARAX) tablet 25 mg, 25 mg, Oral, TID PRN, Chelle Randolph MD, 25 mg at 10/27/20 2132    acetaminophen (TYLENOL) tablet 650 mg, 650 mg, Oral, Q4H PRN, Breana Pereira MD, 650 mg at 10/27/20 1934    polyethylene glycol (GLYCOLAX) packet 17 g, 17 g, Oral, Daily PRN, Breana Pereira MD    nicotine (NICODERM CQ) 21 MG/24HR 1 patch, 1 patch, Transdermal, Daily, Breana Pereira MD, 1 patch at 10/28/20 0757    melatonin disintegrating tablet 5 mg, 5 mg, Oral, Daily, Breana Pereira MD, 5 mg at 10/27/20 2125    insulin lispro (HUMALOG) injection vial 0-6 Units, 0-6 Units, Subcutaneous, TID WC, Arianna Osborn MD, 2 Units at 10/28/20 0950    insulin lispro (HUMALOG) injection vial 0-3 Units, 0-3

## 2020-10-28 NOTE — PROGRESS NOTES
BHI Daily Shift Assessment  Nursing Progress Note    Room: [facility]/612-02 Name: Griselda Braden Age: 44 y.o. Ethnicity:  Gender: male   Dx: Depression/SI  Precautions: suicide risk  CPAP: No Accu-Chek: Yes  MSE:  Status and Exam  Normal: No  Facial Expression: Flat  Affect: Appropriate  Level of Consciousness: Alert  Mood:Normal: No  Mood: Depressed, Anxious  Motor Activity:Normal: No  Motor Activity: Decreased  Interview Behavior: Cooperative  Preception: [place] to Person, [place] to Time, [place] to Place, [place] to Situation  Attention:Normal: No  Attention: Unable to Concentrate  Thought Processes: Circumstantial  Thought Content:Normal: No  Thought Content: Poverty of Content  Hallucinations: Auditory (Comment)(Voices and internal noise)  Delusions: No  Memory:Normal: Yes  Insight and Judgment: No  Insight and Judgment: Poor Insight  Present Suicidal Ideation: No  Present Homicidal Ideation: No  Sleep: Yes, Good, no sleep issues Hours Slept: 10 Sched Sleep Meds: Yes PRN Sleep Meds: No Other PRN Meds: Yes Med Compliant: Yes Appetite: good Percent Meals: 75% Social: No ADLs: Yes Speech: normal Depression: 8 Anxiety: 8    Patient calm and cooperative, appearance clean, thought organized, alert/oriented, activities and self care done, reports no SI or HI, reports voices and internal noise.     Cecelia Capps RN

## 2020-10-28 NOTE — PROGRESS NOTES
[facility] Adult Unit Daily Assessment  Nursing Progress Note    Room: 06/612-02   Name: Yaneth Pineda   Age: 44 y.o. Gender: male   Dx: <principal problem not specified>  Precautions: suicide risk  Inpatient Status: voluntary       SLEEP:    Sleep Quality Fair  Sleep Medications: Yes, Doxepin 50mg Melatonin 5mg   PRN Sleep Meds: No       MEDICAL:    Other PRN Meds: Yes, Tylenol & Atarax  Med Compliant: Yes  Accu-Chek: Yes, AC/HS, 292 at HS  Oxygen/CPAP/BiPAP: No  CIWA/CINA: No   PAIN Assessment: location, bilat legs & feet  Side Effects from medication: No      PSYCH:    Depression: 7   Anxiety: 7   SI denies suicidal ideation   HI Negative for homicidal ideation      AVH:Present - 'I always hear voices'      GENERAL:    Appetite: good    Social: Yes   Speech: normal   Appearance: appropriately dressed    GROUP:    Group Participation: Yes  Participation Quality: Interactive    Notes: Pt was social in the TV area this evening. Pt was calm, pleasant & med compliant. Pt received PRN Tylenol & scheduled Gabapentin for c/o's sharp pain to bilat feet & legs.        Electronically signed by Ricki Sainz RN on 10/27/2020 at 11:10 PM

## 2020-10-28 NOTE — PLAN OF CARE
Group Therapy Note    Date: 10/28/2020  Start Time: 1100  End Time:  1368  Number of Participants: 4    Type of Group: Psychotherapy    Wellness Binder Information  Module Name:  staying well  Session Number:  1    Patient's Goal:  daily maintenance and coping skills    Notes:  pt was verbally prompted to attend group. Pt refused. Information about coping skills was provided. Status After Intervention:      Participation Level:     Participation Quality:       Speech:         Thought Process/Content:       Affective Functioning:       Mood:       Level of consciousness:        Response to Learning:       Endings:     Modes of Intervention:       Discipline Responsible: Psychoeducational Specialist      Signature:  Aman Vogel

## 2020-10-28 NOTE — PROGRESS NOTES
Group Note    Number of Participants in Group: 4  Number of Patients on Unit:6      Patient attended group:Yes  Reason for Absence:  Intervention for patient absence:        Type of Group:   Wrap-Up/Relaxation    Patient's Goal: See wrap up group sheet    Participation Level:    Minimal           Patient Response to Learning: No    Patient's Behavior: Cooperative and Pleasant    Is Patient Social/Interacting: Yes    Relaxation:   Television:Yes   Reading:No   Game/Puzzle:No   Phone: No       Notes/Comments:    Pt filled out wrap up sheet    Please see patient's wrap up group sheet for patient's comments       Electronically signed by Shandra Lake RN on 10/27/20 at 9:22 PM CDT

## 2020-10-29 LAB
GLUCOSE BLD-MCNC: 195 MG/DL (ref 70–99)
GLUCOSE BLD-MCNC: 232 MG/DL (ref 70–99)
GLUCOSE BLD-MCNC: 274 MG/DL (ref 70–99)
GLUCOSE BLD-MCNC: 382 MG/DL (ref 70–99)
PERFORMED ON: ABNORMAL

## 2020-10-29 PROCEDURE — 99231 SBSQ HOSP IP/OBS SF/LOW 25: CPT | Performed by: PSYCHIATRY & NEUROLOGY

## 2020-10-29 PROCEDURE — 6370000000 HC RX 637 (ALT 250 FOR IP): Performed by: PSYCHIATRY & NEUROLOGY

## 2020-10-29 PROCEDURE — 6370000000 HC RX 637 (ALT 250 FOR IP): Performed by: FAMILY MEDICINE

## 2020-10-29 PROCEDURE — 82947 ASSAY GLUCOSE BLOOD QUANT: CPT

## 2020-10-29 PROCEDURE — 1240000000 HC EMOTIONAL WELLNESS R&B

## 2020-10-29 RX ORDER — DOXEPIN HYDROCHLORIDE 50 MG/1
100 CAPSULE ORAL NIGHTLY
Status: DISCONTINUED | OUTPATIENT
Start: 2020-10-29 | End: 2020-10-30 | Stop reason: HOSPADM

## 2020-10-29 RX ADMIN — INSULIN HUMAN 10 UNITS: 100 INJECTION, SOLUTION PARENTERAL at 07:58

## 2020-10-29 RX ADMIN — GABAPENTIN 600 MG: 300 CAPSULE ORAL at 11:54

## 2020-10-29 RX ADMIN — Medication 5 MG: at 21:03

## 2020-10-29 RX ADMIN — GABAPENTIN 600 MG: 300 CAPSULE ORAL at 07:53

## 2020-10-29 RX ADMIN — GABAPENTIN 600 MG: 300 CAPSULE ORAL at 21:03

## 2020-10-29 RX ADMIN — LURASIDONE HYDROCHLORIDE 20 MG: 40 TABLET, FILM COATED ORAL at 11:54

## 2020-10-29 RX ADMIN — HYDROXYZINE HYDROCHLORIDE 25 MG: 25 TABLET, FILM COATED ORAL at 08:16

## 2020-10-29 RX ADMIN — ACETAMINOPHEN 650 MG: 325 TABLET, FILM COATED ORAL at 21:04

## 2020-10-29 RX ADMIN — HYDROXYZINE HYDROCHLORIDE 25 MG: 25 TABLET, FILM COATED ORAL at 18:31

## 2020-10-29 RX ADMIN — GABAPENTIN 600 MG: 300 CAPSULE ORAL at 16:51

## 2020-10-29 RX ADMIN — INSULIN HUMAN 10 UNITS: 100 INJECTION, SOLUTION PARENTERAL at 11:56

## 2020-10-29 RX ADMIN — INSULIN LISPRO 1 UNITS: 100 INJECTION, SOLUTION INTRAVENOUS; SUBCUTANEOUS at 07:56

## 2020-10-29 RX ADMIN — INSULIN LISPRO 2 UNITS: 100 INJECTION, SOLUTION INTRAVENOUS; SUBCUTANEOUS at 16:52

## 2020-10-29 RX ADMIN — Medication 34 UNITS: at 07:55

## 2020-10-29 RX ADMIN — DOXEPIN HYDROCHLORIDE 100 MG: 50 CAPSULE ORAL at 21:04

## 2020-10-29 RX ADMIN — LURASIDONE HYDROCHLORIDE 60 MG: 40 TABLET, FILM COATED ORAL at 07:54

## 2020-10-29 RX ADMIN — Medication 34 UNITS: at 21:01

## 2020-10-29 RX ADMIN — INSULIN LISPRO 2 UNITS: 100 INJECTION, SOLUTION INTRAVENOUS; SUBCUTANEOUS at 21:00

## 2020-10-29 RX ADMIN — INSULIN LISPRO 5 UNITS: 100 INJECTION, SOLUTION INTRAVENOUS; SUBCUTANEOUS at 11:55

## 2020-10-29 RX ADMIN — INSULIN HUMAN 10 UNITS: 100 INJECTION, SOLUTION PARENTERAL at 16:53

## 2020-10-29 ASSESSMENT — PAIN SCALES - GENERAL
PAINLEVEL_OUTOF10: 7
PAINLEVEL_OUTOF10: 1

## 2020-10-29 ASSESSMENT — PAIN DESCRIPTION - FREQUENCY
FREQUENCY: INTERMITTENT
FREQUENCY: INTERMITTENT

## 2020-10-29 ASSESSMENT — PAIN - FUNCTIONAL ASSESSMENT: PAIN_FUNCTIONAL_ASSESSMENT: ACTIVITIES ARE NOT PREVENTED

## 2020-10-29 ASSESSMENT — PAIN DESCRIPTION - ORIENTATION
ORIENTATION: RIGHT;LEFT
ORIENTATION: RIGHT;LEFT

## 2020-10-29 ASSESSMENT — PAIN DESCRIPTION - LOCATION
LOCATION: FOOT;LEG
LOCATION: LEG;FOOT

## 2020-10-29 ASSESSMENT — PAIN DESCRIPTION - ONSET: ONSET: ON-GOING

## 2020-10-29 ASSESSMENT — PAIN DESCRIPTION - PAIN TYPE
TYPE: ACUTE PAIN
TYPE: ACUTE PAIN

## 2020-10-29 NOTE — PROGRESS NOTES
appetite and poor sleep. Insight: Improving. Judgment: Improving.     Data  Lab Results   Component Value Date    WBC 8.3 10/25/2020    HGB 12.3 (L) 10/25/2020    HCT 35.7 (L) 10/25/2020    MCV 88.4 10/25/2020     10/25/2020      Lab Results   Component Value Date     10/25/2020    K 4.0 10/25/2020     10/25/2020    CO2 26 10/25/2020    BUN 15 10/25/2020    CREATININE 0.6 10/25/2020    GLUCOSE 221 (H) 10/25/2020    CALCIUM 8.5 (L) 10/25/2020    PROT 7.0 10/22/2020    LABALBU 3.8 10/22/2020    BILITOT 0.8 10/22/2020    ALKPHOS 91 10/22/2020    AST 15 10/22/2020    ALT 29 10/22/2020    LABGLOM >60 10/25/2020    GFRAA >59 10/25/2020       Medications    Current Facility-Administered Medications:     [START ON 10/30/2020] lurasidone (LATUDA) tablet 80 mg, 80 mg, Oral, Daily, Kylah Obrien MD    lurasidone (LATUDA) tablet 20 mg, 20 mg, Oral, Daily, Kylah Obrien MD    doxepin (SINEQUAN) capsule 100 mg, 100 mg, Oral, Nightly, Kylah Obrien MD    gabapentin (NEURONTIN) capsule 600 mg, 600 mg, Oral, 4x Daily, Emma Wong MD, 600 mg at 10/29/20 0753    insulin glargine (LANTUS) injection vial 34 Units, 34 Units, Subcutaneous, BID, Radhames Alves MD, 34 Units at 10/29/20 0755    insulin regular (HUMULIN R;NOVOLIN R) injection 10 Units, 10 Units, Subcutaneous, TID AC, Radhames Alves MD, 10 Units at 10/29/20 0758    vitamin D (ERGOCALCIFEROL) capsule 50,000 Units, 50,000 Units, Oral, Weekly, Radhames Alves MD, 50,000 Units at 10/27/20 1341    hydrOXYzine (ATARAX) tablet 25 mg, 25 mg, Oral, TID PRN, Kylah Obrien MD, 25 mg at 10/29/20 0816    acetaminophen (TYLENOL) tablet 650 mg, 650 mg, Oral, Q4H PRN, Sagrario Dang MD, 650 mg at 10/27/20 1934    polyethylene glycol (GLYCOLAX) packet 17 g, 17 g, Oral, Daily PRN, Sagrario Dang MD    nicotine (NICODERM CQ) 21 MG/24HR 1 patch, 1 patch, Transdermal, Daily, Sagrario Dang MD, 1 patch at 10/29/20 5575    melatonin disintegrating tablet 5 mg, 5 mg, Oral, Daily, Rylan Rodrigues MD, 5 mg at 10/28/20 2128    insulin lispro (HUMALOG) injection vial 0-6 Units, 0-6 Units, Subcutaneous, TID WC, Brendan Ding MD, 1 Units at 10/29/20 0756    insulin lispro (HUMALOG) injection vial 0-3 Units, 0-3 Units, Subcutaneous, Nightly, Brendan Ding MD, 2 Units at 10/28/20 2128      ASSESSMENT AND PLAN  DSM 5 DIAGNOSIS  Impression  Bipolar depression  Generalized anxiety disorder  Insomnia  Benzodiazepine use disorder, severe, in sustained remission  Methamphetamine use disorder, severe, in early resmission     Medical issues  Diabetes  Peripheral neuropathy  Vitamin D deficiency    Patient continues to improve. Will benefit from further medication adjustment. Plan:   1. Psychiatric Medications:   Increase Latuda to help with mood stabilization. Increase doxepin to help with insomnia. Monitor for side effects. The risks, benefits, side effects, indications, contraindications, alternatives and adverse effects of the medications have been discussed with patient. 2. Continue to provide supportive psychotherapy. Encourage socialization and participation in recreational activities. Work on coping skills. 3. Medical Issues:    Continue medical monitoring by Dr. Beatrice Flowers and associates. Ensure glycemic control. Vitamin supplementation. 4. Disposition:     to provide outpatient resources and facilitate disposition.      Amount of time spent with patient:      15 minutes with greater than 50 % of the time spent in counseling and collaboration of care

## 2020-10-29 NOTE — PLAN OF CARE
Group Therapy Note    Date: 10/29/2020  Start Time: 1000  End Time:  8850  Number of Participants: 4    Type of Group: Psychoeducation    Wellness Binder Information  Module Name:  Men's Issues  Session Number:  1    Group Goal for Pt: To improve knowledge of effective stress management techniques    Notes:  Pt did not attend group discussion. Pt was invited/encouraged. Status After Intervention:      Participation Level:     Participation Quality:       Speech:         Thought Process/Content:       Affective Functioning:       Mood:       Level of consciousness:        Response to Learning:       Endings:     Modes of Intervention:       Discipline Responsible:       Signature:  Shirley Mclean

## 2020-10-29 NOTE — PROGRESS NOTES
Progress Note  Kris Borjas  10/29/2020 8:30 AM  Subjective:   Admit Date:   10/25/2020      CC/ADMIT DX:       Interval History:   Reviewed overnight events and nursing notes. He has no new medical concerns. I have reviewed all labs/diagnostics from the last 24hrs. ROS:   I have done a 10 point ROS and all are negative, except what is mentioned in the HPI. DIET CARB CONTROL;    Medications:      gabapentin  600 mg Oral 4x Daily    doxepin  75 mg Oral Nightly    insulin glargine  34 Units Subcutaneous BID    insulin regular  10 Units Subcutaneous TID AC    vitamin D  50,000 Units Oral Weekly    lurasidone  60 mg Oral Daily    nicotine  1 patch Transdermal Daily    melatonin  5 mg Oral Daily    insulin lispro  0-6 Units Subcutaneous TID WC    insulin lispro  0-3 Units Subcutaneous Nightly           Objective:   Vitals: /82   Pulse 99   Temp 97.4 °F (36.3 °C) (Temporal)   Resp 18   SpO2 100%  No intake or output data in the 24 hours ending 10/29/20 0830  General appearance: alert and cooperative with exam  Extremities: extremities normal, atraumatic, no cyanosis or edema  Neurologic:  No obvious focal neurologic deficits. Skin: no rashes    Assessment and Plan: Active Problems:    Bipolar depression ([address]  Resolved Problems:    * No resolved hospital problems. *     DM2    Plan:  1. Continue present medication(s)   2. Continue to monitor glucose and adjust treatment  3. Follow with Psych      Discharge planning:    home     Reviewed treatment plans with the patient and/or family.              Electronically signed by Jessica Monteiro MD on 10/29/2020 at 8:30 AM

## 2020-10-29 NOTE — PROGRESS NOTES
Group Therapy Note    Start Time: 800  End Time:  900  Number of Participants: 7    Type of Group: Community Meeting       Patient's Goal:  \" Self \"      Notes:      Participation Level:  Active Listener       Participation Quality: Appropriate      Thought Process/Content: Logical      Affective Functioning: Congruent      Mood: Calm      Level of consciousness:  Alert      Modes of Intervention: Support      Discipline Responsible: Behavioral Health Tech II      Signature:  Rad Quigley

## 2020-10-29 NOTE — PROGRESS NOTES
Group Therapy Note    Date: 10/28/2020  Start Time: 2000  End Time:  2030  Number of Participants: 6    Type of Group: Wrap-Up    Wellness Binder Information  Module Name:    Session Number:      Patient's Goal:  See self-inventory. Notes:  Patient completed Wrap-Up sheet. Status After Intervention:  Unchanged    Participation Level:  Active Listener    Participation Quality: Appropriate      Speech:  normal      Thought Process/Content: Logical      Affective Functioning: Congruent      Mood: calm      Level of consciousness:  Alert      Response to Learning: Able to retain information      Endings: None Reported    Modes of Intervention: Education      Discipline Responsible: [address] [place] [address]      Signature:  Kylah Mansfield

## 2020-10-29 NOTE — PROGRESS NOTES
[facility] Adult Unit Daily Assessment  Nursing Progress Note    Room: [facility]/612-02   Name: Melissa Briscoe   Age: 44 y.o. Gender: male   Dx: <principal problem not specified>  Precautions: suicide risk  Inpatient Status: voluntary       SLEEP:    Sleep Quality Poor  Sleep Medications: Yes, Melatonin 5mg, Doxepin 75mg  PRN Sleep Meds: No       MEDICAL:    Other PRN Meds: No   Med Compliant: Yes  Accu-Chek: Yes, AC/HS, 296 at HS  Oxygen/CPAP/BiPAP: No  CIWA/CINA: No   PAIN Assessment: takes Gabapentin for chronic leg/feet pain  Side Effects from medication: No      PSYCH:    Depression: 5   Anxiety: 5   SI denies suicidal ideation   HI Negative for homicidal ideation      AVH:Absent      GENERAL:    Appetite: good    Social: Yes   Speech: normal   Appearance: appropriately dressed and appropriately groomed    GROUP:    Group Participation: Yes  Participation Quality: Interactive    Notes: Pt social in the TV area this evening. Pt laughing & joking with peers. Pt pleasant & med compliant. Pt asleep by 2300. Will continue to monitor via 15 minute rounds.       Electronically signed by Carmen Cárdenas RN on 10/28/2020 at 11:58 PM

## 2020-10-29 NOTE — PROGRESS NOTES
Treatment Team Note:     SW met with [address] team to discuss Pts TX and DC plans.      Progress/Behavior/Group Attendance: TBD     Target Symptoms/Reason for admission: Patient admitted to [facility] due to Has not been taking his medications for about two months. He states that he has been depressed for the past two months and has just wants to give up on everything. [facility] stopped taking his diabetes medication and all of his Depression medications.  He states that he has had suicidal thoughts in the past. Patient states that he does not have a plan to harm himself today but needs to see someone  Diagnoses: Bipolar depression, Intermittent explosive disorder, ADHD, per patient, Benzodiazepine use disorder, severe in sustained remission, Methamphetamine use disorder, severe, Peripheral neuropathy, related to poorly controlled diabetes, Suicidal ideations, Treatment noncompliance, Unemployment  UDS: Neg  BAL:  Neg     AftercarePlan: [facility]     Pt lives with: in a friends trailer      Collateral obtained from: mom  On: 10/27/2020     Family Session: CARMEN     Misc:

## 2020-10-30 VITALS
SYSTOLIC BLOOD PRESSURE: 124 MMHG | TEMPERATURE: 99.2 F | OXYGEN SATURATION: 98 % | DIASTOLIC BLOOD PRESSURE: 75 MMHG | RESPIRATION RATE: 16 BRPM | HEART RATE: 96 BPM

## 2020-10-30 PROBLEM — R45.851 SUICIDAL IDEATION: Status: RESOLVED | Noted: 2020-10-22 | Resolved: 2020-10-30

## 2020-10-30 PROBLEM — F31.32 BIPOLAR 1 DISORDER, DEPRESSED, MODERATE (HCC): Status: RESOLVED | Noted: 2019-07-10 | Resolved: 2020-10-30

## 2020-10-30 PROBLEM — F41.1 GENERALIZED ANXIETY DISORDER: Chronic | Status: ACTIVE | Noted: 2020-10-30

## 2020-10-30 PROBLEM — G47.00 INSOMNIA: Chronic | Status: ACTIVE | Noted: 2020-10-30

## 2020-10-30 PROBLEM — R73.9 HYPERGLYCEMIA: Status: RESOLVED | Noted: 2020-10-22 | Resolved: 2020-10-30

## 2020-10-30 LAB
GLUCOSE BLD-MCNC: 192 MG/DL (ref 70–99)
GLUCOSE BLD-MCNC: 284 MG/DL (ref 70–99)
PERFORMED ON: ABNORMAL
PERFORMED ON: ABNORMAL

## 2020-10-30 PROCEDURE — 5130000000 HC BRIDGE APPOINTMENT

## 2020-10-30 PROCEDURE — 82947 ASSAY GLUCOSE BLOOD QUANT: CPT

## 2020-10-30 PROCEDURE — 6370000000 HC RX 637 (ALT 250 FOR IP): Performed by: PSYCHIATRY & NEUROLOGY

## 2020-10-30 PROCEDURE — 6370000000 HC RX 637 (ALT 250 FOR IP): Performed by: FAMILY MEDICINE

## 2020-10-30 PROCEDURE — 99239 HOSP IP/OBS DSCHRG MGMT >30: CPT | Performed by: PSYCHIATRY & NEUROLOGY

## 2020-10-30 RX ORDER — GABAPENTIN 600 MG/1
600 TABLET ORAL 4 TIMES DAILY
Qty: 56 TABLET | Refills: 0 | Status: SHIPPED | OUTPATIENT
Start: 2020-10-30 | End: 2021-07-28 | Stop reason: SDUPTHER

## 2020-10-30 RX ORDER — DOXEPIN HYDROCHLORIDE 75 MG/1
75 CAPSULE ORAL NIGHTLY
Qty: 30 CAPSULE | Refills: 1 | Status: SHIPPED | OUTPATIENT
Start: 2020-10-30 | End: 2021-07-26

## 2020-10-30 RX ORDER — ERGOCALCIFEROL (VITAMIN D2) 1250 MCG
50000 CAPSULE ORAL WEEKLY
Qty: 11 CAPSULE | Refills: 1 | Status: SHIPPED | OUTPATIENT
Start: 2020-10-30 | End: 2021-07-26

## 2020-10-30 RX ORDER — INSULIN GLARGINE 100 [IU]/ML
34 INJECTION, SOLUTION SUBCUTANEOUS 2 TIMES DAILY
Qty: 2040 UNITS | Refills: 1 | Status: SHIPPED | OUTPATIENT
Start: 2020-10-30 | End: 2021-07-26

## 2020-10-30 RX ORDER — HYDROXYZINE HYDROCHLORIDE 25 MG/1
25 TABLET, FILM COATED ORAL 3 TIMES DAILY PRN
Qty: 90 TABLET | Refills: 1 | Status: SHIPPED | OUTPATIENT
Start: 2020-10-30 | End: 2020-11-29

## 2020-10-30 RX ADMIN — INSULIN HUMAN 10 UNITS: 100 INJECTION, SOLUTION PARENTERAL at 12:14

## 2020-10-30 RX ADMIN — INSULIN LISPRO 1 UNITS: 100 INJECTION, SOLUTION INTRAVENOUS; SUBCUTANEOUS at 08:53

## 2020-10-30 RX ADMIN — INSULIN HUMAN 10 UNITS: 100 INJECTION, SOLUTION PARENTERAL at 08:51

## 2020-10-30 RX ADMIN — GABAPENTIN 600 MG: 300 CAPSULE ORAL at 12:14

## 2020-10-30 RX ADMIN — LURASIDONE HYDROCHLORIDE 80 MG: 40 TABLET, FILM COATED ORAL at 08:50

## 2020-10-30 RX ADMIN — INSULIN LISPRO 3 UNITS: 100 INJECTION, SOLUTION INTRAVENOUS; SUBCUTANEOUS at 12:15

## 2020-10-30 RX ADMIN — GABAPENTIN 600 MG: 300 CAPSULE ORAL at 08:49

## 2020-10-30 RX ADMIN — Medication 34 UNITS: at 08:50

## 2020-10-30 NOTE — PROGRESS NOTES
[facility] Adult Unit Daily Assessment  Nursing Progress Note    Room: [facility]/612-02   Name: Raymundo Quigley   Age: 44 y.o. Gender: male   Dx: <principal problem not specified>  Precautions: suicide risk  Inpatient Status: voluntary       SLEEP:    Sleep Quality Good  Sleep Medications: Yes   PRN Sleep Meds: No       MEDICAL:    Other PRN Meds: Yes   Med Compliant: Yes  Accu-Chek: yes  Oxygen/CPAP/BiPAP: No  CIWA/CINA: No   PAIN Assessment: bilateral feet and foot  Side Effects from medication: No      PSYCH:    Depression: 7   Anxiety: 10   SI yes, no plan, contracts for safety. HI Negative for homicidal ideation      AVH:Present - {HALLUCINATIONS: auditory      GENERAL:    Appetite: good    Social: Yes   Speech: normal   Appearance: appropriately dressed and healthy looking    Notes: Patient complaint of increased anxiety. Noted the patient was restless. Patient complaint of pain in his bilateral feet and leg. Tylenol was given. Continue to monitor.           Electronically signed by Sapna Conway RN on 10/29/20 at 11:57 PM CDT

## 2020-10-30 NOTE — PROGRESS NOTES
CLINICAL PHARMACY NOTE: MEDS TO [facility] Select Patient?: No  Total # of Prescriptions Filled: 9   The following medications were delivered to the patient:  · Gabapentin 600 mg  · Vitamin D (ergocalciferol) 1.25mg  · Latuda 80 mg  · Doxepin 75 mg  · Hydroxyzine 25 mg  · BD pen needles  · BD 3/10ml insulin syringes  · Basaglar Kwikpen  · Humulin R  Total # of Interventions Completed: 2  Time Spent (min): 60    Additional Documentation:  Had to get Lantus insulin changed to Tod Keas for insurance coverage  Had to get Rx for pen needles and insulin syringes  Had to find out what Glucose monitor was covered by [employer] and told nurse to have that Rx sent out as we did not stock    Patient had zero copay and Rxs were picked up at pharmacy by Vladimir Peters    Electronically signed by Graham Romo, [address] [place] [address] on 10/30/2020 at 3:16 PM

## 2020-10-30 NOTE — SUICIDE SAFETY PLAN
SAFETY PLAN    A suicide Safety Plan is a document that supports someone when they are having thoughts of suicide. Warning Signs that indicate a suicidal crisis may be developing: What (situations, thoughts, feelings, body sensations, behaviors, etc.) do you experience that lets you know you are beginning to think about suicide? 1. aggrevation  2. frustration  3. isolation    Internal Coping Strategies:  What things can I do (relaxation techniques, hobbies, physical activities, etc.) to take my mind off my problems without contacting another person? 1. music  2.   3.    People and social settings that provide distraction: Who can I call or where can I go to distract me? 1. Name: music  Phone:   2. Name:   Phone:    3. Place:            4. Place:     People whom I can ask for help: Who can I call when I need help - for example, friends, family, clergy, someone else? 1. Name: Mayra Kevin , mother              Phone: [phone]  2. Name:   Phone:   3. Name:   Phone:    Professionals or [address] [place] [address] I can contact during a crisis: Who can I call for help - for example, my doctor, my psychiatrist, my psychologist, a mental health provider, a suicide hotline? 1. Clinician Name: [id number]  Phone: [phone]      Clinician Pager or Emergency Contact #: [phone]    2. Clinician Name: [address]   Phone: [phone]      Clinician Pager or Emergency Contact #: [phone]    3. Suicide Prevention Lifeline: 1-841-513-TALK (3611)    4. Wally  Emergency Department      Emergency Services Address: [address]      Emergency Services Phone: [phone]    Making the environment safe: How can I make my environment (house/apartment/living space) safer? For example, can I remove guns, medications, and other items? 1. Remove weapons from the home  2. Remove extra medications from the home.

## 2020-10-30 NOTE — DISCHARGE SUMMARY
Discharge Summary     Patient ID:  Randy Leblanc  [id number]  72 y.o.  1970    Admit date: 10/25/2020  Discharge date: 10/30/2020    Admitting Physician: Mic Campos MD   Attending Physician: Kvng Mcmullen MD  Discharge Provider: Kvng Mcmullen MD     Admission Diagnoses:   Bipolar depression  Suicidal ideation  Generalized anxiety disorder  Intermittent explosive disorder   Benzodiazepine use disorder, severe, in sustained remission  Methamphetamine use disorder, severe, in early resmission  Diabetes  Peripheral neuropathy    Discharge Diagnoses:   Bipolar depression  Generalized anxiety disorder  Benzodiazepine use disorder, severe, in sustained remission  Methamphetamine use disorder, severe, in early resmission  Diabetes  Peripheral neuropathy  Vitamin D deficiency  Hypertriglyceridemia    Admission Condition: poor    Discharged Condition: stable    Indication for Admission: Suicidal ideation    CHIEF COMPLAINT:  \"I am worried about everything\"     History obtained from: patient, chart     HISTORY OF PRESENT ILLNESS:    24-year-old white male with history of bipolar depression, anxiety disorder, ADHD, admitted to medical services secondary to hyperglycemia.  Patient was medically stabilized and transferred to psychiatry for further management due to suicidal ideation. He is well-known to our service due to his previous admission to our unit in July 2019.       When seen by our consult service on the medical floor, patient reported noncompliance with his psychotropics at home. He reported that his major life stressor is unemployment. He stated that his mother is his major social and financial support. Patient reported depressed mood, episodes of anxiety and anger issues, fatigue, poor quality of sleep, decreased motivation, poor focus.     This morning, patient presents with dysphoric affect. He is laying in bed with a towel wrapped around his head. He is withdrawn.   He endorses hopelessness and helplessness. He does not contract for safety. States he feels very depressed. \"I'm worried about everything. \"  Slept poorly last night and feels that trazodone did not work. Appetite is poor. Reports headache and chronic leg pain due to neuropathy. He is open to medication adjustment.     PSYCHIATRIC HISTORY:    Diagnoses: Bipolar depression, anxiety, ADHD  Suicide attempts/gestures: x 3 - overdose on insulin, tried to inject air in his blood vessels, tried to get into the car accident   Prior hospitalizations: Twice to a hospital in [facility] in 2019  Medication trials: Gabapentin, risperidone, trazodone, Klonopin, Latuda, bupropion - h/o nausea but did well on it on previous admission, \"some stimulants\". Mental health contact: Lost to follow-up      SUBSTANCE USE HISTORY:  Denies alcohol use. H/o methamphetamine abuse, quit 2 months ago. H/o Klonopin abuse. H/o rehab treatment in [place], [facility]. Smokes 1.5 PPD. Hospital Course:  Patient was admitted to the behavioral health floor and was acclimated to the unit. He was placed on suicide precautions. Labs were reviewed and physical exam was completed by Dr. Autumn Colmenares and associates. Home medications were reconciled. HUSAM was obtained and reviewed. Patient was restarted on Latuda and the dose was increased to help with depression. Patient received doxepin for insomnia. Atarax as needed was given for episodic anxiety. Patient was restarted on his home regimen for chronic medical issues. Glycemic control was ensured. Gabapentin was increased to help with peripheral neuropathy. Patient tolerated all his medications well, without side effects, and was compliant. Outpatient follow-up with a primary care provider was arranged. Patient attended and participated in groups. All interactions with the peers and staff members were appropriate. Behaviorally, he was not a problem. Sleep and appetite improved.   There was no Lab Results   Component Value Date    CHOL 163 07/11/2019     Lab Results   Component Value Date    TRIG 433 (H) 07/11/2019     Lab Results   Component Value Date    HDL 31 (L) 10/23/2020    HDL 28 (L) 07/11/2019     Lab Results   Component Value Date    LDLCALC 75 10/23/2020    [address] see below 07/11/2019     No results found for: LABVLDL, VLDL  No results found for: CHOLHDLRATIO  Lab Results   Component Value Date    LABA1C 13.1 (H) 10/22/2020     No results found for: EAG  Lab Results   Component Value Date    TSHFT4 2.25 10/27/2020       Treatments: RN and SW    Mental status examination at the time of discharge:  Alert, Oriented X 4  Appearance:  Improved Hygiene  Speech with Regular Rate and Rhythm  Eye Contact:  Good  No Psychomotor Agitation/Retardation Noted  Attitude:  Cooperative  Mood:  \"Good\"  Affective: Congruent, appropriate to the situation, with a normal range and intensity  Thought Processes:  Coherently communicated, logical and goal oriented  Thought Content:  No Suicidal Ideation, No Homicidal Ideation, No Auditory or Visual Hallucinations, NO Overt Delusions  Insight: Improved  Judgement: Improved  Memory is intact for both remote and recent  Intellectual Functioning:  Within the [address] of Knowledge:  Adequate  Attention and Concentration:  Adequate    Discharge Exam:  Please, see medical note    Disposition: home    Patient Instructions:      Medication List      START taking these medications    doxepin 75 MG capsule  Commonly known as:  SINEQUAN  Take 1 capsule by mouth nightly     gabapentin 600 MG tablet  Commonly known as:  Neurontin  Take 1 tablet by mouth 4 times daily for 14 days.   Replaces:  gabapentin 400 MG capsule     hydrOXYzine 25 MG tablet  Commonly known as:  ATARAX  Take 1 tablet by mouth 3 times daily as needed for Anxiety        CHANGE how you take these medications    * glucose monitoring kit monitoring kit  1 kit by Does not apply route daily  What changed:  Another medication with the same name was changed. Make sure you understand how and when to take each. * blood glucose monitor kit and supplies  True metrix glucose monitor, test strips and lancets. Pt may test 4 times a day. What changed:  additional instructions     insulin glargine 100 UNIT/ML injection vial  Commonly known as:  LANTUS  Inject 34 Units into the skin 2 times daily  What changed:    · how much to take  · when to take this     insulin regular 100 UNIT/ML injection  Commonly known as:  HUMULIN R;NOVOLIN R  Inject 10 Units into the skin 3 times daily (before meals)  What changed:    · how much to take  · how to take this  · when to take this  · additional instructions     lurasidone 80 MG Tabs tablet  Commonly known as:  LATUDA  Take 1 tablet by mouth daily  Start taking on:  October 31, 2020  What changed:    · medication strength  · how much to take         * This list has 2 medication(s) that are the same as other medications prescribed for you. Read the directions carefully, and ask your doctor or other care provider to review them with you. CONTINUE taking these medications    blood glucose test strips  Test 4 times a day & as needed for symptoms of irregular blood glucose. Please give True Metrix test strips and lancets     ergocalciferol 1.25 MG (23724 UT) capsule  Commonly known as:  ERGOCALCIFEROL  Take 1 capsule by mouth once a week for 11 doses     glucagon 1 MG injection  Inject 1 mg into the muscle See Admin Instructions Follow package directions for low blood sugar. Insulin Syringe-Needle U-100 29G X 1/2\" 0.3 ML Misc  1 each by Does not apply route daily     Insulin Syringes (Disposable) U-100 0.3 ML Misc  1 each by Does not apply route daily     * Lancets Misc  1 each by Does not apply route 4 times daily Test 4 times daily.  DX: E11.65, Z79.4  True Metrix     * Easy Touch Safety Lancets 21G Misc  1 Device by Does not apply route 2 times daily     * Easy Touch Safety Lancets 21G Misc  CHECK GLUCOSE TID AND PRN     melatonin 3 MG Tabs tablet  Take 2 tablets by mouth nightly         * This list has 3 medication(s) that are the same as other medications prescribed for you. Read the directions carefully, and ask your doctor or other care provider to review them with you. STOP taking these medications    buPROPion 150 MG extended release tablet  Commonly known as:  WELLBUTRIN SR     gabapentin 400 MG capsule  Commonly known as:  NEURONTIN  Replaced by:  gabapentin 600 MG tablet     glimepiride 2 MG tablet  Commonly known as:  AMARYL     lisinopril 10 MG tablet  Commonly known as:  PRINIVIL;ZESTRIL     metFORMIN 500 MG tablet  Commonly known as:  GLUCOPHAGE     nabumetone 500 MG tablet  Commonly known as:  RELAFEN     risperiDONE 1 MG tablet  Commonly known as:  RISPERDAL     traZODone 100 MG tablet  Commonly known as:  DESYREL           Where to Get Your Medications      These medications were sent to [facility], [address] [place] [address] St [phone]  [address], [address] [facility] [address]    Phone:  [phone]   · blood glucose monitor kit and supplies  · doxepin 75 MG capsule  · ergocalciferol 1.25 MG (53061 UT) capsule  · gabapentin 600 MG tablet  · hydrOXYzine 25 MG tablet  · insulin glargine 100 UNIT/ML injection vial  · insulin regular 100 UNIT/ML injection  · Insulin Syringes (Disposable) U-100 0.3 ML Misc  · lurasidone 80 MG Tabs tablet       Activity: as tolerated  Diet: diabetic diet  Wound Care: none needed    Follow-up:        Nov12 New Patient Appointment with YANELY Bell   Thursday Nov 12, 2020 3:00 PM   Please arrive 15 minutes prior to scheduled appointment time to complete paper work, bring photo ID and insurance cards. [address] [facility]   [address] [address] [place]            Go to [facility]   Thursday Nov 12, 2020   Intake/therapy appt with Chace Moore at 3:00pm, please provide a photo id, soc sec card, insurance card and office copay  [facility], [address] [facility]   Phone: [phone]   Fax: [phone] Patient Appointment with ALEX Pinto NP   Thursday Dec 10, 2020 11:00 AM   Please arrive 15 minutes prior to scheduled appointment time to complete paper work, bring photo ID and insurance cards. [address] [facility]   [id number] [place] [address]            Go to [facility] [place]   Thursday Dec 10, 2020   Medication management appt with Ivis Le at 11:00am, please provide a current list of medications.   [facility], [address] [facility]   Phone: [phone]   Fax: [phone]      Follow up with PCP, Dr. Zack Barba      Time worked: More than 31 minutes    Participation: good    Electronically signed by Chaparrita Briggs MD on 10/30/2020 at 10:31 AM

## 2020-10-30 NOTE — BH NOTE
Group Therapy Note    Date: 10/30/2020  Start Time: 1330  End Time:  1400  Number of Participants: 3    Type of Group: Relaxation    Wellness Binder Information  Module Name:    Session Number:      Patient's Goal:      Notes:      Status After Intervention:  Improved    Participation Level:  Active Listener and Interactive    Participation Quality: Appropriate, Sharing and Supportive      Speech:  normal      Thought Process/Content:       Affective Functioning: Congruent      Mood: anxious and depressed      Level of consciousness:  Alert and Attentive      Response to Learning: Able to verbalize current knowledge/experience      Endings:     Modes of Intervention: Prayer      Discipline Responsible: Spiritual Care,       Signature:  Ochoa Ocasio

## 2020-10-30 NOTE — PROGRESS NOTES
Progress Note  Leila Cotton  10/30/2020 12:38 AM  Subjective:   Admit Date:   10/25/2020      CC/ADMIT DX:       Interval History:   Reviewed overnight events and nursing notes. He denies any physical complaints. I have reviewed all labs/diagnostics from the last 24hrs. ROS:   I have done a 10 point ROS and all are negative, except what is mentioned in the HPI. DIET CARB CONTROL;    Medications:      lurasidone  80 mg Oral Daily    doxepin  100 mg Oral Nightly    gabapentin  600 mg Oral 4x Daily    insulin glargine  34 Units Subcutaneous BID    insulin regular  10 Units Subcutaneous TID AC    vitamin D  50,000 Units Oral Weekly    nicotine  1 patch Transdermal Daily    melatonin  5 mg Oral Daily    insulin lispro  0-6 Units Subcutaneous TID WC    insulin lispro  0-3 Units Subcutaneous Nightly           Objective:   Vitals: /84   Pulse 99   Temp 98.6 °F (37 °C)   Resp 18   SpO2 99%  No intake or output data in the 24 hours ending 10/30/20 0038  General appearance: alert and cooperative with exam  Extremities: extremities normal, atraumatic, no cyanosis or edema  Neurologic:  No obvious focal neurologic deficits. Skin: no rashes    Assessment and Plan: Active Problems:    Bipolar depression ([address]  Resolved Problems:    * No resolved hospital problems. *     DM2    Plan:  1. Continue present medication(s)   2. Follow with glucose  3. Follow with Psych      Discharge planning:    home     Reviewed treatment plans with the patient and/or family.              Electronically signed by Marcela Worthington MD on 10/30/2020 at 12:38 AM

## 2020-10-30 NOTE — PLAN OF CARE
Group Therapy Note    Date: 10/30/2020  Start Time: 1100  End Time:  4361  Number of Participants: 7    Type of Group: Psychoeducation    Wellness Binder Information  Module Name:  staying well  Session Number:  1    Patient's Goal:  daily maintenance and coping skills    Notes:  pt was verbally prompted to attend group. Pt refused. Information about coping skills was provided. Status After Intervention:      Participation Level:     Participation Quality:       Speech:         Thought Process/Content:       Affective Functioning:       Mood:       Level of consciousness:        Response to Learning:       Endings:     Modes of Intervention:       Discipline Responsible: Psychoeducational Specialist      Signature:  Nickolas Walters

## 2020-10-30 NOTE — PROGRESS NOTES
Treatment Team Note:     SW met with [address] team to discuss Pts TX and DC plans.      Progress/Behavior/Group Attendance: TBD     Target Symptoms/Reason for admission: Patient admitted to [facility] due to Has not been taking his medications for about two months. He states that he has been depressed for the past two months and has just wants to give up on everything. [facility] stopped taking his diabetes medication and all of his Depression medications.  He states that he has had suicidal thoughts in the past. Patient states that he does not have a plan to harm himself today but needs to see someone  Diagnoses: Bipolar depression, Intermittent explosive disorder, ADHD, per patient, Benzodiazepine use disorder, severe in sustained remission, Methamphetamine use disorder, severe, Peripheral neuropathy, related to poorly controlled diabetes, Suicidal ideations, Treatment noncompliance, Unemployment  UDS: Neg  BAL:  Neg     AftercarePlan: [facility]     Pt lives with: in a friends trailer      Collateral obtained from: mom  On: 10/27/2020     Family Session: CARMEN     Misc:

## 2020-10-30 NOTE — PROGRESS NOTES
[facility]  Discharge Note  Bridge Appointment completed: Reviewed Discharge Instructions with patient. Patient verbalizes understanding and agreement with the discharge plan using the teachback method. Referral for Outpatient Tobacco Cessation Counseling, upon discharge (omar X if applicable and completed):    ( )  Hospital staff assisted patient to call Quit Line or faxed referral                                   during hospitalization                  ( )  Recognizing danger situations (included triggers and roadblocks), if not completed on admission                    ( )  Coping skills (new ways to manage stress, exercise, relaxation techniques, changing routine, distraction), if not completed on admission                                                           ( )  Basic information about quitting (benefits of quitting, techniques in how to quit, available resources, if not completed on admission  ( ) Referral for counseling faxed to [facility]   ( ) Patient refused referral  ( ) Patient refused counseling  (x ) Patient refused smoking cessation medication upon discharge    Vaccinations (omar X if applicable and completed):  ( ) Patient states already received influenza vaccine elsewhere  ( ) Patient received influenza vaccine during this hospitalization  ( x) Patient refused influenza vaccine at this time      Pt discharged with followings belongings:   Dentures: None  Vision - Corrective Lenses: None  Hearing Aid: None  Jewelry: None  Body Piercings Removed: No  Clothing: (face mask and shirt)  Were All Patient Medications Collected?: Not Applicable  Other Valuables: Wallet, Money (Comment)([place]  license. $ 3:98)   Valuables retrieved from safe and returned to patient. Patient left department with staff to private vehcle   via ambulatory  , discharged to home  . Patient education on aftercare instructions: yes  Patient verbalize understanding of AVS:  yes. Suicidal Ideations? No AVH? deenies HI? Negative for homicidal ideation  Pt educated on insulin administration      Status EXAM upon discharge:  Status and Exam  Normal: No  Facial Expression: Brightened  Affect: Appropriate  Level of Consciousness: Alert  Mood:Normal: No  Mood: Anxious  Motor Activity:Normal: Yes  Motor Activity: Decreased  Interview Behavior: Cooperative  Preception: [place] to Person, [place] to Time, [place] to Place, [place] to Situation  Attention:Normal: No  Attention: Distractible  Thought Processes: Circumstantial  Thought Content:Normal: No  Thought Content: Preoccupations  Hallucinations:  Auditory (Comment)  Delusions: No  Memory:Normal: No  Memory: Poor Recent  Insight and Judgment: No  Insight and Judgment: Poor Insight, Poor Judgment  Present Suicidal Ideation: No  Present Homicidal Ideation: No

## 2020-10-30 NOTE — PROGRESS NOTES
Discharge Note     Pt discharging on this date. Pt denies SI, HI, and AVH at this time. Pt reports improvement in behavior and is leaving unit in overall good condition. SW and pt discussed pt's follow up appointments and importance of attending appointments as scheduled, pt voiced understanding and agreement. Pt and SW also discussed pt safety plan and pt able to verbally identify: warning signs, coping strategies, places and people that help make the pt feel better/distract negative thoughts, friends/family/agencies/professionals the pt can reach out to in a crisis, and something that is important to the pt/worth living for. Pt provided the national suicide prevention hotline number (9-263-509-758-172-3294) as well as local community behavioral health [facility]) crisis number for emergencies (1-592.494.1824).      Pt to follow up with:  [facility] on 11/12/2020 and 12/10/2020    Referral to out patient tobacco cessation counseling treatment:    Patient refused tobacco cessation counseling    SW offered to assist pt with transportation, pt reports that he has transportation

## 2020-10-31 NOTE — PROGRESS NOTES
Progress Note  Frandy Goodwin  10/30/2020 10:06 PM  Subjective:   Admit Date:   10/25/2020      CC/ADMIT DX:       Interval History:   Reviewed overnight events and nursing notes. He has no new medical complaints. I have reviewed all labs/diagnostics from the last 24hrs. ROS:   I have done a 10 point ROS and all are negative, except what is mentioned in the HPI. No diet orders on file    Medications:             Objective:   Vitals: /75   Pulse 96   Temp 99.2 °F (37.3 °C) (Temporal)   Resp 16   SpO2 98%  No intake or output data in the 24 hours ending 10/30/20 2206  General appearance: alert and cooperative with exam  Extremities: extremities normal, atraumatic, no cyanosis or edema  Neurologic:  No obvious focal neurologic deficits. Skin: no rashes    Assessment and Plan:   Principal Problem:    Bipolar depression ([address]  Active Problems:    Generalized anxiety disorder    Insomnia  Resolved Problems:    * No resolved hospital problems. *     DM2    Plan:  1. Continue present medication(s)   2. He will need referral to [facility] on his f/u at my office. 3.  Follow with Psych      Discharge planning:    home     Reviewed treatment plans with the patient and/or family.              Electronically signed by Prosper Rousseau MD on 10/30/2020 at 10:06 PM

## 2020-10-31 NOTE — PROGRESS NOTES
VLADIMIR attempted to contact pt to follow-up with him after he discharged from the unit yesterday to see if he had any questions or concerns that needed to be addressed. VLADIMIR called the contact number listed for the pt and his mother answered. She said this was actually her phone number and that the pt doesn't have a phone right now. She said he was there earlier to eat and visit with her and seemed to be really well. VLADIMIR told her to let pt know that if he has any questions to feel free to call us back on the unit anytime.

## 2020-11-12 ENCOUNTER — TELEPHONE (OUTPATIENT)
Dept: PSYCHIATRY | Age: 50
End: 2020-11-12

## 2020-11-12 NOTE — TELEPHONE ENCOUNTER
Pt was a no show/answer for appointment. During time before and 16 minutes after pt's scheduled appointment, LCSW: waited for pt to arrive at Office.

## 2020-11-30 ENCOUNTER — TELEPHONE (OUTPATIENT)
Dept: PSYCHIATRY | Age: 50
End: 2020-11-30

## 2020-12-01 NOTE — TELEPHONE ENCOUNTER
Patient called to see when his appointments were schedule. Mailed patient his follow up appointments. Hemostasis: Electrocautery

## 2021-07-26 ENCOUNTER — OFFICE VISIT (OUTPATIENT)
Dept: PRIMARY CARE CLINIC | Age: 51
End: 2021-07-26
Payer: MEDICAID

## 2021-07-26 VITALS
RESPIRATION RATE: 16 BRPM | OXYGEN SATURATION: 97 % | HEART RATE: 102 BPM | SYSTOLIC BLOOD PRESSURE: 150 MMHG | WEIGHT: 158 LBS | TEMPERATURE: 98.3 F | BODY MASS INDEX: 22.67 KG/M2 | DIASTOLIC BLOOD PRESSURE: 90 MMHG

## 2021-07-26 DIAGNOSIS — R82.4 KETONURIA: ICD-10-CM

## 2021-07-26 DIAGNOSIS — E11.65 HYPERGLYCEMIA DUE TO DIABETES MELLITUS (HCC): ICD-10-CM

## 2021-07-26 DIAGNOSIS — R42 DIZZINESS: Primary | ICD-10-CM

## 2021-07-26 LAB
APPEARANCE FLUID: NORMAL
BILIRUBIN, POC: NEGATIVE
BLOOD URINE, POC: NEGATIVE
CHP ED QC CHECK: NORMAL
CLARITY, POC: NORMAL
COLOR, POC: NORMAL
GLUCOSE BLD-MCNC: 380 MG/DL
GLUCOSE URINE, POC: NORMAL
KETONES, POC: NORMAL
LEUKOCYTE EST, POC: NEGATIVE
NITRITE, POC: NEGATIVE
PH, POC: 5
PROTEIN, POC: NORMAL
SPECIFIC GRAVITY, POC: 1.01
UROBILINOGEN, POC: NORMAL

## 2021-07-26 PROCEDURE — 82962 GLUCOSE BLOOD TEST: CPT | Performed by: NURSE PRACTITIONER

## 2021-07-26 PROCEDURE — 81002 URINALYSIS NONAUTO W/O SCOPE: CPT | Performed by: NURSE PRACTITIONER

## 2021-07-26 PROCEDURE — 99215 OFFICE O/P EST HI 40 MIN: CPT | Performed by: NURSE PRACTITIONER

## 2021-07-26 ASSESSMENT — ENCOUNTER SYMPTOMS
DIARRHEA: 0
SHORTNESS OF BREATH: 0
VOMITING: 0
NAUSEA: 1

## 2021-07-26 NOTE — PROGRESS NOTES
[address] [facility]  [address] [place] [address] [address] [address]  Dept: [phone]  Dept Fax: [phone]: [phone]     Visit type: Established patient    Reason for Visit: Blood Sugar Problem (Patient states he has been out of his medication for 3 weeks due to being release from shelter)      Assessment and Plan       1. Dizziness  -     POCT Glucose  -     POCT Urinalysis no Micro  2. Ketonuria  3. Hyperglycemia due to diabetes mellitus (HCC)      ICD-10-CM    1. Dizziness  R42 POCT Glucose     POCT Urinalysis no Micro   2. Ketonuria  R82.4    3. Hyperglycemia due to diabetes mellitus (HCC)  E11.65          Return if symptoms worsen or fail to improve. Elevated blood glucsoe and ketones in urine with symptomatic hyperglycemia. TO ER for further evaluation and treatment. KEEP scheduled appointment with PCP this week. Released in care of MOM via private auto to direct transport to [facility] ER. Subjective       Patient notes he was just released from shelter and has been out of his diabetic medications for three weeks. He notes he walked here and did not try to get in with his PCP. He notes history of altered blood sugars. He notes he is dizzy, diaphoretic, blurred vision, headache and fatigue. His blood sugar at bedside glucose is 380. Review of Systems   Constitutional: Positive for diaphoresis and fatigue. Negative for fever. Eyes: Positive for visual disturbance. Respiratory: Negative for shortness of breath. Cardiovascular: Negative for chest pain and palpitations. Gastrointestinal: Positive for nausea. Negative for diarrhea and vomiting. Genitourinary: Positive for frequency. Psychiatric/Behavioral: The patient is nervous/anxious.          Allergies   Allergen Reactions    Bupropion Nausea Only and Other (See Comments)     \"it made me real pérez\"       Outpatient Medications Prior to Visit   Medication Sig Dispense Refill    gabapentin (NEURONTIN) 600 MG tablet Take 1 tablet by mouth 4 times daily for 14 days. 56 tablet 0    doxepin (SINEQUAN) 75 MG capsule Take 1 capsule by mouth nightly 30 capsule 1    insulin glargine (LANTUS) 100 UNIT/ML injection vial Inject 34 Units into the skin 2 times daily 2040 Units 1    lurasidone (LATUDA) 80 MG TABS tablet Take 1 tablet by mouth daily 30 tablet 1    ergocalciferol (ERGOCALCIFEROL) 1.25 MG (40504 UT) capsule Take 1 capsule by mouth once a week for 11 doses 11 capsule 1    blood glucose monitor kit and supplies True metrix glucose monitor, test strips and lancets. Pt may test 4 times a day. 1 kit 0    insulin regular (HUMULIN R;NOVOLIN R) 100 UNIT/ML injection Inject 10 Units into the skin 3 times daily (before meals) 9 mL 1    Insulin Syringes, Disposable, U-100 0.3 ML MISC 1 each by Does not apply route daily 150 each 1    blood glucose monitor strips Test 4 times a day & as needed for symptoms of irregular blood glucose. Please give True Metrix test strips and lancets (Patient not taking: Reported on 10/22/2020) 200 strip 5    EASY TOUCH SAFETY LANCETS 21G MISC CHECK GLUCOSE TID AND PRN (Patient not taking: Reported on 10/22/2020) 100 each 5    Insulin Syringe-Needle U-100 29G X 1/2\" 0.3 ML MISC 1 each by Does not apply route daily (Patient not taking: Reported on 10/22/2020) 100 each 3    EASY TOUCH SAFETY LANCETS 21G MISC 1 Device by Does not apply route 2 times daily (Patient not taking: Reported on 10/22/2020) 100 each 5    melatonin 3 MG TABS tablet Take 2 tablets by mouth nightly (Patient not taking: Reported on 10/22/2020) 60 tablet 1    glucagon 1 MG injection Inject 1 mg into the muscle See Admin Instructions Follow package directions for low blood sugar. (Patient not taking: Reported on 10/22/2020) 1 kit 3    Lancets MISC 1 each by Does not apply route 4 times daily Test 4 times daily.  DX: E11.65, Z79.4  True Metrix (Patient not taking: Reported on 10/22/2020) 200 each 5    glucose monitoring kit (FREESTYLE) monitoring kit 1 kit by Does not apply route daily (Patient not taking: Reported on 10/22/2020) 1 kit 0     No facility-administered medications prior to visit.         Past Medical History:   Diagnosis Date    ADHD     Anxiety     Depression     Diabetes ([facility] [address]         Social History     Tobacco Use    Smoking status: Current Every Day Smoker     Packs/day: 1.50     Types: Cigarettes     Start date: 5/3/1999    Smokeless tobacco: Former User   Substance Use Topics    Alcohol use: Not Currently        Past Surgical History:   Procedure Laterality Date    NECK SURGERY         Family History   Adopted: Yes       Objective       BP (!) 150/90 (Site: Right Upper Arm, Position: Sitting)   Pulse 102   Temp 98.3 °F (36.8 °C) (Temporal)   Resp 16   Wt 158 lb (71.7 kg)   SpO2 97%   BMI 22.67 kg/m²   Physical Exam      Data Reviewed and Summarized       Labs:   UA and POCT glucose      ALEX Major - CNP

## 2021-07-26 NOTE — PATIENT INSTRUCTIONS
Elevated blood glucsoe and ketones in urine with symptomatic hyperglycemia. TO ER for further evaluation and treatment. KEEP scheduled appointment with PCP this week.

## 2021-07-28 ENCOUNTER — OFFICE VISIT (OUTPATIENT)
Dept: FAMILY MEDICINE CLINIC | Age: 51
End: 2021-07-28
Payer: MEDICAID

## 2021-07-28 VITALS
OXYGEN SATURATION: 98 % | DIASTOLIC BLOOD PRESSURE: 88 MMHG | WEIGHT: 164.4 LBS | SYSTOLIC BLOOD PRESSURE: 162 MMHG | TEMPERATURE: 97.9 F | HEART RATE: 104 BPM | BODY MASS INDEX: 23.54 KG/M2 | HEIGHT: 70 IN

## 2021-07-28 DIAGNOSIS — E11.42 DIABETIC PERIPHERAL NEUROPATHY (HCC): ICD-10-CM

## 2021-07-28 DIAGNOSIS — E10.65 TYPE 1 DIABETES MELLITUS WITH HYPERGLYCEMIA (HCC): Primary | ICD-10-CM

## 2021-07-28 DIAGNOSIS — F41.9 ANXIETY: ICD-10-CM

## 2021-07-28 DIAGNOSIS — Z13.220 SCREENING CHOLESTEROL LEVEL: ICD-10-CM

## 2021-07-28 DIAGNOSIS — F19.11 HISTORY OF DRUG ABUSE (HCC): ICD-10-CM

## 2021-07-28 DIAGNOSIS — F39 MOOD DISORDER (HCC): ICD-10-CM

## 2021-07-28 PROCEDURE — 99204 OFFICE O/P NEW MOD 45 MIN: CPT | Performed by: FAMILY MEDICINE

## 2021-07-28 RX ORDER — GLUCOSAMINE HCL/CHONDROITIN SU 500-400 MG
CAPSULE ORAL
Qty: 200 STRIP | Refills: 5 | Status: SHIPPED | OUTPATIENT
Start: 2021-07-28

## 2021-07-28 RX ORDER — BUPROPION HYDROCHLORIDE 150 MG/1
150 TABLET ORAL EVERY MORNING
Qty: 30 TABLET | Refills: 3 | Status: ON HOLD | OUTPATIENT
Start: 2021-07-28 | End: 2022-03-09 | Stop reason: HOSPADM

## 2021-07-28 RX ORDER — LANCETS 30 GAUGE
1 EACH MISCELLANEOUS
Qty: 200 EACH | Refills: 5 | Status: SHIPPED | OUTPATIENT
Start: 2021-07-28

## 2021-07-28 RX ORDER — BLOOD-GLUCOSE METER
1 KIT MISCELLANEOUS DAILY
Qty: 1 KIT | Refills: 0 | Status: SHIPPED | OUTPATIENT
Start: 2021-07-28

## 2021-07-28 RX ORDER — GABAPENTIN 600 MG/1
600 TABLET ORAL 3 TIMES DAILY
Qty: 90 TABLET | Refills: 1 | Status: ON HOLD | OUTPATIENT
Start: 2021-07-28 | End: 2022-03-09

## 2021-07-28 RX ORDER — INSULIN ASPART 100 [IU]/ML
20 INJECTION, SOLUTION INTRAVENOUS; SUBCUTANEOUS
Qty: 5 PEN | Refills: 2 | Status: ON HOLD | OUTPATIENT
Start: 2021-07-28 | End: 2022-03-09 | Stop reason: SDUPTHER

## 2021-07-28 RX ORDER — INSULIN GLARGINE 100 [IU]/ML
40 INJECTION, SOLUTION SUBCUTANEOUS 2 TIMES DAILY
Qty: 10 PEN | Refills: 5 | Status: ON HOLD | OUTPATIENT
Start: 2021-07-28 | End: 2022-03-09 | Stop reason: SDUPTHER

## 2021-07-28 RX ORDER — HYDROXYZINE HYDROCHLORIDE 25 MG/1
25 TABLET, FILM COATED ORAL EVERY 8 HOURS PRN
Qty: 90 TABLET | Refills: 2 | Status: SHIPPED | OUTPATIENT
Start: 2021-07-28 | End: 2021-08-27

## 2021-07-28 ASSESSMENT — ENCOUNTER SYMPTOMS
WHEEZING: 0
RHINORRHEA: 0
EYE DISCHARGE: 0
EYE PAIN: 0
ABDOMINAL PAIN: 0
CONSTIPATION: 0
VOMITING: 0
DIARRHEA: 0
BACK PAIN: 0
NAUSEA: 0
SHORTNESS OF BREATH: 0
COUGH: 0

## 2021-07-28 NOTE — PATIENT INSTRUCTIONS
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
## 2021-12-05 ENCOUNTER — APPOINTMENT (OUTPATIENT)
Dept: GENERAL RADIOLOGY | Age: 51
DRG: 871 | End: 2021-12-05
Payer: MEDICAID

## 2021-12-05 ENCOUNTER — HOSPITAL ENCOUNTER (INPATIENT)
Age: 51
LOS: 2 days | Discharge: HOME | DRG: 871 | End: 2021-12-07
Attending: EMERGENCY MEDICINE | Admitting: INTERNAL MEDICINE
Payer: MEDICAID

## 2021-12-05 DIAGNOSIS — E10.65 TYPE 1 DIABETES MELLITUS WITH HYPERGLYCEMIA (HCC): ICD-10-CM

## 2021-12-05 DIAGNOSIS — J18.9 PNEUMONIA OF RIGHT LOWER LOBE DUE TO INFECTIOUS ORGANISM: Primary | ICD-10-CM

## 2021-12-05 PROBLEM — A41.9 SEPSIS (HCC): Status: ACTIVE | Noted: 2021-12-05

## 2021-12-05 LAB
ALBUMIN SERPL-MCNC: 4.2 G/DL (ref 3.5–5.2)
ALP BLD-CCNC: 123 U/L (ref 40–130)
ALT SERPL-CCNC: 21 U/L (ref 5–41)
ANION GAP SERPL CALCULATED.3IONS-SCNC: 15 MMOL/L (ref 7–19)
AST SERPL-CCNC: 12 U/L (ref 5–40)
BASE EXCESS VENOUS: 2 MMOL/L
BASOPHILS ABSOLUTE: 0 K/UL (ref 0–0.2)
BASOPHILS RELATIVE PERCENT: 0.2 % (ref 0–1)
BILIRUB SERPL-MCNC: 1.3 MG/DL (ref 0.2–1.2)
BILIRUBIN URINE: NEGATIVE
BLOOD, URINE: NEGATIVE
BUN BLDV-MCNC: 29 MG/DL (ref 6–20)
CALCIUM SERPL-MCNC: 9.3 MG/DL (ref 8.6–10)
CARBOXYHEMOGLOBIN: 2.4 %
CHLORIDE BLD-SCNC: 85 MMOL/L (ref 98–111)
CLARITY: CLEAR
CO2: 24 MMOL/L (ref 22–29)
COLOR: YELLOW
CREAT SERPL-MCNC: 0.9 MG/DL (ref 0.5–1.2)
EOSINOPHILS ABSOLUTE: 0 K/UL (ref 0–0.6)
EOSINOPHILS RELATIVE PERCENT: 0.1 % (ref 0–5)
GFR AFRICAN AMERICAN: >59
GFR NON-AFRICAN AMERICAN: >60
GLUCOSE BLD-MCNC: 298 MG/DL (ref 70–99)
GLUCOSE BLD-MCNC: 372 MG/DL (ref 70–99)
GLUCOSE BLD-MCNC: 454 MG/DL (ref 70–99)
GLUCOSE BLD-MCNC: 510 MG/DL (ref 74–109)
GLUCOSE URINE: =>1000 MG/DL
HCO3 VENOUS: 27 MMOL/L (ref 23–29)
HCT VFR BLD CALC: 50 % (ref 42–52)
HEMOGLOBIN: 17.3 G/DL (ref 14–18)
IMMATURE GRANULOCYTES #: 0.1 K/UL
KETONES, URINE: 15 MG/DL
LACTIC ACID: 2.6 MMOL/L (ref 0.5–1.9)
LEUKOCYTE ESTERASE, URINE: NEGATIVE
LIPASE: 13 U/L (ref 13–60)
LYMPHOCYTES ABSOLUTE: 2.7 K/UL (ref 1.1–4.5)
LYMPHOCYTES RELATIVE PERCENT: 20.6 % (ref 20–40)
MCH RBC QN AUTO: 30 PG (ref 27–31)
MCHC RBC AUTO-ENTMCNC: 34.6 G/DL (ref 33–37)
MCV RBC AUTO: 86.8 FL (ref 80–94)
METHEMOGLOBIN VENOUS: 1.1 %
MONOCYTES ABSOLUTE: 0.7 K/UL (ref 0–0.9)
MONOCYTES RELATIVE PERCENT: 5.3 % (ref 0–10)
NEUTROPHILS ABSOLUTE: 9.5 K/UL (ref 1.5–7.5)
NEUTROPHILS RELATIVE PERCENT: 73.4 % (ref 50–65)
NITRITE, URINE: NEGATIVE
O2 CONTENT, VEN: 15 ML/DL
O2 SAT, VEN: 61 %
PCO2, VEN: 42 MMHG (ref 40–50)
PDW BLD-RTO: 12.8 % (ref 11.5–14.5)
PERFORMED ON: ABNORMAL
PH UA: 5 (ref 5–8)
PH VENOUS: 7.41 (ref 7.35–7.45)
PLATELET # BLD: 313 K/UL (ref 130–400)
PMV BLD AUTO: 9.9 FL (ref 9.4–12.4)
PO2, VEN: 26 MMHG
POTASSIUM SERPL-SCNC: 5 MMOL/L (ref 3.5–5)
PROTEIN UA: NEGATIVE MG/DL
RBC # BLD: 5.76 M/UL (ref 4.7–6.1)
SARS-COV-2, NAAT: NOT DETECTED
SODIUM BLD-SCNC: 124 MMOL/L (ref 136–145)
SPECIFIC GRAVITY UA: 1.04 (ref 1–1.03)
TOTAL PROTEIN: 7.8 G/DL (ref 6.6–8.7)
TROPONIN: <0.01 NG/ML (ref 0–0.03)
UROBILINOGEN, URINE: 0.2 E.U./DL
WBC # BLD: 12.9 K/UL (ref 4.8–10.8)

## 2021-12-05 PROCEDURE — 85025 COMPLETE CBC W/AUTO DIFF WBC: CPT

## 2021-12-05 PROCEDURE — 87040 BLOOD CULTURE FOR BACTERIA: CPT

## 2021-12-05 PROCEDURE — 2140000000 HC CCU INTERMEDIATE R&B

## 2021-12-05 PROCEDURE — 81003 URINALYSIS AUTO W/O SCOPE: CPT

## 2021-12-05 PROCEDURE — 80053 COMPREHEN METABOLIC PANEL: CPT

## 2021-12-05 PROCEDURE — 87635 SARS-COV-2 COVID-19 AMP PRB: CPT

## 2021-12-05 PROCEDURE — 36600 WITHDRAWAL OF ARTERIAL BLOOD: CPT

## 2021-12-05 PROCEDURE — 82947 ASSAY GLUCOSE BLOOD QUANT: CPT

## 2021-12-05 PROCEDURE — 6360000002 HC RX W HCPCS: Performed by: EMERGENCY MEDICINE

## 2021-12-05 PROCEDURE — 84484 ASSAY OF TROPONIN QUANT: CPT

## 2021-12-05 PROCEDURE — 99284 EMERGENCY DEPT VISIT MOD MDM: CPT

## 2021-12-05 PROCEDURE — 96365 THER/PROPH/DIAG IV INF INIT: CPT

## 2021-12-05 PROCEDURE — 71045 X-RAY EXAM CHEST 1 VIEW: CPT

## 2021-12-05 PROCEDURE — 6370000000 HC RX 637 (ALT 250 FOR IP): Performed by: EMERGENCY MEDICINE

## 2021-12-05 PROCEDURE — 83690 ASSAY OF LIPASE: CPT

## 2021-12-05 PROCEDURE — 93005 ELECTROCARDIOGRAM TRACING: CPT | Performed by: EMERGENCY MEDICINE

## 2021-12-05 PROCEDURE — 36415 COLL VENOUS BLD VENIPUNCTURE: CPT

## 2021-12-05 PROCEDURE — 96375 TX/PRO/DX INJ NEW DRUG ADDON: CPT

## 2021-12-05 PROCEDURE — 82803 BLOOD GASES ANY COMBINATION: CPT

## 2021-12-05 PROCEDURE — 2580000003 HC RX 258: Performed by: EMERGENCY MEDICINE

## 2021-12-05 PROCEDURE — 83605 ASSAY OF LACTIC ACID: CPT

## 2021-12-05 RX ORDER — ONDANSETRON 2 MG/ML
4 INJECTION INTRAMUSCULAR; INTRAVENOUS ONCE
Status: COMPLETED | OUTPATIENT
Start: 2021-12-05 | End: 2021-12-05

## 2021-12-05 RX ORDER — SODIUM CHLORIDE, SODIUM LACTATE, POTASSIUM CHLORIDE, AND CALCIUM CHLORIDE .6; .31; .03; .02 G/100ML; G/100ML; G/100ML; G/100ML
1000 INJECTION, SOLUTION INTRAVENOUS ONCE
Status: COMPLETED | OUTPATIENT
Start: 2021-12-05 | End: 2021-12-05

## 2021-12-05 RX ADMIN — CEFTRIAXONE 1000 MG: 1 INJECTION, POWDER, FOR SOLUTION INTRAMUSCULAR; INTRAVENOUS at 22:25

## 2021-12-05 RX ADMIN — INSULIN HUMAN 10 UNITS: 100 INJECTION, SOLUTION PARENTERAL at 22:50

## 2021-12-05 RX ADMIN — SODIUM CHLORIDE, POTASSIUM CHLORIDE, SODIUM LACTATE AND CALCIUM CHLORIDE 1000 ML: 600; 310; 30; 20 INJECTION, SOLUTION INTRAVENOUS at 21:27

## 2021-12-05 RX ADMIN — AZITHROMYCIN MONOHYDRATE 500 MG: 500 INJECTION, POWDER, LYOPHILIZED, FOR SOLUTION INTRAVENOUS at 22:26

## 2021-12-05 RX ADMIN — ONDANSETRON 4 MG: 2 INJECTION INTRAMUSCULAR; INTRAVENOUS at 21:34

## 2021-12-05 ASSESSMENT — PAIN SCALES - GENERAL: PAINLEVEL_OUTOF10: 10

## 2021-12-05 ASSESSMENT — PAIN DESCRIPTION - LOCATION: LOCATION: GENERALIZED

## 2021-12-05 ASSESSMENT — PAIN DESCRIPTION - PAIN TYPE: TYPE: ACUTE PAIN

## 2021-12-06 PROBLEM — Z72.0 TOBACCO ABUSE: Status: ACTIVE | Noted: 2021-12-06

## 2021-12-06 PROBLEM — J44.1 COPD WITH ACUTE EXACERBATION (HCC): Status: ACTIVE | Noted: 2021-12-06

## 2021-12-06 PROBLEM — Z71.6 TOBACCO ABUSE COUNSELING: Status: ACTIVE | Noted: 2021-12-06

## 2021-12-06 PROBLEM — R79.89 ELEVATED LACTIC ACID LEVEL: Status: ACTIVE | Noted: 2021-12-06

## 2021-12-06 PROBLEM — J40 BRONCHITIS: Status: ACTIVE | Noted: 2021-12-06

## 2021-12-06 PROBLEM — E11.65 HYPERGLYCEMIA DUE TO DIABETES MELLITUS (HCC): Status: ACTIVE | Noted: 2021-12-06

## 2021-12-06 PROBLEM — R53.83 LETHARGY: Status: ACTIVE | Noted: 2021-12-06

## 2021-12-06 PROBLEM — E86.0 DEHYDRATION: Status: ACTIVE | Noted: 2021-12-06

## 2021-12-06 PROBLEM — E87.20 LACTIC ACIDOSIS: Status: RESOLVED | Noted: 2021-12-06 | Resolved: 2021-12-06

## 2021-12-06 PROBLEM — F17.210 DEPENDENCE ON NICOTINE FROM CIGARETTES: Status: ACTIVE | Noted: 2021-12-06

## 2021-12-06 PROBLEM — E87.20 LACTIC ACIDOSIS: Status: ACTIVE | Noted: 2021-12-06

## 2021-12-06 PROBLEM — J18.9 RIGHT LOWER LOBE PNEUMONIA: Status: ACTIVE | Noted: 2021-12-06

## 2021-12-06 PROBLEM — R65.10 SIRS (SYSTEMIC INFLAMMATORY RESPONSE SYNDROME) (HCC): Status: ACTIVE | Noted: 2021-12-06

## 2021-12-06 PROBLEM — J18.9 SEPSIS DUE TO PNEUMONIA (HCC): Status: ACTIVE | Noted: 2021-12-05

## 2021-12-06 PROBLEM — J96.01 ACUTE HYPOXEMIC RESPIRATORY FAILURE (HCC): Status: ACTIVE | Noted: 2021-12-06

## 2021-12-06 LAB
ALBUMIN SERPL-MCNC: 4 G/DL (ref 3.5–5.2)
ALP BLD-CCNC: 93 U/L (ref 40–130)
ALT SERPL-CCNC: 15 U/L (ref 5–41)
ANION GAP SERPL CALCULATED.3IONS-SCNC: 14 MMOL/L (ref 7–19)
AST SERPL-CCNC: 11 U/L (ref 5–40)
BILIRUB SERPL-MCNC: 1.1 MG/DL (ref 0.2–1.2)
BUN BLDV-MCNC: 23 MG/DL (ref 6–20)
CALCIUM SERPL-MCNC: 8.8 MG/DL (ref 8.6–10)
CHLORIDE BLD-SCNC: 93 MMOL/L (ref 98–111)
CO2: 23 MMOL/L (ref 22–29)
CREAT SERPL-MCNC: 0.6 MG/DL (ref 0.5–1.2)
EKG P AXIS: 69 DEGREES
EKG P-R INTERVAL: 132 MS
EKG Q-T INTERVAL: 356 MS
EKG QRS DURATION: 82 MS
EKG QTC CALCULATION (BAZETT): 426 MS
EKG T AXIS: 75 DEGREES
GFR AFRICAN AMERICAN: >59
GFR NON-AFRICAN AMERICAN: >60
GLUCOSE BLD-MCNC: 105 MG/DL (ref 70–99)
GLUCOSE BLD-MCNC: 113 MG/DL (ref 70–99)
GLUCOSE BLD-MCNC: 221 MG/DL (ref 70–99)
GLUCOSE BLD-MCNC: 285 MG/DL (ref 70–99)
GLUCOSE BLD-MCNC: 299 MG/DL (ref 70–99)
GLUCOSE BLD-MCNC: 315 MG/DL (ref 70–99)
GLUCOSE BLD-MCNC: 322 MG/DL (ref 70–99)
GLUCOSE BLD-MCNC: 329 MG/DL (ref 74–109)
HBA1C MFR BLD: 13.2 % (ref 4–6)
HCT VFR BLD CALC: 42.3 % (ref 42–52)
HEMOGLOBIN: 14.7 G/DL (ref 14–18)
LACTIC ACID: 1.6 MMOL/L (ref 0.5–1.9)
MCH RBC QN AUTO: 30.2 PG (ref 27–31)
MCHC RBC AUTO-ENTMCNC: 34.8 G/DL (ref 33–37)
MCV RBC AUTO: 87 FL (ref 80–94)
PDW BLD-RTO: 12.6 % (ref 11.5–14.5)
PERFORMED ON: ABNORMAL
PLATELET # BLD: 276 K/UL (ref 130–400)
PMV BLD AUTO: 9.8 FL (ref 9.4–12.4)
POTASSIUM SERPL-SCNC: 4.1 MMOL/L (ref 3.5–5)
RBC # BLD: 4.86 M/UL (ref 4.7–6.1)
SODIUM BLD-SCNC: 130 MMOL/L (ref 136–145)
TOTAL PROTEIN: 6.2 G/DL (ref 6.6–8.7)
WBC # BLD: 12.4 K/UL (ref 4.8–10.8)

## 2021-12-06 PROCEDURE — 82947 ASSAY GLUCOSE BLOOD QUANT: CPT

## 2021-12-06 PROCEDURE — 2580000003 HC RX 258: Performed by: HOSPITALIST

## 2021-12-06 PROCEDURE — P9047 ALBUMIN (HUMAN), 25%, 50ML: HCPCS | Performed by: INTERNAL MEDICINE

## 2021-12-06 PROCEDURE — 2580000003 HC RX 258: Performed by: INTERNAL MEDICINE

## 2021-12-06 PROCEDURE — 83036 HEMOGLOBIN GLYCOSYLATED A1C: CPT

## 2021-12-06 PROCEDURE — 83605 ASSAY OF LACTIC ACID: CPT

## 2021-12-06 PROCEDURE — 6360000002 HC RX W HCPCS: Performed by: INTERNAL MEDICINE

## 2021-12-06 PROCEDURE — 6360000002 HC RX W HCPCS: Performed by: HOSPITALIST

## 2021-12-06 PROCEDURE — 2140000000 HC CCU INTERMEDIATE R&B

## 2021-12-06 PROCEDURE — 80053 COMPREHEN METABOLIC PANEL: CPT

## 2021-12-06 PROCEDURE — 36415 COLL VENOUS BLD VENIPUNCTURE: CPT

## 2021-12-06 PROCEDURE — 85027 COMPLETE CBC AUTOMATED: CPT

## 2021-12-06 PROCEDURE — 6370000000 HC RX 637 (ALT 250 FOR IP): Performed by: INTERNAL MEDICINE

## 2021-12-06 PROCEDURE — 2500000003 HC RX 250 WO HCPCS: Performed by: INTERNAL MEDICINE

## 2021-12-06 PROCEDURE — 6370000000 HC RX 637 (ALT 250 FOR IP): Performed by: HOSPITALIST

## 2021-12-06 RX ORDER — METHYLPREDNISOLONE SODIUM SUCCINATE 40 MG/ML
40 INJECTION, POWDER, LYOPHILIZED, FOR SOLUTION INTRAMUSCULAR; INTRAVENOUS EVERY 12 HOURS
Status: DISCONTINUED | OUTPATIENT
Start: 2021-12-06 | End: 2021-12-07 | Stop reason: HOSPADM

## 2021-12-06 RX ORDER — INSULIN GLARGINE 100 [IU]/ML
40 INJECTION, SOLUTION SUBCUTANEOUS 2 TIMES DAILY
Status: DISCONTINUED | OUTPATIENT
Start: 2021-12-06 | End: 2021-12-07 | Stop reason: HOSPADM

## 2021-12-06 RX ORDER — NICOTINE 21 MG/24HR
1 PATCH, TRANSDERMAL 24 HOURS TRANSDERMAL DAILY
Status: DISCONTINUED | OUTPATIENT
Start: 2021-12-06 | End: 2021-12-07 | Stop reason: HOSPADM

## 2021-12-06 RX ORDER — BUPROPION HYDROCHLORIDE 150 MG/1
150 TABLET ORAL EVERY MORNING
Status: DISCONTINUED | OUTPATIENT
Start: 2021-12-06 | End: 2021-12-07 | Stop reason: HOSPADM

## 2021-12-06 RX ORDER — IPRATROPIUM BROMIDE AND ALBUTEROL SULFATE 2.5; .5 MG/3ML; MG/3ML
1 SOLUTION RESPIRATORY (INHALATION) EVERY 4 HOURS PRN
Status: DISCONTINUED | OUTPATIENT
Start: 2021-12-06 | End: 2021-12-07 | Stop reason: HOSPADM

## 2021-12-06 RX ORDER — ALBUMIN (HUMAN) 12.5 G/50ML
25 SOLUTION INTRAVENOUS ONCE
Status: COMPLETED | OUTPATIENT
Start: 2021-12-06 | End: 2021-12-06

## 2021-12-06 RX ORDER — SODIUM CHLORIDE 9 MG/ML
INJECTION, SOLUTION INTRAVENOUS CONTINUOUS
Status: DISCONTINUED | OUTPATIENT
Start: 2021-12-06 | End: 2021-12-07 | Stop reason: HOSPADM

## 2021-12-06 RX ORDER — GABAPENTIN 600 MG/1
300 TABLET ORAL 2 TIMES DAILY
Status: DISCONTINUED | OUTPATIENT
Start: 2021-12-06 | End: 2021-12-07 | Stop reason: HOSPADM

## 2021-12-06 RX ORDER — 0.9 % SODIUM CHLORIDE 0.9 %
1000 INTRAVENOUS SOLUTION INTRAVENOUS ONCE
Status: COMPLETED | OUTPATIENT
Start: 2021-12-06 | End: 2021-12-06

## 2021-12-06 RX ADMIN — GABAPENTIN 300 MG: 600 TABLET, FILM COATED ORAL at 20:07

## 2021-12-06 RX ADMIN — FAMOTIDINE 20 MG: 10 INJECTION, SOLUTION INTRAVENOUS at 08:10

## 2021-12-06 RX ADMIN — BUPROPION HYDROCHLORIDE 150 MG: 150 TABLET, EXTENDED RELEASE ORAL at 08:10

## 2021-12-06 RX ADMIN — METHYLPREDNISOLONE SODIUM SUCCINATE 40 MG: 40 INJECTION, POWDER, FOR SOLUTION INTRAMUSCULAR; INTRAVENOUS at 12:08

## 2021-12-06 RX ADMIN — METHYLPREDNISOLONE SODIUM SUCCINATE 40 MG: 40 INJECTION, POWDER, FOR SOLUTION INTRAMUSCULAR; INTRAVENOUS at 01:31

## 2021-12-06 RX ADMIN — SODIUM CHLORIDE 1000 ML: 9 INJECTION, SOLUTION INTRAVENOUS at 01:32

## 2021-12-06 RX ADMIN — ENOXAPARIN SODIUM 40 MG: 100 INJECTION SUBCUTANEOUS at 08:09

## 2021-12-06 RX ADMIN — ALBUMIN (HUMAN) 25 G: 0.25 INJECTION, SOLUTION INTRAVENOUS at 01:31

## 2021-12-06 RX ADMIN — INSULIN LISPRO 9 UNITS: 100 INJECTION, SOLUTION INTRAVENOUS; SUBCUTANEOUS at 08:08

## 2021-12-06 RX ADMIN — INSULIN GLARGINE 40 UNITS: 100 INJECTION, SOLUTION SUBCUTANEOUS at 08:07

## 2021-12-06 RX ADMIN — INSULIN LISPRO 20 UNITS: 100 INJECTION, SOLUTION INTRAVENOUS; SUBCUTANEOUS at 08:08

## 2021-12-06 RX ADMIN — INSULIN GLARGINE 40 UNITS: 100 INJECTION, SOLUTION SUBCUTANEOUS at 22:05

## 2021-12-06 RX ADMIN — FAMOTIDINE 20 MG: 10 INJECTION, SOLUTION INTRAVENOUS at 20:07

## 2021-12-06 RX ADMIN — FAMOTIDINE 20 MG: 10 INJECTION, SOLUTION INTRAVENOUS at 01:31

## 2021-12-06 RX ADMIN — INSULIN LISPRO 6 UNITS: 100 INJECTION, SOLUTION INTRAVENOUS; SUBCUTANEOUS at 12:08

## 2021-12-06 RX ADMIN — INSULIN GLARGINE 40 UNITS: 100 INJECTION, SOLUTION SUBCUTANEOUS at 01:59

## 2021-12-06 RX ADMIN — AZITHROMYCIN MONOHYDRATE 500 MG: 500 INJECTION, POWDER, LYOPHILIZED, FOR SOLUTION INTRAVENOUS at 21:59

## 2021-12-06 RX ADMIN — GABAPENTIN 300 MG: 600 TABLET, FILM COATED ORAL at 01:32

## 2021-12-06 RX ADMIN — INSULIN HUMAN 10 UNITS: 100 INJECTION, SOLUTION PARENTERAL at 05:21

## 2021-12-06 RX ADMIN — SODIUM CHLORIDE: 9 INJECTION, SOLUTION INTRAVENOUS at 20:07

## 2021-12-06 RX ADMIN — LURASIDONE HYDROCHLORIDE 20 MG: 40 TABLET, FILM COATED ORAL at 08:10

## 2021-12-06 RX ADMIN — GABAPENTIN 300 MG: 600 TABLET, FILM COATED ORAL at 08:10

## 2021-12-06 RX ADMIN — WATER 1000 MG: 1 INJECTION INTRAMUSCULAR; INTRAVENOUS; SUBCUTANEOUS at 22:00

## 2021-12-06 RX ADMIN — INSULIN LISPRO 6 UNITS: 100 INJECTION, SOLUTION INTRAVENOUS; SUBCUTANEOUS at 01:59

## 2021-12-06 ASSESSMENT — ENCOUNTER SYMPTOMS
COUGH: 1
NAUSEA: 1
EYES NEGATIVE: 1
BACK PAIN: 1
WHEEZING: 1
ALLERGIC/IMMUNOLOGIC NEGATIVE: 1
SHORTNESS OF BREATH: 1
ABDOMINAL DISTENTION: 1
ABDOMINAL PAIN: 1

## 2021-12-06 ASSESSMENT — PAIN SCALES - GENERAL: PAINLEVEL_OUTOF10: 5

## 2021-12-06 ASSESSMENT — PAIN DESCRIPTION - PAIN TYPE: TYPE: ACUTE PAIN

## 2021-12-06 NOTE — PROGRESS NOTES
Dr. Romario Coburn made aware of patient's blood pressure being 80/52 manually and blood glucose of 105.

## 2021-12-06 NOTE — PROGRESS NOTES
[place], [place], [address]    DEPARTMENT OF HOSPITALIST MEDICINE      PROGRESS  NOTE:    NOTE: Portions of this note have been copied forward, however, changed to reflect the most current clinical status of this patient. Patient:  Chadd Sanchez  YOB: 1970  Date of Service: 12/6/2021  MRN: [id number]   Acct: [de-identified]   Primary Care Physician: Marisel Stoll MD  Advance Directive: Full Code  Admit Date: 12/5/2021       Hospital Day: 1      CHIEF COMPLAINT:  Chief Complaint   Patient presents with    Emesis     has been going off and on for 5 days    Dizziness    Fatigue     \"been in the bed for 4-5 days sleeping, no energy    Hyperglycemia       SUBJECTIVE:  I saw and evaluated the patient at the bedside. He still feels weak and tired. Shortness of breath has improved a little since admission. Spoke in detail about tobacco cessation. Tha Thapa  COURSE:    12/6/2021  Patient admitted with sepsis secondary to pneumonia. Feels tired and fatigued. Lactic acidosis has improved. SBP still low 100. Patient started on IV hydration. Patient is uncontrolled diabetes mellitus with hemoglobin A1c 13.2%. We will optimize his diabetes management while he is here. 12/5/2021  History of Present Illness   46year old male with multiple medical issues. Hypertension, tobacco abuse, copd , poorly controlled type ii dm   And weakness and lethargy and cough for the last 5 days.      Evaluated in er and noted to be hyperglycemic  With bp fluctuations high and low. Dehydration   Wheezing   Rhonchi noted. Febrile illness  Failure to thrive at home      Being admitted for CAP and / or complicated gram negative pneumonia   Copd exacerbation with wheezing and hypoxemia and   Acute hypoxemic respiratory failure and has been smoking the whole time with these sx.  In addition has been using drugs and methamphetamines as well likely that has worsened overall situation SubCUTAneous TID WC    insulin lispro  0-9 Units SubCUTAneous Nightly    methylPREDNISolone  40 mg IntraVENous Q12H    famotidine (PEPCID) injection  20 mg IntraVENous BID    enoxaparin  40 mg SubCUTAneous Daily    azithromycin  500 mg IntraVENous Q24H    cefTRIAXone (ROCEPHIN) IV  1,000 mg IntraVENous Q24H    nicotine  1 patch TransDERmal Daily        PRN:  ipratropium-albuterol, 1 ampule, Q4H PRN        Infusions:   sodium chloride         Laboratory Data:  Recent Labs     12/05/21 2010 12/06/21  0254   WBC 12.9* 12.4*   HGB 17.3 14.7    276     Recent Labs     12/05/21 2010 12/06/21  0254   * 130*   K 5.0 4.1   CL 85* 93*   CO2 24 23   BUN 29* 23*   CREATININE 0.9 0.6   GLUCOSE 510* 329*     Recent Labs     12/05/21 2010 12/06/21  0254   AST 12 11   ALT 21 15   BILITOT 1.3* 1.1   ALKPHOS 123 93     Troponin T:   Recent Labs     12/05/21 2010   Norma Carrel <0.01     Pro-BNP: No results for input(s): BNP in the last 72 hours. INR: No results for input(s): INR in the last 72 hours. UA:  Recent Labs     12/05/21  2215   COLORU YELLOW   PHUR 5.0   CLARITYU Clear   SPECGRAV 1.041   LEUKOCYTESUR Negative   UROBILINOGEN 0.2   BILIRUBINUR Negative   BLOODU Negative   GLUCOSEU =>1000     A1C:   Recent Labs     12/06/21  0254   LABA1C 13.2*     ABG:No results for input(s): PHART, CWG4RYY, PO2ART, QDF8QMX, BEART, HGBAE, S1HMCAWX, CARBOXHGBART in the last 72 hours. Imaging:    XR CHEST PORTABLE    Result Date: 12/5/2021  Nonconsolidating interstitial infiltrate in the right lung base concerning for pneumonia. Signed by Dr Ami Guevara.  Annette       ASSESSMENT & PLAN:    Principal Problem:    Sepsis due to pneumonia [facility])  Active Problems:    Essential hypertension    Controlled type 2 diabetes mellitus with hyperglycemia (HCC)    Diabetic peripheral neuropathy (HCC)    Bipolar depression ([facility] [address]    Generalized anxiety disorder    Insomnia    History of drug abuse ([facility] [address]    Mood disorder (HCC)    Type 1 diabetes mellitus with hyperglycemia (HCC)    Hyperglycemia due to diabetes mellitus (HCC)    Dehydration    Lethargy    COPD with acute exacerbation (HCC)    Bronchitis    SIRS (systemic inflammatory response syndrome) (HCC)    Elevated lactic acid level    Right lower lobe pneumonia    Acute hypoxemic respiratory failure (HCC)    Tobacco abuse    Tobacco abuse counseling    Dependence on nicotine from cigarettes  Resolved Problems:    Lactic acidosis      Continue with current medications  Patient received IV Rocephin and Zithromax in the ER on admission  We will continue with both antibiotics on the floor  Patient SBP is below 100  Patient started on IV hydration with normal saline at 125 cc an hour  He has uncontrolled diabetes mellitus with hemoglobin A1c 13.2%  Will optimize diabetic management during his hospital stay  Encourage ambulation in hallways     Chronic medical issues . .. Continue with home meds. Monitor patient closely while admitted. Advised very close f/u with patient's PCP as an outpatient to address chronic medical issues. Dependence on nicotine from cigarettes          Tobacco abuse counseling  Patient smokes cigarettes on a chronic basis. Strictly advised patient to cut down on or quit smoking. Nicotine patch offered. ~5 minutes spent on tobacco cessation counseling with the patient. Websites - http://smokefree. gov & [employer]. com   °Quitlines - 1-800-QUIT-NOW (3-030-770-5155)   °[employer] Apps - QuitSTART Rivera   °Text Messages - SmokefreeTXT (send the word QUIT to 97341)       Repeat labs in a.m. Electrolyte replacement as per protocol. Patient will be monitored very closely on the floor. Further recommendations as per the hospital course. Discharge Plan:  To be determined as per the hospital course      Patient  is on DVT prophylaxis  Current medications reviewed  Lab work reviewed  Radiology/Chest x-ray films reviewed  Treatment recommendations from suspecialities

## 2021-12-06 NOTE — PROGRESS NOTES
Patient complaining of chest discomfort. Patient admitted with pneumonia. EKG obtained. EKG unremarkable.

## 2021-12-06 NOTE — PROGRESS NOTES
pH, Carlos 7.35 - 7.45 7.41    pCO2, Carlos 40.0 - 50.0 mmHg 42.0    pO2, Carlos Not Established mmHg 26    HCO3, Venous 23 - 29 mmol/L 27    Base Excess, Carlos Not Established mmol/L 2    O2 Sat, Carlos Not Established % 61    Carboxyhemoglobin % 2.4    Comment:      0.0-1.5   (Smokers 1.5-5.0)    MetHgb, Carlos <1.5 % 1.1    O2 Content, Carlos Not Established mL/dL 15      vbg

## 2021-12-06 NOTE — ED TRIAGE NOTES
Patient presents with complaint of nausea, vomiting and fever 3-4 days. Patient is Type  I diabetic.

## 2021-12-06 NOTE — PROGRESS NOTES
4 Eyes Skin Assessment    Viri Hansen is being assessed upon: Admission    I agree that I, Christine Morton, RN, along with Kiko Silva RN (either 2 RN's or 1 LPN and 1 RN) have performed a thorough Head to Toe Skin Assessment on the patient. ALL assessment sites listed below have been assessed. Areas assessed by both nurses:     [x]   Head, Face, and Ears   [x]   Shoulders, Back, and Chest  [x]   Arms, Elbows, and Hands   [x]   Coccyx, Sacrum, and Ischium  [x]   Legs, Feet, and Heels    Does the Patient have Skin Breakdown? No    Isaiah Prevention initiated: NA  Wound Care Orders initiated: NA    WOC nurse consulted for Pressure Injury (Stage 3,4, Unstageable, DTI, NWPT, and Complex wounds) and New or Established Ostomies: NA        Primary Nurse eSignature:  Christine Morton RN on 12/6/2021 at 2:19 AM      Co-Signer eSignature: Electronically signed by Kelsy Downey RN on 12/6/21 at 2:53 AM CST

## 2021-12-06 NOTE — ED NOTES
Bed: 01-A  Expected date:   Expected time:   Means of arrival:   Comments:     Vianey Hernandez RN  12/05/21 1943

## 2021-12-06 NOTE — H&P
HISTORY AND PHYSICAL             Date: 12/6/2021        Patient Name: Namrata Jara     YOB: 1970      Age:  46 y.o. Chief Complaint     Chief Complaint   Patient presents with    Emesis     has been going off and on for 5 days    Dizziness    Fatigue     \"been in the bed for 4-5 days sleeping, no energy    Hyperglycemia      Increased cough   Fever and chills   Poor blood sugar control   Dehydration   Cough with yellow sputum production   Poor hygiene   Poor nutrition     History Obtained From   Patient     History of Present Illness   46year old male with multiple medical issues. Hypertension, tobacco abuse, copd , poorly controlled type ii dm   And weakness and lethargy and cough for the last 5 days. Evaluated in er and noted to be hyperglycemic  With bp fluctuations high and low. Dehydration   Wheezing   Rhonchi noted. Febrile illness  Failure to thrive at home     Being admitted for CAP and / or complicated gram negative pneumonia   Copd exacerbation with wheezing and hypoxemia and   Acute hypoxemic respiratory failure and has been smoking the whole time with these sx. In addition has been using drugs and methamphetamines as well likely that has worsened overall situation     Uncontrolled type ii dm with nephropathy, neuropathy, retinopathy     Admitted for CAP with copd exacerbation and acute hypoxemic respiratory failure     Past Medical History     Past Medical History:   Diagnosis Date    ADHD     Anxiety     Depression     Diabetes ([facility] [address]         Past Surgical History     Past Surgical History:   Procedure Laterality Date    NECK SURGERY          Medications Prior to Admission     Prior to Admission medications    Medication Sig Start Date End Date Taking?  Authorizing Provider   insulin glargine (BASAGLAR KWIKPEN) 100 UNIT/ML injection pen Inject 40 Units into the skin 2 times daily 7/28/21  Yes Howard Valenzuela MD   lurasidone (LATUDA) 20 MG TABS tablet Take 1 tablet by mouth daily 7/28/21  Yes Marika Mullins MD   buPROPion (WELLBUTRIN XL) 150 MG extended release tablet Take 1 tablet by mouth every morning 7/28/21  Yes aMrika Mullins MD   insulin aspart (NOVOLOG FLEXPEN) 100 UNIT/ML injection pen Inject 20 Units into the skin 3 times daily (before meals) 7/28/21  Yes Marika Mullins MD   gabapentin (NEURONTIN) 600 MG tablet Take 1 tablet by mouth 3 times daily for 30 days. 7/28/21 12/6/21 Yes Marika Mullins MD   glucose monitoring (FREESTYLE FREEDOM) kit 1 kit by Does not apply route daily 7/28/21  Yes Marika Mullins MD   blood glucose monitor strips Test 5 times a day & as needed for symptoms of irregular blood glucose. Dispense sufficient amount for indicated testing frequency plus additional to accommodate PRN testing needs. 7/28/21  Yes Marika Mullins MD   Lancets MISC 1 each by Does not apply route 5 times daily 7/28/21  Yes Marika Mullins MD        Allergies   Patient has no known allergies. Social History     Social History     Tobacco History     Smoking Status  Current Every Day Smoker Smoking Start Date  5/3/1999 Smoking Frequency  1.5 packs/day Smoking Tobacco Type  Cigarettes    Smokeless Tobacco Use  Former User          Alcohol History     Alcohol Use Status  Not Currently          Drug Use     Drug Use Status  Yes Types  Methamphetamines (Crystal Meth) Comment  a week a go          Sexual Activity     Sexually Active  Not Asked                Family History     Family History   Adopted: Yes       Review of Systems   Review of Systems   Constitutional: Positive for activity change, diaphoresis and fatigue. HENT: Positive for congestion. Eyes: Negative. Respiratory: Positive for cough, shortness of breath and wheezing. Cardiovascular: Positive for palpitations and leg swelling. Gastrointestinal: Positive for abdominal distention, abdominal pain and nausea. Endocrine: Negative. Genitourinary: Negative. Musculoskeletal: Positive for arthralgias and back pain. Neutrophils Absolute 9.5 (H) 1.5 - 7.5 K/uL    Immature Granulocytes # 0.1 K/uL    Lymphocytes Absolute 2.7 1.1 - 4.5 K/uL    Monocytes Absolute 0.70 0.00 - 0.90 K/uL    Eosinophils Absolute 0.00 0.00 - 0.60 K/uL    Basophils Absolute 0.00 0.00 - 0.20 K/uL   Comprehensive Metabolic Panel    Collection Time: 12/05/21  8:10 PM   Result Value Ref Range    Sodium 124 (L) 136 - 145 mmol/L    Potassium 5.0 3.5 - 5.0 mmol/L    Chloride 85 (L) 98 - 111 mmol/L    CO2 24 22 - 29 mmol/L    Anion Gap 15 7 - 19 mmol/L    Glucose 510 (HH) 74 - 109 mg/dL    BUN 29 (H) 6 - 20 mg/dL    CREATININE 0.9 0.5 - 1.2 mg/dL    GFR Non-African American >60 >60    GFR African American >59 >59    Calcium 9.3 8.6 - 10.0 mg/dL    Total Protein 7.8 6.6 - 8.7 g/dL    Albumin 4.2 3.5 - 5.2 g/dL    Total Bilirubin 1.3 (H) 0.2 - 1.2 mg/dL    Alkaline Phosphatase 123 40 - 130 U/L    ALT 21 5 - 41 U/L    AST 12 5 - 40 U/L   Lipase    Collection Time: 12/05/21  8:10 PM   Result Value Ref Range    Lipase 13 13 - 60 U/L   Troponin    Collection Time: 12/05/21  8:10 PM   Result Value Ref Range    Troponin <0.01 0.00 - 0.03 ng/mL   COVID-19, Rapid    Collection Time: 12/05/21  9:30 PM    Specimen: Nasopharyngeal Swab   Result Value Ref Range    SARS-CoV-2, NAAT Not Detected Not Detected   Lactic Acid, Plasma    Collection Time: 12/05/21  9:31 PM   Result Value Ref Range    Lactic Acid 2.6 (HH) 0.5 - 1.9 mmol/L   Venous Blood Gas    Collection Time: 12/05/21 10:12 PM   Result Value Ref Range    pH, Carlos 7.41 7.35 - 7.45    pCO2, Carlos 42.0 40.0 - 50.0 mmHg    pO2, Carlos 26 Not Established mmHg    HCO3, Venous 27 23 - 29 mmol/L    Base Excess, Carlos 2 Not Established mmol/L    O2 Sat, Carlos 61 Not Established %    Carboxyhemoglobin 2.4 %    MetHgb, Carlos 1.1 <1.5 %    O2 Content, Carlos 15 Not Established mL/dL   Urinalysis Reflex to Culture    Collection Time: 12/05/21 10:15 PM   Result Value Ref Range    Color, UA YELLOW Straw/Yellow    Clarity, UA Clear Clear Glucose, Ur =>1000 Negative mg/dL    Bilirubin Urine Negative Negative    Ketones, Urine 15 (A) Negative mg/dL    Specific Gravity, UA 1.041 1.005 - 1.030    Blood, Urine Negative Negative    pH, UA 5.0 5.0 - 8.0    Protein, UA Negative Negative mg/dL    Urobilinogen, Urine 0.2 <2.0 E.U./dL    Nitrite, Urine Negative Negative    Leukocyte Esterase, Urine Negative Negative   POCT Glucose    Collection Time: 12/05/21 10:42 PM   Result Value Ref Range    POC Glucose 372 (H) 70 - 99 mg/dl    Performed on AccuChek    POCT Glucose    Collection Time: 12/05/21 11:50 PM   Result Value Ref Range    POC Glucose 298 (H) 70 - 99 mg/dl    Performed on [employer]k    POCT Glucose    Collection Time: 12/06/21  1:15 AM   Result Value Ref Range    POC Glucose 315 (H) 70 - 99 mg/dl    Performed on [employer]k         Imaging/Diagnostics Last 24 Hours   XR CHEST PORTABLE    Result Date: 12/5/2021  EXAMINATION: XR CHEST PORTABLE 12/5/2021 10:23 PM HISTORY: Weakness. Report: A portable upright frontal view the chest was obtained. COMPARISON: There are no correlative imaging studies for comparison. The left lung is clear, there is interstitial infiltrate in the right lung base without consolidation. Interstitial pneumonia is not excluded. Heart size is normal. There is no pneumothorax or pleural effusion. The osseous structures and upper abdomen appear unremarkable. Nonconsolidating interstitial infiltrate in the right lung base concerning for pneumonia. Signed by Dr Rachel Aden.  [facility] Problems           Last Modified POA    * (Principal) Sepsis ([facility] [address] 12/6/2021 Yes    Essential hypertension 12/6/2021 Yes    Controlled type 2 diabetes mellitus with hyperglycemia ([address] 12/6/2021 Yes    Diabetic peripheral neuropathy ([address] 12/6/2021 Yes    Bipolar depression ([address] 12/6/2021 Yes    Generalized anxiety disorder (Chronic) 12/6/2021 Yes    Insomnia (Chronic) 12/6/2021 Yes    History of drug abuse ([address] 12/6/2021 Yes Mood disorder ([facility] [address] 12/6/2021 Yes    Type 1 diabetes mellitus with hyperglycemia ([address] 12/6/2021 Yes    Hyperglycemia due to diabetes mellitus ([address] 12/6/2021 Yes    Dehydration 12/6/2021 Yes    Lethargy 12/6/2021 Yes    COPD with acute exacerbation ([address] 12/6/2021 Yes    Bronchitis 12/6/2021 Yes    SIRS (systemic inflammatory response syndrome) ([address] 12/6/2021 Yes    Elevated lactic acid level 12/6/2021 Yes    Right lower lobe pneumonia 12/6/2021 Yes    Acute hypoxemic respiratory failure ([address] 12/6/2021 Yes    Tobacco abuse 12/6/2021 Yes          Plan   1. Patient is being admitted due to acute hypoxemic respiratory failrue   2. Multifactorial due to right lower lobe pneumonia, due to immune suppression this could also be a complicated gram negative pneumonia. He also has copd exacerbation with acute bronchitis and acute bronchial inflammatory reaction noted. 3. Dehydration   4. Elevated and fluctuations in blood pressure. 5. Hyperglycemia and type ii dm uncontrolled due to poor control of diet and exercise   6. Tobacco abuse   7. Drug abuse   8. Febrile illness   9. dvt and gi prophylaxis.      Consultations Ordered:  None    Electronically signed by Rico Nagy MD on 12/5/21 at 11:47 PM CST

## 2021-12-06 NOTE — PLAN OF CARE
Problem: Falls - Risk of:  Goal: Will remain free from falls  Description: Will remain free from falls  Outcome: Ongoing  Goal: Absence of physical injury  Description: Absence of physical injury  Outcome: Ongoing     Problem: Breathing Pattern - Ineffective:  Goal: Ability to achieve and maintain a regular respiratory rate will improve  Description: Ability to achieve and maintain a regular respiratory rate will improve  Outcome: Ongoing     Problem: SAFETY  Goal: Free from accidental physical injury  Outcome: Ongoing  Goal: Free from intentional harm  Outcome: Ongoing     Problem: SAFETY  Goal: Free from intentional harm  Outcome: Ongoing     Problem: DAILY CARE  Goal: Daily care needs are met  Outcome: Ongoing     Problem: PAIN  Goal: Patient's pain/discomfort is manageable  Outcome: Ongoing     Problem: SKIN INTEGRITY  Goal: Skin integrity is maintained or improved  Outcome: Ongoing     Problem: KNOWLEDGE DEFICIT  Goal: Patient/S.O. demonstrates understanding of disease process, treatment plan, medications, and discharge instructions.   Outcome: Ongoing     Problem: DISCHARGE BARRIERS  Goal: Patient's continuum of care needs are met  Outcome: Ongoing

## 2021-12-06 NOTE — PROGRESS NOTES
Isatu Franz arrived to room # [id number]. Presented with: Emesis, dizziness, fatigue, and hyperglycemia  Mental Status: Patient is oriented, alert, coherent, logical, thought processes intact and able to concentrate and follow conversation. Vitals:    12/06/21 0045   BP: 122/70   Pulse: 98   Resp: 20   Temp: 97.9 °F (36.6 °C)   SpO2: 97%     Patient safety contract and falls prevention contract reviewed with patient Yes. Oriented Patient to room. Call light within reach. Yes.   Needs, issues or concerns expressed at this time: no.      Electronically signed by Vandana Weeks RN on 12/6/2021 at 2:18 AM

## 2021-12-07 VITALS
BODY MASS INDEX: 20.9 KG/M2 | DIASTOLIC BLOOD PRESSURE: 80 MMHG | SYSTOLIC BLOOD PRESSURE: 136 MMHG | HEIGHT: 70 IN | HEART RATE: 96 BPM | WEIGHT: 146 LBS | TEMPERATURE: 98 F | OXYGEN SATURATION: 100 % | RESPIRATION RATE: 20 BRPM

## 2021-12-07 PROBLEM — R79.89 ELEVATED LACTIC ACID LEVEL: Status: RESOLVED | Noted: 2021-12-06 | Resolved: 2021-12-07

## 2021-12-07 PROBLEM — A41.9 SEPSIS DUE TO PNEUMONIA (HCC): Status: RESOLVED | Noted: 2021-12-05 | Resolved: 2021-12-07

## 2021-12-07 PROBLEM — J18.9 SEPSIS DUE TO PNEUMONIA (HCC): Status: RESOLVED | Noted: 2021-12-05 | Resolved: 2021-12-07

## 2021-12-07 PROBLEM — J96.01 ACUTE HYPOXEMIC RESPIRATORY FAILURE (HCC): Status: RESOLVED | Noted: 2021-12-06 | Resolved: 2021-12-07

## 2021-12-07 PROBLEM — E86.0 DEHYDRATION: Status: RESOLVED | Noted: 2021-12-06 | Resolved: 2021-12-07

## 2021-12-07 LAB
ANION GAP SERPL CALCULATED.3IONS-SCNC: 12 MMOL/L (ref 7–19)
BASOPHILS ABSOLUTE: 0 K/UL (ref 0–0.2)
BASOPHILS RELATIVE PERCENT: 0.2 % (ref 0–1)
BUN BLDV-MCNC: 19 MG/DL (ref 6–20)
CALCIUM SERPL-MCNC: 8.7 MG/DL (ref 8.6–10)
CHLORIDE BLD-SCNC: 102 MMOL/L (ref 98–111)
CO2: 23 MMOL/L (ref 22–29)
CREAT SERPL-MCNC: 0.7 MG/DL (ref 0.5–1.2)
EOSINOPHILS ABSOLUTE: 0 K/UL (ref 0–0.6)
EOSINOPHILS RELATIVE PERCENT: 0.1 % (ref 0–5)
GFR AFRICAN AMERICAN: >59
GFR NON-AFRICAN AMERICAN: >60
GLUCOSE BLD-MCNC: 186 MG/DL (ref 70–99)
GLUCOSE BLD-MCNC: 193 MG/DL (ref 74–109)
GLUCOSE BLD-MCNC: 307 MG/DL (ref 70–99)
HCT VFR BLD CALC: 41.1 % (ref 42–52)
HEMOGLOBIN: 13.8 G/DL (ref 14–18)
IMMATURE GRANULOCYTES #: 0.1 K/UL
LYMPHOCYTES ABSOLUTE: 2.5 K/UL (ref 1.1–4.5)
LYMPHOCYTES RELATIVE PERCENT: 20.3 % (ref 20–40)
MAGNESIUM: 2.1 MG/DL (ref 1.6–2.6)
MCH RBC QN AUTO: 29.6 PG (ref 27–31)
MCHC RBC AUTO-ENTMCNC: 33.6 G/DL (ref 33–37)
MCV RBC AUTO: 88 FL (ref 80–94)
MONOCYTES ABSOLUTE: 0.5 K/UL (ref 0–0.9)
MONOCYTES RELATIVE PERCENT: 4.1 % (ref 0–10)
NEUTROPHILS ABSOLUTE: 9.3 K/UL (ref 1.5–7.5)
NEUTROPHILS RELATIVE PERCENT: 74.9 % (ref 50–65)
PDW BLD-RTO: 12.9 % (ref 11.5–14.5)
PERFORMED ON: ABNORMAL
PERFORMED ON: ABNORMAL
PHOSPHORUS: 2.3 MG/DL (ref 2.5–4.5)
PLATELET # BLD: 255 K/UL (ref 130–400)
PMV BLD AUTO: 9.8 FL (ref 9.4–12.4)
POTASSIUM SERPL-SCNC: 3.7 MMOL/L (ref 3.5–5)
RBC # BLD: 4.67 M/UL (ref 4.7–6.1)
SODIUM BLD-SCNC: 137 MMOL/L (ref 136–145)
WBC # BLD: 12.4 K/UL (ref 4.8–10.8)

## 2021-12-07 PROCEDURE — 85025 COMPLETE CBC W/AUTO DIFF WBC: CPT

## 2021-12-07 PROCEDURE — 2500000003 HC RX 250 WO HCPCS: Performed by: INTERNAL MEDICINE

## 2021-12-07 PROCEDURE — 2500000003 HC RX 250 WO HCPCS: Performed by: HOSPITALIST

## 2021-12-07 PROCEDURE — 83735 ASSAY OF MAGNESIUM: CPT

## 2021-12-07 PROCEDURE — 6370000000 HC RX 637 (ALT 250 FOR IP): Performed by: INTERNAL MEDICINE

## 2021-12-07 PROCEDURE — 82947 ASSAY GLUCOSE BLOOD QUANT: CPT

## 2021-12-07 PROCEDURE — 84100 ASSAY OF PHOSPHORUS: CPT

## 2021-12-07 PROCEDURE — 2580000003 HC RX 258: Performed by: HOSPITALIST

## 2021-12-07 PROCEDURE — 6370000000 HC RX 637 (ALT 250 FOR IP): Performed by: HOSPITALIST

## 2021-12-07 PROCEDURE — 6360000002 HC RX W HCPCS: Performed by: INTERNAL MEDICINE

## 2021-12-07 PROCEDURE — 80048 BASIC METABOLIC PNL TOTAL CA: CPT

## 2021-12-07 PROCEDURE — 36415 COLL VENOUS BLD VENIPUNCTURE: CPT

## 2021-12-07 RX ORDER — METHYLPREDNISOLONE 4 MG/1
TABLET ORAL
Qty: 1 KIT | Refills: 0 | Status: SHIPPED | OUTPATIENT
Start: 2021-12-07 | End: 2022-03-04

## 2021-12-07 RX ORDER — FAMOTIDINE 20 MG/1
20 TABLET, FILM COATED ORAL 2 TIMES DAILY
Status: DISCONTINUED | OUTPATIENT
Start: 2021-12-07 | End: 2021-12-07 | Stop reason: HOSPADM

## 2021-12-07 RX ORDER — AZITHROMYCIN 500 MG/1
500 TABLET, FILM COATED ORAL DAILY
Qty: 3 TABLET | Refills: 0 | Status: SHIPPED | OUTPATIENT
Start: 2021-12-07 | End: 2021-12-10

## 2021-12-07 RX ORDER — CEFDINIR 300 MG/1
300 CAPSULE ORAL 2 TIMES DAILY
Qty: 10 CAPSULE | Refills: 0 | Status: SHIPPED | OUTPATIENT
Start: 2021-12-07 | End: 2021-12-12

## 2021-12-07 RX ADMIN — METHYLPREDNISOLONE SODIUM SUCCINATE 40 MG: 40 INJECTION, POWDER, FOR SOLUTION INTRAMUSCULAR; INTRAVENOUS at 01:04

## 2021-12-07 RX ADMIN — FAMOTIDINE 20 MG: 10 INJECTION, SOLUTION INTRAVENOUS at 08:31

## 2021-12-07 RX ADMIN — INSULIN LISPRO 3 UNITS: 100 INJECTION, SOLUTION INTRAVENOUS; SUBCUTANEOUS at 12:21

## 2021-12-07 RX ADMIN — INSULIN GLARGINE 40 UNITS: 100 INJECTION, SOLUTION SUBCUTANEOUS at 08:31

## 2021-12-07 RX ADMIN — BUPROPION HYDROCHLORIDE 150 MG: 150 TABLET, EXTENDED RELEASE ORAL at 08:30

## 2021-12-07 RX ADMIN — INSULIN LISPRO 12 UNITS: 100 INJECTION, SOLUTION INTRAVENOUS; SUBCUTANEOUS at 08:31

## 2021-12-07 RX ADMIN — GABAPENTIN 300 MG: 600 TABLET, FILM COATED ORAL at 08:30

## 2021-12-07 RX ADMIN — ENOXAPARIN SODIUM 40 MG: 100 INJECTION SUBCUTANEOUS at 08:31

## 2021-12-07 RX ADMIN — LURASIDONE HYDROCHLORIDE 20 MG: 40 TABLET, FILM COATED ORAL at 08:30

## 2021-12-07 RX ADMIN — INSULIN LISPRO 20 UNITS: 100 INJECTION, SOLUTION INTRAVENOUS; SUBCUTANEOUS at 08:37

## 2021-12-07 RX ADMIN — METHYLPREDNISOLONE SODIUM SUCCINATE 40 MG: 40 INJECTION, POWDER, FOR SOLUTION INTRAMUSCULAR; INTRAVENOUS at 12:21

## 2021-12-07 RX ADMIN — POTASSIUM PHOSPHATE, MONOBASIC AND POTASSIUM PHOSPHATE, DIBASIC 30 MMOL: 224; 236 INJECTION, SOLUTION, CONCENTRATE INTRAVENOUS at 11:02

## 2021-12-07 NOTE — PROGRESS NOTES
Pharmacy Intravenous to Oral Protocol    Medication changed per [facility] IV to PO protocol: Famotidine    Patient meets the following inclusion criteria and none of the exclusion criteria:    Inclusion criteria:  - IV therapy > 24 hours (antibiotics only)  - Tolerating diet more advanced than clear liquids  - Tolerating PO medications  - No vasopressor blood pressure support (ie no signs of shock)  - Patient hasn't had a seizure for 72 hrs (antiepileptic medications only)    Exclusion criteria:  - Infections requiring IV therapy (ie meningitis, endocarditis, osteomyelitis, pancreatitis)   - Nausea and/or vomiting or severe diarrhea within past 24 hours   - Has gastrectomy, ileus, gastric outlet or bowel obstruction, or malabsorption syndromes   - Has significant painful oral ulceration   - TPN with an NPO order   - Active GI bleed   - Unable to swallow   - NPO   - Febrile in the last 24 hours (antibiotics only)   - Clinical deteriorating or unstable (antibiotics only)   - Pediatric patients and patients who are not euthyroid (not on oral levothyroxine/not stabilized on oral levothyroxine)- Levothyroxine only     Electronically signed by Cory Garrido, [address] [place] [address] on 12/7/2021 at 10:47 AM

## 2021-12-07 NOTE — PLAN OF CARE
Problem: Falls - Risk of:  Goal: Will remain free from falls  Description: Will remain free from falls  Outcome: Ongoing     Problem: Falls - Risk of:  Goal: Absence of physical injury  Description: Absence of physical injury  Outcome: Ongoing     Problem: Breathing Pattern - Ineffective:  Goal: Ability to achieve and maintain a regular respiratory rate will improve  Description: Ability to achieve and maintain a regular respiratory rate will improve  Outcome: Ongoing     Problem: SAFETY  Goal: Free from accidental physical injury  Outcome: Ongoing     Problem: SAFETY  Goal: Free from intentional harm  Outcome: Ongoing     Problem: DAILY CARE  Goal: Daily care needs are met  Outcome: Ongoing     Problem: PAIN  Goal: Patient's pain/discomfort is manageable  Outcome: Ongoing     Problem: SKIN INTEGRITY  Goal: Skin integrity is maintained or improved  Outcome: Ongoing     Problem: KNOWLEDGE DEFICIT  Goal: Patient/S.O. demonstrates understanding of disease process, treatment plan, medications, and discharge instructions.   Outcome: Ongoing     Problem: DISCHARGE BARRIERS  Goal: Patient's continuum of care needs are met  Outcome: Ongoing

## 2021-12-07 NOTE — DISCHARGE SUMMARY
[place], [place], [address]    DEPARTMENT OF HOSPITALIST MEDICINE      DISCHARGE SUMMARY:      PATIENT NAME:  Gage Mcgrath  :  1970  MRN:  [id number]    Admission Date:   2021  7:43 PM Attending: Mira Duvall MD   Discharge Date:   2021              PCP: Gamaliel Puente MD  Length of Stay: 2 days     Chief Complaint on Admission:   Chief Complaint   Patient presents with    Emesis     has been going off and on for 5 days    Dizziness    Fatigue     \"been in the bed for 4-5 days sleeping, no energy    Hyperglycemia       Consultants:     None       Discharge Problem List:   Principal Problem (Resolved):    Sepsis due to pneumonia [facility])  Active Problems:    Essential hypertension    Controlled type 2 diabetes mellitus with hyperglycemia ([address]    Diabetic peripheral neuropathy ([address]    Bipolar depression ([address]    Generalized anxiety disorder    Insomnia    History of drug abuse ([address]    Mood disorder ([address]    Hyperglycemia due to diabetes mellitus ([address]    Lethargy    COPD with acute exacerbation ([address]    Bronchitis    SIRS (systemic inflammatory response syndrome) ([address]    Right lower lobe pneumonia    Tobacco abuse    Tobacco abuse counseling    Dependence on nicotine from cigarettes  Resolved Problems:    Dehydration    Elevated lactic acid level    Acute hypoxemic respiratory failure (HCC)    Lactic acidosis       Dependence on nicotine from cigarettes          Tobacco abuse counseling  Patient smokes cigarettes on a chronic basis. Strictly advised patient to cut down on or quit smoking. Nicotine patch offered. ~5 minutes spent on tobacco cessation counseling with the patient. Websites - http://smokefree. gov & Legal[employer]ants[employer]. com   °Quitlines - 1-800-QUIT-NOW (1-962-791-918-437-8004)   °Smokefree Apps - QuitSTART Rivera   °Text Messages - SmokefreeTXT (send the word QUIT to 66415)     [address]  TREATMENT:    2021  Patient is feeling better today.  His shortness of breath has been improving. His lab, electrolytes and vitals are stable. He is maintaining his blood pressure in normal range. Sodium level has improved from 1 24-1 37. Lactic acidosis has resolved. His phosphorus was borderline low at 2.3 which has been replaced with IV potassium phosphate. Leukocytosis is stable at 12.4. He is able to walk around and wants to go home. He has received 2 days of IV antibiotic in the form of Rocephin and Zithromax in the hospital. I am going to discharge him on 3 days of azithromycin and 5 days of Omnicef to complete course of antibiotics. Strictly advised patient to quit smoking. 12/6/2021  Patient admitted with sepsis secondary to pneumonia. Feels tired and fatigued. Lactic acidosis has improved. SBP still low 100. Patient started on IV hydration. Patient is uncontrolled diabetes mellitus with hemoglobin A1c 13.2%. We will optimize his diabetes management while he is here.        12/5/2021  History of Present Illness   46year old male with multiple medical issues. Hypertension, tobacco abuse, copd , poorly controlled type ii dm   And weakness and lethargy and cough for the last 5 days.      Evaluated in er and noted to be hyperglycemic  With bp fluctuations high and low. Dehydration   Wheezing   Rhonchi noted. Febrile illness  Failure to thrive at home      Being admitted for CAP and / or complicated gram negative pneumonia   Copd exacerbation with wheezing and hypoxemia and   Acute hypoxemic respiratory failure and has been smoking the whole time with these sx.  In addition has been using drugs and methamphetamines as well likely that has worsened overall situation      Uncontrolled type ii dm with nephropathy, neuropathy, retinopathy      Admitted for CAP with copd exacerbation and acute hypoxemic respiratory failure         OBJECTIVE:  /80   Pulse 96   Temp 98 °F (36.7 °C) (Temporal)   Resp 20   Ht 5' 10\" (1.778 m)   Wt 146 lb (66.2 kg)   SpO2 100% BMI 20.95 kg/m²       Heart: RRR   Lungs: Bilateral decreased air entry in both lung fields, coarse bilateral breath sounds, + right lower lobe rhonchi   Abdomen: Soft, non-tender   Extremities: No edema   Neurologic: Alert and oriented   Skin: Warm and dry          Laboratory Data:  Recent Labs     12/05/21 2010 12/06/21 0254 12/07/21  0256   WBC 12.9* 12.4* 12.4*   HGB 17.3 14.7 13.8*    276 255     Recent Labs     12/05/21 2010 12/06/21  0254 12/07/21  0256   * 130* 137   K 5.0 4.1 3.7   CL 85* 93* 102   CO2 24 23 23   BUN 29* 23* 19   CREATININE 0.9 0.6 0.7   GLUCOSE 510* 329* 193*     Recent Labs     12/05/21 2010 12/06/21  0254   AST 12 11   ALT 21 15   BILITOT 1.3* 1.1   ALKPHOS 123 93     Troponin T:   Recent Labs     12/05/21 2010   Cristy Atkins <0.01     Pro-BNP: No results for input(s): BNP in the last 72 hours. INR: No results for input(s): INR in the last 72 hours. UA:  Recent Labs     12/05/21  2215   COLORU YELLOW   PHUR 5.0   CLARITYU Clear   SPECGRAV 1.041   LEUKOCYTESUR Negative   UROBILINOGEN 0.2   BILIRUBINUR Negative   BLOODU Negative   GLUCOSEU =>1000     A1C:   Recent Labs     12/06/21  0254   LABA1C 13.2*     ABG:No results for input(s): PHART, IDW8HWB, PO2ART, VAO5MZM, BEART, HGBAE, Y0KJZGGW, CARBOXHGBART in the last 72 hours. Impressions of imaging performed in 48 hours before discharge:    XR CHEST PORTABLE    Result Date: 12/5/2021  Nonconsolidating interstitial infiltrate in the right lung base concerning for pneumonia. Signed by Dr Lilly Coppola. [place]       Current Discharge Medication List           Details   methylPREDNISolone (MEDROL DOSEPACK) 4 MG tablet Take by mouth.   Qty: 1 kit, Refills: 0      cefdinir (OMNICEF) 300 MG capsule Take 1 capsule by mouth 2 times daily for 5 days  Qty: 10 capsule, Refills: 0      azithromycin (ZITHROMAX) 500 MG tablet Take 1 tablet by mouth daily for 3 days  Qty: 3 tablet, Refills: 0    Associated Diagnoses: Pneumonia of right lower lobe due to infectious organism              Details   insulin glargine (BASAGLAR KWIKPEN) 100 UNIT/ML injection pen Inject 40 Units into the skin 2 times daily  Qty: 10 pen, Refills: 5    Comments: Please include any needed needles  Associated Diagnoses: Type 1 diabetes mellitus with hyperglycemia (HCC)      lurasidone (LATUDA) 20 MG TABS tablet Take 1 tablet by mouth daily  Qty: 30 tablet, Refills: 2    Associated Diagnoses: Mood disorder (HCC)      buPROPion (WELLBUTRIN XL) 150 MG extended release tablet Take 1 tablet by mouth every morning  Qty: 30 tablet, Refills: 3    Associated Diagnoses: Mood disorder (HCC)      insulin aspart (NOVOLOG FLEXPEN) 100 UNIT/ML injection pen Inject 20 Units into the skin 3 times daily (before meals)  Qty: 5 pen, Refills: 2    Comments: Please include any needed needles  Associated Diagnoses: Type 1 diabetes mellitus with hyperglycemia (HCC)      gabapentin (NEURONTIN) 600 MG tablet Take 1 tablet by mouth 3 times daily for 30 days. Qty: 90 tablet, Refills: 1    Associated Diagnoses: Diabetic peripheral neuropathy (HCC)      glucose monitoring (FREESTYLE FREEDOM) kit 1 kit by Does not apply route daily  Qty: 1 kit, Refills: 0    Comments: May substitute brand as needed for cost/insurance purposes. Associated Diagnoses: Type 1 diabetes mellitus with hyperglycemia (HCC)      blood glucose monitor strips Test 5 times a day & as needed for symptoms of irregular blood glucose. Dispense sufficient amount for indicated testing frequency plus additional to accommodate PRN testing needs. Qty: 200 strip, Refills: 5    Comments: Brand per patient preference. May round up to next available package size. Associated Diagnoses: Type 1 diabetes mellitus with hyperglycemia (HCC)      Lancets MISC 1 each by Does not apply route 5 times daily  Qty: 200 each, Refills: 5    Comments: May substitute brand as needed for cost/insurance purposes.   Associated Diagnoses: Type 1 diabetes mellitus with hyperglycemia ([address]               ISSUES TO BE ADDRESSED AT HOSPITAL FOLLOW-UP VISIT:    Advised patient to follow-up closely with PCP upon discharge for management of chronic medical issues  Please see the detailed discharge directions delivered from earlier today! Condition on Discharge: gradually improving  Discharge Disposition: Home    Recommended Follow Up:  Dereck Song MD  [facility]  [phone]    Go on 12/14/2021  Hospital follow-up appointment is scheduled for 12/14 at 10am.    Followup Appointments Scheduled at Time of Discharge:  Future Appointments   Date Time Provider Rudy Chaidez   12/14/2021 10:00 [id number] [facility], [address] [place] [address] [id number]        Discharge Instructions:   Please see the discharge paperwork. Patient was seen at bedside today, and the examination shows improvement since yesterday. Detailed discharge directions delivered to the patient by myself and our nursing staff, who verbalizes understanding and is very happy and satisfied with the plan. Patient has been advised to continue all medications as prescribed and advised, and f/u with PCP within 1 week. Patient is stable from medical standpoint to be discharged. Total time spent during patient evaluation and assessment, discussion with the nurse/family, addressing discharge medications/scripts and coordination of care for safe discharge was in excess of 35 minutes.       Signed Electronically:    Amilcar Islas MD  4:35 PM 12/7/2021

## 2021-12-09 ASSESSMENT — ENCOUNTER SYMPTOMS
COUGH: 1
SHORTNESS OF BREATH: 1

## 2021-12-09 NOTE — ED PROVIDER NOTES
[facility]  eMERGENCY dEPARTMENT eNCOUnter      Pt Name: Gifty Vidales  MRN: [id number]  [place] 1970  Date of evaluation: 12/5/2021  Provider: Severo Urias MD    [address] [place] [address]       Chief Complaint   Patient presents with    Emesis     has been going off and on for 5 days    Dizziness    Fatigue     \"been in the bed for 4-5 days sleeping, no energy    Hyperglycemia         HISTORY OF PRESENT ILLNESS   (Location/Symptom, Timing/Onset,Context/Setting, Quality, Duration, Modifying Factors, Severity)  Note limiting factors. Gifty Vidales is a 46 y.o. male who presents to the emergency department with fatigue and weakness 4-5 days not doing well. DM with high glucose. Emesis x 5 days. Not vaccinated. Smokes. Cough and CP with cough. The history is provided by the patient. NursingNotes were reviewed. REVIEW OF SYSTEMS    (2-9 systems for level 4, 10 or more for level 5)     Review of Systems   Constitutional: Positive for fatigue. Negative for fever. Respiratory: Positive for cough and shortness of breath. Cardiovascular: Positive for chest pain. Neurological: Negative for seizures and syncope. Psychiatric/Behavioral: Negative for confusion. A complete review of systems was performed and is negative except as noted above in the HPI.        PAST MEDICAL HISTORY     Past Medical History:   Diagnosis Date    ADHD     Anxiety     Depression     Diabetes ([facility]          SURGICAL HISTORY       Past Surgical History:   Procedure Laterality Date    NECK SURGERY           CURRENT MEDICATIONS       Discharge Medication List as of 12/7/2021  5:35 PM      CONTINUE these medications which have NOT CHANGED    Details   insulin glargine (BASAGLAR KWIKPEN) 100 UNIT/ML injection pen Inject 40 Units into the skin 2 times daily, Disp-10 pen, R-5Please include any needed needlesNormal      lurasidone (LATUDA) 20 MG TABS tablet Take 1 tablet by mouth daily, Disp-30 tablet, R-2Normal      buPROPion (WELLBUTRIN XL) 150 MG extended release tablet Take 1 tablet by mouth every morning, Disp-30 tablet, R-3Normal      insulin aspart (NOVOLOG FLEXPEN) 100 UNIT/ML injection pen Inject 20 Units into the skin 3 times daily (before meals), Disp-5 pen, R-2Please include any needed needlesNormal      gabapentin (NEURONTIN) 600 MG tablet Take 1 tablet by mouth 3 times daily for 30 days. , Disp-90 tablet, R-1Normal      glucose monitoring (FREESTYLE FREEDOM) kit DAILY Starting Wed 7/28/2021, Disp-1 kit, R-0, Normal      blood glucose monitor strips Test 5 times a day & as needed for symptoms of irregular blood glucose. Dispense sufficient amount for indicated testing frequency plus additional to accommodate PRN testing needs. , Disp-200 strip, R-5, Normal      Lancets MISC 5 TIMES DAILY Starting Wed 7/28/2021, Disp-200 each, R-5, Normal             ALLERGIES     Patient has no known allergies.     FAMILY HISTORY       Family History   Adopted: Yes          SOCIAL HISTORY       Social History     Socioeconomic History    Marital status: Single     Spouse name: None    Number of children: None    Years of education: None    Highest education level: None   Occupational History    None   Tobacco Use    Smoking status: Current Every Day Smoker     Packs/day: 1.50     Types: Cigarettes     Start date: 5/3/1999    Smokeless tobacco: Former User   Vaping Use    Vaping Use: Never used   Substance and Sexual Activity    Alcohol use: Not Currently    Drug use: Yes     Types: Methamphetamines (Crystal Meth)     Comment: \"A couple months ago\"    Sexual activity: None   Other Topics Concern    None   Social History Narrative    None     Social Determinants of Health     Financial Resource Strain:     Difficulty of Paying Living Expenses: Not on file   Food Insecurity:     Worried About Running Out of Food in the Last Year: Not on file    Billy of Food in the Last Year: Not on file Transportation Needs:     Lack of Transportation (Medical): Not on file    Lack of Transportation (Non-Medical): Not on file   Physical Activity:     Days of Exercise per Week: Not on file    Minutes of Exercise per Session: Not on file   Stress:     Feeling of Stress : Not on file   Social Connections:     Frequency of Communication with Friends and Family: Not on file    Frequency of Social Gatherings with Friends and Family: Not on file    Attends [employer] Services: Not on file    Active Member of [address] [place] [address] or Organizations: Not on file    Attends Club or Organization Meetings: Not on file    Marital Status: Not on file   Intimate Partner Violence:     Fear of Current or Ex-Partner: Not on file    Emotionally Abused: Not on file    Physically Abused: Not on file    Sexually Abused: Not on file   Housing Stability:     Unable to Pay for Housing in the Last Year: Not on file    Number of [place] in the Last Year: Not on file    Unstable Housing in the Last Year: Not on file       SCREENINGS    [place] Coma Scale  Eye Opening: Spontaneous  Best Verbal Response: Oriented  Best Motor Response: Obeys commands  [place] Coma Scale Score: 15        PHYSICAL EXAM    (up to 7 for level 4, 8 or more for level 5)     ED Triage Vitals [12/05/21 1930]   BP Temp Temp Source Pulse Resp SpO2 Height Weight   104/79 97.1 °F (36.2 °C) Oral 117 18 100 % 5' 10\" (1.778 m) 144 lb 6.4 oz (65.5 kg)       Physical Exam  Vitals reviewed. Constitutional:       General: He is not in acute distress. HENT:      Head: Normocephalic and atraumatic. Eyes:      Extraocular Movements: Extraocular movements intact. Pupils: Pupils are equal, round, and reactive to light. Cardiovascular:      Rate and Rhythm: Normal rate and regular rhythm. Pulmonary:      Effort: Pulmonary effort is normal.      Breath sounds: Wheezing and rhonchi present. Abdominal:      General: Abdomen is flat. Palpations: Abdomen is soft. Musculoskeletal:         General: No swelling. Normal range of motion. Cervical back: Normal range of motion and neck supple. Skin:     General: Skin is warm and dry. Neurological:      General: No focal deficit present. Mental Status: He is alert and oriented to person, place, and time. Psychiatric:         Mood and Affect: Mood normal.         Behavior: Behavior normal.         DIAGNOSTIC RESULTS     EKG: All EKG's are interpreted by the Emergency Department Physician who either signs or Co-signs this chart in the absence of a cardiologist.        RADIOLOGY:   Non-plain film images such as CT, Ultrasound and MRI are read by the radiologist. Chales Kirstin images are visualized and preliminarily interpreted by the emergency physician with the below findings:        Interpretation per the Radiologist below, if available at the time of this note:    XR CHEST PORTABLE   Final Result   Nonconsolidating interstitial infiltrate in the right lung   base concerning for pneumonia. Signed by Dr Zachary Bryant            ED BEDSIDE ULTRASOUND:   Performed by ED Physician - none    LABS:  Labs Reviewed   CBC WITH AUTO DIFFERENTIAL - Abnormal; Notable for the following components:       Result Value    WBC 12.9 (*)     Neutrophils % 73.4 (*)     Neutrophils Absolute 9.5 (*)     All other components within normal limits   COMPREHENSIVE METABOLIC PANEL - Abnormal; Notable for the following components:    Sodium 124 (*)     Chloride 85 (*)     Glucose 510 (*)     BUN 29 (*)     Total Bilirubin 1.3 (*)     All other components within normal limits    Narrative:     CALL  [place]  [facility] tel. ,  Chemistry results called to and read back by Nicol Ferguson in ED, 12/05/2021  21:17, by [facility]   URINE RT REFLEX TO CULTURE - Abnormal; Notable for the following components:    Ketones, Urine 15 (*)     All other components within normal limits   LACTIC ACID, PLASMA - Abnormal; Notable for the following components:    Lactic Acid 2.6 (*)     All other components within normal limits    Narrative:     CALL  [facility] tel. ,  Chemistry results called to and read back by Jamila Merlos in ED, 12/05/2021  21:59, by [facility]   CBC - Abnormal; Notable for the following components:    WBC 12.4 (*)     All other components within normal limits   COMPREHENSIVE METABOLIC PANEL - Abnormal; Notable for the following components:    Sodium 130 (*)     Chloride 93 (*)     Glucose 329 (*)     BUN 23 (*)     Total Protein 6.2 (*)     All other components within normal limits   HEMOGLOBIN A1C - Abnormal; Notable for the following components:    Hemoglobin A1C 13.2 (*)     All other components within normal limits   BASIC METABOLIC PANEL - Abnormal; Notable for the following components:    Glucose 193 (*)     All other components within normal limits   CBC WITH AUTO DIFFERENTIAL - Abnormal; Notable for the following components:    WBC 12.4 (*)     RBC 4.67 (*)     Hemoglobin 13.8 (*)     Hematocrit 41.1 (*)     Neutrophils % 74.9 (*)     Neutrophils Absolute 9.3 (*)     All other components within normal limits   PHOSPHORUS - Abnormal; Notable for the following components:    Phosphorus 2.3 (*)     All other components within normal limits   POCT GLUCOSE - Abnormal; Notable for the following components:    POC Glucose 454 (*)     All other components within normal limits   POCT GLUCOSE - Abnormal; Notable for the following components:    POC Glucose 372 (*)     All other components within normal limits   POCT GLUCOSE - Abnormal; Notable for the following components:    POC Glucose 298 (*)     All other components within normal limits   POCT GLUCOSE - Abnormal; Notable for the following components:    POC Glucose 315 (*)     All other components within normal limits   POCT GLUCOSE - Abnormal; Notable for the following components:    POC Glucose 322 (*)     All other components within normal limits   POCT GLUCOSE - Abnormal; Notable for the following components: POC Glucose 299 (*)     All other components within normal limits   POCT GLUCOSE - Abnormal; Notable for the following components:    POC Glucose 221 (*)     All other components within normal limits   POCT GLUCOSE - Abnormal; Notable for the following components:    POC Glucose 105 (*)     All other components within normal limits   POCT GLUCOSE - Abnormal; Notable for the following components:    POC Glucose 113 (*)     All other components within normal limits   POCT GLUCOSE - Abnormal; Notable for the following components:    POC Glucose 285 (*)     All other components within normal limits   POCT GLUCOSE - Abnormal; Notable for the following components:    POC Glucose 307 (*)     All other components within normal limits   POCT GLUCOSE - Abnormal; Notable for the following components:    POC Glucose 186 (*)     All other components within normal limits   COVID-19, RAPID   CULTURE, BLOOD 1    Narrative:     ORDER#: [id number]                          ORDERED BY: JUAN PABLO HANNAH  SOURCE: Blood R U E                        COLLECTED:  12/05/21 21:31  ANTIBIOTICS AT [place].:                      RECEIVED :  12/05/21 21:40   CULTURE, BLOOD 2    Narrative:     ORDER#: [id number]                          ORDERED BY: JUAN PABLO HANNAH  SOURCE: Blood Hand, Right                  COLLECTED:  12/05/21 22:38  ANTIBIOTICS AT [place].:                      RECEIVED :  12/05/21 22:49   LIPASE   TROPONIN   VENOUS BLD GAS   LACTIC ACID, PLASMA   MAGNESIUM   POCT GLUCOSE   POCT GLUCOSE   POCT GLUCOSE   POCT GLUCOSE   POCT GLUCOSE   POCT GLUCOSE   POCT GLUCOSE   POCT GLUCOSE   POCT GLUCOSE   POCT GLUCOSE   POCT GLUCOSE       All other labs were within normal range or not returned as of this dictation.     EMERGENCY DEPARTMENT COURSE and DIFFERENTIALDIAGNOSIS/MDM:   Vitals:    Vitals:    12/07/21 0116 12/07/21 0525 12/07/21 1039 12/07/21 1439   BP: 126/69 (!) 142/83 127/71 136/80   Pulse: 102 98 110 96   Resp: 18 20 20 20   Temp: 98.6 °F (37 °C) 97.6 °F (36.4 °C) 98 °F (36.7 °C)    TempSrc: Temporal Temporal Temporal    SpO2: 97% 100% 98% 100%   Weight:       Height:           MDM  Number of Diagnoses or Management Options  Pneumonia of right lower lobe due to infectious organism: new and requires workup  Type 1 diabetes mellitus with hyperglycemia ([facility]): new and requires workup  Diagnosis management comments: RLL pna in pt with uncontrolled DM    Admit   ABx     Insulin    dw hospitalist        Amount and/or Complexity of Data Reviewed  Clinical lab tests: ordered and reviewed  Tests in the radiology section of CPT®: reviewed and ordered  Discuss the patient with other providers: yes  Independent visualization of images, tracings, or specimens: yes    Patient Progress  Patient progress: stable        CONSULTS:  None    PROCEDURES:  Unless otherwise notedbelow, none     Procedures    FINAL IMPRESSION     1. Pneumonia of right lower lobe due to infectious organism    2.  Type 1 diabetes mellitus with hyperglycemia ([facility]          DISPOSITION/PLAN   DISPOSITION Admitted 12/05/2021 11:47:10 PM      PATIENT REFERRED TO:  Antonio Bagley MD  [address] [place] [address]  [phone]    Go on 12/14/2021  Hospital follow-up appointment is scheduled for 12/14 at 10am.      DISCHARGE MEDICATIONS:  Discharge Medication List as of 12/7/2021  5:35 PM      START taking these medications    Details   methylPREDNISolone (MEDROL DOSEPACK) 4 MG tablet Take by mouth., Disp-1 kit, R-0Normal      cefdinir (OMNICEF) 300 MG capsule Take 1 capsule by mouth 2 times daily for 5 days, Disp-10 capsule, R-0Normal      azithromycin (ZITHROMAX) 500 MG tablet Take 1 tablet by mouth daily for 3 days, Disp-3 tablet, R-0Normal                (Please note that portions of this note were completed with a voice recognition program.  Efforts were made to edit the dictations butoccasionally words are mis-transcribed.)    Car Wong MD (electronically signed)  AttendingEmergency Physician          Selvin East MD  12/09/21 1007

## 2021-12-11 LAB
BLOOD CULTURE, ROUTINE: NORMAL
CULTURE, BLOOD 2: NORMAL

## 2022-01-17 LAB — TSH SERPL DL<=0.05 MIU/L-ACNC: NORMAL M[IU]/L

## 2022-03-04 ENCOUNTER — HOSPITAL ENCOUNTER (INPATIENT)
Age: 52
LOS: 2 days | Discharge: PSYCHIATRIC OR SUBSTANCE USE FACILITY | DRG: 885 | End: 2022-03-06
Attending: EMERGENCY MEDICINE | Admitting: INTERNAL MEDICINE
Payer: MEDICAID

## 2022-03-04 ENCOUNTER — OFFICE VISIT (OUTPATIENT)
Dept: PRIMARY CARE CLINIC | Age: 52
End: 2022-03-04
Payer: MEDICAID

## 2022-03-04 VITALS
OXYGEN SATURATION: 96 % | HEART RATE: 82 BPM | TEMPERATURE: 97.2 F | DIASTOLIC BLOOD PRESSURE: 84 MMHG | RESPIRATION RATE: 16 BRPM | BODY MASS INDEX: 22.24 KG/M2 | SYSTOLIC BLOOD PRESSURE: 142 MMHG | WEIGHT: 155 LBS

## 2022-03-04 DIAGNOSIS — T14.8XXA SKIN ABRASION: ICD-10-CM

## 2022-03-04 DIAGNOSIS — R73.9 HYPERGLYCEMIA: Primary | ICD-10-CM

## 2022-03-04 DIAGNOSIS — E11.65 UNCONTROLLED TYPE 2 DIABETES MELLITUS WITH HYPERGLYCEMIA (HCC): ICD-10-CM

## 2022-03-04 DIAGNOSIS — F32.A DEPRESSION, UNSPECIFIED DEPRESSION TYPE: ICD-10-CM

## 2022-03-04 DIAGNOSIS — K02.9 PAIN DUE TO DENTAL CARIES: ICD-10-CM

## 2022-03-04 PROBLEM — J44.9 COPD (CHRONIC OBSTRUCTIVE PULMONARY DISEASE) (HCC): Status: ACTIVE | Noted: 2021-12-06

## 2022-03-04 LAB
ACETONE BLOOD: NEGATIVE
ALBUMIN SERPL-MCNC: 4.1 G/DL (ref 3.5–5.2)
ALP BLD-CCNC: 119 U/L (ref 40–130)
ALT SERPL-CCNC: 30 U/L (ref 5–41)
AMPHETAMINE SCREEN, URINE: NEGATIVE
ANION GAP SERPL CALCULATED.3IONS-SCNC: 13 MMOL/L (ref 7–19)
AST SERPL-CCNC: 17 U/L (ref 5–40)
BARBITURATE SCREEN URINE: NEGATIVE
BASE EXCESS VENOUS: 3 MMOL/L
BASOPHILS ABSOLUTE: 0 K/UL (ref 0–0.2)
BASOPHILS RELATIVE PERCENT: 0.4 % (ref 0–1)
BENZODIAZEPINE SCREEN, URINE: NEGATIVE
BILIRUB SERPL-MCNC: 0.5 MG/DL (ref 0.2–1.2)
BILIRUBIN URINE: NEGATIVE
BLOOD, URINE: NEGATIVE
BUN BLDV-MCNC: 20 MG/DL (ref 6–20)
CALCIUM SERPL-MCNC: 9.8 MG/DL (ref 8.6–10)
CANNABINOID SCREEN URINE: NEGATIVE
CARBOXYHEMOGLOBIN: 5.7 %
CHLORIDE BLD-SCNC: 89 MMOL/L (ref 98–111)
CHP ED QC CHECK: NORMAL
CLARITY: CLEAR
CO2: 27 MMOL/L (ref 22–29)
COCAINE METABOLITE SCREEN URINE: NEGATIVE
COLOR: YELLOW
CREAT SERPL-MCNC: 0.7 MG/DL (ref 0.5–1.2)
EOSINOPHILS ABSOLUTE: 0.1 K/UL (ref 0–0.6)
EOSINOPHILS RELATIVE PERCENT: 0.8 % (ref 0–5)
ETHANOL: <10 MG/DL (ref 0–0.08)
GFR AFRICAN AMERICAN: >59
GFR NON-AFRICAN AMERICAN: >60
GLUCOSE BLD-MCNC: 181 MG/DL (ref 70–99)
GLUCOSE BLD-MCNC: 327 MG/DL (ref 70–99)
GLUCOSE BLD-MCNC: 379 MG/DL (ref 70–99)
GLUCOSE BLD-MCNC: 517 MG/DL
GLUCOSE BLD-MCNC: 715 MG/DL (ref 74–109)
GLUCOSE URINE: =>1000 MG/DL
HBA1C MFR BLD: 13.7 % (ref 4–6)
HCO3 VENOUS: 31 MMOL/L (ref 23–29)
HCT VFR BLD CALC: 46.6 % (ref 42–52)
HEMOGLOBIN: 15.7 G/DL (ref 14–18)
IMMATURE GRANULOCYTES #: 0 K/UL
KETONES, URINE: NEGATIVE MG/DL
LEUKOCYTE ESTERASE, URINE: NEGATIVE
LYMPHOCYTES ABSOLUTE: 3.1 K/UL (ref 1.1–4.5)
LYMPHOCYTES RELATIVE PERCENT: 32.2 % (ref 20–40)
Lab: NORMAL
MCH RBC QN AUTO: 30.1 PG (ref 27–31)
MCHC RBC AUTO-ENTMCNC: 33.7 G/DL (ref 33–37)
MCV RBC AUTO: 89.3 FL (ref 80–94)
METHEMOGLOBIN VENOUS: 0.8 %
MONOCYTES ABSOLUTE: 0.6 K/UL (ref 0–0.9)
MONOCYTES RELATIVE PERCENT: 6.4 % (ref 0–10)
NEUTROPHILS ABSOLUTE: 5.7 K/UL (ref 1.5–7.5)
NEUTROPHILS RELATIVE PERCENT: 59.9 % (ref 50–65)
NITRITE, URINE: NEGATIVE
O2 CONTENT, VEN: 8 ML/DL
O2 SAT, VEN: 36 %
OPIATE SCREEN URINE: NEGATIVE
PCO2, VEN: 57 MMHG (ref 40–50)
PDW BLD-RTO: 12.4 % (ref 11.5–14.5)
PERFORMED ON: ABNORMAL
PH UA: 6.5 (ref 5–8)
PH VENOUS: 7.34 (ref 7.35–7.45)
PLATELET # BLD: 286 K/UL (ref 130–400)
PMV BLD AUTO: 9.7 FL (ref 9.4–12.4)
PO2, VEN: 24 MMHG
POTASSIUM SERPL-SCNC: 5.4 MMOL/L (ref 3.5–5)
PROTEIN UA: NEGATIVE MG/DL
RBC # BLD: 5.22 M/UL (ref 4.7–6.1)
SARS-COV-2, NAAT: NOT DETECTED
SODIUM BLD-SCNC: 129 MMOL/L (ref 136–145)
SPECIFIC GRAVITY UA: 1.03 (ref 1–1.03)
TOTAL PROTEIN: 7.2 G/DL (ref 6.6–8.7)
UROBILINOGEN, URINE: 0.2 E.U./DL
WBC # BLD: 9.5 K/UL (ref 4.8–10.8)

## 2022-03-04 PROCEDURE — 99213 OFFICE O/P EST LOW 20 MIN: CPT | Performed by: NURSE PRACTITIONER

## 2022-03-04 PROCEDURE — 82962 GLUCOSE BLOOD TEST: CPT | Performed by: NURSE PRACTITIONER

## 2022-03-04 PROCEDURE — 96374 THER/PROPH/DIAG INJ IV PUSH: CPT

## 2022-03-04 PROCEDURE — 82009 KETONE BODYS QUAL: CPT

## 2022-03-04 PROCEDURE — 1210000000 HC MED SURG R&B

## 2022-03-04 PROCEDURE — 81003 URINALYSIS AUTO W/O SCOPE: CPT

## 2022-03-04 PROCEDURE — 83036 HEMOGLOBIN GLYCOSYLATED A1C: CPT

## 2022-03-04 PROCEDURE — 6360000002 HC RX W HCPCS: Performed by: NURSE PRACTITIONER

## 2022-03-04 PROCEDURE — 6370000000 HC RX 637 (ALT 250 FOR IP): Performed by: NURSE PRACTITIONER

## 2022-03-04 PROCEDURE — 80053 COMPREHEN METABOLIC PANEL: CPT

## 2022-03-04 PROCEDURE — 80307 DRUG TEST PRSMV CHEM ANLYZR: CPT

## 2022-03-04 PROCEDURE — 87635 SARS-COV-2 COVID-19 AMP PRB: CPT

## 2022-03-04 PROCEDURE — 82077 ASSAY SPEC XCP UR&BREATH IA: CPT

## 2022-03-04 PROCEDURE — 36415 COLL VENOUS BLD VENIPUNCTURE: CPT

## 2022-03-04 PROCEDURE — 2580000003 HC RX 258: Performed by: NURSE PRACTITIONER

## 2022-03-04 PROCEDURE — 6370000000 HC RX 637 (ALT 250 FOR IP): Performed by: EMERGENCY MEDICINE

## 2022-03-04 PROCEDURE — 99283 EMERGENCY DEPT VISIT LOW MDM: CPT

## 2022-03-04 PROCEDURE — 82803 BLOOD GASES ANY COMBINATION: CPT

## 2022-03-04 PROCEDURE — 2580000003 HC RX 258: Performed by: EMERGENCY MEDICINE

## 2022-03-04 PROCEDURE — 82947 ASSAY GLUCOSE BLOOD QUANT: CPT

## 2022-03-04 PROCEDURE — 36600 WITHDRAWAL OF ARTERIAL BLOOD: CPT

## 2022-03-04 PROCEDURE — 85025 COMPLETE CBC W/AUTO DIFF WBC: CPT

## 2022-03-04 RX ORDER — ONDANSETRON 2 MG/ML
4 INJECTION INTRAMUSCULAR; INTRAVENOUS EVERY 6 HOURS PRN
Status: DISCONTINUED | OUTPATIENT
Start: 2022-03-04 | End: 2022-03-06 | Stop reason: HOSPADM

## 2022-03-04 RX ORDER — INSULIN GLARGINE 100 [IU]/ML
15 INJECTION, SOLUTION SUBCUTANEOUS NIGHTLY
Status: DISCONTINUED | OUTPATIENT
Start: 2022-03-04 | End: 2022-03-06 | Stop reason: HOSPADM

## 2022-03-04 RX ORDER — SODIUM CHLORIDE 0.9 % (FLUSH) 0.9 %
5-40 SYRINGE (ML) INJECTION EVERY 12 HOURS SCHEDULED
Status: DISCONTINUED | OUTPATIENT
Start: 2022-03-04 | End: 2022-03-06 | Stop reason: HOSPADM

## 2022-03-04 RX ORDER — ACETAMINOPHEN 325 MG/1
650 TABLET ORAL ONCE
Status: COMPLETED | OUTPATIENT
Start: 2022-03-04 | End: 2022-03-04

## 2022-03-04 RX ORDER — ACETAMINOPHEN 325 MG/1
650 TABLET ORAL EVERY 6 HOURS PRN
Status: DISCONTINUED | OUTPATIENT
Start: 2022-03-04 | End: 2022-03-06 | Stop reason: HOSPADM

## 2022-03-04 RX ORDER — TRAZODONE HYDROCHLORIDE 150 MG/1
300 TABLET ORAL NIGHTLY
Status: ON HOLD | COMMUNITY
End: 2022-03-09 | Stop reason: HOSPADM

## 2022-03-04 RX ORDER — BUPROPION HYDROCHLORIDE 150 MG/1
150 TABLET ORAL EVERY MORNING
Status: DISCONTINUED | OUTPATIENT
Start: 2022-03-05 | End: 2022-03-06 | Stop reason: HOSPADM

## 2022-03-04 RX ORDER — NICOTINE 21 MG/24HR
1 PATCH, TRANSDERMAL 24 HOURS TRANSDERMAL DAILY
Status: DISCONTINUED | OUTPATIENT
Start: 2022-03-04 | End: 2022-03-06 | Stop reason: HOSPADM

## 2022-03-04 RX ORDER — ONDANSETRON 4 MG/1
4 TABLET, ORALLY DISINTEGRATING ORAL EVERY 8 HOURS PRN
Status: DISCONTINUED | OUTPATIENT
Start: 2022-03-04 | End: 2022-03-06 | Stop reason: HOSPADM

## 2022-03-04 RX ORDER — SODIUM CHLORIDE 9 MG/ML
25 INJECTION, SOLUTION INTRAVENOUS PRN
Status: DISCONTINUED | OUTPATIENT
Start: 2022-03-04 | End: 2022-03-06 | Stop reason: HOSPADM

## 2022-03-04 RX ORDER — GABAPENTIN 600 MG/1
600 TABLET ORAL 3 TIMES DAILY
Status: DISCONTINUED | OUTPATIENT
Start: 2022-03-04 | End: 2022-03-06 | Stop reason: HOSPADM

## 2022-03-04 RX ORDER — ACETAMINOPHEN 650 MG/1
650 SUPPOSITORY RECTAL EVERY 6 HOURS PRN
Status: DISCONTINUED | OUTPATIENT
Start: 2022-03-04 | End: 2022-03-06 | Stop reason: HOSPADM

## 2022-03-04 RX ORDER — SODIUM CHLORIDE 0.9 % (FLUSH) 0.9 %
5-40 SYRINGE (ML) INJECTION PRN
Status: DISCONTINUED | OUTPATIENT
Start: 2022-03-04 | End: 2022-03-06 | Stop reason: HOSPADM

## 2022-03-04 RX ORDER — 0.9 % SODIUM CHLORIDE 0.9 %
1000 INTRAVENOUS SOLUTION INTRAVENOUS ONCE
Status: COMPLETED | OUTPATIENT
Start: 2022-03-04 | End: 2022-03-04

## 2022-03-04 RX ORDER — POLYETHYLENE GLYCOL 3350 17 G/17G
17 POWDER, FOR SOLUTION ORAL DAILY PRN
Status: DISCONTINUED | OUTPATIENT
Start: 2022-03-04 | End: 2022-03-06 | Stop reason: HOSPADM

## 2022-03-04 RX ORDER — SODIUM CHLORIDE 9 MG/ML
INJECTION, SOLUTION INTRAVENOUS CONTINUOUS
Status: DISCONTINUED | OUTPATIENT
Start: 2022-03-04 | End: 2022-03-05

## 2022-03-04 RX ADMIN — INSULIN GLARGINE 15 UNITS: 100 INJECTION, SOLUTION SUBCUTANEOUS at 21:45

## 2022-03-04 RX ADMIN — GABAPENTIN 600 MG: 600 TABLET, FILM COATED ORAL at 15:30

## 2022-03-04 RX ADMIN — SODIUM CHLORIDE 1000 ML: 9 INJECTION, SOLUTION INTRAVENOUS at 12:20

## 2022-03-04 RX ADMIN — INSULIN HUMAN 15 UNITS: 100 INJECTION, SOLUTION PARENTERAL at 13:00

## 2022-03-04 RX ADMIN — GABAPENTIN 600 MG: 600 TABLET, FILM COATED ORAL at 21:40

## 2022-03-04 RX ADMIN — INSULIN LISPRO 12 UNITS: 100 INJECTION, SOLUTION INTRAVENOUS; SUBCUTANEOUS at 17:27

## 2022-03-04 RX ADMIN — SODIUM CHLORIDE: 9 INJECTION, SOLUTION INTRAVENOUS at 21:42

## 2022-03-04 RX ADMIN — LURASIDONE HYDROCHLORIDE 20 MG: 40 TABLET, FILM COATED ORAL at 15:30

## 2022-03-04 RX ADMIN — ENOXAPARIN SODIUM 40 MG: 100 INJECTION SUBCUTANEOUS at 15:30

## 2022-03-04 RX ADMIN — ACETAMINOPHEN 650 MG: 325 TABLET ORAL at 14:04

## 2022-03-04 RX ADMIN — SODIUM CHLORIDE 1000 ML: 9 INJECTION, SOLUTION INTRAVENOUS at 14:45

## 2022-03-04 RX ADMIN — SODIUM CHLORIDE: 9 INJECTION, SOLUTION INTRAVENOUS at 15:32

## 2022-03-04 ASSESSMENT — ENCOUNTER SYMPTOMS
NAUSEA: 0
SHORTNESS OF BREATH: 0
COUGH: 0
ABDOMINAL PAIN: 1
ABDOMINAL PAIN: 0
RHINORRHEA: 0
SINUS PAIN: 0
SORE THROAT: 0
SINUS PRESSURE: 0
VOMITING: 0
COUGH: 0
RHINORRHEA: 0
BACK PAIN: 0
NAUSEA: 1
SHORTNESS OF BREATH: 0
ABDOMINAL PAIN: 0
CHEST TIGHTNESS: 0
DIARRHEA: 0
VOMITING: 1
SORE THROAT: 0

## 2022-03-04 ASSESSMENT — PAIN SCALES - GENERAL
PAINLEVEL_OUTOF10: 8
PAINLEVEL_OUTOF10: 0

## 2022-03-04 NOTE — ED PROVIDER NOTES
[facility] EMERGENCY DEPT  eMERGENCY dEPARTMENT eNCOUnter      Pt Name: Anastasiia Maynard  MRN: [id number]  [place] 1970  Date of evaluation: 3/4/2022  Provider: Vi Heredia MD    [address] [place] [address]       Chief Complaint   Patient presents with    Hyperglycemia     \"over 500 at home\", too high to read in triage, sent by PCP         HISTORY OF PRESENT ILLNESS   (Location/Symptom, Timing/Onset,Context/Setting, Quality, Duration, Modifying Factors, Severity)  Note limiting factors. Anastasiia Maynard is a 46 y.o. male who presents to the emergency department for hyperglycemia. The patient reports that he has not really been taking that good of care of himself the last couple weeks due to a recent death in the family and has not taken any of his medication the past 2 weeks. Family came to check on him today and check his blood sugar and it was too high to read. He ultimately was directed to the emergency department for evaluation. Also states that he has a left maxillary tooth has been bothering him recently and try to go to [facility] dentistry today but his ex-wife did not show up take him. He denies being suicidal but admits to being depressed. [facility]    NursingNotes were reviewed. REVIEW OF SYSTEMS    (2-9 systems for level 4, 10 or more for level 5)     Review of Systems   Constitutional: Positive for activity change and fatigue. Negative for chills and fever. HENT: Negative for rhinorrhea and sore throat. Respiratory: Negative for cough and shortness of breath. Cardiovascular: Negative for chest pain. Gastrointestinal: Negative for abdominal pain, diarrhea, nausea and vomiting. Genitourinary: Negative for dysuria and frequency. Musculoskeletal: Negative for back pain and neck pain. Neurological: Negative for dizziness and headaches. Psychiatric/Behavioral: Positive for dysphoric mood and sleep disturbance. Negative for hallucinations and suicidal ideas.    All other systems reviewed and are negative. PAST MEDICALHISTORY     Past Medical History:   Diagnosis Date    ADHD     Anxiety     Depression     Diabetes ([address]          SURGICAL HISTORY       Past Surgical History:   Procedure Laterality Date    NECK SURGERY           CURRENT MEDICATIONS     Previous Medications    BLOOD GLUCOSE MONITOR STRIPS    Test 5 times a day & as needed for symptoms of irregular blood glucose. Dispense sufficient amount for indicated testing frequency plus additional to accommodate PRN testing needs. BUPROPION (WELLBUTRIN XL) 150 MG EXTENDED RELEASE TABLET    Take 1 tablet by mouth every morning    GABAPENTIN (NEURONTIN) 600 MG TABLET    Take 1 tablet by mouth 3 times daily for 30 days. GLUCOSE MONITORING (FREESTYLE FREEDOM) KIT    1 kit by Does not apply route daily    INSULIN ASPART (NOVOLOG FLEXPEN) 100 UNIT/ML INJECTION PEN    Inject 20 Units into the skin 3 times daily (before meals)    INSULIN GLARGINE (BASAGLAR KWIKPEN) 100 UNIT/ML INJECTION PEN    Inject 40 Units into the skin 2 times daily    LANCETS MISC    1 each by Does not apply route 5 times daily    LURASIDONE (LATUDA) 20 MG TABS TABLET    Take 1 tablet by mouth daily       ALLERGIES     Patient has no known allergies.     FAMILY HISTORY       Family History   Adopted: Yes          SOCIAL HISTORY       Social History     Socioeconomic History    Marital status: Single     Spouse name: None    Number of children: None    Years of education: None    Highest education level: None   Occupational History    None   Tobacco Use    Smoking status: Current Every Day Smoker     Packs/day: 1.50     Types: Cigarettes     Start date: 5/3/1999    Smokeless tobacco: Former User   Vaping Use    Vaping Use: Never used   Substance and Sexual Activity    Alcohol use: Not Currently    Drug use: Yes     Types: Methamphetamines (Crystal Meth)     Comment: \"A couple months ago\"    Sexual activity: None   Other Topics Concern    None   Social History Narrative    None     Social Determinants of Health     Financial Resource Strain:     Difficulty of Paying Living Expenses: Not on file   Food Insecurity:     Worried About Running Out of Food in the Last Year: Not on file    Billy of Food in the Last Year: Not on file   Transportation Needs:     Lack of Transportation (Medical): Not on file    Lack of Transportation (Non-Medical): Not on file   Physical Activity:     Days of Exercise per Week: Not on file    Minutes of Exercise per Session: Not on file   Stress:     Feeling of Stress : Not on file   Social Connections:     Frequency of Communication with Friends and Family: Not on file    Frequency of Social Gatherings with Friends and Family: Not on file    Attends Restorationist Services: Not on file    Active Member of [address] [place] [address] [employer] or Organizations: Not on file    Attends Club or Organization Meetings: Not on file    Marital Status: Not on file   Intimate Partner Violence:     Fear of Current or Ex-Partner: Not on file    Emotionally Abused: Not on file    Physically Abused: Not on file    Sexually Abused: Not on file   Housing Stability:     Unable to Pay for Housing in the Last Year: Not on file    Number of [place] in the Last Year: Not on file    Unstable Housing in the Last Year: Not on file       SCREENINGS    Gurvinder Coma Scale  Eye Opening: Spontaneous  Best Verbal Response: Oriented  Best Motor Response: Obeys commands  Gurvinder Coma Scale Score: 15        PHYSICAL EXAM    (up to 7 for level 4, 8 or more for level 5)     ED Triage Vitals [03/04/22 1117]   BP Temp Temp Source Pulse Resp SpO2 Height Weight   (!) 160/107 98.4 °F (36.9 °C) Oral 98 18 99 % 5' 10\" (1.778 m) 155 lb (70.3 kg)       Physical Exam  Vitals and nursing note reviewed. Constitutional:       Appearance: Normal appearance. He is well-developed. He is not ill-appearing or diaphoretic. HENT:      Head: Normocephalic and atraumatic.       Nose: Nose normal. CBC WITH AUTO DIFFERENTIAL   ETHANOL   URINE DRUG SCREEN   POCT GLUCOSE   POCT GLUCOSE       All other labs were within normal range or not returned as of this dictation. EMERGENCY DEPARTMENT COURSE and DIFFERENTIAL DIAGNOSIS/MDM:   Vitals:    Vitals:    03/04/22 1117   BP: (!) 160/107   Pulse: 98   Resp: 18   Temp: 98.4 °F (36.9 °C)   TempSrc: Oral   SpO2: 99%   Weight: 155 lb (70.3 kg)   Height: 5' 10\" (1.778 m)       MDM    Pt clearly depressed not taking meds or caring for himself, bs 715, not in DKA but need to get blood sugar down and glycemic control and education for patient and he clearly needs to see behavioral health as well, has been admitted in past for SI but denies that today, d/w Leland De Jesus and Dr. Nicole Arnett for admission    CONSULTS:  [address] [place] [address]:  Unless otherwise noted below, none     Procedures    FINAL IMPRESSION      1. Hyperglycemia    2. Depression, unspecified depression type          DISPOSITION/PLAN   DISPOSITION Admitted 03/04/2022 01:12:09 PM      PATIENT REFERRED TO:  No follow-up provider specified.     DISCHARGE MEDICATIONS:  New Prescriptions    No medications on file          (Please note that portions of this note were completed with a voice recognition program.  Efforts were made to edit thedictations but occasionally words are mis-transcribed.)    Yuridia Reilly MD (electronically signed)  Attending Emergency Physician       Rena Huston MD  03/04/22 7277

## 2022-03-04 NOTE — CARE COORDINATION
Date / Time of Evaluation: 3/4/2022 1:42 PM  Assessment Completed by: Kandis Arevalo    Patient Admission Status:     [address] [address]    [phone] (home)   Telephone Information:   Mobile [phone]       (Best Practice:  Have patient / caregiver verify above address and phone number by stating out loud their current address and reachable phone number.)  Is above information correct? yes      Current PCP:  Francia Chen MD  PCP verified? yes    Initial Assessment Completed at bedside with: Patient     Emergency Contacts:  Extended Emergency Contact Information  Primary Emergency Contact: Royal Hanks, [address] Phone: [phone]  Relation: Parent    Advance Directives: Code Status:  Full Code    Financial:  Payor: Marcin Barnhart / Plan: Marisabel Ahn / Product Type: *No Product type* /     Have [address] [place] [address] Avenue:  no    Pharmacy:   [address], [address] [phone] [address] [address]  Phone: [phone] Fax: [phone]    [address] [facility] [address], [address] [phone] [place] [phone]  [address] [facility]  Phone: [phone] Fax: [phone]    VLT[facility] [id number] - ZOCJWVZ, [address] [facility] [phone] [place] [phone]  [address]  [address] [address]  Phone: [phone] Fax: Rebeca  [address] [facility], [address] [place] [address] [address] P [phone] - F [phone]  [address] [place] [place] [address]  Phone: [phone] Fax: [phone]      Potential assistance purchasing medications?  no    ADLS:  Support System:   Independent; family and friend support     Current Home Environment:  Lives alone     Plans to RETURN to current housing: yes  Barriers to RETURNING to current housing:  no    Currently ACTIVE with Home Health CARE:  no  [address] [place] [address]:  N/a    Has a pulse oximetry unit at home: no    Had [address] [facility] prior to admission:  no  Oxygen Company:  n/a  Informed of need to bring portable home O2 tank to hospital on day of DISCHARGE:  n/a  Name of person committed to bringing portable tank at discharge:  n/a    Active with HD/PD prior to admission: no  Nephrologist:  [address] [place] [address] for Discharge: Family     Barriers to discharge:  None at this time      Additional CM/SW Notes: SW met with pt at bedside. Pt states he lives alone and is independent. He states he has family and friend support. He is established with a PCP who he states he sees. Pt states he has not had any issues with purchasing medications. When asked about drug use, pt states he hasn't used in 30-40 days. Will provide pt with drug rehab information. Mika May and/or his family were provided with choice of provider.         Arie   Care Management Department  Ph:  [phone]   Fax: [phone]

## 2022-03-04 NOTE — PROGRESS NOTES
[address] [facility] [place] [address]  Dept: [phone]  Dept Fax: [phone]  Loc: [phone]    Irwin Ravi is a 46 y.o. male who presents today for his medical conditions/complaintsas noted below. Irwin Ravi is c/o of Dental Pain (x 1 week) and Skin Lesion (Skin lesion on left side of neck x 2 weeks)      HPI:   Patient states that he is having dental pain for about a month. His teeth in the back and the front are broken. He has two knots that have popped up on the back of his head. He states that he has not been taking him insulin like he should and he has not been checking his blood sugars. He just lost his sister and he said that he just hasn't been handling it well . He has no plan to harm himself. Dental Pain   This is a new problem. The current episode started 1 to 4 weeks ago. The problem occurs constantly. The problem has been unchanged. The pain is at a severity of 7/10. The pain is moderate. Pertinent negatives include no difficulty swallowing, facial pain, fever, oral bleeding, sinus pressure or thermal sensitivity. He has tried NSAIDs for the symptoms. The treatment provided no relief.        Past Medical History:   Diagnosis Date    ADHD     Anxiety     Depression     Diabetes ([address]      Past Surgical History:   Procedure Laterality Date    NECK SURGERY       Family History   Adopted: Yes     Social History     Tobacco Use    Smoking status: Current Every Day Smoker     Packs/day: 1.50     Types: Cigarettes     Start date: 5/3/1999    Smokeless tobacco: Former User   Substance Use Topics    Alcohol use: Not Currently      Current Outpatient Medications on File Prior to Visit   Medication Sig Dispense Refill    insulin glargine (BASAGLAR KWIKPEN) 100 UNIT/ML injection pen Inject 40 Units into the skin 2 times daily 10 pen 5    lurasidone (LATUDA) 20 MG TABS tablet Take 1 tablet by mouth daily 30 tablet 2    buPROPion (WELLBUTRIN XL) 150 MG extended release tablet Take 1 tablet by mouth every morning 30 tablet 3    insulin aspart (NOVOLOG FLEXPEN) 100 UNIT/ML injection pen Inject 20 Units into the skin 3 times daily (before meals) 5 pen 2    gabapentin (NEURONTIN) 600 MG tablet Take 1 tablet by mouth 3 times daily for 30 days. 90 tablet 1    glucose monitoring (FREESTYLE FREEDOM) kit 1 kit by Does not apply route daily 1 kit 0    blood glucose monitor strips Test 5 times a day & as needed for symptoms of irregular blood glucose. Dispense sufficient amount for indicated testing frequency plus additional to accommodate PRN testing needs. 200 strip 5    Lancets MISC 1 each by Does not apply route 5 times daily 200 each 5     No current facility-administered medications on file prior to visit. No Known Allergies  Health Maintenance   Topic Date Due    Hepatitis C screen  Never done    COVID-19 Vaccine (1) Never done    Pneumococcal 0-64 years Vaccine (1 of 2 - PPSV23) Never done    Diabetic foot exam  Never done    Depression Monitoring  Never done    HIV screen  Never done    Diabetic microalbuminuria test  Never done    Diabetic retinal exam  Never done    Hepatitis B vaccine (1 of 3 - Risk 3-dose series) Never done    DTaP/Tdap/Td vaccine (1 - Tdap) Never done    Colorectal Cancer Screen  Never done    Shingles Vaccine (1 of 2) Never done    Flu vaccine (1) Never done    Lipid screen  10/23/2021    A1C test (Diabetic or Prediabetic)  03/06/2022    Potassium monitoring  12/07/2022    Creatinine monitoring  12/07/2022    Hepatitis A vaccine  Aged Out    Hib vaccine  Aged Out    Meningococcal (ACWY) vaccine  Aged Out       Subjective:   Review of Systems   Constitutional: Negative for chills, fatigue and fever. HENT: Negative for congestion, ear pain, postnasal drip, rhinorrhea, sinus pressure, sinus pain and sore throat.     Respiratory: Negative for cough, chest tightness and shortness of breath. Cardiovascular: Negative for chest pain. Gastrointestinal: Negative for abdominal pain. Skin: Negative for rash. Objective:   BP (!) 142/84 (Site: Right Upper Arm, Position: Sitting)   Pulse 82   Temp 97.2 °F (36.2 °C) (Temporal)   Resp 16   Wt 155 lb (70.3 kg)   SpO2 96%   BMI 22.24 kg/m²    Physical Exam  Vitals and nursing note reviewed. Constitutional:       General: He is not in acute distress. Appearance: Normal appearance. He is not ill-appearing. Interventions: He is not intubated. HENT:      Head: Normocephalic. Right Ear: Tympanic membrane and ear canal normal.      Left Ear: Tympanic membrane and ear canal normal.      Mouth/Throat:      Dentition: Abnormal dentition. Does not have dentures. Dental tenderness, gingival swelling, dental caries, dental abscesses and gum lesions (gums swollen and red) present. Eyes:      Pupils: Pupils are equal, round, and reactive to light. Cardiovascular:      Rate and Rhythm: Normal rate and regular rhythm. Pulses: Normal pulses. Heart sounds: Normal heart sounds. Pulmonary:      Effort: Pulmonary effort is normal. No tachypnea, bradypnea, accessory muscle usage, prolonged expiration, respiratory distress or retractions. He is not intubated. Breath sounds: Normal breath sounds and air entry. No decreased air movement or transmitted upper airway sounds. No decreased breath sounds, wheezing, rhonchi or rales. Musculoskeletal:      Cervical back: Normal range of motion and neck supple. Skin:     General: Skin is warm and dry. Comments: Patient has two knots on the back of his head that are scabbed and sore to the touch    Neurological:      Mental Status: He is alert. Results for orders placed or performed in visit on 03/04/22   POCT Glucose   Result Value Ref Range    Glucose 517 mg/dL    QC OK? Assessment:      Diagnosis Orders   1. Hyperglycemia  POCT Glucose   2.  Pain due to dental caries     3. Uncontrolled type 2 diabetes mellitus with hyperglycemia ([address]     4. Skin abrasion         Plan:   Deshawn Moreira was seen today for dental pain and skin lesion. Diagnoses and all orders for this visit:    Hyperglycemia  -     POCT Glucose  Patient's blood sugar is too elevated for us to care for here. He needs to go to the ER for IV fluids and stat labs. Patient is agreeable to go to the ER and states that his x wife will bring him. She is agreeable to bring him today. Report called to [facility] ER. Pain due to dental caries    Uncontrolled type 2 diabetes mellitus with hyperglycemia (HCC)    Skin abrasion         No follow-ups on file. Patient given educational materials- see patient instructions. Discussed use, benefit, and side effects of prescribedmedications. All patient questions answered. Pt voiced understanding.      Electronically signed by ALEX Foley CNP on 3/4/2022 at 8:53 AM

## 2022-03-04 NOTE — PROGRESS NOTES
Results for Diana Taylor (MRN [id number]) as of 3/4/2022 12:19   Ref.  Range 3/4/2022 12:11   Carboxyhemoglobin Latest Units: % 5.7   pH, Carlos Latest Ref Range: 7.35 - 7.45  7.34 (L)   pCO2, Carlos Latest Ref Range: 40.0 - 50.0 mmHg 57.0 (H)   pO2, Carlos Latest Ref Range: Not Established mmHg 24   HCO3, Venous Latest Ref Range: 23 - 29 mmol/L 31 (H)   Base Excess, Carlos Latest Ref Range: Not Established mmol/L 3   O2 Content, Carlos Latest Ref Range: Not Established mL/dL 8   MetHgb, Carlos Latest Ref Range: <1.5 % 0.8   O2 Sat, Carlos Latest Ref Range: Not Established % 36   Venous blood gas

## 2022-03-04 NOTE — H&P
[id number] [facility]      [facility]ist - History & Physical      PCP: Carol Dejesus MD    Date of Admission: 3/4/2022    Date of Service: 3/4/2022    Chief Complaint:  Hyperglycemia    History Of Present Illness: The patient is a 46 y.o. male who presented to [facility] ED complaining of depression and hyperglycemia. Pt has history of DM, HTN, cigarette smoking, methamphetamine use and mood disorder/bipolar depression. He relates that over past couple of weeks he has had worsening depression due to recent death of a family member. He tells me,\"I don't want to talk about it\". He tells me that due to depression he stopped taking all of his home medications over past two weeks. He relates that his family checked on him today and that his blood sugar was too high for glucometer to read. Because of this, family urged him to be evaluated here. He has had episodes of nausea, vomiting associated with vague generalized abdominal pain. He relates that he last vomited two days ago. He gives history of iv drug abuse reporting having last used methamphetamine drug 6 weeks ago. He tells me that he has had problems with dental pain and had anticipated that his ex-wife would take him to a dentist today. He does not endorse any problems with fevers to me. He denies current as well as recent suicidal thoughts. In ED, etoh negative, UA negative, UDS, HgA1c pending, sodium 129-sodium corrected for glucose 139, potassium 5.4, CO2 27, anion gap 13, glucose 715, creatinine 0.7/BUN 20, venous blood gas pH 7.34, acetone negative, wbc 9.5, hgb 15.7, platelets 498E. Pt was given 2L of NS, 15units of insulin and consult for psychiatry placed in ED. Pt is admitted to hospitalist service in consultation with psychiatry for further evaluation and treatment.         Past Medical History:        Diagnosis Date    ADHD     Anxiety     Depression     Diabetes [facility])        Past Surgical History:        Procedure Laterality Date    NECK SURGERY         Home Medications:  Prior to Admission medications    Medication Sig Start Date End Date Taking? Authorizing Provider   insulin glargine (BASAGLAR KWIKPEN) 100 UNIT/ML injection pen Inject 40 Units into the skin 2 times daily 7/28/21   Katja Vital MD   lurasidone (LATUDA) 20 MG TABS tablet Take 1 tablet by mouth daily 7/28/21   Katja Vital MD   buPROPion (WELLBUTRIN XL) 150 MG extended release tablet Take 1 tablet by mouth every morning 7/28/21   Katja Vital MD   insulin aspart (NOVOLOG FLEXPEN) 100 UNIT/ML injection pen Inject 20 Units into the skin 3 times daily (before meals) 7/28/21   Katja Vital MD   gabapentin (NEURONTIN) 600 MG tablet Take 1 tablet by mouth 3 times daily for 30 days. 7/28/21 12/6/21  Katja Vital MD   glucose monitoring (FREESTYLE FREEDOM) kit 1 kit by Does not apply route daily 7/28/21   Katja Vital MD   blood glucose monitor strips Test 5 times a day & as needed for symptoms of irregular blood glucose. Dispense sufficient amount for indicated testing frequency plus additional to accommodate PRN testing needs. 7/28/21   Katja Viatl MD   Lancets MISC 1 each by Does not apply route 5 times daily 7/28/21   Katja Vital MD       Allergies:    Patient has no known allergies. Social History:    The patient currently lives alone  Tobacco:   reports that he has been smoking cigarettes. He started smoking about 22 years ago. He has been smoking about 1.50 packs per day. He has quit using smokeless tobacco.  Alcohol:   reports previous alcohol use. Illicit Drugs: iv methamphetamine use      Family History:      Adopted: Yes       Review of Systems:   Review of Systems   Constitutional: Negative for fever. HENT: Positive for dental problem. Gastrointestinal: Positive for abdominal pain, nausea and vomiting. Psychiatric/Behavioral: Positive for dysphoric mood. Negative for suicidal ideas. All other systems reviewed and are negative.        14 point review of systems is negative except as specifically addressed above. Physical Examination:  BP (!) 160/107   Pulse 98   Temp 98.4 °F (36.9 °C) (Oral)   Resp 18   Ht 5' 10\" (1.778 m)   Wt 155 lb (70.3 kg)   SpO2 99%   BMI 22.24 kg/m²   Physical Exam  Vitals reviewed. Constitutional:       Comments: 46 yr old male with flat affect reporting need for tylenol d/t dental pain   HENT:      Head: Normocephalic. Right Ear: External ear normal.      Left Ear: External ear normal.      Mouth/Throat:      Mouth: Mucous membranes are moist.      Pharynx: Oropharynx is clear. Comments: Multiple teeth with severe dental carries to gingival surface without obvious gingival abscess  Retropharynx appears normal  Speech clear  He swallows oral secretions  Eyes:      Conjunctiva/sclera: Conjunctivae normal.      Pupils: Pupils are equal, round, and reactive to light. Cardiovascular:      Rate and Rhythm: Normal rate and regular rhythm. Heart sounds: Normal heart sounds. Pulmonary:      Effort: Pulmonary effort is normal.      Comments: Decreased breath sounds throughout  Abdominal:      General: Bowel sounds are normal.      Palpations: Abdomen is soft. Tenderness: There is no abdominal tenderness. Musculoskeletal:         General: Normal range of motion. Cervical back: Normal range of motion. Comments: Moves bilateral upper/lower extremities equally   Skin:     General: Skin is warm and dry. Neurological:      Mental Status: He is alert and oriented to person, place, and time.           Diagnostic Data:  CBC:  Recent Labs     03/04/22  1156   WBC 9.5   HGB 15.7   HCT 46.6        BMP:  Recent Labs     03/04/22  1156   *   K 5.4*   CL 89*   CO2 27   BUN 20   CREATININE 0.7   CALCIUM 9.8     Recent Labs     03/04/22  1156   AST 17   ALT 30   BILITOT 0.5   ALKPHOS 119     Urinalysis:  Lab Results   Component Value Date    NITRU Negative 12/05/2021    BLOODU Negative 12/05/2021    SPECGRAV 1.041 12/05/2021    GLUCOSEU =>1000 12/05/2021     Assessment/Plan:  Principal Problem:    Hyperglycemia due to diabetes mellitus ([facility] [address]  Active Problems:    Essential hypertension    Diabetic peripheral neuropathy (HCC)    Bipolar depression ([facility] [address]    History of drug abuse ([facility] [address]    Mood disorder ([facility] [address]    COPD (chronic obstructive pulmonary disease) ([facility] [address]    Tobacco abuse  Resolved Problems:    * No resolved hospital problems. *     Active Problems:    Hyperglycemia due to diabetes mellitus (HCC)   -2L of NS bolus given in ED   -NS at 150cc/hr   -poc glucose qid   -lantus 15units SC qhs   -high dose insulin correction   -HgA1c    Bipolar depression/Mood disorder    -consult psychiatry   -resume bupropion    -resume lurasidone   -adjustments in mental health meds per psychiatry    Essential hypertension   -monitor blood pressure    Diabetic peripheral neuropathy ([facility] [address]   -resume neurontin    History of drug abuse/Tobacco abuse   -tobacco cessation education   -social service consult for outpatient rehab   -nicoderm 21mg/24hr patch daily   -UDS     COPD (chronic obstructive pulmonary disease) ([facility] [address]   -monitor for supplemental oxygen requirements  Resolved Problems:    * No resolved hospital problems.  *  Signed:  ALEX Perera CNP, 3/4/2022 1:28 PM

## 2022-03-05 LAB
ANION GAP SERPL CALCULATED.3IONS-SCNC: 10 MMOL/L (ref 7–19)
BASOPHILS ABSOLUTE: 0 K/UL (ref 0–0.2)
BASOPHILS RELATIVE PERCENT: 0.4 % (ref 0–1)
BUN BLDV-MCNC: 18 MG/DL (ref 6–20)
CALCIUM SERPL-MCNC: 9.1 MG/DL (ref 8.6–10)
CHLORIDE BLD-SCNC: 101 MMOL/L (ref 98–111)
CO2: 24 MMOL/L (ref 22–29)
CREAT SERPL-MCNC: 0.4 MG/DL (ref 0.5–1.2)
EOSINOPHILS ABSOLUTE: 0.1 K/UL (ref 0–0.6)
EOSINOPHILS RELATIVE PERCENT: 1.5 % (ref 0–5)
GFR AFRICAN AMERICAN: >59
GFR NON-AFRICAN AMERICAN: >60
GLUCOSE BLD-MCNC: 175 MG/DL (ref 70–99)
GLUCOSE BLD-MCNC: 202 MG/DL (ref 74–109)
GLUCOSE BLD-MCNC: 234 MG/DL (ref 70–99)
GLUCOSE BLD-MCNC: 237 MG/DL (ref 70–99)
GLUCOSE BLD-MCNC: 247 MG/DL (ref 70–99)
HCT VFR BLD CALC: 40.9 % (ref 42–52)
HEMOGLOBIN: 13.4 G/DL (ref 14–18)
IMMATURE GRANULOCYTES #: 0 K/UL
LYMPHOCYTES ABSOLUTE: 3.2 K/UL (ref 1.1–4.5)
LYMPHOCYTES RELATIVE PERCENT: 39 % (ref 20–40)
MCH RBC QN AUTO: 28.8 PG (ref 27–31)
MCHC RBC AUTO-ENTMCNC: 32.8 G/DL (ref 33–37)
MCV RBC AUTO: 88 FL (ref 80–94)
MONOCYTES ABSOLUTE: 0.5 K/UL (ref 0–0.9)
MONOCYTES RELATIVE PERCENT: 5.8 % (ref 0–10)
NEUTROPHILS ABSOLUTE: 4.4 K/UL (ref 1.5–7.5)
NEUTROPHILS RELATIVE PERCENT: 52.8 % (ref 50–65)
PDW BLD-RTO: 12.4 % (ref 11.5–14.5)
PERFORMED ON: ABNORMAL
PLATELET # BLD: 308 K/UL (ref 130–400)
PMV BLD AUTO: 9.4 FL (ref 9.4–12.4)
POTASSIUM REFLEX MAGNESIUM: 4.3 MMOL/L (ref 3.5–5)
RBC # BLD: 4.65 M/UL (ref 4.7–6.1)
SODIUM BLD-SCNC: 135 MMOL/L (ref 136–145)
WBC # BLD: 8.3 K/UL (ref 4.8–10.8)

## 2022-03-05 PROCEDURE — 85025 COMPLETE CBC W/AUTO DIFF WBC: CPT

## 2022-03-05 PROCEDURE — 2580000003 HC RX 258: Performed by: NURSE PRACTITIONER

## 2022-03-05 PROCEDURE — 6370000000 HC RX 637 (ALT 250 FOR IP): Performed by: INTERNAL MEDICINE

## 2022-03-05 PROCEDURE — 82947 ASSAY GLUCOSE BLOOD QUANT: CPT

## 2022-03-05 PROCEDURE — 1210000000 HC MED SURG R&B

## 2022-03-05 PROCEDURE — 99252 IP/OBS CONSLTJ NEW/EST SF 35: CPT | Performed by: PSYCHIATRY & NEUROLOGY

## 2022-03-05 PROCEDURE — 6370000000 HC RX 637 (ALT 250 FOR IP): Performed by: PSYCHIATRY & NEUROLOGY

## 2022-03-05 PROCEDURE — 6370000000 HC RX 637 (ALT 250 FOR IP): Performed by: NURSE PRACTITIONER

## 2022-03-05 PROCEDURE — 80048 BASIC METABOLIC PNL TOTAL CA: CPT

## 2022-03-05 PROCEDURE — 36415 COLL VENOUS BLD VENIPUNCTURE: CPT

## 2022-03-05 PROCEDURE — 6360000002 HC RX W HCPCS: Performed by: NURSE PRACTITIONER

## 2022-03-05 RX ORDER — HYDROXYZINE HYDROCHLORIDE 25 MG/1
25 TABLET, FILM COATED ORAL 3 TIMES DAILY PRN
Status: DISCONTINUED | OUTPATIENT
Start: 2022-03-05 | End: 2022-03-06 | Stop reason: HOSPADM

## 2022-03-05 RX ORDER — VALACYCLOVIR HYDROCHLORIDE 500 MG/1
1000 TABLET, FILM COATED ORAL DAILY
Status: DISCONTINUED | OUTPATIENT
Start: 2022-03-05 | End: 2022-03-06 | Stop reason: HOSPADM

## 2022-03-05 RX ORDER — TRAZODONE HYDROCHLORIDE 50 MG/1
50 TABLET ORAL NIGHTLY
Status: DISCONTINUED | OUTPATIENT
Start: 2022-03-05 | End: 2022-03-06 | Stop reason: HOSPADM

## 2022-03-05 RX ADMIN — BUPROPION HYDROCHLORIDE 150 MG: 150 TABLET, EXTENDED RELEASE ORAL at 08:40

## 2022-03-05 RX ADMIN — GABAPENTIN 600 MG: 600 TABLET, FILM COATED ORAL at 15:15

## 2022-03-05 RX ADMIN — LURASIDONE HYDROCHLORIDE 20 MG: 40 TABLET, FILM COATED ORAL at 08:39

## 2022-03-05 RX ADMIN — SODIUM CHLORIDE, PRESERVATIVE FREE 10 ML: 5 INJECTION INTRAVENOUS at 08:40

## 2022-03-05 RX ADMIN — GABAPENTIN 600 MG: 600 TABLET, FILM COATED ORAL at 08:39

## 2022-03-05 RX ADMIN — INSULIN LISPRO 6 UNITS: 100 INJECTION, SOLUTION INTRAVENOUS; SUBCUTANEOUS at 12:17

## 2022-03-05 RX ADMIN — INSULIN LISPRO 3 UNITS: 100 INJECTION, SOLUTION INTRAVENOUS; SUBCUTANEOUS at 21:17

## 2022-03-05 RX ADMIN — INSULIN GLARGINE 15 UNITS: 100 INJECTION, SOLUTION SUBCUTANEOUS at 21:16

## 2022-03-05 RX ADMIN — GABAPENTIN 600 MG: 600 TABLET, FILM COATED ORAL at 20:32

## 2022-03-05 RX ADMIN — ACETAMINOPHEN 650 MG: 325 TABLET ORAL at 08:40

## 2022-03-05 RX ADMIN — VALACYCLOVIR HYDROCHLORIDE 1000 MG: 500 TABLET, FILM COATED ORAL at 12:17

## 2022-03-05 RX ADMIN — INSULIN LISPRO 3 UNITS: 100 INJECTION, SOLUTION INTRAVENOUS; SUBCUTANEOUS at 08:46

## 2022-03-05 RX ADMIN — INSULIN LISPRO 6 UNITS: 100 INJECTION, SOLUTION INTRAVENOUS; SUBCUTANEOUS at 18:37

## 2022-03-05 RX ADMIN — ENOXAPARIN SODIUM 40 MG: 100 INJECTION SUBCUTANEOUS at 15:15

## 2022-03-05 RX ADMIN — SODIUM CHLORIDE, PRESERVATIVE FREE 10 ML: 5 INJECTION INTRAVENOUS at 20:32

## 2022-03-05 RX ADMIN — TRAZODONE HYDROCHLORIDE 50 MG: 50 TABLET ORAL at 20:32

## 2022-03-05 ASSESSMENT — PAIN SCALES - GENERAL: PAINLEVEL_OUTOF10: 8

## 2022-03-05 NOTE — PROGRESS NOTES
[facility] Hospitalists      Patient:  Ivette Israel  YOB: 1970  Date of Service: 3/5/2022  MRN: [id number]   Acct: [de-identified]   Primary Care Physician: Alessandro Rich MD  Advance Directive: Full Code  Admit Date: 3/4/2022       Hospital Day: 1    CHIEF COMPLAINT generalized weakness with hypoglycemia    SUBJECTIVE: Patient reports to be feeling a little better. Reports pain around the scalp lesions. CUMULATIVE HOSPITAL COURSE:  46 y.o. male who presented to [address] [place] [address] ED complaining of depression and hyperglycemia. Pt has history of DM, HTN, cigarette smoking, methamphetamine use and mood disorder/bipolar depression.     He relates that over past couple of weeks he has had worsening depression due to recent death of a family member. He tells me,\"I don't want to talk about it\". He tells me that due to depression he stopped taking all of his home medications over past two weeks. He relates that his family checked on him today and that his blood sugar was too high for glucometer to read. Because of this, family urged him to be evaluated here.      He has had episodes of nausea, vomiting associated with vague generalized abdominal pain. He relates that he last vomited two days ago. He gives history of iv drug abuse reporting having last used methamphetamine drug 6 weeks ago. He tells me that he has had problems with dental pain and had anticipated that his ex-wife would take him to a dentist today. He does not endorse any problems with fevers to me. He denies current as well as recent suicidal thoughts. He reported that he just gave up when his sister .     Review of Systems:       Constitutional:  No fevers, chills, nausea, vomiting,    Lungs:   No hemoptysis, pleurisy, cough, SOB   Heart:  No chest pressure with exertion, palpitations,    Abdomen:   No new masses, no bright red blood per rectum   Extremities: No acute pain while ambulating, no new lesions   Neurologic: No new motor or sensory changes     14 point review of systems is negative except as specifically addressed above. Objective:   VITALS:  /88   Pulse 92   Temp 97.3 °F (36.3 °C) (Temporal)   Resp 16   Ht 5' 10\" (1.778 m)   Wt 155 lb (70.3 kg)   SpO2 97%   BMI 22.24 kg/m²   24HR INTAKE/OUTPUT:    Intake/Output Summary (Last 24 hours) at 3/5/2022 5834  Last data filed at 3/4/2022 1437  Gross per 24 hour   Intake 1000 ml   Output --   Net 1000 ml       PHYSICAL  EXAMINATION:    ANGELA:  Awake, alert, oriented x 3, patient appears tired and fatigued   Head/Eyes:  Normocephalic, atraumatic, EOMI and PERRLA bilaterally   Respiratory:   Bilateral fair air entry in both lung fields, no wheezing, rales or crackles bilaterally, symmetric expansion of chest   Cardiovascular:  Regular rate and rhythm, S1+S2+0, no murmurs/rubs   Abdomen:   Soft, non-tender, bowel sounds +ve, no organomegaly   Extremities: Moves all, full range of motion, no edema   Neurologic: Awake, alert, oriented x 3, cranial nerves II-XII intact, no focal neurological deficits, sensory system intact   Psychiatric: Normal mood, non-suicidal           Medications:      sodium chloride      sodium chloride 150 mL/hr at 03/04/22 2142      sodium chloride flush  5-40 mL IntraVENous 2 times per day    enoxaparin  40 mg SubCUTAneous Daily    insulin lispro  0-18 Units SubCUTAneous TID WC    insulin lispro  0-9 Units SubCUTAneous Nightly    insulin glargine  15 Units SubCUTAneous Nightly    nicotine  1 patch TransDERmal Daily    gabapentin  600 mg Oral TID    lurasidone  20 mg Oral Daily    buPROPion  150 mg Oral QAM     sodium chloride flush, sodium chloride, ondansetron **OR** ondansetron, polyethylene glycol, acetaminophen **OR** acetaminophen  ADULT DIET;  Regular; 4 carb choices (60 gm/meal)     Lab and other Data:     Recent Labs     03/04/22  1156 03/05/22  0144   WBC 9.5 8.3   HGB 15.7 13.4*    308     Recent Labs     03/04/22  0845 03/04/22  1156 03/05/22  0144 NA  --  129* 135*   K  --  5.4* 4.3   CL  --  89* 101   CO2  --  27 24   BUN  --  20 18   CREATININE  --  0.7 0.4*   GLUCOSE 517 715* 202*     Recent Labs     03/04/22  1156   AST 17   ALT 30   BILITOT 0.5   ALKPHOS 119     Troponin T: No results for input(s): TROPONINI in the last 72 hours. Pro-BNP: No results for input(s): BNP in the last 72 hours. INR: No results for input(s): INR in the last 72 hours. UA:  Recent Labs     03/04/22  1258   COLORU YELLOW   PHUR 6.5   CLARITYU Clear   SPECGRAV 1.031   LEUKOCYTESUR Negative   UROBILINOGEN 0.2   BILIRUBINUR Negative   BLOODU Negative   GLUCOSEU =>1000     A1C:   Recent Labs     03/04/22  1156   LABA1C 13.7*     ABG:No results for input(s): PHART, PYG2CVY, PO2ART, ACN7MNY, BEART, HGBAE, N1NFQCRM, CARBOXHGBART in the last 72 hours. RAD:   No results found. Assessment/Plan   Principal Problem:    Hyperglycemia due to diabetes mellitus (HCC)  Active Problems:    Essential hypertension    Diabetic peripheral neuropathy (HCC)    Bipolar depression ([facility] [address]    History of drug abuse ([facility] [address]    Mood disorder (HCC)    COPD (chronic obstructive pulmonary disease) ([facility] [address]    Tobacco abuse  Resolved Problems:    * No resolved hospital problems. *     Hyperglycemia due to diabetes mellitus (HCC)              -NS at 150cc/hr              -poc glucose qid              -lantus 15units SC qhs              -high dose insulin correction              -HgA1c 13.7   -Diabetes education      Bipolar depression/Mood disorder               -consulted psychiatry, will follow up further recommendations.              -resume bupropion               -resume lurasidone              -adjustments in mental health meds per psychiatry. Patient denies any plans or intention to hurt himself but stated that he gave up after the death of his sister. Essential hypertension              -monitor blood pressure      Scalp lesions, probable herpes flare   -Tender lesions noted.   We will place him on valacyclovir.   -Wound care consulted      Diabetic peripheral neuropathy (HCC)              -Continue neurontin      History of drug abuse/Tobacco abuse              -tobacco cessation education              -social service consult for outpatient rehab              -nicoderm 21mg/24hr patch daily              -UDS, unremarkable      COPD (chronic obstructive pulmonary disease) ([facility] [address]              -monitor for supplemental oxygen requirements      DVT Prophylaxis: Lovenox    GI prophylaxis:  Dave Lozano MD, 3/5/2022 9:58 AM

## 2022-03-05 NOTE — CONSULTS
[facility]  Psychiatry Consult    Reason for Consult:  depression    69-year-old white male with history of depression, anxiety, polysubstance abuse, diabetes, HTN, COPD, admitted with hyperglycemia and depression. UDS negative. Glucose level over 700 on admission, improved. Patient is known to our service. Patient is seen resting in bed this morning. He is calm and cooperative. States he has been severely depressed and his son brought him to the hospital.  He has been unable to function and has not been taking care of his diabetes. He reports noncompliance with psychotropics and psychiatric follow-up. States he has been sober for a month and a half. He lives in a camper alone. His sister  several weeks ago. \"She was my main support. \"  He is in touch with his children. He voices hopelessness and helplessness. States at times he feels like he \"no longer wants to be here\". Reports poor appetite, poor sleep, poor energy level. He denies mood swings and racing thoughts. Anxiety has been very high. Feels that he would benefit from inpatient psychiatric treatment. He denies physical symptoms. Psychiatric History:    Diagnoses: Bipolar depression, KRYSTLE  Suicide attempts/gestures: x 3 - overdose on insulin, tried to inject air in his blood vessels, tried to get into the car accident. Prior hospitalizations:  - , 2019  Medication trials: Gabapentin, risperidone, trazodone, Klonopin, Latuda, bupropion - h/o nausea but did well on it on previous admission, \"some stimulants\", Wellbutrin  Mental health contact: Lost to follow-up   Head trauma: Denies    Substance Use History:  Denies alcohol use. H/o methamphetamine abuse, quit 1.5 month ago. H/o Klonopin abuse.  H/o rehab treatment in [place], [facility].  Smokes 1.5 PPD. Allergies:  Patient has no known allergies.     Medical History:  Past Medical History:   Diagnosis Date    ADHD     Anxiety     Depression     Diabetes ([facility] [address]        Family History:  Family History   Adopted: Yes       Social History:  Lives in a camper alone. Children involved. Review of Systems: 14 point review of systems negative except as described above    Vitals:    03/05/22 1133   BP: 123/75   Pulse: 95   Resp: 18   Temp: 98.3 °F (36.8 °C)   SpO2: 94%       Mental Status  Appearance: Older than stated age. Disheveledand in hospital attire. Made good eye contact. Gait not assessed. No abnormal movements or tremor. Behavior: Calm, cooperative, and socially appropriate. Psychomotor retardation  appreciated. Speech: Normal in tone, volume, and quality. No slurring, dysarthria or pressured speech noted. Mood: \"Depressed\"   Affect: Mood congruent. Thought Process: Appears linear, logical and goal oriented. Causality appears intact. Thought Content: Denies active suicidal and homicidal ideation. Hopeless. No overt delusions or paranoia appreciated. Perceptions: Denies auditory or visual hallucinations at present time. Not responding to internal stimuli. Concentration: Intact. Orientation: to person, place, date, and situation. Language: Intact. Fund of information: Intact. Memory: Recent and remote appear intact. Impulsivity: Limited. Neurovegitative: Poor appetite and sleep. Insight: Impaired. Judgment: Impaired. Assessment  DSM-5 DIAGNOSIS:  Impression   Depression unspecified   Anxiety unspecified  Methamphetamine use disorder, severe, in early remission  Tobacco use disorder  Diabetes   COPD  HTN    Patient appears to be severely depressed and functioning poorly. Voices hopelessness. He is meeting the criteria for inpatient psychiatric treatment. Transfer to psychiatry tomorrow pending stable glucose level. Continue Wellbutrin for depression. [place]. PRN Atarax for anxiety. Trazodone fr sleep. Thank you for consulting our service. Please call us with questions.       Ayn Hutton MD

## 2022-03-06 ENCOUNTER — HOSPITAL ENCOUNTER (INPATIENT)
Age: 52
LOS: 3 days | Discharge: HOME | DRG: 885 | End: 2022-03-09
Attending: PSYCHIATRY & NEUROLOGY | Admitting: PSYCHIATRY & NEUROLOGY
Payer: MEDICAID

## 2022-03-06 VITALS
RESPIRATION RATE: 19 BRPM | OXYGEN SATURATION: 98 % | SYSTOLIC BLOOD PRESSURE: 123 MMHG | BODY MASS INDEX: 22.19 KG/M2 | HEART RATE: 98 BPM | DIASTOLIC BLOOD PRESSURE: 84 MMHG | HEIGHT: 70 IN | WEIGHT: 155 LBS | TEMPERATURE: 97.9 F

## 2022-03-06 DIAGNOSIS — E11.42 DIABETIC PERIPHERAL NEUROPATHY (HCC): ICD-10-CM

## 2022-03-06 DIAGNOSIS — E10.65 TYPE 1 DIABETES MELLITUS WITH HYPERGLYCEMIA (HCC): ICD-10-CM

## 2022-03-06 PROBLEM — F32.9 MAJOR DEPRESSIVE DISORDER WITHOUT PSYCHOTIC FEATURES: Status: ACTIVE | Noted: 2022-03-06

## 2022-03-06 LAB
ANION GAP SERPL CALCULATED.3IONS-SCNC: 10 MMOL/L (ref 7–19)
BASOPHILS ABSOLUTE: 0 K/UL (ref 0–0.2)
BASOPHILS RELATIVE PERCENT: 0.5 % (ref 0–1)
BUN BLDV-MCNC: 14 MG/DL (ref 6–20)
CALCIUM SERPL-MCNC: 8.9 MG/DL (ref 8.6–10)
CHLORIDE BLD-SCNC: 101 MMOL/L (ref 98–111)
CO2: 25 MMOL/L (ref 22–29)
CREAT SERPL-MCNC: 0.6 MG/DL (ref 0.5–1.2)
EOSINOPHILS ABSOLUTE: 0.1 K/UL (ref 0–0.6)
EOSINOPHILS RELATIVE PERCENT: 1.3 % (ref 0–5)
GFR AFRICAN AMERICAN: >59
GFR NON-AFRICAN AMERICAN: >60
GLUCOSE BLD-MCNC: 138 MG/DL (ref 70–99)
GLUCOSE BLD-MCNC: 161 MG/DL (ref 70–99)
GLUCOSE BLD-MCNC: 234 MG/DL (ref 70–99)
GLUCOSE BLD-MCNC: 290 MG/DL (ref 74–109)
GLUCOSE BLD-MCNC: 379 MG/DL (ref 70–99)
GLUCOSE BLD-MCNC: 402 MG/DL (ref 70–99)
HCT VFR BLD CALC: 40.4 % (ref 42–52)
HEMOGLOBIN: 13.6 G/DL (ref 14–18)
IMMATURE GRANULOCYTES #: 0 K/UL
LYMPHOCYTES ABSOLUTE: 2.8 K/UL (ref 1.1–4.5)
LYMPHOCYTES RELATIVE PERCENT: 32.4 % (ref 20–40)
MCH RBC QN AUTO: 30.1 PG (ref 27–31)
MCHC RBC AUTO-ENTMCNC: 33.7 G/DL (ref 33–37)
MCV RBC AUTO: 89.4 FL (ref 80–94)
MONOCYTES ABSOLUTE: 0.5 K/UL (ref 0–0.9)
MONOCYTES RELATIVE PERCENT: 5.5 % (ref 0–10)
NEUTROPHILS ABSOLUTE: 5.2 K/UL (ref 1.5–7.5)
NEUTROPHILS RELATIVE PERCENT: 59.8 % (ref 50–65)
PDW BLD-RTO: 12.5 % (ref 11.5–14.5)
PERFORMED ON: ABNORMAL
PLATELET # BLD: 274 K/UL (ref 130–400)
PMV BLD AUTO: 9.2 FL (ref 9.4–12.4)
POTASSIUM REFLEX MAGNESIUM: 4.1 MMOL/L (ref 3.5–5)
RBC # BLD: 4.52 M/UL (ref 4.7–6.1)
SODIUM BLD-SCNC: 136 MMOL/L (ref 136–145)
WBC # BLD: 8.7 K/UL (ref 4.8–10.8)

## 2022-03-06 PROCEDURE — 82947 ASSAY GLUCOSE BLOOD QUANT: CPT

## 2022-03-06 PROCEDURE — 1240000000 HC EMOTIONAL WELLNESS R&B

## 2022-03-06 PROCEDURE — 2580000003 HC RX 258: Performed by: NURSE PRACTITIONER

## 2022-03-06 PROCEDURE — 6370000000 HC RX 637 (ALT 250 FOR IP): Performed by: PSYCHIATRY & NEUROLOGY

## 2022-03-06 PROCEDURE — 80048 BASIC METABOLIC PNL TOTAL CA: CPT

## 2022-03-06 PROCEDURE — 85025 COMPLETE CBC W/AUTO DIFF WBC: CPT

## 2022-03-06 PROCEDURE — 36415 COLL VENOUS BLD VENIPUNCTURE: CPT

## 2022-03-06 PROCEDURE — 6370000000 HC RX 637 (ALT 250 FOR IP): Performed by: INTERNAL MEDICINE

## 2022-03-06 PROCEDURE — 6370000000 HC RX 637 (ALT 250 FOR IP): Performed by: NURSE PRACTITIONER

## 2022-03-06 PROCEDURE — 6360000002 HC RX W HCPCS: Performed by: NURSE PRACTITIONER

## 2022-03-06 RX ORDER — DEXTROSE MONOHYDRATE 50 MG/ML
100 INJECTION, SOLUTION INTRAVENOUS PRN
Status: DISCONTINUED | OUTPATIENT
Start: 2022-03-06 | End: 2022-03-09 | Stop reason: HOSPADM

## 2022-03-06 RX ORDER — POLYETHYLENE GLYCOL 3350 17 G/17G
17 POWDER, FOR SOLUTION ORAL DAILY PRN
Status: DISCONTINUED | OUTPATIENT
Start: 2022-03-06 | End: 2022-03-09 | Stop reason: HOSPADM

## 2022-03-06 RX ORDER — INSULIN GLARGINE 100 [IU]/ML
15 INJECTION, SOLUTION SUBCUTANEOUS ONCE
Status: COMPLETED | OUTPATIENT
Start: 2022-03-06 | End: 2022-03-06

## 2022-03-06 RX ORDER — GABAPENTIN 300 MG/1
600 CAPSULE ORAL 3 TIMES DAILY
Status: DISCONTINUED | OUTPATIENT
Start: 2022-03-06 | End: 2022-03-09 | Stop reason: HOSPADM

## 2022-03-06 RX ORDER — ACETAMINOPHEN 325 MG/1
650 TABLET ORAL EVERY 4 HOURS PRN
Status: DISCONTINUED | OUTPATIENT
Start: 2022-03-06 | End: 2022-03-08

## 2022-03-06 RX ORDER — DEXTROSE MONOHYDRATE 25 G/50ML
12.5 INJECTION, SOLUTION INTRAVENOUS PRN
Status: DISCONTINUED | OUTPATIENT
Start: 2022-03-06 | End: 2022-03-06 | Stop reason: CLARIF

## 2022-03-06 RX ORDER — VALACYCLOVIR HYDROCHLORIDE 500 MG/1
1000 TABLET, FILM COATED ORAL DAILY
Status: DISCONTINUED | OUTPATIENT
Start: 2022-03-07 | End: 2022-03-09 | Stop reason: HOSPADM

## 2022-03-06 RX ORDER — NICOTINE 21 MG/24HR
1 PATCH, TRANSDERMAL 24 HOURS TRANSDERMAL DAILY
Status: DISCONTINUED | OUTPATIENT
Start: 2022-03-07 | End: 2022-03-09 | Stop reason: HOSPADM

## 2022-03-06 RX ORDER — INSULIN GLARGINE 100 [IU]/ML
15 INJECTION, SOLUTION SUBCUTANEOUS NIGHTLY
Status: DISCONTINUED | OUTPATIENT
Start: 2022-03-06 | End: 2022-03-07

## 2022-03-06 RX ORDER — NICOTINE POLACRILEX 4 MG
15 LOZENGE BUCCAL PRN
Status: DISCONTINUED | OUTPATIENT
Start: 2022-03-06 | End: 2022-03-06 | Stop reason: CLARIF

## 2022-03-06 RX ORDER — BUPROPION HYDROCHLORIDE 150 MG/1
150 TABLET, EXTENDED RELEASE ORAL DAILY
Status: DISCONTINUED | OUTPATIENT
Start: 2022-03-07 | End: 2022-03-06

## 2022-03-06 RX ORDER — HYDROXYZINE HYDROCHLORIDE 25 MG/1
25 TABLET, FILM COATED ORAL 3 TIMES DAILY PRN
Status: DISCONTINUED | OUTPATIENT
Start: 2022-03-06 | End: 2022-03-09 | Stop reason: HOSPADM

## 2022-03-06 RX ORDER — BUPROPION HYDROCHLORIDE 150 MG/1
150 TABLET ORAL DAILY
Status: DISCONTINUED | OUTPATIENT
Start: 2022-03-07 | End: 2022-03-09 | Stop reason: HOSPADM

## 2022-03-06 RX ORDER — TRAZODONE HYDROCHLORIDE 50 MG/1
50 TABLET ORAL NIGHTLY
Status: DISCONTINUED | OUTPATIENT
Start: 2022-03-06 | End: 2022-03-07

## 2022-03-06 RX ADMIN — GABAPENTIN 600 MG: 300 CAPSULE ORAL at 21:17

## 2022-03-06 RX ADMIN — ACETAMINOPHEN 650 MG: 325 TABLET ORAL at 09:35

## 2022-03-06 RX ADMIN — INSULIN LISPRO 15 UNITS: 100 INJECTION, SOLUTION INTRAVENOUS; SUBCUTANEOUS at 17:10

## 2022-03-06 RX ADMIN — VALACYCLOVIR HYDROCHLORIDE 1000 MG: 500 TABLET, FILM COATED ORAL at 08:27

## 2022-03-06 RX ADMIN — INSULIN GLARGINE 15 UNITS: 100 INJECTION, SOLUTION SUBCUTANEOUS at 08:27

## 2022-03-06 RX ADMIN — ENOXAPARIN SODIUM 40 MG: 100 INJECTION SUBCUTANEOUS at 14:54

## 2022-03-06 RX ADMIN — GABAPENTIN 600 MG: 600 TABLET, FILM COATED ORAL at 08:27

## 2022-03-06 RX ADMIN — INSULIN GLARGINE 15 UNITS: 100 INJECTION, SOLUTION SUBCUTANEOUS at 21:18

## 2022-03-06 RX ADMIN — HYDROXYZINE HYDROCHLORIDE 25 MG: 25 TABLET, FILM COATED ORAL at 19:39

## 2022-03-06 RX ADMIN — ACETAMINOPHEN 650 MG: 325 TABLET ORAL at 21:17

## 2022-03-06 RX ADMIN — INSULIN LISPRO 9 UNITS: 100 INJECTION, SOLUTION INTRAVENOUS; SUBCUTANEOUS at 21:19

## 2022-03-06 RX ADMIN — INSULIN LISPRO 6 UNITS: 100 INJECTION, SOLUTION INTRAVENOUS; SUBCUTANEOUS at 12:45

## 2022-03-06 RX ADMIN — SODIUM CHLORIDE, PRESERVATIVE FREE 10 ML: 5 INJECTION INTRAVENOUS at 08:28

## 2022-03-06 RX ADMIN — BUPROPION HYDROCHLORIDE 150 MG: 150 TABLET, EXTENDED RELEASE ORAL at 08:27

## 2022-03-06 RX ADMIN — TRAZODONE HYDROCHLORIDE 50 MG: 50 TABLET ORAL at 21:17

## 2022-03-06 RX ADMIN — GABAPENTIN 600 MG: 600 TABLET, FILM COATED ORAL at 14:54

## 2022-03-06 ASSESSMENT — PAIN SCALES - GENERAL
PAINLEVEL_OUTOF10: 6
PAINLEVEL_OUTOF10: 7
PAINLEVEL_OUTOF10: 0
PAINLEVEL_OUTOF10: 5
PAINLEVEL_OUTOF10: 8

## 2022-03-06 ASSESSMENT — PAIN DESCRIPTION - DESCRIPTORS
DESCRIPTORS: ACHING
DESCRIPTORS: DULL;SHARP;STABBING

## 2022-03-06 ASSESSMENT — PAIN DESCRIPTION - LOCATION
LOCATION: MOUTH
LOCATION: MOUTH

## 2022-03-06 ASSESSMENT — PAIN DESCRIPTION - FREQUENCY
FREQUENCY: CONTINUOUS
FREQUENCY: CONTINUOUS

## 2022-03-06 ASSESSMENT — SLEEP AND FATIGUE QUESTIONNAIRES
DIFFICULTY FALLING ASLEEP: NO
SLEEP PATTERN: RESTLESSNESS;DISTURBED/INTERRUPTED SLEEP
DO YOU HAVE DIFFICULTY SLEEPING: YES
DIFFICULTY STAYING ASLEEP: YES
DO YOU USE A SLEEP AID: YES
RESTFUL SLEEP: NO
DIFFICULTY ARISING: NO

## 2022-03-06 ASSESSMENT — PAIN DESCRIPTION - ONSET
ONSET: ON-GOING
ONSET: ON-GOING

## 2022-03-06 ASSESSMENT — PAIN DESCRIPTION - ORIENTATION: ORIENTATION: RIGHT;LEFT

## 2022-03-06 ASSESSMENT — PAIN DESCRIPTION - PAIN TYPE
TYPE: ACUTE PAIN
TYPE: ACUTE PAIN

## 2022-03-06 ASSESSMENT — PAIN - FUNCTIONAL ASSESSMENT
PAIN_FUNCTIONAL_ASSESSMENT: ACTIVITIES ARE NOT PREVENTED
PAIN_FUNCTIONAL_ASSESSMENT: ACTIVITIES ARE NOT PREVENTED

## 2022-03-06 ASSESSMENT — PAIN DESCRIPTION - PROGRESSION: CLINICAL_PROGRESSION: NOT CHANGED

## 2022-03-06 ASSESSMENT — PATIENT HEALTH QUESTIONNAIRE - PHQ9: SUM OF ALL RESPONSES TO PHQ QUESTIONS 1-9: 27

## 2022-03-06 ASSESSMENT — LIFESTYLE VARIABLES: HISTORY_ALCOHOL_USE: NO

## 2022-03-06 NOTE — PROGRESS NOTES
[facility] Hospitalists      Patient:  Lona Cummings  YOB: 1970  Date of Service: 3/6/2022  MRN: [id number]   Acct: [de-identified]   Primary Care Physician: Héctor Reddy MD  Advance Directive: Full Code  Admit Date: 3/4/2022       Hospital Day: 2    CHIEF COMPLAINT generalized weakness with hypoglycemia    SUBJECTIVE: Patient reports improvement with no new complaints. Denies plans to hurt himself but states he had given up when his sister . He is still concerned about the painful lesions on his scalp. CUMULATIVE HOSPITAL COURSE:  46 y.o. male who presented to [facility] ED complaining of depression and hyperglycemia. Pt has history of DM, HTN, cigarette smoking, methamphetamine use and mood disorder/bipolar depression.     He relates that over past couple of weeks he has had worsening depression due to recent death of a family member. He tells me,\"I don't want to talk about it\". He tells me that due to depression he stopped taking all of his home medications over past two weeks. He relates that his family checked on him today and that his blood sugar was too high for glucometer to read. Because of this, family urged him to be evaluated here.      He has had episodes of nausea, vomiting associated with vague generalized abdominal pain. He relates that he last vomited two days ago. He gives history of iv drug abuse reporting having last used methamphetamine drug 6 weeks ago. He tells me that he has had problems with dental pain and had anticipated that his ex-wife would take him to a dentist today. He does not endorse any problems with fevers to me. He denies current as well as recent suicidal thoughts. He reported that he just gave up when his sister .     Review of Systems:       Constitutional:  No fevers, chills, nausea, vomiting,    Lungs:   No hemoptysis, pleurisy, cough, SOB   Heart:  No chest pressure with exertion, palpitations,    Abdomen:   No new masses, no bright red blood per rectum Extremities: No acute pain while ambulating, no new lesions   Neurologic: No new motor or sensory changes     14 point review of systems is negative except as specifically addressed above. Objective:   VITALS:  /77   Pulse 99   Temp 97.7 °F (36.5 °C) (Temporal)   Resp 16   Ht 5' 10\" (1.778 m)   Wt 155 lb (70.3 kg)   SpO2 98%   BMI 22.24 kg/m²   24HR INTAKE/OUTPUT:      Intake/Output Summary (Last 24 hours) at 3/6/2022 0935  Last data filed at 3/5/2022 1224  Gross per 24 hour   Intake 2429.25 ml   Output --   Net 2429.25 ml       PHYSICAL  EXAMINATION:    ANGELA:  Awake, alert, oriented x 3, patient appears tired and fatigued   Head/Eyes:  Normocephalic, atraumatic, EOMI and PERRLA bilaterally   Respiratory:   Bilateral fair air entry in both lung fields, no wheezing, rales or crackles bilaterally, symmetric expansion of chest   Cardiovascular:  Regular rate and rhythm, S1+S2+0, no murmurs/rubs   Abdomen:   Soft, non-tender, bowel sounds +ve, no organomegaly   Extremities:  Skin Moves all, full range of motion, no edema  Skull lesions tender occipital area.    Neurologic: Awake, alert, oriented x 3, cranial nerves II-XII intact, no focal neurological deficits, sensory system intact   Psychiatric: Normal mood, non-suicidal           Medications:      sodium chloride        insulin glargine  15 Units SubCUTAneous Once    valACYclovir  1,000 mg Oral Daily    traZODone  50 mg Oral Nightly    sodium chloride flush  5-40 mL IntraVENous 2 times per day    enoxaparin  40 mg SubCUTAneous Daily    insulin lispro  0-18 Units SubCUTAneous TID WC    insulin lispro  0-9 Units SubCUTAneous Nightly    insulin glargine  15 Units SubCUTAneous Nightly    nicotine  1 patch TransDERmal Daily    gabapentin  600 mg Oral TID    buPROPion  150 mg Oral QAM     hydrOXYzine, sodium chloride flush, sodium chloride, ondansetron **OR** ondansetron, polyethylene glycol, acetaminophen **OR** acetaminophen  ADULT DIET; Regular; 4 carb choices (60 gm/meal)     Lab and other Data:     Recent Labs     03/04/22  1156 03/05/22  0144   WBC 9.5 8.3   HGB 15.7 13.4*    308     Recent Labs     03/04/22  0845 03/04/22  1156 03/05/22  0144   NA  --  129* 135*   K  --  5.4* 4.3   CL  --  89* 101   CO2  --  27 24   BUN  --  20 18   CREATININE  --  0.7 0.4*   GLUCOSE 517 715* 202*     Recent Labs     03/04/22  1156   AST 17   ALT 30   BILITOT 0.5   ALKPHOS 119     Troponin T: No results for input(s): TROPONINI in the last 72 hours. Pro-BNP: No results for input(s): BNP in the last 72 hours. INR: No results for input(s): INR in the last 72 hours. UA:  Recent Labs     03/04/22  1258   COLORU YELLOW   PHUR 6.5   CLARITYU Clear   SPECGRAV 1.031   LEUKOCYTESUR Negative   UROBILINOGEN 0.2   BILIRUBINUR Negative   BLOODU Negative   GLUCOSEU =>1000     A1C:   Recent Labs     03/04/22  1156   LABA1C 13.7*     ABG:No results for input(s): PHART, LMU0DSG, PO2ART, YVA6PYR, BEART, HGBAE, V9OVCPVS, CARBOXHGBART in the last 72 hours. RAD:   No results found. Assessment/Plan   Principal Problem:    Hyperglycemia due to diabetes mellitus (HCC)  Active Problems:    Essential hypertension    Diabetic peripheral neuropathy (HCC)    Bipolar depression ([facility] [address]    History of drug abuse ([facility]    Mood disorder (HCC)    COPD (chronic obstructive pulmonary disease) ([facility]    Tobacco abuse  Resolved Problems:    * No resolved hospital problems. *     Hyperglycemia due to diabetes mellitus (HCC)   -Blood sugars still in the 200s. -Stop IV fluids and encourage oral hydration               -Humalog sliding scale with fingerstick blood sugar AC at bedtime               -Lantus 15units SC qhs.   We will give a one-time dose of Lantus 15 units this morning.              -high dose insulin correction              -HgA1c 13.7   -Diabetes education      Bipolar depression/Mood disorder               -consulted psychiatry, will follow up further

## 2022-03-06 NOTE — PROGRESS NOTES
Spoke with 4th floor nurse, Curt Cho, and informed patient may transfer to [facility] today after we have a room cleaned for the patient. May call report to [phone] when room assigned and cleaned. Informed to have patient sign a voluntary form before transfer.     Electronically signed by Sheri Mazariegos RN on 3/6/2022 at 11:29 AM

## 2022-03-07 PROBLEM — E11.65 HYPERGLYCEMIA DUE TO DIABETES MELLITUS (HCC): Status: RESOLVED | Noted: 2021-12-06 | Resolved: 2022-03-07

## 2022-03-07 PROBLEM — F32.A DEPRESSION, UNSPECIFIED: Status: ACTIVE | Noted: 2022-03-07

## 2022-03-07 LAB
GLUCOSE BLD-MCNC: 258 MG/DL (ref 70–99)
GLUCOSE BLD-MCNC: 291 MG/DL (ref 70–99)
GLUCOSE BLD-MCNC: 343 MG/DL (ref 70–99)
GLUCOSE BLD-MCNC: 496 MG/DL (ref 70–99)
PERFORMED ON: ABNORMAL

## 2022-03-07 PROCEDURE — 6370000000 HC RX 637 (ALT 250 FOR IP): Performed by: PSYCHIATRY & NEUROLOGY

## 2022-03-07 PROCEDURE — 90792 PSYCH DIAG EVAL W/MED SRVCS: CPT | Performed by: PSYCHIATRY & NEUROLOGY

## 2022-03-07 PROCEDURE — 1240000000 HC EMOTIONAL WELLNESS R&B

## 2022-03-07 PROCEDURE — 82947 ASSAY GLUCOSE BLOOD QUANT: CPT

## 2022-03-07 RX ORDER — INSULIN GLARGINE 100 [IU]/ML
25 INJECTION, SOLUTION SUBCUTANEOUS NIGHTLY
Status: DISCONTINUED | OUTPATIENT
Start: 2022-03-08 | End: 2022-03-09 | Stop reason: HOSPADM

## 2022-03-07 RX ORDER — TRAZODONE HYDROCHLORIDE 100 MG/1
100 TABLET ORAL NIGHTLY
Status: DISCONTINUED | OUTPATIENT
Start: 2022-03-07 | End: 2022-03-08

## 2022-03-07 RX ORDER — LANOLIN ALCOHOL/MO/W.PET/CERES
3 CREAM (GRAM) TOPICAL NIGHTLY
Status: DISCONTINUED | OUTPATIENT
Start: 2022-03-07 | End: 2022-03-09 | Stop reason: HOSPADM

## 2022-03-07 RX ADMIN — INSULIN LISPRO 9 UNITS: 100 INJECTION, SOLUTION INTRAVENOUS; SUBCUTANEOUS at 08:18

## 2022-03-07 RX ADMIN — Medication 3 MG: at 20:26

## 2022-03-07 RX ADMIN — GABAPENTIN 600 MG: 300 CAPSULE ORAL at 12:09

## 2022-03-07 RX ADMIN — INSULIN LISPRO 9 UNITS: 100 INJECTION, SOLUTION INTRAVENOUS; SUBCUTANEOUS at 20:31

## 2022-03-07 RX ADMIN — INSULIN LISPRO 12 UNITS: 100 INJECTION, SOLUTION INTRAVENOUS; SUBCUTANEOUS at 12:09

## 2022-03-07 RX ADMIN — BUPROPION HYDROCHLORIDE 150 MG: 150 TABLET, EXTENDED RELEASE ORAL at 08:18

## 2022-03-07 RX ADMIN — GABAPENTIN 600 MG: 300 CAPSULE ORAL at 08:18

## 2022-03-07 RX ADMIN — INSULIN LISPRO 9 UNITS: 100 INJECTION, SOLUTION INTRAVENOUS; SUBCUTANEOUS at 16:28

## 2022-03-07 RX ADMIN — GABAPENTIN 600 MG: 300 CAPSULE ORAL at 20:26

## 2022-03-07 RX ADMIN — ACETAMINOPHEN 650 MG: 325 TABLET ORAL at 20:27

## 2022-03-07 RX ADMIN — TRAZODONE HYDROCHLORIDE 100 MG: 100 TABLET ORAL at 20:27

## 2022-03-07 RX ADMIN — HYDROXYZINE HYDROCHLORIDE 25 MG: 25 TABLET, FILM COATED ORAL at 08:17

## 2022-03-07 RX ADMIN — HYDROXYZINE HYDROCHLORIDE 25 MG: 25 TABLET, FILM COATED ORAL at 16:28

## 2022-03-07 RX ADMIN — ACETAMINOPHEN 650 MG: 325 TABLET ORAL at 12:21

## 2022-03-07 RX ADMIN — INSULIN GLARGINE 15 UNITS: 100 INJECTION, SOLUTION SUBCUTANEOUS at 20:30

## 2022-03-07 RX ADMIN — HYDROXYZINE HYDROCHLORIDE 25 MG: 25 TABLET, FILM COATED ORAL at 20:29

## 2022-03-07 ASSESSMENT — PAIN SCALES - GENERAL
PAINLEVEL_OUTOF10: 7
PAINLEVEL_OUTOF10: 3
PAINLEVEL_OUTOF10: 8
PAINLEVEL_OUTOF10: 8

## 2022-03-07 ASSESSMENT — PAIN DESCRIPTION - PROGRESSION
CLINICAL_PROGRESSION: GRADUALLY WORSENING
CLINICAL_PROGRESSION: NOT CHANGED

## 2022-03-07 ASSESSMENT — PAIN DESCRIPTION - ORIENTATION
ORIENTATION: INNER
ORIENTATION: ANTERIOR

## 2022-03-07 ASSESSMENT — PAIN DESCRIPTION - DESCRIPTORS
DESCRIPTORS: HEADACHE
DESCRIPTORS: ACHING

## 2022-03-07 ASSESSMENT — PAIN DESCRIPTION - ONSET
ONSET: GRADUAL
ONSET: GRADUAL

## 2022-03-07 ASSESSMENT — PAIN DESCRIPTION - LOCATION
LOCATION: HEAD
LOCATION: HEAD

## 2022-03-07 ASSESSMENT — PAIN DESCRIPTION - PAIN TYPE
TYPE: ACUTE PAIN
TYPE: ACUTE PAIN

## 2022-03-07 ASSESSMENT — PAIN DESCRIPTION - FREQUENCY
FREQUENCY: INTERMITTENT
FREQUENCY: INTERMITTENT

## 2022-03-07 NOTE — PLAN OF CARE
Problem: Pain:  Goal: Pain level will decrease  Description: Pain level will decrease  3/7/2022 1102 by Rosanne Hernandez RN  Outcome: Ongoing  3/6/2022 2317 by Donaldo Dc RN  Outcome: Ongoing  3/6/2022 2317 by Donaldo Dc RN  Outcome: Ongoing  Goal: Control of acute pain  Description: Control of acute pain  3/7/2022 1102 by Rosanne Hernandez RN  Outcome: Ongoing  3/6/2022 2317 by Donaldo Dc RN  Outcome: Ongoing  3/6/2022 2317 by Donaldo Dc RN  Outcome: Ongoing  Goal: Control of chronic pain  Description: Control of chronic pain  3/7/2022 1102 by Rosanne Hernandez RN  Outcome: Ongoing  3/6/2022 2317 by Donaldo Dc RN  Outcome: Ongoing  3/6/2022 2317 by Donaldo Dc RN  Outcome: Ongoing     Problem: Falls - Risk of:  Goal: Will remain free from falls  Description: Will remain free from falls  3/7/2022 1102 by Rosanne Hernandez RN  Outcome: Ongoing  3/6/2022 2317 by Donaldo Dc RN  Outcome: Ongoing  3/6/2022 2317 by Donaldo Dc RN  Outcome: Ongoing  Goal: Absence of physical injury  Description: Absence of physical injury  3/7/2022 1102 by Rosanne Hernandez RN  Outcome: Ongoing  3/6/2022 2317 by Donaldo Dc RN  Outcome: Ongoing  3/6/2022 2317 by Donaldo Dc RN  Outcome: Ongoing     Problem: Altered Mood, Depressive Behavior:  Goal: Able to verbalize acceptance of life and situations over which he or she has no control  Description: Able to verbalize acceptance of life and situations over which he or she has no control  3/7/2022 1102 by Rosanne Hernandez RN  Outcome: Ongoing  3/6/2022 2317 by Donaldo Dc RN  Outcome: Ongoing  3/6/2022 2317 by Donaldo Dc RN  Outcome: Ongoing  Goal: Able to verbalize and/or display a decrease in depressive symptoms  Description: Able to verbalize and/or display a decrease in depressive symptoms  3/7/2022 1102 by Rosanne Hernandez RN  Outcome: Ongoing  3/6/2022 2317 by Donaldo Dc RN  Outcome: Ongoing  3/6/2022 available resources and services will improve  Outcome: Ongoing  Goal: Ability to manage health-related needs will improve  Description: Ability to manage health-related needs will improve  Outcome: Ongoing     Problem: Metabolic:  Goal: Ability to maintain appropriate glucose levels will improve  Description: Ability to maintain appropriate glucose levels will improve  Outcome: Ongoing     Problem: Nutritional:  Goal: Maintenance of adequate nutrition will improve  Description: Maintenance of adequate nutrition will improve  Outcome: Ongoing

## 2022-03-07 NOTE — PROGRESS NOTES
WRAP UP GROUP NOTE:     Patient's Goal:  Providing feedback as to their own progress in the care-plan provided. Pt's have an opportunity to explore self-reflective skills and share any additional cares and concerns not yet addressed. Pt effectively participated.      Energy level:low  Appetite:good  Concentration: improving  Hallucinations:gone  Depression:improved  Anxiety:on/off  How I worked today:tried a lot  What helps me sleep:try a relaxation technique  Any questions/complaints/comments:no

## 2022-03-07 NOTE — PLAN OF CARE
Group Therapy Note    Date: 3/7/2022  Start Time: 1000  End Time:  2226  Number of Participants: 5    Type of Group: Psychoeducation    Wellness Binder Information  Module Name:  Men's Issues  Session Number:  1    Group Goal for Pt: To improve knowledge of effective stress management techniques    Notes:  Pt did not attend group discussion. Pt was invited/encouraged. Status After Intervention:      Participation Level:     Participation Quality:       Speech:         Thought Process/Content:       Affective Functioning:       Mood:       Level of consciousness:        Response to Learning:       Endings:     Modes of Intervention:       Discipline Responsible:       Signature:  Viral Howard

## 2022-03-07 NOTE — PROGRESS NOTES
Attempted to complete psychosocial and C-SSRS with pt. However, pt refused/declined and reported \"I am not answering no more questions\". Pt refused/declined to sign treatment sheet also.      Electronically signed by Monica Green on 3/7/2022 at 10:32 AM

## 2022-03-07 NOTE — PROGRESS NOTES
[facility] Adult Unit Daily Assessment  Nursing Progress Note    Room: 06/621-01   Name: Daiana Briones   Age: 46 y.o. Gender: male   Dx: Major depressive disorder without psychotic features  Precautions: suicide risk and fall risk  Inpatient Status: voluntary       SLEEP:    Sleep Quality Poor  Sleep Medications: yes  PRN Sleep Meds: No       MEDICAL:    Other PRN Meds: Yes   Med Compliant: Yes  Accu-Chek: Yes  Oxygen/CPAP/BiPAP: No  CIWA/CINA: No   PAIN Assessment: present - adequately treated  Side Effects from medication: No    Is Patient experiencing any respiratory symptoms (headache, fever, body aches, cough. Patelyasemin Peres ): no  Patient educated by nursing to practice social distancing, wear masks, wash hands frequently: yes      PSYCH:    Depression: 7   Anxiety: 10   SI denies suicidal ideation   HI Negative for homicidal ideation      AVH:Present - patient reports that he hears voices and that they are tormenting him. GENERAL:    Appetite: no change from normal    Social: some   Speech: normal   Appearance: appropriately dressed, appropriately groomed and healthy looking    GROUP:    Group Participation: No  Participation Quality: None    Notes:     Patient is alert, oriented, and in an irritable mood this morning reporting that he did not sleep last night due to the patient next door screaming and cussing staff. Patient reports that he is tired and just wants to go back to bed after breakfast. Staff let patient rest until lunch. Patient became more talkative and friendly with staff after resting. Patient continued to report poor sleep though due to everyone waking him up today. Patient reports good appetite, patient has good eye contact during interview with a congruent affect. Patient reports that he is hearing voices and that they are not commanding but that they are tormenting him and raising his anxiety. Patient does become louder when his anxiety goes up.  patient was friendlier and more social with peers during lunch time. No obvious s/s of distress voiced or noted. Will continue to monitor.     Electronically signed by Aamir Yoon RN on 3/7/22 at 2:03 PM CST

## 2022-03-07 NOTE — PROGRESS NOTES
RT leisure assessment is incomplete. Pt has refused to provide leisure information. Pt will be encouraged to attend scheduled group activities to address leisure and social deficits.

## 2022-03-07 NOTE — PROGRESS NOTES
Behavioral Services  Medicare Certification Upon Admission    I certify that this patient's inpatient psychiatric hospital admission is medically necessary for:    [x] (1) Treatment which could reasonably be expected to improve this patient's condition,       [] (2) Or for diagnostic study;     AND     [x](2) The inpatient psychiatric services are provided while the individual is under the care of a physician and are included in the individualized plan of care.     Estimated length of stay/service 3-5 days    Plan for post-hospital care TBA    Electronically signed by Chelle Randolph MD on 3/7/2022 at 7:54 AM

## 2022-03-07 NOTE — PROGRESS NOTES
I Admission from 4th Floor (418)  Nursing Admission Note        Reason for Admission: Pt is a 46year old male admitted from the 4th floor for major depressive disorder with decreased functioning    Patient Active Problem List   Diagnosis    Essential hypertension    Controlled type 2 diabetes mellitus with hyperglycemia ([facility]    [facility] peripheral neuropathy [facility]    Bipolar depression ([facility]    Generalized anxiety disorder    Insomnia    History of drug abuse ([facility]    Mood disorder ([facility]    Anxiety    Hyperglycemia due to diabetes mellitus (HCC)    Lethargy    [facility]    Bronchitis    SIRS (systemic inflammatory response syndrome) ([facility]    Right lower lobe pneumonia    Tobacco abuse    Tobacco abuse counseling    Dependence on nicotine from cigarettes    Major depressive disorder without psychotic features         Addictive Behavior:        Medical Problems:   Past Medical History:   Diagnosis Date    ADHD     Anxiety     Depression     Diabetes ([facility]        Status EXAM:  Status and Exam  Normal: Yes  Affect: Congruent  Level of Consciousness: Alert  Mood:Normal: No  Mood: Depressed,Anxious (rates depression and anxiety 10)  Motor Activity:Normal: Yes  Interview Behavior: Cooperative  Preception: Alloy to Person,Alloy to Time,Alloy to Place,Alloy to Situation  Attention:Normal: No  Attention: Distractible  Thought Processes: Circumstantial  Thought Content:Normal: Yes  Hallucinations:  Auditory (Comment) (\"I hear stuff every once in awhile\", \"agonizing things\")  Delusions: No  Memory:Normal: No  Memory: Poor Recent  Insight and Judgment: No  Insight and Judgment:  (limited insight and judgment)  Present Suicidal Ideation: No  Present Homicidal Ideation: No      Metabolic Screening:    Lab Results   Component Value Date    LABA1C 13.7 (H) 03/04/2022     Lab Results   Component Value Date    CHOL 163 07/11/2019     Lab Results   Component Value Date    TRIG 433 (H) 07/11/2019     Lab Results   Component Value Date    HDL 31 (L) 10/23/2020    HDL 28 (L) 07/11/2019     No components found for: LDLCAL  No results found for: LABVLDL    There is no height or weight on file to calculate BMI. BP Readings from Last 2 Encounters:   03/06/22 (!) 155/92   03/06/22 123/84       PATIENT STRENGTHS:  Strengths: Communication,Employment,Social Skills,Motivated    Patient Strengths and Limitations:  Limitations: Tendency to isolate self,Hopeless about future      Tobacco Screening:  Practical Counseling, on admission, omar X, if applicable and completed (first 3 are required if patient doesn't refuse):            Recognizing danger situations (included triggers and roadblocks)                 Coping skills (new ways to manage stress, exercise, relaxation techniques, changing routine, distraction                                                      Basic information about quitting (benefits of quitting, techniques in how to quit, available resources   Referral for counseling faxed to [facility]                                            Patient refused counseling   Patient has not smoked in the last 30 days   Patient offered nicotine patch. Received   Refused   Patient is a non-smoker yes         Admission to Unit:    Pt admitted to [facility] under the care of Dr. Maggy Erickson, arrived on unit via [facility] with security and staff from 4th floor (112)    Patient arrived dressed in paper scrubs:  no.    Body assessment and safety check completed by Fanny Mendoza, RAJINDER and Anne Macias, RN and  no contraband discovered. Patient belongings and valuables was cataloged and accounted for by Fanny Mendoza, RAJINDER.      Admission completed by Anne Macias, RAJINDER and Fanny Mendoza RN  Oriented to unit, unit policy and expectations:  yes    Reviewed and explained all legal documents:  yes    Education for [place] and Restraints given: yes    Patient signed all legal documents in progess   Pt verbalizes understanding:yes     Rodrigo Zeng Obtained? yes    Identifies stressors. yes   Death of your sister, employment, children, living arrangements. COVID TEACHING: Nursing provided education regarding COVID for social distancing, wearing masks, washing hands, and reporting any symptoms: yes  Mask Provided: yes If patient refused, reason: pts have had negative COVID test prior to admission      Admission Note:    Pt is alert and oriented x 4, cooperative. Normal facial expression, mood congruent. Rates depression and anxiety 10. Denies SI, HI, and VH. States he sometimes has VH saying \"agonizing\" things\". Will pass report to night shift.     Electronically signed by Maddie Mancini RN on 3/6/22 at 6:45 PM CST

## 2022-03-07 NOTE — H&P
H/o methamphetamine abuse, quit 1.5 month ago. H/o Klonopin abuse.  H/o rehab treatment in [address] [place] [address] at [facility].  Smokes 1.5 PPD.     Past Medical History:    Past Medical History:   Diagnosis Date    ADHD     Anxiety     Depression     Diabetes ([address]        Past Surgical History:    Past Surgical History:   Procedure Laterality Date    NECK SURGERY         Medications Prior to Admission:   Prior to Admission medications    Medication Sig Start Date End Date Taking? Authorizing Provider   traZODone (DESYREL) 150 MG tablet Take 300 mg by mouth nightly    Historical Provider, MD   insulin glargine (BASAGLAR KWIKPEN) 100 UNIT/ML injection pen Inject 40 Units into the skin 2 times daily 7/28/21   Matheus Reed MD   lurasidone (LATUDA) 20 MG TABS tablet Take 1 tablet by mouth daily 7/28/21   Matheus Reed MD   buPROPion (WELLBUTRIN XL) 150 MG extended release tablet Take 1 tablet by mouth every morning 7/28/21   Matheus Reed MD   insulin aspart (NOVOLOG FLEXPEN) 100 UNIT/ML injection pen Inject 20 Units into the skin 3 times daily (before meals) 7/28/21   Matheus Reed MD   gabapentin (NEURONTIN) 600 MG tablet Take 1 tablet by mouth 3 times daily for 30 days. 7/28/21 12/6/21  Matheus Reed MD   glucose monitoring (FREESTYLE FREEDOM) kit 1 kit by Does not apply route daily 7/28/21   Matheus Reed MD   blood glucose monitor strips Test 5 times a day & as needed for symptoms of irregular blood glucose. Dispense sufficient amount for indicated testing frequency plus additional to accommodate PRN testing needs. 7/28/21   Matheus Reed MD   Lancets MISC 1 each by Does not apply route 5 times daily 7/28/21   Matheus Reed MD       Allergies:  Patient has no known allergies. Social History:  Lives in a camper alone. Children involved. Family History:   Family History   Adopted: Yes       REVIEW OF SYSTEMS:  General: No fevers, chills, night sweats, no recent weight loss or gain. Head: No headache, no migraine.   Eyes: No recent visual changes. Ears: No recent hearing changes. Nose: No increased congestion or change in sense of smell. Throat: No sore throat, no trouble swallowing. Cardiovascular: No chest pain or palpitations, or dizziness. Respiratory: No cough, wheezes, congestion, or shortness of breath. Gastrointestinal: No abdominal pain, nausea or vomiting, no diarrhea or constipation. Musculo-skeletal: No edema, deformities, or loss of functions. Neurological: No loss of consciousness, abnormal sensations, or weakness. Skin: No rash, abrasions or bruises. PHYSICAL EXAM:  GENERAL APPEARANCE: 46y.o. year-old male in NAD   HEAD: Normocephalic, atraumatic. THROAT: No erythema, exudates, lesions. No tongue laceration. CARDIOVASCULAR: PMI nondisplaced. Regular rhythm and rate. Normal S1 and S2.  PULMONARY: Clear to auscultation bilaterally, no tenderness to palpation. ABDOMEN: Soft, nontender, nondistended. MUSCULOSKELTAL: No obvious deformities, clubbing, cyanosis or edema, no spinous process or paraspinous tenderness, normal ROM, distal pulses intact symmetric 2+ bilaterally. NEUROLOGICAL: Alert, oriented x 3, CN II-XII grossly intact, motor strength 5/5 all muscle groups, DTR 2+ intact and symmetric, sensation intact to sharp and dull. No abnormal movements or tremors. SKIN: Warm, dry, intact, no rash, abrasions bruises     Vitals:  BP (!) 160/82   Pulse 82   Temp 97.2 °F (36.2 °C) (Temporal)   Resp 18   SpO2 94%     Mental Status Examination:    Appearance: Older than stated age. Gait stable. No abnormal movements or tremor. Behavior: Irritable  Speech: Normal in tone, volume, and quality. No slurring, dysarthria or pressured speech noted. Mood: \"Not good. Trying to get rest\"   Affect: Mood congruent. Thought Process: Appears linear. Thought Content: Denies SI and HI. No overt delusions or paranoia appreciated. Perceptions: Denies auditory or visual hallucinations at present time.  Not responding to internal stimuli. Concentration: Intact. Orientation: to person, place, date, and situation. Language: Intact. Fund of information: Intact. Memory: Recent and remote appear intact. Neurovegitative: Fair appetite and poor sleep. Insight: Limited. Judgment: Limited. DATA:  Lab Results   Component Value Date    WBC 8.7 03/06/2022    HGB 13.6 (L) 03/06/2022    HCT 40.4 (L) 03/06/2022    MCV 89.4 03/06/2022     03/06/2022     Lab Results   Component Value Date     03/06/2022    K 4.1 03/06/2022     03/06/2022    CO2 25 03/06/2022    BUN 14 03/06/2022    CREATININE 0.6 03/06/2022    GLUCOSE 290 (H) 03/06/2022    CALCIUM 8.9 03/06/2022    PROT 7.2 03/04/2022    LABALBU 4.1 03/04/2022    BILITOT 0.5 03/04/2022    ALKPHOS 119 03/04/2022    AST 17 03/04/2022    ALT 30 03/04/2022    LABGLOM >60 03/06/2022    GFRAA >59 03/06/2022         ASSESSMENT AND PLAN:  DSM-5 DIAGNOSIS:   Impression:  Depression unspecified   Anxiety unspecified  Methamphetamine use disorder, severe, in early remission  Tobacco use disorder  Diabetes   COPD  HTN    Patient endorses suicidal ideation and is meeting the criteria for inpatient psychiatric treatment. Plan:   -Admit to [facility] Unit and monitor on 15 minute checks. Suicide and fall precautions.  Cadence Vela reviewed. -Gather collateral information from family with release.  -Medical monitoring to be performed by Dr. Janak Barros and associates. Order routine labs. -Acclimate to the unit. Provide supportive psychotherapy.  -Encourage participation in groups and therapeutic activities as appropriate. Work on coping skills. -Medications:    Continue Wellbutrin for depression. Trazodone - increase - and melatonin for sleep. As needed Atarax for anxiety.   Offer nicotine patch to help with nicotine withdrawal.    -The risks, benefits, side effects, indications, contraindications, and adverse effects of the medications have been discussed.  -The patient has verbalized understanding and has capacity to give informed consent.  -SW help evaluate home environment and provide outpatient resources.  -Discuss with treatment team.

## 2022-03-07 NOTE — PROGRESS NOTES
SW met with treatment team to discuss pt's progress and setbacks. SW 2 was present. Pt was admitted, voluntary, for depression, decreased functioning at home, has hx of diabetes/COPD/HTN, hx of depression/anxiety, hx of psychiatric treatment, hx of polysubstance abuse, noncompliance with psychotropic medications and follow-up, states he has been sober for a month and a half, UDS was negative, identifies his current stressor as the death of his sister several weeks ago, reports his sister was his main support, reports poor sleep and appetite, denies HI, reports he hears \"agonizing things\" once in a while, denies VH. Since admission, pt reportedly was cooperative with admission process, alert/oriented x 4, reports severe depression/anxiety, denies SI/HI/VH.

## 2022-03-07 NOTE — PLAN OF CARE
Problem: Pain:  Description: Pain management should include both nonpharmacologic and pharmacologic interventions. Goal: Pain level will decrease  Description: Pain level will decrease  3/6/2022 2317 by Aidee Dutta RN  Outcome: Ongoing     Problem: Pain:  Description: Pain management should include both nonpharmacologic and pharmacologic interventions. Goal: Control of acute pain  Description: Control of acute pain  3/6/2022 2317 by Aidee Dutta RN  Outcome: Ongoing     Problem: Pain:  Description: Pain management should include both nonpharmacologic and pharmacologic interventions.   Goal: Control of chronic pain  Description: Control of chronic pain  3/6/2022 2317 by Aidee Dutta RN  Outcome: Ongoing     Problem: Falls - Risk of:  Goal: Will remain free from falls  Description: Will remain free from falls  3/6/2022 2317 by Aidee Dutta RN  Outcome: Ongoing     Problem: Falls - Risk of:  Goal: Absence of physical injury  Description: Absence of physical injury  3/6/2022 2317 by Aidee Dutta RN  Outcome: Ongoing     Problem: Altered Mood, Depressive Behavior:  Goal: Able to verbalize acceptance of life and situations over which he or she has no control  Description: Able to verbalize acceptance of life and situations over which he or she has no control  3/6/2022 2317 by Aidee Dutta RN  Outcome: Ongoing     Problem: Altered Mood, Depressive Behavior:  Goal: Able to verbalize and/or display a decrease in depressive symptoms  Description: Able to verbalize and/or display a decrease in depressive symptoms  3/6/2022 2317 by Aidee Dutta RN  Outcome: Ongoing     Problem: Altered Mood, Depressive Behavior:  Goal: Ability to disclose and discuss suicidal ideas will improve  Description: Ability to disclose and discuss suicidal ideas will improve  3/6/2022 2317 by Aidee Dutta RN  Outcome: Ongoing     Problem: Altered Mood, Depressive Behavior:  Goal: Absence of self-harm  Description: Absence of self-harm  3/6/2022 2317 by Cb Graham RN  Outcome: Ongoing     Problem: Altered Mood, Deterioration in Function:  Goal: Ability to perform activities of daily living will improve  Description: Ability to perform activities of daily living will improve  3/6/2022 2317 by Cb Graham RN  Outcome: Ongoing     Problem: Altered Mood, Deterioration in Function:  Goal: Able to verbalize reality based thinking  Description: Able to verbalize reality based thinking  3/6/2022 2317 by Cb Graham RN  Outcome: Ongoing     Problem: Anxiety:  Goal: Level of anxiety will decrease  Description: Level of anxiety will decrease  Outcome: Ongoing

## 2022-03-07 NOTE — PLAN OF CARE
Group Therapy Note    Date: 3/7/2022  Start Time: 1400  End Time:  1430  Number of Participants: 3    Type of Group: Cognitive Skills    Wellness Binder Information  Module Name:  Women's Issues  Session Number:  4    Group Goal for Pt: To raise awareness of how thoughts influence feelings    Notes:  Pt did not attend group discussion. Pt was invited/encouraged. Status After Intervention:      Participation Level:     Participation Quality:       Speech:         Thought Process/Content:       Affective Functioning:       Mood:       Level of consciousness:        Response to Learning:       Endings:     Modes of Intervention:       Discipline Responsible:       Signature:  Luis Daniel Vergara

## 2022-03-07 NOTE — PROGRESS NOTES
Patient medications faxed from [facility]. Patient has not had medications filled since 7/30/21. Verification made with patient that he has not had his medications filled since then.

## 2022-03-07 NOTE — PROGRESS NOTES
Group Therapy Note    Date: 03/07/22  Start Time: 0800  End Time:0830  Number of Participants: 7    Type of Group: self inventory     Patient's Goal:  Sleep    Notes:  Patient filled out self inventory and menu    Status After Intervention:  Unchanged    Participation Level: Minimal    Participation Quality: Appropriate    Speech:  pressured    Thought Process/Content: Logical    Affective Functioning: Blunted    Mood: anxious, depressed and irritable    Level of consciousness:  Alert and Oriented x4    Response to Learning: Able to verbalize current knowledge/experience    Modes of Intervention: Education and Support    Discipline Responsible: Registered Nurse    Signature:   Barrett Arteaga RN

## 2022-03-07 NOTE — DISCHARGE SUMMARY
[place] [id number]    DEPARTMENT OF HOSPITALIST MEDICINE      DISCHARGE SUMMARY:      PATIENT NAME:  Maria E San  :  1970  MRN:  [id number]    Admission Date:   3/4/2022 11:17 AM Attending: No att. providers found   Discharge Date:   3/7/2022              PCP: Rajinder Ramirez MD  Length of Stay: 2 days     Chief Complaint on Admission:   Chief Complaint   Patient presents with    Hyperglycemia     \"over 500 at home\", too high to read in triage, sent by PCP       Consultants:     IP CONSULT TO PSYCHIATRY  IP CONSULT TO SOCIAL WORK  IP CONSULT TO DIABETES EDUCATOR       Discharge Problem List:   Principal Problem (Resolved): Hyperglycemia due to diabetes mellitus (HCC)  Active Problems:    Essential hypertension    Diabetic peripheral neuropathy (HCC)    Bipolar depression ([address]    History of drug abuse ([address]    Mood disorder (HCC)    COPD (chronic obstructive pulmonary disease) ([address]    Tobacco abuse         Last dated Assessment and Plan: 2021:  Principal Problem:    Hyperglycemia due to diabetes mellitus ([address]  Active Problems:    Essential hypertension    Diabetic peripheral neuropathy (HCC)    Bipolar depression ([address]    History of drug abuse ([address]    Mood disorder ([address]    COPD (chronic obstructive pulmonary disease) ([address]    Tobacco abuse  Resolved Problems:    * No resolved hospital problems. *      Hyperglycemia due to diabetes mellitus ([address]              -Blood sugars still in the 200s.             -ZLUO IV fluids and encourage oral hydration               -Humalog sliding scale with fingerstick blood sugar AC at bedtime               -Lantus 15units SC qhs.   We will give a one-time dose of Lantus 15 units this morning.              -high dose insulin correction              -HgA1c 13.7              -Diabetes education       Bipolar depression/Mood disorder               -consulted psychiatry, will follow up further recommendations.              -NG per Psychiatry continue with Wellbutrin for depression with trazodone for sleep and as needed Atarax for anxiety and stop Latuda.              -adjustments in mental health meds per psychiatry. Patient denies any plans or intention to hurt himself but stated that he gave up after the death of his sister. Plan for transfer to inpatient psychiatry as he meets criteria for inpatient psychiatric treatment as per psychiatry service.       Essential hypertension              -monitor blood pressure       Scalp lesions, probable herpes flare              -Tender lesions noted. We will place him on valacyclovir for 5 days.              -Wound care consulted       Diabetic peripheral neuropathy ([facility]              -Continue neurontin       History of drug abuse/Tobacco abuse              -tobacco cessation education              -BSJIRX service consult for outpatient rehab              -nicoderm 21mg/24hr patch daily              -UDS, unremarkable       COPD (chronic obstructive pulmonary disease) ([facility]              -EDZOHBC for supplemental oxygen requirements        DVT Prophylaxis: Lovenox     GI prophylaxis:  Pepcid     Transfer to inpatient psychiatry possibly tomorrow on 03/07//2022, if blood sugar remains controlled. CUMULATIVE  HOSPITAL  COURSE  AND  TREATMENT:  46 y. o. male who presented to [facility] ED complaining of depression and hyperglycemia. Pt has history of DM, HTN, cigarette smoking, methamphetamine use and mood disorder/bipolar depression.     He relates that over past couple of weeks he has had worsening depression due to recent death of a family member. He tells me,\"I don't want to talk about it\". He tells me that due to depression he stopped taking all of his home medications over past two weeks. He relates that his family checked on him today and that his blood sugar was too high for glucometer to read.  Because of this, family urged him to be evaluated here.      He has had episodes of nausea, vomiting associated with vague generalized abdominal pain. He relates that he last vomited two days ago. He gives history of iv drug abuse reporting having last used methamphetamine drug 6 weeks ago. He tells me that he has had problems with dental pain and had anticipated that his ex-wife would take him to a dentist today. He does not endorse any problems with fevers to me. He denies current as well as recent suicidal thoughts. He reported that he just gave up when his sister . During hospital stay patient was treated for hypoglycemia with Lantus and Humalog sliding scale with improvement in his hyperglycemia. He was seen and evaluated by psychiatry who later transferred him to the psychiatry unit while awaiting medical clearance as noted in last medical service progress note on 2022. Further management will be as per psychiatry service. OBJECTIVE:  /84   Pulse 98   Temp 97.9 °F (36.6 °C) (Temporal)   Resp 19   Ht 5' 10\" (1.778 m)   Wt 155 lb (70.3 kg)   SpO2 98%   BMI 22.24 kg/m²     Physical exam was performed on 2022     ANGELA:  Awake, alert, oriented x 3, patient appears tired and fatigued   Head/Eyes:  Normocephalic, atraumatic, EOMI and PERRLA bilaterally   Respiratory:   Bilateral fair air entry in both lung fields, no wheezing, rales or crackles bilaterally, symmetric expansion of chest   Cardiovascular:  Regular rate and rhythm, S1+S2+0, no murmurs/rubs   Abdomen:   Soft, non-tender, bowel sounds +ve, no organomegaly   Extremities:  Skin Moves all, full range of motion, no edema  Skull lesions tender occipital area.    Neurologic: Awake, alert, oriented x 3, cranial nerves II-XII intact, no focal neurological deficits, sensory system intact   Psychiatric: Normal mood, non-suicidal              Medication List      ASK your doctor about these medications    Stevenaglsamy Bajwa 100 UNIT/ML injection pen  Generic drug: insulin glargine  Inject 40 Units into the skin 2 times daily     blood glucose test strips  Test 5 times a day & as needed for symptoms of irregular blood glucose. Dispense sufficient amount for indicated testing frequency plus additional to accommodate PRN testing needs. buPROPion 150 MG extended release tablet  Commonly known as: Wellbutrin XL  Take 1 tablet by mouth every morning     gabapentin 600 MG tablet  Commonly known as: Neurontin  Take 1 tablet by mouth 3 times daily for 30 days. glucose monitoring kit  1 kit by Does not apply route daily     Lancets Misc  1 each by Does not apply route 5 times daily     lurasidone 20 MG Tabs tablet  Commonly known as: Latuda  Take 1 tablet by mouth daily     NovoLOG FlexPen 100 UNIT/ML injection pen  Generic drug: insulin aspart  Inject 20 Units into the skin 3 times daily (before meals)     traZODone 150 MG tablet  Commonly known as: DESYREL              Laboratory Data:  Recent Labs     03/05/22  0144 03/06/22  1227   WBC 8.3 8.7   HGB 13.4* 13.6*    274     Recent Labs     03/05/22  0144 03/06/22  1227   * 136   K 4.3 4.1    101   CO2 24 25   BUN 18 14   CREATININE 0.4* 0.6   GLUCOSE 202* 290*     No results for input(s): AST, ALT, ALB, BILITOT, ALKPHOS in the last 72 hours. Troponin T: No results for input(s): TROPONINI in the last 72 hours. Pro-BNP: No results for input(s): BNP in the last 72 hours. INR: No results for input(s): INR in the last 72 hours. UA:No results for input(s): NITRITE, COLORU, PHUR, LABCAST, WBCUA, RBCUA, MUCUS, TRICHOMONAS, YEAST, BACTERIA, CLARITYU, SPECGRAV, LEUKOCYTESUR, UROBILINOGEN, BILIRUBINUR, BLOODU, GLUCOSEU, AMORPHOUS in the last 72 hours. Invalid input(s): Stephon An  A1C: No results for input(s): LABA1C in the last 72 hours. ABG:No results for input(s): PHART, BUU9KPJ, PO2ART, MGY9DUT, BEART, HGBAE, Y1HBSJMQ, CARBOXHGBART in the last 72 hours.       ISSUES TO BE ADDRESSED AT HOSPITAL FOLLOW-UP VISIT:    Advised patient to follow-up closely with PCP upon discharge for management of chronic medical issues  Please see the detailed discharge directions delivered from earlier today! Condition on Discharge: stable  Discharge Disposition: Inpatient Psych    Recommended Follow Up:  No follow-up provider specified. Followup Appointments Scheduled at Time of Discharge:  Future Appointments   Date Time Provider Rudy Chaidez   3/8/2022 11:30 [id number] MD SHELBY [facility]        Discharge Instructions:   Please see the discharge paperwork. Patient was seen at bedside on 3/6/22, and the examination showed improvement since yesterday. Total time spent during patient evaluation and assessment, was in excess of 32 minutes.       Signed Electronically:    Luz Pereira MD  1:55 PM 3/7/2022

## 2022-03-08 LAB
GLUCOSE BLD-MCNC: 226 MG/DL (ref 70–99)
GLUCOSE BLD-MCNC: 239 MG/DL (ref 70–99)
GLUCOSE BLD-MCNC: 325 MG/DL (ref 70–99)
GLUCOSE BLD-MCNC: 447 MG/DL (ref 70–99)
PERFORMED ON: ABNORMAL
VITAMIN B-12: 696 PG/ML (ref 211–946)
VITAMIN D 25-HYDROXY: 21.6 NG/ML

## 2022-03-08 PROCEDURE — 6370000000 HC RX 637 (ALT 250 FOR IP): Performed by: FAMILY MEDICINE

## 2022-03-08 PROCEDURE — 82947 ASSAY GLUCOSE BLOOD QUANT: CPT

## 2022-03-08 PROCEDURE — 82306 VITAMIN D 25 HYDROXY: CPT

## 2022-03-08 PROCEDURE — 82607 VITAMIN B-12: CPT

## 2022-03-08 PROCEDURE — 99233 SBSQ HOSP IP/OBS HIGH 50: CPT | Performed by: PSYCHIATRY & NEUROLOGY

## 2022-03-08 PROCEDURE — 36415 COLL VENOUS BLD VENIPUNCTURE: CPT

## 2022-03-08 PROCEDURE — 1240000000 HC EMOTIONAL WELLNESS R&B

## 2022-03-08 PROCEDURE — 6370000000 HC RX 637 (ALT 250 FOR IP): Performed by: PSYCHIATRY & NEUROLOGY

## 2022-03-08 RX ORDER — QUETIAPINE FUMARATE 50 MG/1
50 TABLET, FILM COATED ORAL NIGHTLY
Status: DISCONTINUED | OUTPATIENT
Start: 2022-03-08 | End: 2022-03-09 | Stop reason: HOSPADM

## 2022-03-08 RX ORDER — ERGOCALCIFEROL 1.25 MG/1
50000 CAPSULE ORAL WEEKLY
Status: DISCONTINUED | OUTPATIENT
Start: 2022-03-08 | End: 2022-03-09 | Stop reason: HOSPADM

## 2022-03-08 RX ORDER — IBUPROFEN 600 MG/1
600 TABLET ORAL EVERY 8 HOURS PRN
Status: DISCONTINUED | OUTPATIENT
Start: 2022-03-08 | End: 2022-03-09 | Stop reason: HOSPADM

## 2022-03-08 RX ORDER — DOXEPIN HYDROCHLORIDE 50 MG/1
50 CAPSULE ORAL NIGHTLY
Status: DISCONTINUED | OUTPATIENT
Start: 2022-03-08 | End: 2022-03-09 | Stop reason: HOSPADM

## 2022-03-08 RX ORDER — ACETAMINOPHEN 325 MG/1
650 TABLET ORAL EVERY 4 HOURS PRN
Status: DISCONTINUED | OUTPATIENT
Start: 2022-03-08 | End: 2022-03-09 | Stop reason: HOSPADM

## 2022-03-08 RX ADMIN — IBUPROFEN 600 MG: 600 TABLET, FILM COATED ORAL at 15:45

## 2022-03-08 RX ADMIN — GABAPENTIN 600 MG: 300 CAPSULE ORAL at 21:02

## 2022-03-08 RX ADMIN — INSULIN LISPRO 6 UNITS: 100 INJECTION, SOLUTION INTRAVENOUS; SUBCUTANEOUS at 16:42

## 2022-03-08 RX ADMIN — IBUPROFEN 600 MG: 600 TABLET, FILM COATED ORAL at 23:19

## 2022-03-08 RX ADMIN — INSULIN GLARGINE 25 UNITS: 100 INJECTION, SOLUTION SUBCUTANEOUS at 21:04

## 2022-03-08 RX ADMIN — ERGOCALCIFEROL 50000 UNITS: 1.25 CAPSULE ORAL at 11:58

## 2022-03-08 RX ADMIN — HYDROXYZINE HYDROCHLORIDE 25 MG: 25 TABLET, FILM COATED ORAL at 21:02

## 2022-03-08 RX ADMIN — DOXEPIN HYDROCHLORIDE 50 MG: 50 CAPSULE ORAL at 21:02

## 2022-03-08 RX ADMIN — INSULIN LISPRO 12 UNITS: 100 INJECTION, SOLUTION INTRAVENOUS; SUBCUTANEOUS at 08:43

## 2022-03-08 RX ADMIN — GABAPENTIN 600 MG: 300 CAPSULE ORAL at 14:05

## 2022-03-08 RX ADMIN — ACETAMINOPHEN 650 MG: 325 TABLET ORAL at 21:02

## 2022-03-08 RX ADMIN — QUETIAPINE FUMARATE 50 MG: 50 TABLET ORAL at 21:02

## 2022-03-08 RX ADMIN — GABAPENTIN 600 MG: 300 CAPSULE ORAL at 08:38

## 2022-03-08 RX ADMIN — VALACYCLOVIR HYDROCHLORIDE 1000 MG: 500 TABLET, FILM COATED ORAL at 08:39

## 2022-03-08 RX ADMIN — INSULIN LISPRO 9 UNITS: 100 INJECTION, SOLUTION INTRAVENOUS; SUBCUTANEOUS at 21:05

## 2022-03-08 RX ADMIN — ACETAMINOPHEN 650 MG: 325 TABLET ORAL at 08:35

## 2022-03-08 RX ADMIN — INSULIN LISPRO 6 UNITS: 100 INJECTION, SOLUTION INTRAVENOUS; SUBCUTANEOUS at 11:55

## 2022-03-08 RX ADMIN — BUPROPION HYDROCHLORIDE 150 MG: 150 TABLET, EXTENDED RELEASE ORAL at 08:39

## 2022-03-08 RX ADMIN — Medication 3 MG: at 21:02

## 2022-03-08 ASSESSMENT — PAIN DESCRIPTION - ONSET
ONSET: ON-GOING
ONSET: GRADUAL

## 2022-03-08 ASSESSMENT — PAIN DESCRIPTION - PROGRESSION
CLINICAL_PROGRESSION: NOT CHANGED
CLINICAL_PROGRESSION: NOT CHANGED
CLINICAL_PROGRESSION: GRADUALLY IMPROVING
CLINICAL_PROGRESSION: GRADUALLY WORSENING

## 2022-03-08 ASSESSMENT — PAIN DESCRIPTION - FREQUENCY
FREQUENCY: INTERMITTENT
FREQUENCY: CONTINUOUS
FREQUENCY: CONTINUOUS
FREQUENCY: INTERMITTENT

## 2022-03-08 ASSESSMENT — PAIN SCALES - GENERAL
PAINLEVEL_OUTOF10: 7
PAINLEVEL_OUTOF10: 10
PAINLEVEL_OUTOF10: 8
PAINLEVEL_OUTOF10: 10
PAINLEVEL_OUTOF10: 9

## 2022-03-08 ASSESSMENT — PAIN DESCRIPTION - PAIN TYPE
TYPE: ACUTE PAIN

## 2022-03-08 ASSESSMENT — PAIN DESCRIPTION - LOCATION
LOCATION: TEETH
LOCATION: HEAD

## 2022-03-08 ASSESSMENT — PAIN - FUNCTIONAL ASSESSMENT
PAIN_FUNCTIONAL_ASSESSMENT: ACTIVITIES ARE NOT PREVENTED

## 2022-03-08 ASSESSMENT — PAIN DESCRIPTION - DESCRIPTORS
DESCRIPTORS: ACHING;DISCOMFORT
DESCRIPTORS: ACHING;DISCOMFORT
DESCRIPTORS: ACHING
DESCRIPTORS: ACHING;DISCOMFORT

## 2022-03-08 ASSESSMENT — PAIN DESCRIPTION - ORIENTATION
ORIENTATION: RIGHT;LEFT
ORIENTATION: MID
ORIENTATION: RIGHT;LEFT;MID

## 2022-03-08 NOTE — H&P
HISTORY and PHYSICAL      CHIEF COMPLAINT:  Depression, SI    Reason for Admission:  Depression, SI    History Obtained From:  patient    HISTORY OF PRESENT ILLNESS:      The patient is a 46 y.o. male who is admitted to the [place] [address] unit with worsening mood issues. He has no c/o CP or SOA. No abdominal pain or N/V. No dysuria. No weakness or HA. No new pain issues. No fevers. N HA or dizziness. Past Medical History:        Diagnosis Date    ADHD     Anxiety     Depression     Diabetes ([address]      Past Surgical History:        Procedure Laterality Date    NECK SURGERY           Medications Prior to Admission:    Medications Prior to Admission: traZODone (DESYREL) 150 MG tablet, Take 300 mg by mouth nightly  insulin glargine (BASAGLAR KWIKPEN) 100 UNIT/ML injection pen, Inject 40 Units into the skin 2 times daily  lurasidone (LATUDA) 20 MG TABS tablet, Take 1 tablet by mouth daily  buPROPion (WELLBUTRIN XL) 150 MG extended release tablet, Take 1 tablet by mouth every morning  insulin aspart (NOVOLOG FLEXPEN) 100 UNIT/ML injection pen, Inject 20 Units into the skin 3 times daily (before meals)  gabapentin (NEURONTIN) 600 MG tablet, Take 1 tablet by mouth 3 times daily for 30 days. glucose monitoring (FREESTYLE FREEDOM) kit, 1 kit by Does not apply route daily  blood glucose monitor strips, Test 5 times a day & as needed for symptoms of irregular blood glucose. Dispense sufficient amount for indicated testing frequency plus additional to accommodate PRN testing needs. Lancets MISC, 1 each by Does not apply route 5 times daily    Allergies:  Patient has no known allergies. Social History:   TOBACCO:   reports that he has been smoking cigarettes. He started smoking about 22 years ago. He has been smoking about 1.50 packs per day. He has quit using smokeless tobacco.  ETOH:   reports previous alcohol use. DRUGS:   reports current drug use.  Drug: Methamphetamines (Crystal Meth). MARITAL STATUS:    OCCUPATION:  He is working  Patient currently lives alone      Family History:       Adopted: Yes     REVIEW OF SYSTEMS:  Constitutional: neg  CV: neg  Pulmonary: neg  GI: neg  : neg  Psych: depression, SI  Neuro: neg  Skin: neg  MusculoSkeletal: neg  HEENT: neg  Joints: neg    Vitals:  BP (!) 144/87   Pulse 105   Temp 98.7 °F (37.1 °C) (Temporal)   Resp 18   SpO2 98%     PHYSICAL EXAM:  Gen: NAD, alert  HEENT: WNL  Lymph: no LAD  Neck: no JVD or masses  Chest: CTA bilat  CV: RRR  Abdomen: NT/ND  Extrem: no C/C/E  Neuro: non focal  Skin: no rashes  Joints: no redness    DATA:  I have reviewed the admission labs and imaging tests.     ASSESSMENT AND PLAN:      Patient Active Hospital Problem List:   Bipolar 1 disorder, depressed, moderate, SI--follow with Psych   DM2--follow with glucose, continue basal insulin and SSI   Anemia  HTN---follow BP            Hamzah Leiva MD  10:07 PM 3/7/2022

## 2022-03-08 NOTE — BH NOTE
WRAP UP GROUP NOTE    Patient's Goal:  Providing feedback as to their own progress in the care-plan provided. Patients have an opportunity to explore self-reflective skills and share any additional cares and concerns not yet addressed. Pt effectively participated.     Energy Level:  Appetite:  Concentration:  Hallucinations:  Depression:  Anxiety:  How I worked today:  What helps me sleep:  Any questions/complaints/comments:    Pt refused to participate    Electronically signed by Marcelo Devi RN on 3/7/2022 at 7:59 PM

## 2022-03-08 NOTE — PLAN OF CARE
Group Therapy Note    Date: 3/8/2022  Start Time: 1300  End Time:  1330  Number of Participants: 3    Type of Group: Recovery    Wellness Binder Information  Module Name:  Emotional Wellness  Session Number:  1    Group Goal for Pt: To raise awareness of the importance of healthy emotional expression    Notes:  Pt did not attend group activity. Pt was invited/encouraged. Status After Intervention:      Participation Level:     Participation Quality:       Speech:         Thought Process/Content:       Affective Functioning:       Mood:       Level of consciousness:        Response to Learning:       Endings:     Modes of Intervention:       Discipline Responsible:       Signature:  Lian Regalado

## 2022-03-08 NOTE — PROGRESS NOTES
Requirement Note     SW met with pt to complete Psychosocial and CSSR-S on this date.      Electronically signed by Kerline Crump [facility] on 3/8/2022 at 3:44 PM

## 2022-03-08 NOTE — BH NOTE
I Daily Shift Assessment-Geriatric Unit  Nursing Progress Note          Room: [address] [place] [address]   Name: Karolyn Steel   Age: 46 y.o. Gender: male   Dx: Major depressive disorder without psychotic features  Precautions: suicide risk and fall risk  Inpatient Status: voluntary     SLEEP:    Sleep: Yes,   Sleep Quality Poor   Hours Slept:    Sleep Medications: Yes; trazadone  PRN Sleep Meds: No       MEDICAL:      Other PRN Meds: Yes;atarax 25mg, tylenol 650mg   Med Compliant: Yes   Accu-Chek: Yes 496 = 9units of Humalog and scheduled 15 units of Lantus  Oxygen/CPAP/BiPAP: No  CIWA/CINA: No   PAIN Assessment: receiving treatment  Side Effects from medication: none voiced    Is Patient experiencing any respiratory symptoms (headache, fever, body aches, cough. Deisy Anderson ): no  Patient educated by nursing to practice social distancing, wear masks, wash hands frequently: yes      Metabolic Screening:    Lab Results   Component Value Date    LABA1C 13.7 (H) 03/04/2022       Lab Results   Component Value Date    CHOL 163 07/11/2019     Lab Results   Component Value Date    TRIG 433 (H) 07/11/2019     Lab Results   Component Value Date    HDL 31 (L) 10/23/2020    HDL 28 (L) 07/11/2019     No components found for: LDLCAL  No results found for: LABVLDL      There is no height or weight on file to calculate BMI. BP Readings from Last 2 Encounters:   03/07/22 (!) 144/87   03/06/22 123/84         PSYCH:     SI denies suicidal ideation    HI Negative for homicidal ideation        AVH:denies      Depression: 5 Anxiety: 10       GENERAL:      Appetite: good  Social: minimal Speech: normal   Appearance:appropriately dressed  Assistive Devices: noneLevel of Assist: Independent      GROUP:    Group Participation: No  Participation LevelNone    Participation QualityResistant    Notes: Pt has been irritable tonight. He was uncooperative with the interview process. He was blunt and hostile at times stating \"get out of here. \" Pt refused to participate in his wrap-up group tonight. He rated his depression a 5 and his anxiety a 10. He denied SI, HI and reports auditory hallucinations, but would not elaborate on what they were. He was mostly isolated to his room tonight, but did come out for a brief time and watch tv. He was minimally social at this time. Pt was also somewhat boisterous stating \"you got anything for being an asshole? \" He asked this loudly down the capone as medications were beginning to be passed. Pt continued with his irritability and demanded that his room door remain shut. He was reminded that safety rounds would be made every 15 minutes and still complained when he saw the red light. [facility] Pt BS tonight was 496 and he received 9 units of Humalog along with his scheduled 15 units of Lantus. He also received Tylenol for c/o pain in head. Will continue to monitor for safety as ordered.      Electronically signed by Gregory Cote RN on 3/7/2022 at 11:50 PM

## 2022-03-08 NOTE — PROGRESS NOTES
Attempted to obtain collateral, however pt refused/declined. Phi sheet or authorization for obtaining social information sheet not signed.      Electronically signed by Monica Lindsey on 3/8/2022 at 2:44 PM

## 2022-03-08 NOTE — PLAN OF CARE
Problem: Pain:  Goal: Pain level will decrease  Description: Pain level will decrease  3/7/2022 2330 by Yessica Ordonez RN  Outcome: Ongoing  3/7/2022 1102 by Crawford Severance, RN  Outcome: Ongoing  Goal: Control of acute pain  Description: Control of acute pain  3/7/2022 2330 by Yessica Ordonez RN  Outcome: Ongoing  3/7/2022 1102 by Crawford Severance, RN  Outcome: Ongoing  Goal: Control of chronic pain  Description: Control of chronic pain  3/7/2022 2330 by Yessica Ordonez RN  Outcome: Ongoing  3/7/2022 1102 by Crawford Severance, RN  Outcome: Ongoing     Problem: Falls - Risk of:  Goal: Will remain free from falls  Description: Will remain free from falls  3/7/2022 2330 by Yessica Ordonez RN  Outcome: Ongoing  3/7/2022 1102 by Crawford Severance, RN  Outcome: Ongoing  Goal: Absence of physical injury  Description: Absence of physical injury  3/7/2022 2330 by Yessica Ordonez RN  Outcome: Ongoing  3/7/2022 1102 by Crawford Severance, RN  Outcome: Ongoing     Problem: Altered Mood, Depressive Behavior:  Goal: Able to verbalize acceptance of life and situations over which he or she has no control  Description: Able to verbalize acceptance of life and situations over which he or she has no control  3/7/2022 2330 by Yessica Ordonez RN  Outcome: Ongoing  3/7/2022 1102 by Crawford Severance, RN  Outcome: Ongoing  Goal: Able to verbalize and/or display a decrease in depressive symptoms  Description: Able to verbalize and/or display a decrease in depressive symptoms  3/7/2022 2330 by Yessica Ordonez RN  Outcome: Ongoing  3/7/2022 1102 by Crawford Severance, RN  Outcome: Ongoing  Goal: Ability to disclose and discuss suicidal ideas will improve  Description: Ability to disclose and discuss suicidal ideas will improve  3/7/2022 2330 by Yessica Ordonez RN  Outcome: Ongoing  3/7/2022 1102 by Crawford Severance, RN  Outcome: Ongoing  Goal: Absence of self-harm  Description: Absence of self-harm  3/7/2022 2330 by Sanju Parra RN  Outcome: Ongoing  3/7/2022 1102 by Pop Armstrong RN  Outcome: Ongoing     Problem: Altered Mood, Deterioration in Function:  Goal: Ability to perform activities of daily living will improve  Description: Ability to perform activities of daily living will improve  3/7/2022 2330 by Sanju Parra RN  Outcome: Ongoing  3/7/2022 1102 by Pop Armstrong RN  Outcome: Ongoing  Goal: Able to verbalize reality based thinking  Description: Able to verbalize reality based thinking  3/7/2022 2330 by Sanju Parra RN  Outcome: Ongoing  3/7/2022 1102 by Pop Armstrong RN  Outcome: Ongoing     Problem: Anxiety:  Goal: Level of anxiety will decrease  Description: Level of anxiety will decrease  3/7/2022 2330 by Sanju Parra RN  Outcome: Ongoing  3/7/2022 1102 by Pop Armstrong RN  Outcome: Ongoing     Problem:  Activity:  Goal: Risk for activity intolerance will decrease  Description: Risk for activity intolerance will decrease  3/7/2022 2330 by Sanju Parra RN  Outcome: Ongoing  3/7/2022 1102 by Pop Armstrong RN  Outcome: Ongoing     Problem: Coping:  Goal: Ability to adjust to condition or change in health will improve  Description: Ability to adjust to condition or change in health will improve  3/7/2022 2330 by Sanju Parra RN  Outcome: Ongoing  3/7/2022 1102 by Pop Armstrong RN  Outcome: Ongoing     Problem: Health Behavior:  Goal: Ability to identify and utilize available resources and services will improve  Description: Ability to identify and utilize available resources and services will improve  3/7/2022 2330 by Sanju Parra RN  Outcome: Ongoing  3/7/2022 1102 by Pop Armstrong RN  Outcome: Ongoing  Goal: Ability to manage health-related needs will improve  Description: Ability to manage health-related needs will improve  3/7/2022 2330 by Sanju Parra RN  Outcome: Ongoing  3/7/2022 1102 by Pop Armstrong RN  Outcome: Ongoing     Problem: Metabolic:  Goal: Ability to maintain appropriate glucose levels will improve  Description: Ability to maintain appropriate glucose levels will improve  3/7/2022 2330 by Sherren Gibney, RN  Outcome: Ongoing  3/7/2022 1102 by Dell Childers RN  Outcome: Ongoing     Problem: Nutritional:  Goal: Maintenance of adequate nutrition will improve  Description: Maintenance of adequate nutrition will improve  3/7/2022 2330 by Sherren Gibney, RN  Outcome: Ongoing  3/7/2022 1102 by Dell Childers RN  Outcome: Ongoing

## 2022-03-08 NOTE — PROGRESS NOTES
Attempted to complete psychosocial and C-SSRS with pt. However, refused/declined and did not want to answer any questions.      Electronically signed by Monica Chi on 3/8/2022 at 2:42 PM

## 2022-03-08 NOTE — PLAN OF CARE
Group Therapy Note    Date: 3/8/2022  Start Time: 1030  End Time:  1100  Number of Participants: 4    Type of Group: Cognitive Skills    Wellness Binder Information  Module Name:  Women's Issues  Session Number:  3    Group Goal for Pt: To improve knowledge of ways to cope with depression    Notes:  Pt did not attend group discussion. Pt was invited/encouraged. Status After Intervention:      Participation Level:     Participation Quality:       Speech:         Thought Process/Content:       Affective Functioning:       Mood:       Level of consciousness:        Response to Learning:       Endings:     Modes of Intervention:       Discipline Responsible:       Signature:  Myles Jonas

## 2022-03-08 NOTE — PROGRESS NOTES
Department of Psychiatry  Attending Progress Note     Chief complaint: \"I'm better\"    SUBJECTIVE:   Chart reviewed, discussed with the team. No major issues overnight. Patient is med-compliant. No SEs. Performs ADLs. Isolates to self. Slept poorly. Patient seen in his room resting. Denies SI/HI/AVH. Rates depression 6/10, anxiety 3/10. Slept 5h. \"Still not sleeping. \" Denies physical complaints. OBJECTIVE    Physical  Wt Readings from Last 3 Encounters:   03/04/22 155 lb (70.3 kg)   03/04/22 155 lb (70.3 kg)   12/06/21 146 lb (66.2 kg)     Temp Readings from Last 3 Encounters:   03/08/22 97.7 °F (36.5 °C) (Temporal)   03/06/22 97.9 °F (36.6 °C) (Temporal)   03/04/22 97.2 °F (36.2 °C) (Temporal)     BP Readings from Last 3 Encounters:   03/08/22 127/83   03/06/22 123/84   03/04/22 (!) 142/84     Pulse Readings from Last 3 Encounters:   03/08/22 107   03/06/22 98   03/04/22 82        Review of Systems: 14-point review of systems negative except as described above    Mental Status Examination:   Appearance:  Stated age. Gait not assessed. No abnormal movements or tremor. Behavior: Calm  Speech: Normal in tone, volume, and quality. No slurring, dysarthria or pressured speech noted. Mood: \"Better \"   Affect: Mood congruent. Thought Process: Appears linear. Thought Content:  Denies SI/HI. No overt delusions or paranoia appreciated. Perceptions: Denies auditory or visual hallucinations at present time. Not responding to internal stimuli. Concentration: Intact. Orientation: to person, place, date, and situation. Language: Intact. Fund of information: Intact. Memory: Recent and remote appear intact. Neurovegitative: Fair appetite and poor sleep. Insight: Improving. Judgment: Improving.     Data  Lab Results   Component Value Date    WBC 8.7 03/06/2022    HGB 13.6 (L) 03/06/2022    HCT 40.4 (L) 03/06/2022    MCV 89.4 03/06/2022     03/06/2022      Lab Results   Component Value Date    NA 136 03/06/2022    K 4.1 03/06/2022     03/06/2022    CO2 25 03/06/2022    BUN 14 03/06/2022    CREATININE 0.6 03/06/2022    GLUCOSE 290 (H) 03/06/2022    CALCIUM 8.9 03/06/2022    PROT 7.2 03/04/2022    LABALBU 4.1 03/04/2022    BILITOT 0.5 03/04/2022    ALKPHOS 119 03/04/2022    AST 17 03/04/2022    ALT 30 03/04/2022    LABGLOM >60 03/06/2022    GFRAA >59 03/06/2022       Medications    Current Facility-Administered Medications:     traZODone (DESYREL) tablet 100 mg, 100 mg, Oral, Nightly, Emma Wong MD, 100 mg at 03/07/22 2027    melatonin tablet 3 mg, 3 mg, Oral, Nightly, Mariana Montano MD, 3 mg at 03/07/22 2026    insulin glargine (LANTUS) injection vial 25 Units, 25 Units, SubCUTAneous, Nightly, Ana Lilia Oates MD    insulin lispro (HUMALOG) injection vial 6 Units, 6 Units, SubCUTAneous, TID WC, Ana Lilia Oates MD, 6 Units at 03/08/22 0842    acetaminophen (TYLENOL) tablet 650 mg, 650 mg, Oral, Q4H PRN, Mariana Montano MD, 650 mg at 03/08/22 0835    polyethylene glycol (GLYCOLAX) packet 17 g, 17 g, Oral, Daily PRN, Mariana Montano MD    nicotine (NICODERM CQ) 21 MG/24HR 1 patch, 1 patch, TransDERmal, Daily, Mariana Montano MD, 1 patch at 03/08/22 0839    gabapentin (NEURONTIN) capsule 600 mg, 600 mg, Oral, TID, Mariana Montano MD, 600 mg at 03/08/22 0838    hydrOXYzine (ATARAX) tablet 25 mg, 25 mg, Oral, TID PRN, Mariana Montano MD, 25 mg at 03/07/22 2029    valACYclovir (VALTREX) tablet 1,000 mg, 1,000 mg, Oral, Daily, Mariana Montano MD, 1,000 mg at 03/08/22 0839    insulin lispro (HUMALOG) injection vial 0-18 Units, 0-18 Units, SubCUTAneous, TID WC, Mariana Montano MD, 12 Units at 03/08/22 0843    insulin lispro (HUMALOG) injection vial 0-9 Units, 0-9 Units, SubCUTAneous, Nightly, Mariana Montano MD, 9 Units at 03/07/22 2031    glucagon (rDNA) injection 1 mg, 1 mg, IntraMUSCular, PRN, Mariana Montano MD    dextrose 5 % solution, 100 mL/hr, IntraVENous, PRN, Emma Susan Ahumada MD    buPROPion (WELLBUTRIN XL) extended release tablet 150 mg, 150 mg, Oral, Daily, Ajay Leach MD, 150 mg at 03/08/22 0839    glucose chewable tablet 4 each, 4 tablet, Oral, PRN, Ajay Leach MD    dextrose bolus (hypoglycemia) 10% 125 mL, 125 mL, IntraVENous, PRN **OR** dextrose bolus (hypoglycemia) 10% 250 mL, 250 mL, IntraVENous, PRN, Ajay Leach MD    ASSESSMENT AND PLAN  DSM 5 DIAGNOSIS  Impression  Depression unspecified   Anxiety unspecified  Insomnia unspecified  Methamphetamine use disorder, severe, in early remission  Tobacco use disorder  Diabetes   COPD  HTN  Vitamin D deficiency    Continue to observe. Address insomnia. Plan:   1. Psychiatric Medications:   Switch trazodone to doxepin to help with insomnia. Add as needed    Seroquel. Monitor for side effects. The risks, benefits, side effects, indications, contraindications, alternatives and adverse effects of the medications have been discussed with patient. 2. Continue to provide supportive psychotherapy. Encourage socialization and participation in recreational activities. Work on coping skills. 3. Medical Issues:   Continue medical monitoring by Dr. Emily Murray and associates. 4. Disposition:     to provide outpatient resources and facilitate disposition.      Amount of time spent with patient:      35 minutes with greater than 50 % of the time spent in counseling and collaboration of care

## 2022-03-08 NOTE — PROGRESS NOTES
[facility] Daily Shift Assessment  Nursing Progress Note    Room: 0621/621-01 Name: Zoraida Carias Age: 46 y.o. Gender: male   Dx: Major depressive disorder without psychotic features  Precautions: close watch, suicide risk and fall risk  Target Symptoms:   Accu-Chek: Yes Sleep: Yes,Sleep Quality Good SI No AVH denies [facility]  ADLs: Yes Speech: pressured Depression: 0 Anxiety: 0   Participation LevelActive Listener and Interactive  Appetite: Very good  Respiratory symptoms: No Headache: No Body aches: No Fever: No Cough: Yes  Patients encouraged to wear masks, wash hands frequently and practice social distancing while on the unit: yes  Visitation: No   Participation QualityAppropriate, Attentive, Sharing and Supportive    Complaints: Complaining of tooth pain, Dr notified and pain med ordered     Notes: Patient is calm and cooperative, but states he is in severe dental pain (8) and I notified the doctor and ibuprofen was ordered. Patient is doing ADL's and is med compliant. Patient does not attend group.      Signature: Caty Diaz RN

## 2022-03-08 NOTE — PLAN OF CARE
Group Therapy Note    Date: 3/8/2022  Start Time: 1000  End Time:  4605  Number of Participants: 4    Type of Group: Psychoeducation    Wellness Binder Information  Module Name:  [address] [place] [address]  Session Number:  1    Group Goal for Pt: To improve knowledge of practical facts about depression    Notes:  Pt did not attend group activity. Pt was invited/encouraged. Status After Intervention:      Participation Level:     Participation Quality:       Speech:         Thought Process/Content:       Affective Functioning:       Mood:       Level of consciousness:        Response to Learning:       Endings:     Modes of Intervention:       Discipline Responsible:       Signature:  Lisette Almanzar

## 2022-03-09 VITALS
BODY MASS INDEX: 23.79 KG/M2 | WEIGHT: 165.8 LBS | TEMPERATURE: 97.2 F | HEART RATE: 97 BPM | RESPIRATION RATE: 20 BRPM | SYSTOLIC BLOOD PRESSURE: 124 MMHG | OXYGEN SATURATION: 98 % | DIASTOLIC BLOOD PRESSURE: 63 MMHG

## 2022-03-09 PROBLEM — F31.9 BIPOLAR DEPRESSION (HCC): Status: RESOLVED | Noted: 2020-10-26 | Resolved: 2022-03-09

## 2022-03-09 PROBLEM — Z71.6 TOBACCO ABUSE COUNSELING: Status: RESOLVED | Noted: 2021-12-06 | Resolved: 2022-03-09

## 2022-03-09 PROBLEM — F17.210 DEPENDENCE ON NICOTINE FROM CIGARETTES: Status: RESOLVED | Noted: 2021-12-06 | Resolved: 2022-03-09

## 2022-03-09 PROBLEM — F32.A DEPRESSION, UNSPECIFIED: Status: RESOLVED | Noted: 2022-03-07 | Resolved: 2022-03-09

## 2022-03-09 PROBLEM — R53.83 LETHARGY: Status: RESOLVED | Noted: 2021-12-06 | Resolved: 2022-03-09

## 2022-03-09 PROBLEM — J40 BRONCHITIS: Status: RESOLVED | Noted: 2021-12-06 | Resolved: 2022-03-09

## 2022-03-09 PROBLEM — F32.9 MAJOR DEPRESSIVE DISORDER WITHOUT PSYCHOTIC FEATURES: Status: RESOLVED | Noted: 2022-03-06 | Resolved: 2022-03-09

## 2022-03-09 PROBLEM — Z72.0 TOBACCO ABUSE: Status: RESOLVED | Noted: 2021-12-06 | Resolved: 2022-03-09

## 2022-03-09 PROBLEM — F17.200 TOBACCO USE DISORDER: Status: ACTIVE | Noted: 2021-12-06

## 2022-03-09 PROBLEM — R65.10 SIRS (SYSTEMIC INFLAMMATORY RESPONSE SYNDROME) (HCC): Status: RESOLVED | Noted: 2021-12-06 | Resolved: 2022-03-09

## 2022-03-09 PROBLEM — F41.1 GENERALIZED ANXIETY DISORDER: Chronic | Status: RESOLVED | Noted: 2020-10-30 | Resolved: 2022-03-09

## 2022-03-09 PROBLEM — F19.11 HISTORY OF DRUG ABUSE (HCC): Status: RESOLVED | Noted: 2021-07-28 | Resolved: 2022-03-09

## 2022-03-09 PROBLEM — F39 MOOD DISORDER (HCC): Status: RESOLVED | Noted: 2021-07-28 | Resolved: 2022-03-09

## 2022-03-09 PROBLEM — J18.9 RIGHT LOWER LOBE PNEUMONIA: Status: RESOLVED | Noted: 2021-12-06 | Resolved: 2022-03-09

## 2022-03-09 LAB
GLUCOSE BLD-MCNC: 231 MG/DL (ref 70–99)
GLUCOSE BLD-MCNC: 265 MG/DL (ref 70–99)
PERFORMED ON: ABNORMAL
PERFORMED ON: ABNORMAL

## 2022-03-09 PROCEDURE — 99239 HOSP IP/OBS DSCHRG MGMT >30: CPT | Performed by: PSYCHIATRY & NEUROLOGY

## 2022-03-09 PROCEDURE — 6370000000 HC RX 637 (ALT 250 FOR IP): Performed by: PSYCHIATRY & NEUROLOGY

## 2022-03-09 PROCEDURE — 82947 ASSAY GLUCOSE BLOOD QUANT: CPT

## 2022-03-09 PROCEDURE — 6370000000 HC RX 637 (ALT 250 FOR IP): Performed by: FAMILY MEDICINE

## 2022-03-09 PROCEDURE — 5130000000 HC BRIDGE APPOINTMENT

## 2022-03-09 RX ORDER — LANOLIN ALCOHOL/MO/W.PET/CERES
3 CREAM (GRAM) TOPICAL NIGHTLY
Qty: 30 TABLET | Refills: 1 | Status: SHIPPED | OUTPATIENT
Start: 2022-03-09 | End: 2022-08-30

## 2022-03-09 RX ORDER — VALACYCLOVIR HYDROCHLORIDE 1 G/1
1000 TABLET, FILM COATED ORAL DAILY
Qty: 30 TABLET | Refills: 1 | Status: SHIPPED | OUTPATIENT
Start: 2022-03-10 | End: 2022-04-09

## 2022-03-09 RX ORDER — INSULIN GLARGINE 100 [IU]/ML
40 INJECTION, SOLUTION SUBCUTANEOUS 2 TIMES DAILY
Qty: 24 ML | Refills: 1 | Status: SHIPPED | OUTPATIENT
Start: 2022-03-09 | End: 2022-08-30 | Stop reason: SDUPTHER

## 2022-03-09 RX ORDER — BLOOD SUGAR DIAGNOSTIC
500 STRIP MISCELLANEOUS 3 TIMES DAILY
Qty: 500 EACH | Refills: 1 | Status: SHIPPED | OUTPATIENT
Start: 2022-03-09 | End: 2022-08-30

## 2022-03-09 RX ORDER — INSULIN ASPART 100 [IU]/ML
20 INJECTION, SOLUTION INTRAVENOUS; SUBCUTANEOUS
Qty: 18 ML | Refills: 1 | Status: SHIPPED | OUTPATIENT
Start: 2022-03-09 | End: 2022-08-30 | Stop reason: SDUPTHER

## 2022-03-09 RX ORDER — HYDROXYZINE 50 MG/1
50 TABLET, FILM COATED ORAL 3 TIMES DAILY PRN
Qty: 45 TABLET | Refills: 1 | Status: SHIPPED | OUTPATIENT
Start: 2022-03-09 | End: 2022-04-08

## 2022-03-09 RX ORDER — GABAPENTIN 600 MG/1
600 TABLET ORAL 3 TIMES DAILY
Qty: 42 TABLET | Refills: 0 | Status: SHIPPED | OUTPATIENT
Start: 2022-03-09 | End: 2022-08-30 | Stop reason: SDUPTHER

## 2022-03-09 RX ORDER — DOXEPIN HYDROCHLORIDE 50 MG/1
50 CAPSULE ORAL NIGHTLY
Qty: 30 CAPSULE | Refills: 1 | Status: SHIPPED | OUTPATIENT
Start: 2022-03-09 | End: 2022-08-30

## 2022-03-09 RX ORDER — ERGOCALCIFEROL 1.25 MG/1
50000 CAPSULE ORAL WEEKLY
Qty: 11 CAPSULE | Refills: 1 | Status: SHIPPED | OUTPATIENT
Start: 2022-03-15 | End: 2022-08-30

## 2022-03-09 RX ORDER — BUPROPION HYDROCHLORIDE 150 MG/1
150 TABLET ORAL DAILY
Qty: 30 TABLET | Refills: 1 | Status: SHIPPED | OUTPATIENT
Start: 2022-03-10 | End: 2022-09-06

## 2022-03-09 RX ADMIN — IBUPROFEN 600 MG: 600 TABLET, FILM COATED ORAL at 08:10

## 2022-03-09 RX ADMIN — BUPROPION HYDROCHLORIDE 150 MG: 150 TABLET, EXTENDED RELEASE ORAL at 08:59

## 2022-03-09 RX ADMIN — INSULIN LISPRO 10 UNITS: 100 INJECTION, SOLUTION INTRAVENOUS; SUBCUTANEOUS at 07:44

## 2022-03-09 RX ADMIN — VALACYCLOVIR HYDROCHLORIDE 1000 MG: 500 TABLET, FILM COATED ORAL at 08:59

## 2022-03-09 RX ADMIN — INSULIN LISPRO 6 UNITS: 100 INJECTION, SOLUTION INTRAVENOUS; SUBCUTANEOUS at 07:43

## 2022-03-09 RX ADMIN — GABAPENTIN 600 MG: 300 CAPSULE ORAL at 08:59

## 2022-03-09 RX ADMIN — HYDROXYZINE HYDROCHLORIDE 25 MG: 25 TABLET, FILM COATED ORAL at 09:07

## 2022-03-09 ASSESSMENT — PAIN SCALES - GENERAL: PAINLEVEL_OUTOF10: 9

## 2022-03-09 NOTE — PROGRESS NOTES
Discharge Note     Pt discharging on this date. Pt denies SI, HI, and AVH at this time. Pt reports improvement in behavior and is leaving unit in overall good condition. SW and pt discussed pt's follow up appointments and importance of attending appointments as scheduled, pt voiced understanding and agreement. Pt and SW also discussed pt safety plan and pt able to verbally identify: warning signs, coping strategies, places and people that help make the pt feel better/distract negative thoughts, friends/family/agencies/professionals the pt can reach out to in a crisis, and something that is important to the pt/worth living for. Pt provided the national suicide prevention hotline number (2-111-211-098-999-6645) as well as local community behavioral health [facility]) crisis number for emergencies (9-480-368-665-444-2265). Pt to follow up with:  [address] [place] [address]) on 03__/14__/22 @ 11:00am. Patient will follow up with  ALEX Richard at [facility] for medication management, patient will be seen on _03_/16__/22 @ 00:00 AM/PM for the med management appt.      Referral to out patient tobacco cessation counseling treatment:    Patient refused referral to outpatient tobacco cessation counseling    SW offered to assist pt with transportation, pt reports that he would need a cab home

## 2022-03-09 NOTE — PROGRESS NOTES
Progress Note  Keyla Page  3/8/2022 10:40 PM  Subjective:   Admit Date:   3/6/2022      CC/ADMIT DX:       Interval History:   Reviewed overnight events and nursing notes. He has no new medical issues. I have reviewed all labs/diagnostics from the last 24hrs. ROS:   I have done a 10 point ROS and all are negative, except what is mentioned in the HPI. ADULT DIET; Regular; 3 carb choices (45 gm/meal)    Medications:      dextrose        doxepin  50 mg Oral Nightly    QUEtiapine  50 mg Oral Nightly    vitamin D  50,000 Units Oral Weekly    [START ON 3/9/2022] insulin lispro  10 Units SubCUTAneous TID WC    melatonin  3 mg Oral Nightly    insulin glargine  25 Units SubCUTAneous Nightly    nicotine  1 patch TransDERmal Daily    gabapentin  600 mg Oral TID    valACYclovir  1,000 mg Oral Daily    insulin lispro  0-18 Units SubCUTAneous TID WC    insulin lispro  0-9 Units SubCUTAneous Nightly    buPROPion  150 mg Oral Daily           Objective:   Vitals: BP (!) 170/90   Pulse 99   Temp 97.2 °F (36.2 °C) (Temporal)   Resp 20   SpO2 98%  No intake or output data in the 24 hours ending 03/08/22 2240  General appearance: alert and cooperative with exam  Extremities: extremities normal, atraumatic, no cyanosis or edema  Neurologic:  No obvious focal neurologic deficits. Skin: no rashes    Assessment and Plan:   Principal Problem:    Major depressive disorder without psychotic features  Active Problems:    Depression, unspecified  Resolved Problems:    * No resolved hospital problems. *    HTN    DM2    Plan:  1. Continue present medication(s)   2. Adjust DM treatment  3. Follow with Psych      Discharge planning:    home     Reviewed treatment plans with the patient and/or family.              Electronically signed by Ana Lilia Oates MD on 3/8/2022 at 10:40 PM

## 2022-03-09 NOTE — PROGRESS NOTES
[facility] Daily Shift Assessment-Geriatric Unit  Nursing Progress Note          Room: [address] [place] [address]   Name: Madan Lundberg   Age: 46 y.o. Gender: male   Dx: Major depressive disorder without psychotic features  Precautions: suicide risk and fall risk  Inpatient Status: voluntary     SLEEP:    Sleep: Yes,   Sleep Quality Good   Hours Slept: 7   Sleep Medications: Yes  PRN Sleep Meds: No       MEDICAL:      Other PRN Meds: Yes   Med Compliant: Yes   Accu-Chek: Yes -- 447 at HS   Oxygen/CPAP/BiPAP: No  CIWA/CINA: No   PAIN Assessment: present - adequately treated  Side Effects from medication: No    Is Patient experiencing any respiratory symptoms (headache, fever, body aches, cough. Bula Dayhoff ): no  Patient educated by nursing to practice social distancing, wear masks, wash hands frequently: yes      Metabolic Screening:    Lab Results   Component Value Date    LABA1C 13.7 (H) 03/04/2022       Lab Results   Component Value Date    CHOL 163 07/11/2019     Lab Results   Component Value Date    TRIG 433 (H) 07/11/2019     Lab Results   Component Value Date    HDL 31 (L) 10/23/2020    HDL 28 (L) 07/11/2019     No components found for: LDLCAL  No results found for: LABVLDL      There is no height or weight on file to calculate BMI. BP Readings from Last 2 Encounters:   03/08/22 (!) 170/90   03/06/22 123/84         PSYCH:     SI denies suicidal ideation    HI Negative for homicidal ideation        AVH:Present - patient states hears voices that are constantly nagging      Depression: 10 Anxiety: 10       GENERAL:      Appetite: good  Social: Yes Speech: normal   Appearance:appropriately dressed and disheveled  Assistive Devices: none     Level of Assist: Independent      GROUP:    Group Participation: No  Participation LevelNone    Participation QualityResistant    Notes:   Patient sitting in the day area at the beginning of the shift eating a snack. Patient went to the TV area when he finished his snack.   Patient sitting in the TV area with some other patients and a nurse had to remind them of appropriate shows to be watching. Patient became angry when asked to go to the day area to do wrap-up and stated he was in the middle of watching a show. When it was stressed that it was something that needed to be done at that time and not after the show, the patient had a loud angry outburst and went to his room, refusing to do wrap-up and not wanting this nurse to take his vitals. Patient stated to both nurses that he has triggers that cause his outburst and one of them is someone telling him what to do and authority figures. This nurse apologized to the patient for upsetting him. Patient then apologized for his behavior. Patient resting in bed at this time with eyes closed. Will continue to monitor for safety.           Electronically signed by John Griffin RN on 3/9/22 at 3:57 AM CST No

## 2022-03-09 NOTE — PLAN OF CARE
Group Therapy Note    Date: 3/9/2022  Start Time: 1000  End Time:  1100  Number of Participants: 2    Type of Group: Psychoeducation    Wellness Binder Information  Module Name:  Women's Issues  Session Number:  1    Group Goal for Pt: To raise awareness of the effectiveness of assertive communication    Notes:  Pt did not attend group discussion. Pt was invited/encouraged. Status After Intervention:      Participation Level:     Participation Quality:       Speech:         Thought Process/Content:       Affective Functioning:       Mood:       Level of consciousness:        Response to Learning:       Endings:     Modes of Intervention:       Discipline Responsible:       Signature:  Fidencio Whitaker

## 2022-03-09 NOTE — PLAN OF CARE
Problem: Pain:  Goal: Pain level will decrease  Description: Pain level will decrease  3/9/2022 1052 by Cori Rouse RN  Outcome: Completed  3/9/2022 0335 by Kurtis Hollins RN  Outcome: Ongoing  Goal: Control of acute pain  Description: Control of acute pain  3/9/2022 1052 by Cori Rouse RN  Outcome: Completed  3/9/2022 0335 by Kurtis Hollins RN  Outcome: Ongoing  Goal: Control of chronic pain  Description: Control of chronic pain  3/9/2022 1052 by Cori Rouse RN  Outcome: Completed  3/9/2022 0335 by Kurtis Hollins RN  Outcome: Ongoing     Problem: Falls - Risk of:  Goal: Will remain free from falls  Description: Will remain free from falls  3/9/2022 1052 by Cori Rouse RN  Outcome: Completed  3/9/2022 0335 by Kurtis Hollins RN  Outcome: Met This Shift  Goal: Absence of physical injury  Description: Absence of physical injury  3/9/2022 1052 by Cori Rouse RN  Outcome: Completed  3/9/2022 0335 by Kurtis Hollins RN  Outcome: Met This Shift     Problem: Altered Mood, Depressive Behavior:  Goal: Able to verbalize acceptance of life and situations over which he or she has no control  Description: Able to verbalize acceptance of life and situations over which he or she has no control  3/9/2022 1052 by Cori Rouse RN  Outcome: Completed  3/9/2022 0335 by Kurtis Hollins RN  Outcome: Ongoing  Goal: Able to verbalize and/or display a decrease in depressive symptoms  Description: Able to verbalize and/or display a decrease in depressive symptoms  3/9/2022 1052 by Cori Rouse RN  Outcome: Completed  3/9/2022 0335 by Kurtis Hollins RN  Outcome: Ongoing  Goal: Ability to disclose and discuss suicidal ideas will improve  Description: Ability to disclose and discuss suicidal ideas will improve  3/9/2022 1052 by Cori Rouse RN  Outcome: Completed  3/9/2022 0335 by Kurtis Hollins RN  Outcome: Ongoing  Goal: Absence of self-harm  Description: Absence of self-harm  3/9/2022 1052 by Javy Christiansen RN  Outcome: Completed  3/9/2022 0335 by Jenna Villafana RN  Outcome: Met This Shift     Problem: Altered Mood, Deterioration in Function:  Goal: Ability to perform activities of daily living will improve  Description: Ability to perform activities of daily living will improve  3/9/2022 1052 by Javy Christiansne RN  Outcome: Completed  3/9/2022 0335 by Jenna Villafana RN  Outcome: Ongoing  Goal: Able to verbalize reality based thinking  Description: Able to verbalize reality based thinking  3/9/2022 1052 by Javy Christiansen RN  Outcome: Completed  3/9/2022 0335 by Jenna Villafana RN  Outcome: Ongoing     Problem: Anxiety:  Goal: Level of anxiety will decrease  Description: Level of anxiety will decrease  3/9/2022 1052 by Javy Christiansen RN  Outcome: Completed  3/9/2022 0335 by Jenna Villafana RN  Outcome: Ongoing     Problem:  Activity:  Goal: Risk for activity intolerance will decrease  Description: Risk for activity intolerance will decrease  3/9/2022 1052 by Javy Christiansen RN  Outcome: Completed  3/9/2022 0335 by Jenna Villafana RN  Outcome: Ongoing     Problem: Coping:  Goal: Ability to adjust to condition or change in health will improve  Description: Ability to adjust to condition or change in health will improve  3/9/2022 1052 by Javy Christiansen RN  Outcome: Completed  3/9/2022 0335 by Jenna Villafana RN  Outcome: Ongoing     Problem: Health Behavior:  Goal: Ability to identify and utilize available resources and services will improve  Description: Ability to identify and utilize available resources and services will improve  3/9/2022 1052 by Javy Christiansen RN  Outcome: Completed  3/9/2022 0335 by Jenna Villafana RN  Outcome: Ongoing  Goal: Ability to manage health-related needs will improve  Description: Ability to manage health-related needs will improve  3/9/2022 1052 by Javy Christiansen RN  Outcome: Completed  3/9/2022 0335 by Jenna Villafana RN  Outcome: Ongoing     Problem: Metabolic:  Goal: Ability to maintain appropriate glucose levels will improve  Description: Ability to maintain appropriate glucose levels will improve  3/9/2022 1052 by Michael Razo RN  Outcome: Completed  3/9/2022 0335 by Bonnie Lopez RN  Outcome: Ongoing     Problem: Nutritional:  Goal: Maintenance of adequate nutrition will improve  Description: Maintenance of adequate nutrition will improve  3/9/2022 1052 by Michael Razo RN  Outcome: Completed  3/9/2022 0335 by Bonnie Lopez RN  Outcome: Ongoing

## 2022-03-09 NOTE — PROGRESS NOTES
SW met with treatment team to discuss pt's progress and setbacks. SW 2 was present. Pt reportedly slept well last night, with medications, appetite is good, refuses to attend scheduled group activities, social with peers/staff, independent with ADLS, appearance is disheveled, compliant with medications, behavior has been irritable, had angry outburst last night, reports severe depression/anxiety, denies SI/HI, reports he hears voices that are constantly nagging, denies VH, tentative discharge Thursday, continue current treatment plan.

## 2022-03-09 NOTE — DISCHARGE SUMMARY
fall asleep in someone keeps coming to his room. He did not get any sleep last night. He apologizes for being irritable. He denies suicidal ideation. He is open to medication adjustment. He denies physical complaints.     Psychiatric History:    Diagnoses: Bipolar depression, KRYSTLE  Suicide attempts/gestures: x 3 - overdose on insulin, tried to inject air in his blood vessels, tried to get into the car accident. Prior hospitalizations: [facility] - 2020, 2019  Medication trials: Gabapentin, risperidone, trazodone, Klonopin, Latuda, bupropion - h/o nausea but did well on it on previous admission, \"some stimulants\", Wellbutrin  Mental health contact: Lost to follow-up   Head trauma: Denies     Substance Use History:  Denies alcohol use. H/o methamphetamine abuse, quit 1.5 month ago. H/o Klonopin abuse.  H/o rehab treatment in [place], IOP at [address] 1.5 PPD.     Hospital Course:  Patient was admitted to the behavioral health floor and was acclimated to the unit. He was placed on suicide and fall precautions. Labs were reviewed and physical exam was completed by Dr. Nitza eCdeno and associates. Home medications were reconciled. HUSAM was obtained and reviewed. Wellbutrin was continued for depression. Trazodone was given for sleep. Trazodone was later switched to doxepin. Atarax as needed was given for anxiety. Glycemic control was ensured. Patient tolerated all the medications well and without side effects. Patient complained of dental pain. He was planning to see the dentist upon discharge. Follow-up with primary care provider was recommended for diabetes. Patient isolated to self most of the time. All interactions with the peers and staff members were appropriate. Behaviorally, he was not a problem. There was no evidence of suicidality. Sleep and appetite improved.   With the above-mentioned medications changes as well as psychotherapeutic interventions, patient reported considerable improvement in his condition and requested discharge. It was felt that patient was at his baseline. Patient has no access to guns at home. On 3/9/2022 it was therefore decided to discharge the patient, as it was felt that he received maximal benefit from his hospitalization. This patient is not suicidal, homicidal or psychotic at discharge. He does not present danger to self or others.       Number of antipsychotic medication prescribed at discharge: 0  IF MORE THAN ONE IS USED: NA    History of greater than 3 failed multiple monotherapy trials: NA  Monotherapy taper plan/ cross taper in progress: NA  Augmentation of Clozapine: NA    Referral to addiction treatment: patient refused    Prescription for Alcohol or Drug Disorder Medication: patient refused    Prescription for Tobacco Cessation medication: none    If no prescriptions for Tobacco Cessation must document why: patient refused    Consults: Internal medicine    Significant Diagnostic Studies:   Lab Results   Component Value Date    WBC 8.7 03/06/2022    HGB 13.6 (L) 03/06/2022    HCT 40.4 (L) 03/06/2022    MCV 89.4 03/06/2022     03/06/2022     Lab Results   Component Value Date     03/06/2022    K 4.1 03/06/2022     03/06/2022    CO2 25 03/06/2022    BUN 14 03/06/2022    CREATININE 0.6 03/06/2022    GLUCOSE 290 (H) 03/06/2022    CALCIUM 8.9 03/06/2022    PROT 7.2 03/04/2022    LABALBU 4.1 03/04/2022    BILITOT 0.5 03/04/2022    ALKPHOS 119 03/04/2022    AST 17 03/04/2022    ALT 30 03/04/2022    LABGLOM >60 03/06/2022    GFRAA >59 03/06/2022         Lab Results   Component Value Date    ELAQMBRG05 696 03/08/2022     Lab Results   Component Value Date    VITD25 21.6 (L) 03/08/2022     Lab Results   Component Value Date    CHOL 163 07/11/2019     Lab Results   Component Value Date    TRIG 433 (H) 07/11/2019     Lab Results   Component Value Date    HDL 31 (L) 10/23/2020    HDL 28 (L) 07/11/2019     Lab Results   Component Value Date    LDLCALC 75 10/23/2020    [address] see below 07/11/2019     No results found for: LABVLDL, VLDL  No results found for: CHOLHDLRATIO  Lab Results   Component Value Date    LABA1C 13.7 (H) 03/04/2022     No results found for: EAG  Lab Results   Component Value Date    TSHFT4 2.25 10/27/2020       Treatments: RN and SW    Mental status examination at the time of discharge:  Alert, Oriented X 4  Appearance:  Improved Hygiene  Speech with Regular Rate and Rhythm  Eye Contact:  Good  No Psychomotor Agitation/Retardation Noted  Attitude:  Cooperative  Mood:  \"Good\"  Affective: Congruent, appropriate to the situation, with a normal range and intensity  Thought Processes:  Coherently communicated, logical and goal oriented  Thought Content:  No Suicidal Ideation, No Homicidal Ideation, No Auditory or Visual Hallucinations, NO Overt Delusions  Insight: Improved  Judgement: Improved  Memory is intact for both remote and recent  Intellectual Functioning:  Within the [address] of Knowledge:  Adequate  Attention and Concentration:  Adequate    Discharge Exam:  Please, see medical note    Disposition: home    Patient Instructions:   Current Discharge Medication List      START taking these medications    Details   doxepin (SINEQUAN) 50 MG capsule Take 1 capsule by mouth nightly  Qty: 30 capsule, Refills: 1      valACYclovir (VALTREX) 1 g tablet Take 1 tablet by mouth daily  Qty: 30 tablet, Refills: 1      vitamin D (ERGOCALCIFEROL) 1.25 MG (83117 UT) CAPS capsule Take 1 capsule by mouth once a week for 11 doses  Qty: 11 capsule, Refills: 1      melatonin 3 MG TABS tablet Take 1 tablet by mouth nightly  Qty: 30 tablet, Refills: 1      hydrOXYzine (ATARAX) 50 MG tablet Take 1 tablet by mouth 3 times daily as needed for Anxiety  Qty: 45 tablet, Refills: 1         CONTINUE these medications which have CHANGED    Details   buPROPion (WELLBUTRIN XL) 150 MG extended release tablet Take 1 tablet by mouth daily  Qty: 30 tablet, Refills: 1 gabapentin (NEURONTIN) 600 MG tablet Take 1 tablet by mouth 3 times daily for 14 days. Qty: 42 tablet, Refills: 0    Associated Diagnoses: Diabetic peripheral neuropathy ([address]         CONTINUE these medications which have NOT CHANGED    Details   insulin glargine (BASAGLAR KWIKPEN) 100 UNIT/ML injection pen Inject 40 Units into the skin 2 times daily  Qty: 10 pen, Refills: 5    Comments: Please include any needed needles  Associated Diagnoses: Type 1 diabetes mellitus with hyperglycemia (HCC)      insulin aspart (NOVOLOG FLEXPEN) 100 UNIT/ML injection pen Inject 20 Units into the skin 3 times daily (before meals)  Qty: 5 pen, Refills: 2    Comments: Please include any needed needles  Associated Diagnoses: Type 1 diabetes mellitus with hyperglycemia (HCC)      glucose monitoring (FREESTYLE FREEDOM) kit 1 kit by Does not apply route daily  Qty: 1 kit, Refills: 0    Comments: May substitute brand as needed for cost/insurance purposes. Associated Diagnoses: Type 1 diabetes mellitus with hyperglycemia (HCC)      blood glucose monitor strips Test 5 times a day & as needed for symptoms of irregular blood glucose. Dispense sufficient amount for indicated testing frequency plus additional to accommodate PRN testing needs. Qty: 200 strip, Refills: 5    Comments: Brand per patient preference. May round up to next available package size. Associated Diagnoses: Type 1 diabetes mellitus with hyperglycemia (HCC)      Lancets MISC 1 each by Does not apply route 5 times daily  Qty: 200 each, Refills: 5    Comments: May substitute brand as needed for cost/insurance purposes.   Associated Diagnoses: Type 1 diabetes mellitus with hyperglycemia (HCC)         STOP taking these medications       traZODone (DESYREL) 150 MG tablet Comments:   Reason for Stopping:         lurasidone (LATUDA) 20 MG TABS tablet Comments:   Reason for Stopping:               Activity: as tolerated  Diet: diabetic diet  Wound Care: none needed    Follow-up:  Mar14 Go to [facility]  Monday Mar 14, 2022  Intake/therapy appointment at 6 am with Renato Mayer. Please be there at 10 am to fill out paperwork. Please bring photo ID, social security card, insurance card, and an updated med list.  [facility]   [address], [address]   Phone [phone]   Fax [phone]   CRISIS LINE: [phone]     Mar16 Go to [facility]  Wednesday Mar 16, 2022  Med management appointment at 4 pm with Len See. Please bring an updated med list.  [facility]   [address], [address]   Phone [phone]   Fax [phone]   CRISIS LINE: [phone]     [facility] Follow Up with Dr. Katja Vital MD  Wednesday Mar 23, 2022 11:00 AM  Arrive 15 minutes early.  Bring photo ID, insurance card(s), co-pay, medication bottles & completed forms.  [facility] AT [address]  [phone]          Go to Dr. Katja iVtal MD  Wednesday Mar 23, 2022  Appointment at 11 am. On follow up with PCP, please review labs/imaging done during this Hospital stay, and discuss any additional/repeat testing or treatment needed with your PCP Tracy [address]         Time worked: 34 min    Participation: good    Electronically signed by Telly Matta MD on 3/9/2022 at 9:26 AM

## 2022-03-09 NOTE — PROGRESS NOTES
CLINICAL PHARMACY NOTE: MEDS TO BEDS    Total # of Prescriptions Filled: 10   The following medications were delivered to the patient:  · Doxepin 50 mg  · Lantus Solostar  · Novolog Flexpen  · Pen Needles  · Vitamin D (Ergo) 1.25 mg  · Bupropion  mg  · Hydroxyzine 50 mg  · Melatonin 3 mg  · Valacyclovir 1 gm  · Gabapentin 600 mg      Additional Documentation:    Handed scripts to [employer] (Rn) at [facility]

## 2022-03-09 NOTE — BH NOTE
Received notification for Prior Authorization (PA) request for the following medication:    Doxepin    Submitted clinical information at this time to [employer], via [employer] web portal, HIPPA compliant/Confidential web portal.  Response time varies, 1 to 5 business days for a response. PA Case ID#  [id number]     Electronically signed, Kendall Adorno.  Jaziel MONTENEGRO, Utilization Review , [facility],  3/9/2022 at 1000AM

## 2022-03-09 NOTE — PLAN OF CARE
Problem: Falls - Risk of:  Goal: Will remain free from falls  Description: Will remain free from falls  3/9/2022 0335 by Jenna Villafana RN  Outcome: Met This Shift  3/8/2022 1444 by Beryle Pollack, RN  Outcome: Ongoing  Goal: Absence of physical injury  Description: Absence of physical injury  3/9/2022 0335 by Jenna Villafana RN  Outcome: Met This Shift  3/8/2022 1444 by Beryle Pollack, RN  Outcome: Ongoing     Problem: Altered Mood, Depressive Behavior:  Goal: Absence of self-harm  Description: Absence of self-harm  3/9/2022 0335 by Jenna Villafana RN  Outcome: Met This Shift  3/8/2022 1444 by Beryle Pollack, RN  Outcome: Ongoing     Problem: Pain:  Goal: Pain level will decrease  Description: Pain level will decrease  3/9/2022 0335 by Jenna Villafana RN  Outcome: Ongoing  3/8/2022 1444 by Beryle Pollack, RN  Outcome: Ongoing  Goal: Control of acute pain  Description: Control of acute pain  3/9/2022 0335 by Jenna Villafana RN  Outcome: Ongoing  3/8/2022 1444 by Beryle Pollack, RN  Outcome: Ongoing  Goal: Control of chronic pain  Description: Control of chronic pain  3/9/2022 0335 by Jenna Villafana RN  Outcome: Ongoing  3/8/2022 1444 by Beryle Pollack, RN  Outcome: Ongoing     Problem: Altered Mood, Depressive Behavior:  Goal: Able to verbalize acceptance of life and situations over which he or she has no control  Description: Able to verbalize acceptance of life and situations over which he or she has no control  3/9/2022 0335 by Jenna Villafana RN  Outcome: Ongoing  3/8/2022 1444 by Beryle Pollack, RN  Outcome: Ongoing  Goal: Able to verbalize and/or display a decrease in depressive symptoms  Description: Able to verbalize and/or display a decrease in depressive symptoms  3/9/2022 0335 by Jenna Villafana RN  Outcome: Ongoing  3/8/2022 1444 by Beryle Pollack, RN  Outcome: Ongoing  Goal: Ability to disclose and discuss suicidal ideas will improve  Description: Ability to disclose and discuss suicidal ideas will improve  3/9/2022 0335 by Jenna Villafana RN  Outcome: Ongoing  3/8/2022 1444 by Lindsay Rios RN  Outcome: Ongoing     Problem: Altered Mood, Deterioration in Function:  Goal: Ability to perform activities of daily living will improve  Description: Ability to perform activities of daily living will improve  3/9/2022 0335 by Chris Salvador RN  Outcome: Ongoing  3/8/2022 1444 by Lindsay Rios RN  Outcome: Ongoing  Goal: Able to verbalize reality based thinking  Description: Able to verbalize reality based thinking  3/9/2022 0335 by Chris Salvador RN  Outcome: Ongoing  3/8/2022 1444 by Lindsay Rios RN  Outcome: Ongoing     Problem: Anxiety:  Goal: Level of anxiety will decrease  Description: Level of anxiety will decrease  3/9/2022 0335 by Chris Salvador RN  Outcome: Ongoing  3/8/2022 1444 by Lindsay Rios RN  Outcome: Ongoing     Problem:  Activity:  Goal: Risk for activity intolerance will decrease  Description: Risk for activity intolerance will decrease  3/9/2022 0335 by Chris Salvador RN  Outcome: Ongoing  3/8/2022 1444 by Lindsay Rios RN  Outcome: Ongoing     Problem: Coping:  Goal: Ability to adjust to condition or change in health will improve  Description: Ability to adjust to condition or change in health will improve  3/9/2022 0335 by Chris Salvador RN  Outcome: Ongoing  3/8/2022 1444 by Lindsay Rios RN  Outcome: Ongoing     Problem: Health Behavior:  Goal: Ability to identify and utilize available resources and services will improve  Description: Ability to identify and utilize available resources and services will improve  3/9/2022 0335 by Chris Salvador RN  Outcome: Ongoing  3/8/2022 1444 by Lindsay Rios RN  Outcome: Ongoing  Goal: Ability to manage health-related needs will improve  Description: Ability to manage health-related needs will improve  3/9/2022 0335 by Chris Salvador RN  Outcome: Ongoing  3/8/2022 1444 by Lindsay Rios RN  Outcome: Ongoing     Problem: Metabolic:  Goal: Ability to maintain appropriate glucose levels will improve  Description: Ability to maintain appropriate glucose levels will improve  3/9/2022 0335 by Jae Duncan RN  Outcome: Ongoing  3/8/2022 1444 by Denia Alvarez RN  Outcome: Ongoing     Problem: Nutritional:  Goal: Maintenance of adequate nutrition will improve  Description: Maintenance of adequate nutrition will improve  3/9/2022 0335 by Jae Duncan RN  Outcome: Ongoing  3/8/2022 1444 by Denia Alvarez RN  Outcome: Ongoing

## 2022-03-09 NOTE — PROGRESS NOTES
WRAP UP GROUP NOTE:     Patient's Goal:  Providing feedback as to their own progress in the care-plan provided. Pt's have an opportunity to explore self-reflective skills and share any additional cares and concerns not yet addressed. Pt effectively participated. Energy level:  Appetite:  Concentration:   Hallucinations:  Depression:  Anxiety:  How I worked today:  What helps me sleep:  Any questions/complaints/comments:    Patient refused to do wrap-up. Became angry and irritable when asked to go to day room with other patients to do wrap-up.   Became loud and had an angry outburst.

## 2022-03-09 NOTE — DISCHARGE INSTR - DIET

## 2022-03-09 NOTE — PROGRESS NOTES
[facility]  Discharge Note  Bridge Appointment completed: Reviewed Discharge Instructions with patient. Patient verbalizes understanding and agreement with the discharge plan using the teachback method. Referral for Outpatient Tobacco Cessation Counseling, upon discharge (omar X if applicable and completed):    ( )  Hospital staff assisted patient to call Quit Line or faxed referral                                   during hospitalization                  ( )  Recognizing danger situations (included triggers and roadblocks), if not completed on admission                    ( )  Coping skills (new ways to manage stress, exercise, relaxation techniques, changing routine, distraction), if not completed on admission                                                           ( )  Basic information about quitting (benefits of quitting, techniques in how to quit, available resources, if not completed on admission  ( ) Referral for counseling faxed to [facility]   ( ) Patient refused referral  (X) Patient refused counseling  ( ) Patient refused smoking cessation medication upon discharge    Vaccinations (omar X if applicable and completed):  ( ) Patient states already received influenza vaccine elsewhere  ( ) Patient received influenza vaccine during this hospitalization  (X) Patient refused influenza vaccine at this time      Pt discharged with followings belongings:       Valuables sent home with pt. Valuables retrieved from [employer] and returned to patient. Patient left department with Basilio Stern RN via transportation provided by [employer], discharged to home/self-care. Patient education on aftercare instructions: yes  Patient verbalize understanding of AVS:  yes. Suicidal Ideations? No AVH? Auditory, voices in his head that are constantly \"nagging\" him HI?  Negative for homicidal ideation         Status EXAM upon discharge:  Status and Exam  Normal: Yes  Facial Expression: Brightened  Affect: Congruent  Level of Consciousness: Alert  Mood:Normal: No  Mood: Depressed,Anxious  Motor Activity:Normal: Yes  Motor Activity:  (within normal limits)  Interview Behavior: Cooperative  Preception: [place] to Person,[place] to Time,[place] to Place,[place] to Situation  Attention:Normal: Yes  Attention:  (concentration intact)  Thought Processes:  (linear/goal-oriented)  Thought Content:Normal: Yes  Thought Content:  (denies SI, HI, and AVH)  Hallucinations:  Auditory (Comment) (voices in his head that are constantly \"nagging\" him)  Delusions: No  Memory:Normal: Yes  Memory:  (memory intact)  Insight and Judgment: Yes  Insight and Judgment:  (improved insight and judgment)  Present Suicidal Ideation: No  Present Homicidal Ideation: No    Electronically signed by Ramos Fernandes RN on 3/9/2022 at 11:23 AM

## 2022-03-10 NOTE — PROGRESS NOTES
Progress Note  Yaneth Pineda  3/9/2022 8:15 PM  Subjective:   Admit Date:   3/6/2022      CC/ADMIT DX:       Interval History:   Reviewed overnight events and nursing notes. He denies any new physical complaints. I have reviewed all labs/diagnostics from the last 24hrs. ROS:   I have done a 10 point ROS and all are negative, except what is mentioned in the HPI. No diet orders on file    Medications:               Objective:   Vitals: /63   Pulse 97   Temp 97.2 °F (36.2 °C) (Temporal)   Resp 20   Wt 165 lb 12.8 oz (75.2 kg)   SpO2 98%   BMI 23.79 kg/m²  No intake or output data in the 24 hours ending 03/09/22 2015  General appearance: alert and cooperative with exam  Extremities: extremities normal, atraumatic, no cyanosis or edema  Neurologic:  No obvious focal neurologic deficits. Skin: no rashes    Assessment and Plan:   Principal Problem:    Depression, unspecified  Active Problems:    Insomnia    Anxiety    Tobacco use disorder  Resolved Problems:    History of drug abuse (HCC)    Major depressive disorder without psychotic features    HTN    DM2    Plan:  1. Continue present medication(s)   2. Follow with DM treatment  3. Follow with Psych      Discharge planning:    home     Reviewed treatment plans with the patient and/or family.              Electronically signed by Rush Brown MD on 3/9/2022 at 8:15 PM

## 2022-03-25 NOTE — PROGRESS NOTES
SW met with treatment team to discuss pt's progress and setbacks. SW 2 was present. Pt reportedly had poor sleep last night, with medications, appetite is good, refuses to participate in scheduled group activities, minimally social with peers/staff, independent with ADLS, compliant with medications, behavior has been irritable, uncooperative, hostile at times, reports moderate depression, severe anxiety, denies SI/HI, reports AH, but would not elaborate on what they were, denies VH, continue current treatment plan. Clear bilaterally, pupils equal, round and reactive to light.

## 2022-06-19 ENCOUNTER — HOSPITAL ENCOUNTER (EMERGENCY)
Facility: HOSPITAL | Age: 52
Discharge: HOME | End: 2022-06-19
Attending: EMERGENCY MEDICINE | Admitting: EMERGENCY MEDICINE

## 2022-06-19 VITALS
HEART RATE: 98 BPM | SYSTOLIC BLOOD PRESSURE: 162 MMHG | HEIGHT: 70 IN | WEIGHT: 168.87 LBS | OXYGEN SATURATION: 99 % | DIASTOLIC BLOOD PRESSURE: 89 MMHG | RESPIRATION RATE: 18 BRPM | TEMPERATURE: 98 F | BODY MASS INDEX: 24.18 KG/M2

## 2022-06-19 DIAGNOSIS — M54.2 NECK PAIN: ICD-10-CM

## 2022-06-19 DIAGNOSIS — M89.8X1 PAIN OF BOTH SCAPULAS: Primary | ICD-10-CM

## 2022-06-19 DIAGNOSIS — R60.0 PEDAL EDEMA: ICD-10-CM

## 2022-06-19 DIAGNOSIS — R73.9 HYPERGLYCEMIA: ICD-10-CM

## 2022-06-19 LAB
ALBUMIN SERPL-MCNC: 4 G/DL (ref 3.5–5.2)
ALBUMIN/GLOB SERPL: 1.3 G/DL
ALP SERPL-CCNC: 92 U/L (ref 39–117)
ALT SERPL W P-5'-P-CCNC: 19 U/L (ref 1–41)
ANION GAP SERPL CALCULATED.3IONS-SCNC: 11.1 MMOL/L (ref 5–15)
AST SERPL-CCNC: 16 U/L (ref 1–40)
BASOPHILS # BLD AUTO: 0.03 10*3/MM3 (ref 0–0.2)
BASOPHILS NFR BLD AUTO: 0.4 % (ref 0–1.5)
BILIRUB SERPL-MCNC: 0.6 MG/DL (ref 0–1.2)
BILIRUB UR QL STRIP: NEGATIVE
BUN SERPL-MCNC: 15 MG/DL (ref 6–20)
BUN/CREAT SERPL: 21.1 (ref 7–25)
CALCIUM SPEC-SCNC: 9.5 MG/DL (ref 8.6–10.5)
CHLORIDE SERPL-SCNC: 97 MMOL/L (ref 98–107)
CLARITY UR: CLEAR
CO2 SERPL-SCNC: 24.9 MMOL/L (ref 22–29)
COLOR UR: YELLOW
CREAT SERPL-MCNC: 0.71 MG/DL (ref 0.76–1.27)
CRP SERPL-MCNC: <0.3 MG/DL (ref 0–0.5)
DEPRECATED RDW RBC AUTO: 44.2 FL (ref 37–54)
EGFRCR SERPLBLD CKD-EPI 2021: 109.7 ML/MIN/1.73
EOSINOPHIL # BLD AUTO: 0.08 10*3/MM3 (ref 0–0.4)
EOSINOPHIL NFR BLD AUTO: 1 % (ref 0.3–6.2)
ERYTHROCYTE [DISTWIDTH] IN BLOOD BY AUTOMATED COUNT: 13.6 % (ref 12.3–15.4)
ERYTHROCYTE [SEDIMENTATION RATE] IN BLOOD: 14 MM/HR (ref 0–20)
GLOBULIN UR ELPH-MCNC: 3.2 GM/DL
GLUCOSE BLDC GLUCOMTR-MCNC: 191 MG/DL (ref 70–99)
GLUCOSE SERPL-MCNC: 415 MG/DL (ref 65–99)
GLUCOSE UR STRIP-MCNC: ABNORMAL MG/DL
HCT VFR BLD AUTO: 42.4 % (ref 37.5–51)
HGB BLD-MCNC: 14.4 G/DL (ref 13–17.7)
HGB UR QL STRIP.AUTO: NEGATIVE
HOLD SPECIMEN: NORMAL
HOLD SPECIMEN: NORMAL
IMM GRANULOCYTES # BLD AUTO: 0.09 10*3/MM3 (ref 0–0.05)
IMM GRANULOCYTES NFR BLD AUTO: 1.1 % (ref 0–0.5)
KETONES UR QL STRIP: NEGATIVE
LEUKOCYTE ESTERASE UR QL STRIP.AUTO: NEGATIVE
LYMPHOCYTES # BLD AUTO: 2.42 10*3/MM3 (ref 0.7–3.1)
LYMPHOCYTES NFR BLD AUTO: 30.9 % (ref 19.6–45.3)
MCH RBC QN AUTO: 30.3 PG (ref 26.6–33)
MCHC RBC AUTO-ENTMCNC: 34 G/DL (ref 31.5–35.7)
MCV RBC AUTO: 89.1 FL (ref 79–97)
MONOCYTES # BLD AUTO: 0.45 10*3/MM3 (ref 0.1–0.9)
MONOCYTES NFR BLD AUTO: 5.7 % (ref 5–12)
NEUTROPHILS NFR BLD AUTO: 4.77 10*3/MM3 (ref 1.7–7)
NEUTROPHILS NFR BLD AUTO: 60.9 % (ref 42.7–76)
NITRITE UR QL STRIP: NEGATIVE
NRBC BLD AUTO-RTO: 0 /100 WBC (ref 0–0.2)
NT-PROBNP SERPL-MCNC: 314.4 PG/ML (ref 0–900)
PH UR STRIP.AUTO: 7 [PH] (ref 5–8)
PLATELET # BLD AUTO: 229 10*3/MM3 (ref 140–450)
PMV BLD AUTO: 10.5 FL (ref 6–12)
POTASSIUM SERPL-SCNC: 4.4 MMOL/L (ref 3.5–5.2)
PROT SERPL-MCNC: 7.2 G/DL (ref 6–8.5)
PROT UR QL STRIP: NEGATIVE
RBC # BLD AUTO: 4.76 10*6/MM3 (ref 4.14–5.8)
SODIUM SERPL-SCNC: 133 MMOL/L (ref 136–145)
SP GR UR STRIP: >1.03 (ref 1–1.03)
UROBILINOGEN UR QL STRIP: ABNORMAL
WBC NRBC COR # BLD: 7.84 10*3/MM3 (ref 3.4–10.8)
WHOLE BLOOD HOLD COAG: NORMAL
WHOLE BLOOD HOLD SPECIMEN: NORMAL

## 2022-06-19 PROCEDURE — 85025 COMPLETE CBC W/AUTO DIFF WBC: CPT | Performed by: EMERGENCY MEDICINE

## 2022-06-19 PROCEDURE — 83880 ASSAY OF NATRIURETIC PEPTIDE: CPT | Performed by: EMERGENCY MEDICINE

## 2022-06-19 PROCEDURE — 96374 THER/PROPH/DIAG INJ IV PUSH: CPT

## 2022-06-19 PROCEDURE — 80053 COMPREHEN METABOLIC PANEL: CPT | Performed by: EMERGENCY MEDICINE

## 2022-06-19 PROCEDURE — 85652 RBC SED RATE AUTOMATED: CPT | Performed by: EMERGENCY MEDICINE

## 2022-06-19 PROCEDURE — 82962 GLUCOSE BLOOD TEST: CPT

## 2022-06-19 PROCEDURE — 36415 COLL VENOUS BLD VENIPUNCTURE: CPT | Performed by: EMERGENCY MEDICINE

## 2022-06-19 PROCEDURE — 25010000002 KETOROLAC TROMETHAMINE PER 15 MG: Performed by: EMERGENCY MEDICINE

## 2022-06-19 PROCEDURE — 86140 C-REACTIVE PROTEIN: CPT | Performed by: EMERGENCY MEDICINE

## 2022-06-19 PROCEDURE — 81003 URINALYSIS AUTO W/O SCOPE: CPT | Performed by: EMERGENCY MEDICINE

## 2022-06-19 PROCEDURE — 63710000001 INSULIN REGULAR HUMAN PER 5 UNITS: Performed by: EMERGENCY MEDICINE

## 2022-06-19 PROCEDURE — 99283 EMERGENCY DEPT VISIT LOW MDM: CPT

## 2022-06-19 RX ORDER — KETOROLAC TROMETHAMINE 30 MG/ML
30 INJECTION, SOLUTION INTRAMUSCULAR; INTRAVENOUS ONCE
Status: COMPLETED | OUTPATIENT
Start: 2022-06-19 | End: 2022-06-19

## 2022-06-19 RX ADMIN — SODIUM CHLORIDE 1000 ML: 9 INJECTION, SOLUTION INTRAVENOUS at 17:32

## 2022-06-19 RX ADMIN — INSULIN HUMAN 7 UNITS: 100 INJECTION, SOLUTION PARENTERAL at 17:37

## 2022-06-19 RX ADMIN — KETOROLAC TROMETHAMINE 30 MG: 30 INJECTION, SOLUTION INTRAMUSCULAR; INTRAVENOUS at 17:37

## 2022-06-19 NOTE — ED PROVIDER NOTES
Subjective   Patient is 51-year-old male presenting today due to shoulder and neck pain as well as lower extremity edema x2 days.  Patient states that he has a history of diabetes and he is on insulin, he took his insulin this morning but has been eating in the triage.  Patient states that when he checked his blood sugar this morning was 260.  He denies recent fall or injury.  Patient denies high sodium diet and a history of hypertension or heart failure.  He denies nausea, vomiting, fever or chills.       History provided by:  Patient   used: No    Neck Pain  Pain location:  Generalized neck  Pain radiates to:  L scapula and R scapula  Context: not fall and not recent injury    Relieved by:  None tried  Ineffective treatments:  None tried  Associated symptoms: no fever and no visual change    Risk factors: no recurrent falls        Review of Systems   Constitutional: Negative.  Negative for fever.   Eyes: Negative.    Respiratory: Negative.    Cardiovascular: Negative.    Gastrointestinal: Negative.    Endocrine: Negative.    Genitourinary: Negative.    Musculoskeletal: Positive for joint swelling and neck pain.   Skin: Negative.    Allergic/Immunologic: Negative.    Hematological: Negative.    Psychiatric/Behavioral: Negative.        Past Medical History:   Diagnosis Date   • Diabetes mellitus (CMS/HCC)    • Hypertension        No Known Allergies    No past surgical history on file.    No family history on file.    Social History     Socioeconomic History   • Marital status:    Tobacco Use   • Smoking status: Current Every Day Smoker     Packs/day: 2.00   • Smokeless tobacco: Never Used   Substance and Sexual Activity   • Alcohol use: No           Objective   Physical Exam  Vitals and nursing note reviewed.   Constitutional:       Appearance: Normal appearance.   HENT:      Head: Normocephalic and atraumatic.      Nose: Nose normal.      Mouth/Throat:      Mouth: Mucous membranes are  moist.   Eyes:      Extraocular Movements: Extraocular movements intact.      Conjunctiva/sclera: Conjunctivae normal.      Pupils: Pupils are equal, round, and reactive to light.   Cardiovascular:      Rate and Rhythm: Normal rate and regular rhythm.      Heart sounds: Normal heart sounds.   Pulmonary:      Effort: Pulmonary effort is normal.      Breath sounds: Normal breath sounds.   Musculoskeletal:         General: Swelling and tenderness present. No signs of injury. Normal range of motion.      Cervical back: Normal range of motion and neck supple. Tenderness present.        Back:       Right ankle: Swelling present. Normal pulse.      Left ankle: Swelling present. Normal pulse.   Skin:     General: Skin is warm and dry.   Neurological:      General: No focal deficit present.      Mental Status: He is alert and oriented to person, place, and time.   Psychiatric:         Mood and Affect: Mood normal.         Behavior: Behavior normal.         Procedures           ED Course  ED Course as of 06/19/22 1841   Sun Jun 19, 2022   1833 Pt keeps asking the nurse if he can eat and has  bag of food with him in the room.  [AJ]      ED Course User Index  [AJ] Adele Del Valle, KEVIN                                                 MDM  Number of Diagnoses or Management Options  Hyperglycemia  Neck pain  Pain of both scapulas  Pedal edema  Diagnosis management comments: I do not believe that the patient has an acute emergency medical condition requiring additional emergency management at this time. The patient is currently stable for outpatient treatment and continuation of care. Important signs and symptoms that would warrant return to the emergency department were reviewed. The patient was provided the opportunity to ask questions. All questions were addressed and the patient was discharged from the ED. The patient demonstrated understanding and agreed to plan         Amount and/or Complexity of Data Reviewed  Clinical  lab tests: reviewed  Decide to obtain previous medical records or to obtain history from someone other than the patient: yes    Risk of Complications, Morbidity, and/or Mortality  Presenting problems: low  Diagnostic procedures: low  Management options: low    Patient Progress  Patient progress: stable      Final diagnoses:   Pain of both scapulas   Neck pain   Pedal edema   Hyperglycemia       ED Disposition  ED Disposition     ED Disposition   Discharge    Condition   Stable    Comment   --             Provider, No Known  [facility] [address]  [phone]      If symptoms worsen         Medication List      No changes were made to your prescriptions during this visit.          Adele Del Valle PA-C  06/19/22 1845

## 2022-06-19 NOTE — DISCHARGE INSTRUCTIONS
Please wear JESSENIA hose as needed for swelling, please elevate your legs on a couple pillows when you lay down.    You can take Tylenol/Motrin as needed for neck/shoulder pain    Please take your medication as prescribed by your PCP for diabetes

## 2022-06-19 NOTE — SIGNIFICANT NOTE
06/19/22 0417   Plan   Plan sw contacted [facility] to notify them pt was discharging and ready to be picked up.

## 2022-06-26 ENCOUNTER — APPOINTMENT (OUTPATIENT)
Dept: GENERAL RADIOLOGY | Facility: HOSPITAL | Age: 52
End: 2022-06-26

## 2022-06-26 ENCOUNTER — HOSPITAL ENCOUNTER (EMERGENCY)
Facility: HOSPITAL | Age: 52
Discharge: HOME | End: 2022-06-26
Attending: EMERGENCY MEDICINE | Admitting: EMERGENCY MEDICINE

## 2022-06-26 VITALS
RESPIRATION RATE: 18 BRPM | DIASTOLIC BLOOD PRESSURE: 81 MMHG | WEIGHT: 176.59 LBS | SYSTOLIC BLOOD PRESSURE: 163 MMHG | HEIGHT: 70 IN | BODY MASS INDEX: 25.28 KG/M2 | TEMPERATURE: 97.8 F | OXYGEN SATURATION: 99 % | HEART RATE: 99 BPM

## 2022-06-26 DIAGNOSIS — E11.9 TYPE 2 DIABETES MELLITUS WITHOUT COMPLICATION, WITHOUT LONG-TERM CURRENT USE OF INSULIN: ICD-10-CM

## 2022-06-26 DIAGNOSIS — S91.332A PUNCTURE WOUND OF LEFT FOOT, INITIAL ENCOUNTER: ICD-10-CM

## 2022-06-26 DIAGNOSIS — K02.9 PAIN DUE TO DENTAL CARIES: Primary | ICD-10-CM

## 2022-06-26 LAB
ALBUMIN SERPL-MCNC: 3.8 G/DL (ref 3.5–5.2)
ALBUMIN/GLOB SERPL: 1.4 G/DL
ALP SERPL-CCNC: 74 U/L (ref 39–117)
ALT SERPL W P-5'-P-CCNC: 17 U/L (ref 1–41)
ANION GAP SERPL CALCULATED.3IONS-SCNC: 7.2 MMOL/L (ref 5–15)
AST SERPL-CCNC: 18 U/L (ref 1–40)
BASOPHILS # BLD AUTO: 0.03 10*3/MM3 (ref 0–0.2)
BASOPHILS NFR BLD AUTO: 0.4 % (ref 0–1.5)
BILIRUB SERPL-MCNC: 0.5 MG/DL (ref 0–1.2)
BUN SERPL-MCNC: 25 MG/DL (ref 6–20)
BUN/CREAT SERPL: 30.5 (ref 7–25)
CALCIUM SPEC-SCNC: 9.5 MG/DL (ref 8.6–10.5)
CHLORIDE SERPL-SCNC: 101 MMOL/L (ref 98–107)
CO2 SERPL-SCNC: 26.8 MMOL/L (ref 22–29)
CREAT SERPL-MCNC: 0.82 MG/DL (ref 0.76–1.27)
DEPRECATED RDW RBC AUTO: 47.8 FL (ref 37–54)
EGFRCR SERPLBLD CKD-EPI 2021: 105 ML/MIN/1.73
EOSINOPHIL # BLD AUTO: 0.09 10*3/MM3 (ref 0–0.4)
EOSINOPHIL NFR BLD AUTO: 1.3 % (ref 0.3–6.2)
ERYTHROCYTE [DISTWIDTH] IN BLOOD BY AUTOMATED COUNT: 14.3 % (ref 12.3–15.4)
GLOBULIN UR ELPH-MCNC: 2.7 GM/DL
GLUCOSE SERPL-MCNC: 211 MG/DL (ref 65–99)
HCT VFR BLD AUTO: 38.4 % (ref 37.5–51)
HGB BLD-MCNC: 12.8 G/DL (ref 13–17.7)
IMM GRANULOCYTES # BLD AUTO: 0.01 10*3/MM3 (ref 0–0.05)
IMM GRANULOCYTES NFR BLD AUTO: 0.1 % (ref 0–0.5)
LYMPHOCYTES # BLD AUTO: 1.93 10*3/MM3 (ref 0.7–3.1)
LYMPHOCYTES NFR BLD AUTO: 28.7 % (ref 19.6–45.3)
MCH RBC QN AUTO: 30.5 PG (ref 26.6–33)
MCHC RBC AUTO-ENTMCNC: 33.3 G/DL (ref 31.5–35.7)
MCV RBC AUTO: 91.6 FL (ref 79–97)
MONOCYTES # BLD AUTO: 0.47 10*3/MM3 (ref 0.1–0.9)
MONOCYTES NFR BLD AUTO: 7 % (ref 5–12)
NEUTROPHILS NFR BLD AUTO: 4.2 10*3/MM3 (ref 1.7–7)
NEUTROPHILS NFR BLD AUTO: 62.5 % (ref 42.7–76)
NRBC BLD AUTO-RTO: 0 /100 WBC (ref 0–0.2)
PLATELET # BLD AUTO: 198 10*3/MM3 (ref 140–450)
PMV BLD AUTO: 9.9 FL (ref 6–12)
POTASSIUM SERPL-SCNC: 4.4 MMOL/L (ref 3.5–5.2)
PROT SERPL-MCNC: 6.5 G/DL (ref 6–8.5)
RBC # BLD AUTO: 4.19 10*6/MM3 (ref 4.14–5.8)
SODIUM SERPL-SCNC: 135 MMOL/L (ref 136–145)
WBC NRBC COR # BLD: 6.73 10*3/MM3 (ref 3.4–10.8)

## 2022-06-26 PROCEDURE — 36415 COLL VENOUS BLD VENIPUNCTURE: CPT

## 2022-06-26 PROCEDURE — 99283 EMERGENCY DEPT VISIT LOW MDM: CPT

## 2022-06-26 PROCEDURE — 73630 X-RAY EXAM OF FOOT: CPT

## 2022-06-26 PROCEDURE — 85025 COMPLETE CBC W/AUTO DIFF WBC: CPT | Performed by: EMERGENCY MEDICINE

## 2022-06-26 PROCEDURE — 80053 COMPREHEN METABOLIC PANEL: CPT | Performed by: EMERGENCY MEDICINE

## 2022-06-26 RX ORDER — HYDROCODONE BITARTRATE AND ACETAMINOPHEN 5; 325 MG/1; MG/1
1 TABLET ORAL EVERY 6 HOURS PRN
Status: DISCONTINUED | OUTPATIENT
Start: 2022-06-26 | End: 2022-06-26 | Stop reason: HOSPADM

## 2022-06-26 RX ORDER — IBUPROFEN 600 MG/1
600 TABLET ORAL ONCE
Status: COMPLETED | OUTPATIENT
Start: 2022-06-26 | End: 2022-06-26

## 2022-06-26 RX ORDER — TRAZODONE HYDROCHLORIDE 50 MG/1
100 TABLET ORAL NIGHTLY
COMMUNITY

## 2022-06-26 RX ORDER — AMOXICILLIN AND CLAVULANATE POTASSIUM 875; 125 MG/1; MG/1
1 TABLET, FILM COATED ORAL ONCE
Status: COMPLETED | OUTPATIENT
Start: 2022-06-26 | End: 2022-06-26

## 2022-06-26 RX ORDER — CYCLOBENZAPRINE HCL 5 MG
5 TABLET ORAL 3 TIMES DAILY PRN
COMMUNITY

## 2022-06-26 RX ORDER — INSULIN GLARGINE 100 [IU]/ML
40 INJECTION, SOLUTION SUBCUTANEOUS 2 TIMES DAILY
COMMUNITY

## 2022-06-26 RX ORDER — IBUPROFEN 800 MG/1
800 TABLET ORAL EVERY 8 HOURS PRN
Qty: 30 TABLET | Refills: 0 | Status: SHIPPED | OUTPATIENT
Start: 2022-06-26

## 2022-06-26 RX ORDER — PRAZOSIN HYDROCHLORIDE 1 MG/1
1 CAPSULE ORAL NIGHTLY
COMMUNITY

## 2022-06-26 RX ORDER — HYDROCODONE BITARTRATE AND ACETAMINOPHEN 5; 325 MG/1; MG/1
1 TABLET ORAL EVERY 6 HOURS PRN
Qty: 10 TABLET | Refills: 0 | Status: SHIPPED | OUTPATIENT
Start: 2022-06-26

## 2022-06-26 RX ORDER — INSULIN ASPART 100 [IU]/ML
INJECTION, SOLUTION INTRAVENOUS; SUBCUTANEOUS
COMMUNITY

## 2022-06-26 RX ORDER — AMOXICILLIN AND CLAVULANATE POTASSIUM 875; 125 MG/1; MG/1
1 TABLET, FILM COATED ORAL EVERY 12 HOURS
Qty: 14 TABLET | Refills: 0 | Status: ON HOLD | OUTPATIENT
Start: 2022-06-26 | End: 2022-07-19

## 2022-06-26 RX ADMIN — AMOXICILLIN AND CLAVULANATE POTASSIUM 1 TABLET: 875; 125 TABLET, FILM COATED ORAL at 10:55

## 2022-06-26 RX ADMIN — IBUPROFEN 600 MG: 600 TABLET ORAL at 10:55

## 2022-07-05 ENCOUNTER — HOSPITAL ENCOUNTER (EMERGENCY)
Facility: HOSPITAL | Age: 52
Discharge: LEFT BEFORE DISCHARGE | End: 2022-07-05

## 2022-07-05 VITALS
OXYGEN SATURATION: 98 % | BODY MASS INDEX: 26.77 KG/M2 | HEART RATE: 74 BPM | RESPIRATION RATE: 20 BRPM | DIASTOLIC BLOOD PRESSURE: 88 MMHG | TEMPERATURE: 97.4 F | HEIGHT: 70 IN | WEIGHT: 187 LBS | SYSTOLIC BLOOD PRESSURE: 146 MMHG

## 2022-07-05 PROCEDURE — 99211 OFF/OP EST MAY X REQ PHY/QHP: CPT

## 2022-07-05 RX ORDER — SODIUM CHLORIDE 0.9 % (FLUSH) 0.9 %
10 SYRINGE (ML) INJECTION AS NEEDED
Status: DISCONTINUED | OUTPATIENT
Start: 2022-07-05 | End: 2022-07-05 | Stop reason: HOSPADM

## 2022-07-05 NOTE — ED TRIAGE NOTES
Pt presented to ED private vehicle with complaints of blurred vision, headache and dizziness upon standing. Pt states it started yesterday morning at 6 am. Pt alert and oriented and speech is clear.

## 2022-07-05 NOTE — ED NOTES
After triage was done, pt stood up and told me he wanted to leave, he states his eyes are bothering him and he has dizziness upon standing and that is was a eye problem and it was not a sign of a stroke. I advised pt it was and he needed to be checked, he stated I am not staying I am leaving. He stood up and walked out of the door after me trying to pursade him to be seen , he refused to sign any paper work and stated he was going to find a eye doctor. He states he is in a rehab facility and they made him come to ER. He stated we weren't going to waste his time. He ambulated out of ED.

## 2022-07-18 ENCOUNTER — APPOINTMENT (OUTPATIENT)
Dept: GENERAL RADIOLOGY | Facility: HOSPITAL | Age: 52
End: 2022-07-18

## 2022-07-18 ENCOUNTER — HOSPITAL ENCOUNTER (OUTPATIENT)
Facility: HOSPITAL | Age: 52
Setting detail: OBSERVATION
Discharge: HOME | End: 2022-07-19
Attending: STUDENT IN AN ORGANIZED HEALTH CARE EDUCATION/TRAINING PROGRAM | Admitting: FAMILY MEDICINE

## 2022-07-18 ENCOUNTER — APPOINTMENT (OUTPATIENT)
Dept: CT IMAGING | Facility: HOSPITAL | Age: 52
End: 2022-07-18

## 2022-07-18 ENCOUNTER — HOSPITAL ENCOUNTER (EMERGENCY)
Age: 52
Discharge: LEFT BEFORE DISCHARGE | End: 2022-07-18
Payer: MEDICAID

## 2022-07-18 VITALS
RESPIRATION RATE: 16 BRPM | WEIGHT: 158.5 LBS | OXYGEN SATURATION: 99 % | DIASTOLIC BLOOD PRESSURE: 85 MMHG | BODY MASS INDEX: 22.69 KG/M2 | HEIGHT: 70 IN | TEMPERATURE: 98.5 F | SYSTOLIC BLOOD PRESSURE: 138 MMHG | HEART RATE: 103 BPM

## 2022-07-18 DIAGNOSIS — Z86.79 HISTORY OF HYPERTENSION: ICD-10-CM

## 2022-07-18 DIAGNOSIS — R07.9 ACUTE CHEST PAIN: Primary | ICD-10-CM

## 2022-07-18 DIAGNOSIS — H54.60 MONOCULAR VISION LOSS: ICD-10-CM

## 2022-07-18 DIAGNOSIS — Z86.39 HISTORY OF TYPE 1 DIABETES MELLITUS: ICD-10-CM

## 2022-07-18 PROBLEM — Z79.4 TYPE 2 DIABETES MELLITUS WITH HYPERGLYCEMIA, WITH LONG-TERM CURRENT USE OF INSULIN: Status: ACTIVE | Noted: 2022-07-18

## 2022-07-18 PROBLEM — E11.65 TYPE 2 DIABETES MELLITUS WITH HYPERGLYCEMIA, WITH LONG-TERM CURRENT USE OF INSULIN (HCC): Status: ACTIVE | Noted: 2022-07-18

## 2022-07-18 PROBLEM — G89.4 CHRONIC PAIN SYNDROME: Status: ACTIVE | Noted: 2022-07-18

## 2022-07-18 PROBLEM — Z72.0 TOBACCO ABUSE: Status: ACTIVE | Noted: 2022-07-18

## 2022-07-18 PROBLEM — E87.1 HYPONATREMIA: Status: ACTIVE | Noted: 2022-07-18

## 2022-07-18 PROBLEM — F15.10 METHAMPHETAMINE ABUSE: Status: ACTIVE | Noted: 2022-07-18

## 2022-07-18 LAB
ADENOVIRUS BY PCR: NOT DETECTED
ALBUMIN SERPL-MCNC: 4.7 G/DL (ref 3.5–5.2)
ALBUMIN/GLOB SERPL: 1.3 G/DL
ALP SERPL-CCNC: 109 U/L (ref 39–117)
ALT SERPL W P-5'-P-CCNC: 15 U/L (ref 1–41)
ANION GAP SERPL CALCULATED.3IONS-SCNC: 13 MMOL/L (ref 5–15)
AST SERPL-CCNC: 12 U/L (ref 1–40)
ATMOSPHERIC PRESS: 748 MMHG
BASE EXCESS BLDV CALC-SCNC: 1.5 MMOL/L (ref 0–2)
BASOPHILS # BLD AUTO: 0.05 10*3/MM3 (ref 0–0.2)
BASOPHILS NFR BLD AUTO: 0.3 % (ref 0–1.5)
BDY SITE: NORMAL
BILIRUB SERPL-MCNC: 1.4 MG/DL (ref 0–1.2)
BODY TEMPERATURE: 37 C
BORDETELLA PARAPERTUSSIS BY PCR: NOT DETECTED
BORDETELLA PERTUSSIS BY PCR: NOT DETECTED
BUN SERPL-MCNC: 24 MG/DL (ref 6–20)
BUN/CREAT SERPL: 20.3 (ref 7–25)
CALCIUM SPEC-SCNC: 9.5 MG/DL (ref 8.6–10.5)
CHLAMYDOPHILIA PNEUMONIAE BY PCR: NOT DETECTED
CHLORIDE SERPL-SCNC: 92 MMOL/L (ref 98–107)
CO2 SERPL-SCNC: 24 MMOL/L (ref 22–29)
CORONAVIRUS 229E BY PCR: NOT DETECTED
CORONAVIRUS HKU1 BY PCR: NOT DETECTED
CORONAVIRUS NL63 BY PCR: NOT DETECTED
CORONAVIRUS OC43 BY PCR: NOT DETECTED
CREAT SERPL-MCNC: 1.18 MG/DL (ref 0.76–1.27)
DEPRECATED RDW RBC AUTO: 41.6 FL (ref 37–54)
EGFRCR SERPLBLD CKD-EPI 2021: 73.8 ML/MIN/1.73
EOSINOPHIL # BLD AUTO: 0.06 10*3/MM3 (ref 0–0.4)
EOSINOPHIL NFR BLD AUTO: 0.4 % (ref 0.3–6.2)
ERYTHROCYTE [DISTWIDTH] IN BLOOD BY AUTOMATED COUNT: 13.2 % (ref 12.3–15.4)
GLOBULIN UR ELPH-MCNC: 3.5 GM/DL
GLUCOSE BLDC GLUCOMTR-MCNC: 141 MG/DL (ref 70–130)
GLUCOSE BLDC GLUCOMTR-MCNC: 464 MG/DL (ref 70–130)
GLUCOSE BLDC GLUCOMTR-MCNC: 492 MG/DL (ref 70–130)
GLUCOSE SERPL-MCNC: 526 MG/DL (ref 65–99)
HBA1C MFR BLD: 10.9 % (ref 4.8–5.6)
HCO3 BLDV-SCNC: 26.4 MMOL/L (ref 22–28)
HCT VFR BLD AUTO: 45.7 % (ref 37.5–51)
HGB BLD-MCNC: 16.1 G/DL (ref 13–17.7)
HOLD SPECIMEN: NORMAL
HOLD SPECIMEN: NORMAL
HUMAN METAPNEUMOVIRUS BY PCR: NOT DETECTED
HUMAN RHINOVIRUS/ENTEROVIRUS BY PCR: NOT DETECTED
IMM GRANULOCYTES # BLD AUTO: 0.07 10*3/MM3 (ref 0–0.05)
IMM GRANULOCYTES NFR BLD AUTO: 0.5 % (ref 0–0.5)
INFLUENZA A BY PCR: NOT DETECTED
INFLUENZA B BY PCR: NOT DETECTED
LYMPHOCYTES # BLD AUTO: 3.58 10*3/MM3 (ref 0.7–3.1)
LYMPHOCYTES NFR BLD AUTO: 25 % (ref 19.6–45.3)
Lab: NORMAL
MCH RBC QN AUTO: 30.5 PG (ref 26.6–33)
MCHC RBC AUTO-ENTMCNC: 35.2 G/DL (ref 31.5–35.7)
MCV RBC AUTO: 86.6 FL (ref 79–97)
MODALITY: NORMAL
MONOCYTES # BLD AUTO: 1.27 10*3/MM3 (ref 0.1–0.9)
MONOCYTES NFR BLD AUTO: 8.9 % (ref 5–12)
MYCOPLASMA PNEUMONIAE BY PCR: NOT DETECTED
NEUTROPHILS NFR BLD AUTO: 64.9 % (ref 42.7–76)
NEUTROPHILS NFR BLD AUTO: 9.29 10*3/MM3 (ref 1.7–7)
NRBC BLD AUTO-RTO: 0 /100 WBC (ref 0–0.2)
PARAINFLUENZA VIRUS 1 BY PCR: NOT DETECTED
PARAINFLUENZA VIRUS 2 BY PCR: NOT DETECTED
PARAINFLUENZA VIRUS 3 BY PCR: NOT DETECTED
PARAINFLUENZA VIRUS 4 BY PCR: NOT DETECTED
PCO2 BLDV: 42 MM HG (ref 41–51)
PH BLDV: 7.41 PH UNITS (ref 7.32–7.42)
PLATELET # BLD AUTO: 395 10*3/MM3 (ref 140–450)
PMV BLD AUTO: 10.2 FL (ref 6–12)
PO2 BLDV: 30.3 MM HG (ref 27–53)
POTASSIUM SERPL-SCNC: 5 MMOL/L (ref 3.5–5.2)
PROT SERPL-MCNC: 8.2 G/DL (ref 6–8.5)
RBC # BLD AUTO: 5.28 10*6/MM3 (ref 4.14–5.8)
RESPIRATORY SYNCYTIAL VIRUS BY PCR: NOT DETECTED
SAO2 % BLDCOV: 61.9 % (ref 45–75)
SARS-COV-2 RNA PNL SPEC NAA+PROBE: NOT DETECTED
SARS-COV-2, PCR: NOT DETECTED
SODIUM SERPL-SCNC: 129 MMOL/L (ref 136–145)
TROPONIN T SERPL-MCNC: <0.01 NG/ML (ref 0–0.03)
VENTILATOR MODE: NORMAL
WBC NRBC COR # BLD: 14.32 10*3/MM3 (ref 3.4–10.8)
WHOLE BLOOD HOLD COAG: NORMAL
WHOLE BLOOD HOLD SPECIMEN: NORMAL

## 2022-07-18 PROCEDURE — 25010000002 ONDANSETRON PER 1 MG

## 2022-07-18 PROCEDURE — 25010000002 ENOXAPARIN PER 10 MG: Performed by: INTERNAL MEDICINE

## 2022-07-18 PROCEDURE — G0378 HOSPITAL OBSERVATION PER HR: HCPCS

## 2022-07-18 PROCEDURE — 85025 COMPLETE CBC W/AUTO DIFF WBC: CPT | Performed by: STUDENT IN AN ORGANIZED HEALTH CARE EDUCATION/TRAINING PROGRAM

## 2022-07-18 PROCEDURE — 87635 SARS-COV-2 COVID-19 AMP PRB: CPT | Performed by: STUDENT IN AN ORGANIZED HEALTH CARE EDUCATION/TRAINING PROGRAM

## 2022-07-18 PROCEDURE — 71275 CT ANGIOGRAPHY CHEST: CPT

## 2022-07-18 PROCEDURE — 0202U NFCT DS 22 TRGT SARS-COV-2: CPT

## 2022-07-18 PROCEDURE — 93005 ELECTROCARDIOGRAM TRACING: CPT | Performed by: STUDENT IN AN ORGANIZED HEALTH CARE EDUCATION/TRAINING PROGRAM

## 2022-07-18 PROCEDURE — 80053 COMPREHEN METABOLIC PANEL: CPT | Performed by: STUDENT IN AN ORGANIZED HEALTH CARE EDUCATION/TRAINING PROGRAM

## 2022-07-18 PROCEDURE — 71045 X-RAY EXAM CHEST 1 VIEW: CPT

## 2022-07-18 PROCEDURE — 70450 CT HEAD/BRAIN W/O DYE: CPT

## 2022-07-18 PROCEDURE — 83036 HEMOGLOBIN GLYCOSYLATED A1C: CPT | Performed by: STUDENT IN AN ORGANIZED HEALTH CARE EDUCATION/TRAINING PROGRAM

## 2022-07-18 PROCEDURE — 70496 CT ANGIOGRAPHY HEAD: CPT

## 2022-07-18 PROCEDURE — C9803 HOPD COVID-19 SPEC COLLECT: HCPCS

## 2022-07-18 PROCEDURE — 82962 GLUCOSE BLOOD TEST: CPT

## 2022-07-18 PROCEDURE — 63710000001 INSULIN REGULAR HUMAN PER 5 UNITS: Performed by: STUDENT IN AN ORGANIZED HEALTH CARE EDUCATION/TRAINING PROGRAM

## 2022-07-18 PROCEDURE — 93010 ELECTROCARDIOGRAM REPORT: CPT | Performed by: INTERNAL MEDICINE

## 2022-07-18 PROCEDURE — 84484 ASSAY OF TROPONIN QUANT: CPT | Performed by: STUDENT IN AN ORGANIZED HEALTH CARE EDUCATION/TRAINING PROGRAM

## 2022-07-18 PROCEDURE — 0 IOPAMIDOL PER 1 ML: Performed by: STUDENT IN AN ORGANIZED HEALTH CARE EDUCATION/TRAINING PROGRAM

## 2022-07-18 PROCEDURE — 96372 THER/PROPH/DIAG INJ SC/IM: CPT

## 2022-07-18 PROCEDURE — 70498 CT ANGIOGRAPHY NECK: CPT

## 2022-07-18 PROCEDURE — 96374 THER/PROPH/DIAG INJ IV PUSH: CPT

## 2022-07-18 PROCEDURE — 82803 BLOOD GASES ANY COMBINATION: CPT

## 2022-07-18 PROCEDURE — 99285 EMERGENCY DEPT VISIT HI MDM: CPT

## 2022-07-18 RX ORDER — NICOTINE POLACRILEX 4 MG
15 LOZENGE BUCCAL
Status: DISCONTINUED | OUTPATIENT
Start: 2022-07-18 | End: 2022-07-19 | Stop reason: HOSPADM

## 2022-07-18 RX ORDER — LABETALOL HYDROCHLORIDE 5 MG/ML
10 INJECTION, SOLUTION INTRAVENOUS EVERY 6 HOURS PRN
Status: DISCONTINUED | OUTPATIENT
Start: 2022-07-18 | End: 2022-07-19 | Stop reason: HOSPADM

## 2022-07-18 RX ORDER — METOPROLOL SUCCINATE 25 MG/1
25 TABLET, EXTENDED RELEASE ORAL
Status: DISCONTINUED | OUTPATIENT
Start: 2022-07-19 | End: 2022-07-19 | Stop reason: HOSPADM

## 2022-07-18 RX ORDER — ONDANSETRON 2 MG/ML
4 INJECTION INTRAMUSCULAR; INTRAVENOUS ONCE
Status: COMPLETED | OUTPATIENT
Start: 2022-07-18 | End: 2022-07-18

## 2022-07-18 RX ORDER — SODIUM CHLORIDE 0.9 % (FLUSH) 0.9 %
10 SYRINGE (ML) INJECTION AS NEEDED
Status: DISCONTINUED | OUTPATIENT
Start: 2022-07-18 | End: 2022-07-19 | Stop reason: SDUPTHER

## 2022-07-18 RX ORDER — ASPIRIN 81 MG/1
81 TABLET ORAL DAILY
Status: DISCONTINUED | OUTPATIENT
Start: 2022-07-19 | End: 2022-07-19 | Stop reason: HOSPADM

## 2022-07-18 RX ORDER — TRAZODONE HYDROCHLORIDE 100 MG/1
100 TABLET ORAL NIGHTLY
Status: DISCONTINUED | OUTPATIENT
Start: 2022-07-18 | End: 2022-07-19 | Stop reason: HOSPADM

## 2022-07-18 RX ORDER — HYDROCODONE BITARTRATE AND ACETAMINOPHEN 5; 325 MG/1; MG/1
1 TABLET ORAL EVERY 6 HOURS PRN
Status: DISCONTINUED | OUTPATIENT
Start: 2022-07-18 | End: 2022-07-19 | Stop reason: HOSPADM

## 2022-07-18 RX ORDER — ASPIRIN 81 MG/1
324 TABLET, CHEWABLE ORAL ONCE
Status: COMPLETED | OUTPATIENT
Start: 2022-07-18 | End: 2022-07-18

## 2022-07-18 RX ORDER — INSULIN LISPRO 100 [IU]/ML
0-14 INJECTION, SOLUTION INTRAVENOUS; SUBCUTANEOUS
Status: DISCONTINUED | OUTPATIENT
Start: 2022-07-19 | End: 2022-07-19 | Stop reason: HOSPADM

## 2022-07-18 RX ORDER — SODIUM CHLORIDE 0.9 % (FLUSH) 0.9 %
10 SYRINGE (ML) INJECTION EVERY 12 HOURS SCHEDULED
Status: DISCONTINUED | OUTPATIENT
Start: 2022-07-18 | End: 2022-07-19 | Stop reason: HOSPADM

## 2022-07-18 RX ORDER — NICOTINE 21 MG/24HR
1 PATCH, TRANSDERMAL 24 HOURS TRANSDERMAL
Status: DISCONTINUED | OUTPATIENT
Start: 2022-07-19 | End: 2022-07-19 | Stop reason: HOSPADM

## 2022-07-18 RX ORDER — ONDANSETRON 2 MG/ML
4 INJECTION INTRAMUSCULAR; INTRAVENOUS EVERY 6 HOURS PRN
Status: DISCONTINUED | OUTPATIENT
Start: 2022-07-18 | End: 2022-07-19 | Stop reason: HOSPADM

## 2022-07-18 RX ORDER — SODIUM CHLORIDE 9 MG/ML
75 INJECTION, SOLUTION INTRAVENOUS CONTINUOUS
Status: DISCONTINUED | OUTPATIENT
Start: 2022-07-18 | End: 2022-07-19 | Stop reason: HOSPADM

## 2022-07-18 RX ORDER — ACETAMINOPHEN 160 MG/5ML
650 SOLUTION ORAL EVERY 4 HOURS PRN
Status: DISCONTINUED | OUTPATIENT
Start: 2022-07-18 | End: 2022-07-19 | Stop reason: HOSPADM

## 2022-07-18 RX ORDER — TERAZOSIN 1 MG/1
1 CAPSULE ORAL NIGHTLY
Status: DISCONTINUED | OUTPATIENT
Start: 2022-07-18 | End: 2022-07-19 | Stop reason: HOSPADM

## 2022-07-18 RX ORDER — SODIUM CHLORIDE 0.9 % (FLUSH) 0.9 %
10 SYRINGE (ML) INJECTION AS NEEDED
Status: DISCONTINUED | OUTPATIENT
Start: 2022-07-18 | End: 2022-07-19 | Stop reason: HOSPADM

## 2022-07-18 RX ORDER — NITROGLYCERIN 0.4 MG/1
0.4 TABLET SUBLINGUAL
Status: DISCONTINUED | OUTPATIENT
Start: 2022-07-18 | End: 2022-07-19 | Stop reason: HOSPADM

## 2022-07-18 RX ORDER — ACETAMINOPHEN 325 MG/1
650 TABLET ORAL EVERY 4 HOURS PRN
Status: DISCONTINUED | OUTPATIENT
Start: 2022-07-18 | End: 2022-07-19 | Stop reason: HOSPADM

## 2022-07-18 RX ORDER — ENOXAPARIN SODIUM 100 MG/ML
40 INJECTION SUBCUTANEOUS
Status: DISCONTINUED | OUTPATIENT
Start: 2022-07-18 | End: 2022-07-19 | Stop reason: HOSPADM

## 2022-07-18 RX ORDER — DEXTROSE MONOHYDRATE 25 G/50ML
25 INJECTION, SOLUTION INTRAVENOUS
Status: DISCONTINUED | OUTPATIENT
Start: 2022-07-18 | End: 2022-07-19 | Stop reason: HOSPADM

## 2022-07-18 RX ORDER — CYCLOBENZAPRINE HCL 10 MG
5 TABLET ORAL 3 TIMES DAILY PRN
Status: DISCONTINUED | OUTPATIENT
Start: 2022-07-18 | End: 2022-07-19 | Stop reason: HOSPADM

## 2022-07-18 RX ORDER — ONDANSETRON 2 MG/ML
INJECTION INTRAMUSCULAR; INTRAVENOUS
Status: COMPLETED
Start: 2022-07-18 | End: 2022-07-18

## 2022-07-18 RX ORDER — ACETAMINOPHEN 650 MG/1
650 SUPPOSITORY RECTAL EVERY 4 HOURS PRN
Status: DISCONTINUED | OUTPATIENT
Start: 2022-07-18 | End: 2022-07-19 | Stop reason: HOSPADM

## 2022-07-18 RX ADMIN — NITROGLYCERIN 0.4 MG: 0.4 TABLET SUBLINGUAL at 22:34

## 2022-07-18 RX ADMIN — IOPAMIDOL 150 ML: 755 INJECTION, SOLUTION INTRAVENOUS at 22:17

## 2022-07-18 RX ADMIN — SODIUM CHLORIDE, POTASSIUM CHLORIDE, SODIUM LACTATE AND CALCIUM CHLORIDE 1000 ML: 600; 310; 30; 20 INJECTION, SOLUTION INTRAVENOUS at 22:29

## 2022-07-18 RX ADMIN — ONDANSETRON 4 MG: 2 INJECTION INTRAMUSCULAR; INTRAVENOUS at 22:28

## 2022-07-18 RX ADMIN — TRAZODONE HYDROCHLORIDE 100 MG: 100 TABLET ORAL at 23:20

## 2022-07-18 RX ADMIN — TERAZOSIN HYDROCHLORIDE 1 MG: 1 CAPSULE ORAL at 23:20

## 2022-07-18 RX ADMIN — ASPIRIN 324 MG: 81 TABLET, CHEWABLE ORAL at 22:27

## 2022-07-18 RX ADMIN — INSULIN HUMAN 10 UNITS: 100 INJECTION, SOLUTION PARENTERAL at 22:26

## 2022-07-18 RX ADMIN — ENOXAPARIN SODIUM 40 MG: 100 INJECTION SUBCUTANEOUS at 23:09

## 2022-07-18 ASSESSMENT — PAIN DESCRIPTION - LOCATION: LOCATION: GENERALIZED

## 2022-07-18 ASSESSMENT — PAIN SCALES - GENERAL: PAINLEVEL_OUTOF10: 7

## 2022-07-18 ASSESSMENT — PAIN DESCRIPTION - DESCRIPTORS: DESCRIPTORS: ACHING

## 2022-07-19 ENCOUNTER — APPOINTMENT (OUTPATIENT)
Dept: CARDIOLOGY | Facility: HOSPITAL | Age: 52
End: 2022-07-19

## 2022-07-19 ENCOUNTER — READMISSION MANAGEMENT (OUTPATIENT)
Dept: CALL CENTER | Facility: HOSPITAL | Age: 52
End: 2022-07-19

## 2022-07-19 VITALS
HEIGHT: 70 IN | TEMPERATURE: 98 F | WEIGHT: 158 LBS | DIASTOLIC BLOOD PRESSURE: 81 MMHG | HEART RATE: 94 BPM | OXYGEN SATURATION: 99 % | BODY MASS INDEX: 22.62 KG/M2 | RESPIRATION RATE: 18 BRPM | SYSTOLIC BLOOD PRESSURE: 141 MMHG

## 2022-07-19 LAB
ANION GAP SERPL CALCULATED.3IONS-SCNC: 11 MMOL/L (ref 5–15)
BH CV STRESS BP STAGE 1: NORMAL
BH CV STRESS BP STAGE 2: NORMAL
BH CV STRESS BP STAGE 3: NORMAL
BH CV STRESS DOB - ATROPINE STAGE 3: 0.3
BH CV STRESS DOSE DOBUTAMINE STAGE 1: 10
BH CV STRESS DOSE DOBUTAMINE STAGE 2: 20
BH CV STRESS DOSE DOBUTAMINE STAGE 3: 30
BH CV STRESS DURATION MIN STAGE 1: 3
BH CV STRESS DURATION MIN STAGE 2: 3
BH CV STRESS DURATION MIN STAGE 3: 2
BH CV STRESS DURATION SEC STAGE 1: 0
BH CV STRESS DURATION SEC STAGE 2: 0
BH CV STRESS DURATION SEC STAGE 3: 39
BH CV STRESS HR STAGE 1: 94
BH CV STRESS HR STAGE 2: 106
BH CV STRESS HR STAGE 3: 141
BH CV STRESS PROTOCOL 1: NORMAL
BH CV STRESS RECOVERY BP: NORMAL MMHG
BH CV STRESS RECOVERY HR: 86 BPM
BH CV STRESS STAGE 1: 1
BH CV STRESS STAGE 2: 2
BH CV STRESS STAGE 3: 3
BUN SERPL-MCNC: 23 MG/DL (ref 6–20)
BUN/CREAT SERPL: 23.2 (ref 7–25)
CALCIUM SPEC-SCNC: 8.7 MG/DL (ref 8.6–10.5)
CHLORIDE SERPL-SCNC: 98 MMOL/L (ref 98–107)
CHOLEST SERPL-MCNC: 150 MG/DL (ref 0–200)
CO2 SERPL-SCNC: 25 MMOL/L (ref 22–29)
CREAT SERPL-MCNC: 0.99 MG/DL (ref 0.76–1.27)
DEPRECATED RDW RBC AUTO: 41.1 FL (ref 37–54)
EGFRCR SERPLBLD CKD-EPI 2021: 91.1 ML/MIN/1.73
ERYTHROCYTE [DISTWIDTH] IN BLOOD BY AUTOMATED COUNT: 13.1 % (ref 12.3–15.4)
GLUCOSE BLDC GLUCOMTR-MCNC: 244 MG/DL (ref 70–130)
GLUCOSE BLDC GLUCOMTR-MCNC: 259 MG/DL (ref 70–130)
GLUCOSE BLDC GLUCOMTR-MCNC: 398 MG/DL (ref 70–130)
GLUCOSE BLDC GLUCOMTR-MCNC: 410 MG/DL (ref 70–130)
GLUCOSE SERPL-MCNC: 309 MG/DL (ref 65–99)
HCT VFR BLD AUTO: 39.9 % (ref 37.5–51)
HDLC SERPL-MCNC: 23 MG/DL (ref 40–60)
HGB BLD-MCNC: 14.1 G/DL (ref 13–17.7)
LDLC SERPL CALC-MCNC: 97 MG/DL (ref 0–100)
LDLC/HDLC SERPL: 4.03 {RATIO}
MAXIMAL PREDICTED HEART RATE: 167 BPM
MCH RBC QN AUTO: 30.6 PG (ref 26.6–33)
MCHC RBC AUTO-ENTMCNC: 35.3 G/DL (ref 31.5–35.7)
MCV RBC AUTO: 86.6 FL (ref 79–97)
PERCENT MAX PREDICTED HR: 84.43 %
PLATELET # BLD AUTO: 285 10*3/MM3 (ref 140–450)
PMV BLD AUTO: 10 FL (ref 6–12)
POTASSIUM SERPL-SCNC: 3.9 MMOL/L (ref 3.5–5.2)
RBC # BLD AUTO: 4.61 10*6/MM3 (ref 4.14–5.8)
SODIUM SERPL-SCNC: 134 MMOL/L (ref 136–145)
STRESS BASELINE BP: NORMAL MMHG
STRESS BASELINE HR: 89 BPM
STRESS PERCENT HR: 99 %
STRESS POST EXERCISE DUR MIN: 10 MIN
STRESS POST EXERCISE DUR SEC: 26 SEC
STRESS POST PEAK BP: NORMAL MMHG
STRESS POST PEAK HR: 141 BPM
STRESS TARGET HR: 142 BPM
TRIGL SERPL-MCNC: 172 MG/DL (ref 0–150)
TROPONIN T SERPL-MCNC: <0.01 NG/ML (ref 0–0.03)
VLDLC SERPL-MCNC: 30 MG/DL (ref 5–40)
WBC NRBC COR # BLD: 7.78 10*3/MM3 (ref 3.4–10.8)

## 2022-07-19 PROCEDURE — 25010000002 ATROPINE SULFATE: Performed by: INTERNAL MEDICINE

## 2022-07-19 PROCEDURE — 84484 ASSAY OF TROPONIN QUANT: CPT | Performed by: INTERNAL MEDICINE

## 2022-07-19 PROCEDURE — G0378 HOSPITAL OBSERVATION PER HR: HCPCS

## 2022-07-19 PROCEDURE — 85027 COMPLETE CBC AUTOMATED: CPT | Performed by: INTERNAL MEDICINE

## 2022-07-19 PROCEDURE — 93350 STRESS TTE ONLY: CPT

## 2022-07-19 PROCEDURE — 82962 GLUCOSE BLOOD TEST: CPT

## 2022-07-19 PROCEDURE — 25010000002 PERFLUTREN 6.52 MG/ML SUSPENSION: Performed by: INTERNAL MEDICINE

## 2022-07-19 PROCEDURE — 93350 STRESS TTE ONLY: CPT | Performed by: INTERNAL MEDICINE

## 2022-07-19 PROCEDURE — 0 DOBUTAMINE PER 250 MG: Performed by: INTERNAL MEDICINE

## 2022-07-19 PROCEDURE — 36415 COLL VENOUS BLD VENIPUNCTURE: CPT | Performed by: INTERNAL MEDICINE

## 2022-07-19 PROCEDURE — 93352 ADMIN ECG CONTRAST AGENT: CPT | Performed by: INTERNAL MEDICINE

## 2022-07-19 PROCEDURE — 63710000001 INSULIN LISPRO (HUMAN) PER 5 UNITS: Performed by: INTERNAL MEDICINE

## 2022-07-19 PROCEDURE — 80061 LIPID PANEL: CPT | Performed by: INTERNAL MEDICINE

## 2022-07-19 PROCEDURE — 93018 CV STRESS TEST I&R ONLY: CPT | Performed by: INTERNAL MEDICINE

## 2022-07-19 PROCEDURE — 63710000001 INSULIN DETEMIR PER 5 UNITS: Performed by: INTERNAL MEDICINE

## 2022-07-19 PROCEDURE — 93017 CV STRESS TEST TRACING ONLY: CPT

## 2022-07-19 PROCEDURE — 80048 BASIC METABOLIC PNL TOTAL CA: CPT | Performed by: INTERNAL MEDICINE

## 2022-07-19 RX ORDER — METOPROLOL SUCCINATE 25 MG/1
25 TABLET, EXTENDED RELEASE ORAL
Qty: 30 TABLET | Refills: 1 | Status: SHIPPED | OUTPATIENT
Start: 2022-07-20

## 2022-07-19 RX ORDER — DOBUTAMINE HYDROCHLORIDE 100 MG/100ML
10 INJECTION INTRAVENOUS CONTINUOUS
Status: DISCONTINUED | OUTPATIENT
Start: 2022-07-19 | End: 2022-07-19

## 2022-07-19 RX ORDER — NICOTINE 21 MG/24HR
1 PATCH, TRANSDERMAL 24 HOURS TRANSDERMAL
Qty: 21 EACH | Refills: 0 | Status: SHIPPED | OUTPATIENT
Start: 2022-07-20

## 2022-07-19 RX ADMIN — NICOTINE 1 PATCH: 21 PATCH, EXTENDED RELEASE TRANSDERMAL at 10:47

## 2022-07-19 RX ADMIN — HYDROCODONE BITARTRATE AND ACETAMINOPHEN 1 TABLET: 5; 325 TABLET ORAL at 10:52

## 2022-07-19 RX ADMIN — PERFLUTREN 8.48 MG: 6.52 INJECTION, SUSPENSION INTRAVENOUS at 11:55

## 2022-07-19 RX ADMIN — INSULIN LISPRO 12 UNITS: 100 INJECTION, SOLUTION INTRAVENOUS; SUBCUTANEOUS at 16:18

## 2022-07-19 RX ADMIN — Medication 10 MCG/KG/MIN: at 11:55

## 2022-07-19 RX ADMIN — HYDROCODONE BITARTRATE AND ACETAMINOPHEN 1 TABLET: 5; 325 TABLET ORAL at 16:49

## 2022-07-19 RX ADMIN — INSULIN DETEMIR 30 UNITS: 100 INJECTION, SOLUTION SUBCUTANEOUS at 02:16

## 2022-07-19 RX ADMIN — SODIUM CHLORIDE 75 ML/HR: 9 INJECTION, SOLUTION INTRAVENOUS at 00:36

## 2022-07-19 RX ADMIN — ASPIRIN 81 MG: 81 TABLET, COATED ORAL at 10:51

## 2022-07-19 RX ADMIN — ATROPINE SULFATE 0.3 MG: 0.1 INJECTION INTRAVENOUS at 11:52

## 2022-07-19 NOTE — PROGRESS NOTES
[facility]  INPATIENT PROGRESS NOTE    Patient Name: Reyna Ayon  Date of Admission: 7/18/2022  Today's Date: 07/19/22  Length of Stay: 1  Primary Care Physician: Provider, No Known    Subjective   Chief Complaint: Follow-up chest pain  HPI   Patient initially tried to leave AMA this morning.  He reports that he is very displeased with the staff here.  He feels that it is unnecessary that he has been made NPO.  He reports that he is diabetic and must eat first thing every morning.  Discussed with the patient that this is standard for every patient that goes through cardiac stress testing.    He ripped out his IV this morning and has been very hateful with nursing staff.  See note from Sherman Campbell RN for details.  Patient has been educated regarding the risks involved with leaving without further work-up being completed.  He is agreeable to stay for now.  He did not complain of chest pain or shortness of breath upon my examination.      Review of Systems   All pertinent negatives and positives are as above. All other systems have been reviewed and are negative unless otherwise stated.     Objective    Temp:  [98.3 °F (36.8 °C)-98.6 °F (37 °C)] 98.5 °F (36.9 °C)  Heart Rate:  [] 96  Resp:  [18-20] 18  BP: ()/() 115/69  Physical Exam  Vitals and nursing note reviewed.   Constitutional:       General: He is not in acute distress.     Appearance: Normal appearance.   HENT:      Head: Normocephalic.   Cardiovascular:      Rate and Rhythm: Regular rhythm. Tachycardia present.      Pulses: Normal pulses.      Heart sounds: Normal heart sounds. No murmur heard.    No friction rub.      Comments: Sinus/sinus tach   Pulmonary:      Effort: Pulmonary effort is normal. No respiratory distress.      Breath sounds: Normal breath sounds. No wheezing.      Comments: Room air  Abdominal:      General: Bowel sounds are normal. There is no distension.       Palpations: Abdomen is soft.      Tenderness: There is no abdominal tenderness.   Musculoskeletal:         General: No swelling or tenderness. Normal range of motion.      Cervical back: Normal range of motion. No rigidity or tenderness.   Skin:     General: Skin is warm and dry.      Capillary Refill: Capillary refill takes less than 2 seconds.      Findings: No bruising, lesion or rash.   Neurological:      Mental Status: He is alert and oriented to person, place, and time. Mental status is at baseline.      Motor: No weakness.      Comments: Agitated, hateful to staff         Results Review:  I have reviewed the labs, radiology results, and diagnostic studies.    Laboratory Data:   Results from last 7 days   Lab Units 07/18/22  2103   WBC 10*3/mm3 14.32*   HEMOGLOBIN g/dL 16.1   HEMATOCRIT % 45.7   PLATELETS 10*3/mm3 395        Results from last 7 days   Lab Units 07/19/22  0505 07/18/22  2103   SODIUM mmol/L 134* 129*   POTASSIUM mmol/L 3.9 5.0   CHLORIDE mmol/L 98 92*   CO2 mmol/L 25.0 24.0   BUN mg/dL 23* 24*   CREATININE mg/dL 0.99 1.18   CALCIUM mg/dL 8.7 9.5   BILIRUBIN mg/dL  --  1.4*   ALK PHOS U/L  --  109   ALT (SGPT) U/L  --  15   AST (SGOT) U/L  --  12   GLUCOSE mg/dL 309* 526*       Culture Data:   No results found for: BLOODCX, URINECX, WOUNDCX, MRSACX, RESPCX, STOOLCX    Radiology Data:   Imaging Results (Last 24 Hours)     Procedure Component Value Units Date/Time    CT Angiogram Chest [[id number]] Collected: 07/19/22 0710     Updated: 07/19/22 0720    Narrative:      Exam: CT angiogram of the of the chest with intravenous contrast.     CLINICAL HISTORY:  Acute aortic syndrome suspected. Acute chest pain     DOSE:  88 mGycm. All CT scans are performed using dose optimization  techniques as appropriate to the performed exam and including at least  one of the following: Automated exposure control, adjustment of the mA  and/or kV according to size, and the use of the iterative  reconstruction  technique.     TECHNIQUE: Contiguous axial images were obtained through the thorax  following intravenous contrast administration with reformatted images  obtained in the sagittal and coronal projections from the original data  set. Three-dimensional reconstructions are also obtained.     COMPARISON:  None available     FINDINGS:     Pulmonary arteries:  There is normal enhancement of the pulmonary  arteries with no evidence of pulmonary thromboembolic disease.     Aorta:  The thoracic aorta and proximal great vessels are normal in  appearance. There is no evidence of aortic dissection or aneurysm.     LUNGS:  There is a 6 mm triangular nodule within the right middle lobe  abutting the minor fissure for which I would favor an intrafissural  lymph node although it is not pathognomonic for such given its  morphology. It would warrant follow-up. There appears to be some  associated parenchymal scarring. No additional lung lesions are present.  No evidence of acute consolidative pneumonia.     Pleural spaces: Unremarkable. No evidence of pleural effusion or  pneumothorax.     HEART: No evidence of enlargement. No coronary artery calcifications are  present. There is no evidence of pericardial effusion. No evidence of RV  dysfunction.     Bones: No acute osseous abnormalities are demonstrated.     CHEST WALL: No chest wall abnormalities are demonstrated.     Lymph nodes: No enlarged mediastinal or axillary lymphadenopathy is  present.     Upper abdomen: Limited images of the upper abdomen are unremarkable.       Impression:      1. No evidence of pulmonary thromboembolic disease. The thoracic aorta  is normal in caliber with no evidence of dissection or aneurysm.  2. There is a 6 mm angular nodule within the right middle lobe abutting  the minor fissure. I suspect this may represent an intrafissural lymph  node but it is not pathognomonic for that entity based on morphology and  size. It would warrant  follow-up based on Fleischner criteria. Lungs are  otherwise clear. No evidence of consolidative pneumonia or effusion.     Fleischner Society Recommendations for Management of SOLID Pulmonary  Nodules:     1.  If a nodule is less than or equal to 4 mm in size, low risk patients  require no follow up, and high risk patients require a follow-up CT of  the chest at 12 months.  2.  If a nodule is 5-6 mm in size, low risk patients require a follow-up  CT at 12 months, and high risk patients require follow up CT scans at  6-12 months and 18-24 months.  3.  If a nodule is 7-8 mm in size, low risk patients requiring follow-up  at 6-12 months and 18-24 months, and high risk patients requiring  follow-up at 3-6 months, 9-12 months and 24 months.  4.  If a nodule is greater than 8 mm in size, ALL patients require  follow-up at 3, 9 and 24 months. PET/CT or biopsy should also be  considered.  This report was finalized on 07/19/2022 07:17 by Dr. eKnton Swan MD.    CT Angiogram Head [[id number]] Collected: 07/19/22 0702     Updated: 07/19/22 0714    Narrative:      EXAMINATION: CT ANGIOGRAM HEAD-      7/18/2022 9:55 PM CDT     HISTORY: Neuro deficit, acute, stroke suspected; R07.9-Chest pain,  unspecified; H54.60-Unqualified visual loss, one eye, unspecified;  Z86.39-Personal history of other endocrine, nutritional and metabolic  disease; Z86.79-Personal history of other diseases of the circulatory  system     In order to have a CT radiation dose as low as reasonably achievable  Automated Exposure Control was utilized for adjustment of the mA and/or  KV according to patient size.     DLP in mGycm= 404     The CT Angiography of the head is performed after intravenous contrast  enhancement. Images are acquired in axial plane and subsequent 2-D  reconstruction in coronal and sagittal planes and 3-D maximum intensity  projection reconstruction.     There is no previous similar study for comparison. Correlation made with  CT  scan of the head obtained earlier today.     Normal internal carotid arteries are seen in the carotid canal and  cavernous sinus bilaterally.     Normal suprasellar internal carotid arteries bilaterally is seen.     The left internal carotid artery divides into normal-appearing anterior  middle cerebral arteries the subsequent normal insular opercular  branches. A relatively small left posterior communicating artery arises  from the left internal carotid artery before bifurcation.     There is a mild, less than 50% stenosis, of the right internal carotid  terminus, before bifurcation, at the level of origin of is larger than  average right posterior communicating artery. The right internal carotid  artery then continues as the right middle cerebral artery with the  subsequent normal insular opercular branches. The A1 segment of the  right ABBEY is absent.     There is normal anterior communicating artery. There are subsequent  normal A2 and A3 segments of both ACAs.     Both vertebral arteries enter the foramen magnum and become moderately  attenuated after origin of PICA bilaterally. Bones and joint to make a  relatively small basilar artery which continues as the left PCA.     The P1 segment of the right PCA is absent. The P2  P3 Segments of the  right PCA are performed by a dominant right posterior communicating  artery. There are subsequent normal branches.       Impression:      1. No flow limiting arterial stenosis. An unremarkable CTA of the head  with some variations as detailed above.  2. The NASCET criteria was utilized for estimation of carotid arterial  stenosis.                       This report was finalized on 07/19/2022 07:11 by Dr. Carmela Dowling MD.    CT Angiogram Neck [[id number]] Collected: 07/19/22 0656     Updated: 07/19/22 0705    Narrative:      EXAMINATION: CT ANGIOGRAM NECK-      7/18/2022 9:55 PM CDT     HISTORY: Neuro deficit, acute, stroke suspected     In order to have a CT radiation  dose as low as reasonably achievable  Automated Exposure Control was utilized for adjustment of the mA and/or  KV according to patient size.     DLP in mGycm= 404     The CT angiography of the neck is performed after intravenous contrast  enhancement. Images are acquired in axial plane and subsequent 2-D  reconstruction in coronal and sagittal planes and 3-D maximum intensity  projection reconstruction.     There is no previous similar study for comparison. Correlation made with  CT scan of the head obtained earlier today.     There are mild atheromatous changes of the limited visualized thoracic  aorta and aortic arch. No aneurysmal dilatation or dissection.     There is normal origin of brachiocephalic trunk, left common carotid  artery and left subclavian artery from the aortic arch.     The brachiocephalic trunk divides into normal-appearing right common  carotid and right subclavian arteries.     Both common carotid arteries have a normal course and caliber throughout  the neck. There is a small calcific plaque in the distal left common  carotid artery without significant stenosis.     Both common carotid artery divides into normal-appearing intrarenal  external carotid arteries.     Normal internal carotid arteries bilaterally is seen with a mild  tortuosity without stenosis. Normal size internal carotid artery at the  base of the skull and carotid canal bilaterally is noted.     Normal size vertebral arteries arise from the subclavian arteries  bilaterally to the subsequent normal course and caliber. No focal  stenosis or aneurysmal dilatation. Normal size vertebral arteries enter  the foramen magnum and joint to make a normal-sized basilar artery.     The intracranial carotid and vertebral arteries are discussed under CT  angiography of the head.     The soft tissues of the neck appear unremarkable except for moderate  diffuse tonsillar hypertrophy suggesting chronic tonsillitis. The  limited visualized  chest is unremarkable.       Impression:      1. Normal CT Angiography of the neck.  2. The NASCET criteria was utilized for estimation of carotid arterial  stenosis.     The above study was initially reviewed and reported by [facility]. I do not  find any discrepancies.  This report was finalized on 07/19/2022 07:02 by Dr. Carmela Dowling MD.    CT Head Without Contrast [[id number]] Collected: 07/19/22 0625     Updated: 07/19/22 0631    Narrative:      EXAMINATION: CT HEAD WO CONTRAST-      7/18/2022 9:55 PM CDT     HISTORY: Neuro deficit, acute, stroke suspected     In order to have a CT radiation dose as low as reasonably achievable  Automated Exposure Control was utilized for adjustment of the mA and/or  KV according to patient size.     DLP in mGycm= 641     The CT scan of the head is performed without intravenous contrast  enhancement. Images are acquired in axial plane and subsequent  reconstruction in coronal and sagittal planes.     There is no previous similar study for comparison.     There is no evidence of a mass. There is no midline shift.     The ventricles are normal. The cortical sulci are moderately prominent  suggesting moderate chronic volume loss.     There is no evidence of intracranial hemorrhage or hematoma.     The gray-white matter differentiation is maintained.     Images reviewed in bone window show no acute bony abnormality. The  limited visualized paranasal sinuses and mastoid air cells are  unremarkable.       Impression:      1. No acute intracranial abnormality.     The above study was initially reviewed and reported by [facility]. I do not  find any discrepancies.                                   This report was finalized on 07/19/2022 06:28 by Dr. Carmela Dowling MD.    XR Chest 1 View [[id number]] Collected: 07/18/22 2157     Updated: 07/18/22 2201    Narrative:      EXAMINATION: XR CHEST 1 VW- 7/18/2022 9:57 PM CDT     HISTORY: Chest Pain Triage Protocol; R07.9-Chest pain,  unspecified;  H54.60-Unqualified visual loss, one eye, unspecified; Z86.39-Personal  history of other endocrine, nutritional and metabolic disease;  Z86.79-Personal history of other diseases of the circulatory system.     REPORT: A frontal view of the chest was obtained.     COMPARISON: There are no correlative imaging studies for comparison.     The lungs are clear normally expanded. Heart size is normal. No  pneumothorax or pleural effusion is identified. Linear interfaces over  the upper chest may be related to skin fold artifact. The osseous  structures and upper abdomen are unremarkable.       Impression:      No acute cardiopulmonary abnormality.  This report was finalized on 07/18/2022 21:58 by Dr. Yang Juan MD.          I have reviewed the patient's current medications.     Assessment/Plan     Active Hospital Problems    Diagnosis    • **Acute chest pain    • Chronic pain syndrome    • Pseudohyponatremia    • Tobacco abuse    • Type 2 diabetes mellitus with hyperglycemia, with long-term current use of insulin (HCC)    • Methamphetamine abuse ([facility])        Plan:  1.  Patient presented to the emergency department on 7/18/2022 with complaints of chest pain, vomiting, and left-sided numbness/weakness.  He was discharged from rehab in [place] approximately 2 weeks ago for methamphetamine abuse.  He reports that he has been feeling unwell for the last 2 days with decreased appetite, vomiting, and severe weakness.  He tried to work outside on 7/18 when his children had to help him inside of the house due to severe weakness.  He developed chest pain associated with numbness and tingling in the left arm and leg.  Work-up revealed glucose 526, sodium 129, hemoglobin A1c 10.9, WBC 14.3.  Imaging was negative for aortic dissection.    2.  Patient was admitted to hospitalist service for further cardiac work-up.  Cardiac enzymes have remained negative.  EKG showed no acute abnormality.  He received 324 mg of  aspirin.  He denies chest pain upon my examination.   He is NPO for stress test this afternoon.  However, he has tried to leave AMA stating that it is ridiculous that he cannot eat since he is diabetic and must eat first thing every morning.  Educated the patient that this is standard for everyone that goes through her cardiac stress testing.  He is agreeable to stay.      3.  Hemoglobin A1c is 10.9.  He uses insulin at home but does not take it as prescribed.  He had previously been started on metformin but had significant problems with abdominal pain and diarrhea so he stopped taking it.  Last blood glucose 309, 259.  Continue Accu-Cheks with sliding scale insulin.  May need to increase dose if blood glucose remains elevated.    4.  Sodium was 129 on admission.  This morning it is 134.  Order was placed on admission for normal saline at 75 mL/hr but the patient has removed his IV.    5.  Patient was discharged from rehab approximately 2 weeks ago for methamphetamine abuse.  He also smokes half a pack of cigarettes a day.  Nicotine patch has been ordered.  UDS has also been ordered but has not been collected yet.    6.  CTA chest showed a 6 mm angular nodule within the right middle lobe abutting the minor fissure.  Radiologist suspects this may represent an intrafissural lymph node but it is not pathognomonic for that entity based on morphology and size.  Radiologist also states that it would warrant follow-up based on Fleischner criteria.  Would recommend outpatient follow-up with PCP.    7.  Lovenox for DVT prophylaxis      Discharge Planning: I expect the patient to be discharged home in 1-2 days.    Electronically signed by JAY Jane, 07/19/22, 08:51 CDT.

## 2022-07-19 NOTE — DISCHARGE SUMMARY
[facility] Medicine Services  DISCHARGE SUMMARY       Date of Admission: 7/18/2022  Date of Discharge:  7/19/2022  Primary Care Physician: Provider, No Known    Presenting Problem/Chief Complaint:  Chest pain    Final Discharge Diagnoses:  Active Hospital Problems    Diagnosis    • **Acute chest pain    • Chronic pain syndrome    • Pseudohyponatremia    • Tobacco abuse    • Type 2 diabetes mellitus with hyperglycemia, with long-term current use of insulin (HCC)    • Methamphetamine abuse (HCC)        Consults: None    Procedures Performed: None    Pertinent Test Results:     Imaging Results (All)     Procedure Component Value Units Date/Time    CT Angiogram Chest [[id number]] Collected: 07/19/22 0710     Updated: 07/19/22 0720    Narrative:      Exam: CT angiogram of the of the chest with intravenous contrast.     CLINICAL HISTORY:  Acute aortic syndrome suspected. Acute chest pain     DOSE:  88 mGycm. All CT scans are performed using dose optimization  techniques as appropriate to the performed exam and including at least  one of the following: Automated exposure control, adjustment of the mA  and/or kV according to size, and the use of the iterative reconstruction  technique.     TECHNIQUE: Contiguous axial images were obtained through the thorax  following intravenous contrast administration with reformatted images  obtained in the sagittal and coronal projections from the original data  set. Three-dimensional reconstructions are also obtained.     COMPARISON:  None available     FINDINGS:     Pulmonary arteries:  There is normal enhancement of the pulmonary  arteries with no evidence of pulmonary thromboembolic disease.     Aorta:  The thoracic aorta and proximal great vessels are normal in  appearance. There is no evidence of aortic dissection or aneurysm.     LUNGS:  There is a 6 mm triangular nodule within the right middle lobe  abutting the minor fissure for which I would  favor an intrafissural  lymph node although it is not pathognomonic for such given its  morphology. It would warrant follow-up. There appears to be some  associated parenchymal scarring. No additional lung lesions are present.  No evidence of acute consolidative pneumonia.     Pleural spaces: Unremarkable. No evidence of pleural effusion or  pneumothorax.     HEART: No evidence of enlargement. No coronary artery calcifications are  present. There is no evidence of pericardial effusion. No evidence of RV  dysfunction.     Bones: No acute osseous abnormalities are demonstrated.     CHEST WALL: No chest wall abnormalities are demonstrated.     Lymph nodes: No enlarged mediastinal or axillary lymphadenopathy is  present.     Upper abdomen: Limited images of the upper abdomen are unremarkable.       Impression:      1. No evidence of pulmonary thromboembolic disease. The thoracic aorta  is normal in caliber with no evidence of dissection or aneurysm.  2. There is a 6 mm angular nodule within the right middle lobe abutting  the minor fissure. I suspect this may represent an intrafissural lymph  node but it is not pathognomonic for that entity based on morphology and  size. It would warrant follow-up based on Fleischner criteria. Lungs are  otherwise clear. No evidence of consolidative pneumonia or effusion.     Fleischner Society Recommendations for Management of SOLID Pulmonary  Nodules:     1.  If a nodule is less than or equal to 4 mm in size, low risk patients  require no follow up, and high risk patients require a follow-up CT of  the chest at 12 months.  2.  If a nodule is 5-6 mm in size, low risk patients require a follow-up  CT at 12 months, and high risk patients require follow up CT scans at  6-12 months and 18-24 months.  3.  If a nodule is 7-8 mm in size, low risk patients requiring follow-up  at 6-12 months and 18-24 months, and high risk patients requiring  follow-up at 3-6 months, 9-12 months and 24  months.  4.  If a nodule is greater than 8 mm in size, ALL patients require  follow-up at 3, 9 and 24 months. PET/CT or biopsy should also be  considered.  This report was finalized on 07/19/2022 07:17 by Dr. Kenton Swan MD.    CT Angiogram Head [[id number]] Collected: 07/19/22 0702     Updated: 07/19/22 0714    Narrative:      EXAMINATION: CT ANGIOGRAM HEAD-      7/18/2022 9:55 PM CDT     HISTORY: Neuro deficit, acute, stroke suspected; R07.9-Chest pain,  unspecified; H54.60-Unqualified visual loss, one eye, unspecified;  Z86.39-Personal history of other endocrine, nutritional and metabolic  disease; Z86.79-Personal history of other diseases of the circulatory  system     In order to have a CT radiation dose as low as reasonably achievable  Automated Exposure Control was utilized for adjustment of the mA and/or  KV according to patient size.     DLP in mGycm= 404     The CT Angiography of the head is performed after intravenous contrast  enhancement. Images are acquired in axial plane and subsequent 2-D  reconstruction in coronal and sagittal planes and 3-D maximum intensity  projection reconstruction.     There is no previous similar study for comparison. Correlation made with  CT scan of the head obtained earlier today.     Normal internal carotid arteries are seen in the carotid canal and  cavernous sinus bilaterally.     Normal suprasellar internal carotid arteries bilaterally is seen.     The left internal carotid artery divides into normal-appearing anterior  middle cerebral arteries the subsequent normal insular opercular  branches. A relatively small left posterior communicating artery arises  from the left internal carotid artery before bifurcation.     There is a mild, less than 50% stenosis, of the right internal carotid  terminus, before bifurcation, at the level of origin of is larger than  average right posterior communicating artery. The right internal carotid  artery then continues as the right  middle cerebral artery with the  subsequent normal insular opercular branches. The A1 segment of the  right ABBEY is absent.     There is normal anterior communicating artery. There are subsequent  normal A2 and A3 segments of both ACAs.     Both vertebral arteries enter the foramen magnum and become moderately  attenuated after origin of PICA bilaterally. Bones and joint to make a  relatively small basilar artery which continues as the left PCA.     The P1 segment of the right PCA is absent. The P2  P3 Segments of the  right PCA are performed by a dominant right posterior communicating  artery. There are subsequent normal branches.       Impression:      1. No flow limiting arterial stenosis. An unremarkable CTA of the head  with some variations as detailed above.  2. The NASCET criteria was utilized for estimation of carotid arterial  stenosis.                       This report was finalized on 07/19/2022 07:11 by Dr. Carmela Dowling MD.    CT Angiogram Neck [[id number]] Collected: 07/19/22 0656     Updated: 07/19/22 0705    Narrative:      EXAMINATION: CT ANGIOGRAM NECK-      7/18/2022 9:55 PM CDT     HISTORY: Neuro deficit, acute, stroke suspected     In order to have a CT radiation dose as low as reasonably achievable  Automated Exposure Control was utilized for adjustment of the mA and/or  KV according to patient size.     DLP in mGycm= 404     The CT angiography of the neck is performed after intravenous contrast  enhancement. Images are acquired in axial plane and subsequent 2-D  reconstruction in coronal and sagittal planes and 3-D maximum intensity  projection reconstruction.     There is no previous similar study for comparison. Correlation made with  CT scan of the head obtained earlier today.     There are mild atheromatous changes of the limited visualized thoracic  aorta and aortic arch. No aneurysmal dilatation or dissection.     There is normal origin of brachiocephalic trunk, left common  carotid  artery and left subclavian artery from the aortic arch.     The brachiocephalic trunk divides into normal-appearing right common  carotid and right subclavian arteries.     Both common carotid arteries have a normal course and caliber throughout  the neck. There is a small calcific plaque in the distal left common  carotid artery without significant stenosis.     Both common carotid artery divides into normal-appearing intrarenal  external carotid arteries.     Normal internal carotid arteries bilaterally is seen with a mild  tortuosity without stenosis. Normal size internal carotid artery at the  base of the skull and carotid canal bilaterally is noted.     Normal size vertebral arteries arise from the subclavian arteries  bilaterally to the subsequent normal course and caliber. No focal  stenosis or aneurysmal dilatation. Normal size vertebral arteries enter  the foramen magnum and joint to make a normal-sized basilar artery.     The intracranial carotid and vertebral arteries are discussed under CT  angiography of the head.     The soft tissues of the neck appear unremarkable except for moderate  diffuse tonsillar hypertrophy suggesting chronic tonsillitis. The  limited visualized chest is unremarkable.       Impression:      1. Normal CT Angiography of the neck.  2. The NASCET criteria was utilized for estimation of carotid arterial  stenosis.     The above study was initially reviewed and reported by [employer]. I do not  find any discrepancies.  This report was finalized on 07/19/2022 07:02 by Dr. Carmela Dowling MD.    CT Head Without Contrast [[id number]] Collected: 07/19/22 0625     Updated: 07/19/22 0631    Narrative:      EXAMINATION: CT HEAD WO CONTRAST-      7/18/2022 9:55 PM CDT     HISTORY: Neuro deficit, acute, stroke suspected     In order to have a CT radiation dose as low as reasonably achievable  Automated Exposure Control was utilized for adjustment of the mA and/or  KV according to patient  size.     DLP in mGycm= 641     The CT scan of the head is performed without intravenous contrast  enhancement. Images are acquired in axial plane and subsequent  reconstruction in coronal and sagittal planes.     There is no previous similar study for comparison.     There is no evidence of a mass. There is no midline shift.     The ventricles are normal. The cortical sulci are moderately prominent  suggesting moderate chronic volume loss.     There is no evidence of intracranial hemorrhage or hematoma.     The gray-white matter differentiation is maintained.     Images reviewed in bone window show no acute bony abnormality. The  limited visualized paranasal sinuses and mastoid air cells are  unremarkable.       Impression:      1. No acute intracranial abnormality.     The above study was initially reviewed and reported by [facility]. I do not  find any discrepancies.                                   This report was finalized on 07/19/2022 06:28 by Dr. Carmela Dowling MD.    XR Chest 1 View [[id number]] Collected: 07/18/22 2157     Updated: 07/18/22 2201    Narrative:      EXAMINATION: XR CHEST 1 VW- 7/18/2022 9:57 PM CDT     HISTORY: Chest Pain Triage Protocol; R07.9-Chest pain, unspecified;  H54.60-Unqualified visual loss, one eye, unspecified; Z86.39-Personal  history of other endocrine, nutritional and metabolic disease;  Z86.79-Personal history of other diseases of the circulatory system.     REPORT: A frontal view of the chest was obtained.     COMPARISON: There are no correlative imaging studies for comparison.     The lungs are clear normally expanded. Heart size is normal. No  pneumothorax or pleural effusion is identified. Linear interfaces over  the upper chest may be related to skin fold artifact. The osseous  structures and upper abdomen are unremarkable.       Impression:      No acute cardiopulmonary abnormality.  This report was finalized on 07/18/2022 21:58 by Dr. Yang Juan MD.          LAB  RESULTS:      Lab 07/19/22  0902 07/18/22 2103   WBC 7.78 14.32*   HEMOGLOBIN 14.1 16.1   HEMATOCRIT 39.9 45.7   PLATELETS 285 395   NEUTROS ABS  --  9.29*   IMMATURE GRANS (ABS)  --  0.07*   LYMPHS ABS  --  3.58*   MONOS ABS  --  1.27*   EOS ABS  --  0.06   MCV 86.6 86.6         Lab 07/19/22  0505 07/18/22 2103   SODIUM 134* 129*   POTASSIUM 3.9 5.0   CHLORIDE 98 92*   CO2 25.0 24.0   ANION GAP 11.0 13.0   BUN 23* 24*   CREATININE 0.99 1.18   EGFR 91.1 73.8   GLUCOSE 309* 526*   CALCIUM 8.7 9.5   HEMOGLOBIN A1C  --  10.90*         Lab 07/18/22  2103   TOTAL PROTEIN 8.2   ALBUMIN 4.70   GLOBULIN 3.5   ALT (SGPT) 15   AST (SGOT) 12   BILIRUBIN 1.4*   ALK PHOS 109         Lab 07/19/22  0032 07/18/22 2103   TROPONIN T <0.010 <0.010         Lab 07/19/22  0505   CHOLESTEROL 150   LDL CHOL 97   HDL CHOL 23*   TRIGLYCERIDES 172*             Brief Urine Lab Results  (Last result in the past 365 days)      Color   Clarity   Blood   Leuk Est   Nitrite   Protein   CREAT   Urine HCG        06/19/22 1407 Yellow   Clear   Negative   Negative   Negative   Negative               Microbiology Results (last 10 days)     Procedure Component Value - Date/Time    COVID PRE-OP / PRE-PROCEDURE SCREENING ORDER (NO ISOLATION) - Swab, Nasal Cavity [[id number]]  (Normal) Collected: 07/18/22 2238    Lab Status: Final result Specimen: Swab from Nasal Cavity Updated: 07/18/22 2321    Narrative:      The following orders were created for panel order COVID PRE-OP / PRE-PROCEDURE SCREENING ORDER (NO ISOLATION) - Swab, Nasal Cavity.  Procedure                               Abnormality         Status                     ---------                               -----------         ------                     COVID-19,[facility]...[[id number]]  Normal              Final result                 Please view results for these tests on the individual orders.    COVID-19,[facility] IN-HOUSE,Nasal Swab No Transport Media 3-4 HR TAT - Swab, Nasal Cavity  [[id number]]  (Normal) Collected: 07/18/22 2238    Lab Status: Final result Specimen: Swab from Nasal Cavity Updated: 07/18/22 2321     COVID19 Not Detected    Narrative:      Fact sheet for providers: https://www.fda.gov/media/815003/download     Fact sheet for patients: https://www.fda.gov/media/692948/download    Test performed by PCR.    Consider negative results in combination with clinical observations, patient history, and epidemiological information.  Fact sheet for providers: https://www.fda.gov/media/429928/download     Fact sheet for patients: https://www.[employer].gov/media/988033/download    Test performed by PCR.    Consider negative results in combination with clinical observations, patient history, and epidemiological information.          History of Present Illness on Day of Discharge: Patient initially tried to leave AMA this morning.  He reported that he was very displeased with the staff here.  He feels that it is unnecessary that he was made NPO.  He reported that he is diabetic and must eat first thing every morning.  Discussed with the patient that this is standard for every patient that goes through cardiac stress testing. He ripped out his IV this morning and has been very hateful with nursing staff. Patient has been educated regarding the risks involved with leaving without further work-up being completed.  He ultimately agreed to stay for further cardiac testing. He is no longer complaining of chest pain or shortness of breath.  Stress echo was noted to be low risk for ischemia and the patient is stable for discharge home today.    Hospital Course:  The patient is a 51 y.o. male who has a past medical history significant for bipolar disorder, type 2 diabetes mellitus, hypertension, illicit drug use, tobacco use.  Patient presented to the emergency department on 7/18/2022 with complaints of chest pain, vomiting, and left-sided numbness/weakness.  He was discharged from rehab in [place]  approximately 2 weeks ago for methamphetamine abuse.  He reports that he has been feeling unwell for the last 2 days with decreased appetite, vomiting, and severe weakness.  He tried to work outside on 7/18 when his children had to help him inside of the house due to severe weakness.  He developed chest pain associated with numbness and tingling in the left arm and leg.  Work-up revealed glucose 526, sodium 129, hemoglobin A1c 10.9, WBC 14.3.  Imaging was negative for aortic dissection.    Patient was admitted to hospitalist service for further cardiac work-up.  Cardiac enzymes have remained negative.  EKG showed no acute abnormality.  He received 324 mg of aspirin.  He denied chest pain upon my examination.   He was initially NPO for stress test. However, he tried to leave AMA stating that it is ridiculous that he cannot eat since he is diabetic and must eat first thing every morning. Educated the patient that this is standard for everyone that goes through her cardiac stress testing.  Patient eventually agreed to stay and stress test was completed which showed low risk for ischemia.      Hemoglobin A1c is 10.9.  He uses insulin at home but does not take it as prescribed.  He had previously been started on metformin but had significant problems with abdominal pain and diarrhea so he stopped taking it.  He was placed on sliding scale insulin while admitted.      Sodium was 129 on admission.  This morning it was 134.  Order was placed on admission for normal saline at 75 mL/hr but the patient removed his IV.    Patient was discharged from rehab approximately 2 weeks ago for methamphetamine abuse.  He also smokes half a pack of cigarettes a day.  Nicotine patch has been ordered. UDS was ordered but was unable to be collected.    CTA chest showed a 6 mm angular nodule within the right middle lobe abutting the minor fissure.  Radiologist suspects this may represent an intrafissural lymph node but it is not pathognomonic  "for that entity based on morphology and size.  Radiologist also states that it would warrant follow-up based on Fleischner criteria.  Would recommend outpatient follow-up with PCP.    Patient feels that he is ready to go home today.  Labs reviewed.  Home medications reviewed and resumed as appropriate.  Patient is medically stable for discharge.  He has been educated to return to the emergency department for any new or worsening chest pain.    Condition on Discharge: Medically stable    Physical Exam on Discharge:  /76 (BP Location: Left arm, Patient Position: Lying)   Pulse 90   Temp 98 °F (36.7 °C) (Oral)   Resp 18   Ht 177.8 cm (70\")   Wt 71.7 kg (158 lb)   SpO2 98%   BMI 22.67 kg/m²   Physical Exam  Vitals and nursing note reviewed.   Constitutional:       General: He is not in acute distress.     Appearance: Normal appearance.   HENT:      Head: Normocephalic.   Cardiovascular:      Rate and Rhythm: Regular rhythm. Tachycardia present.      Pulses: Normal pulses.      Heart sounds: Normal heart sounds. No murmur heard.    No friction rub.      Comments: Sinus/sinus tach   Pulmonary:      Effort: Pulmonary effort is normal. No respiratory distress.      Breath sounds: Normal breath sounds. No wheezing.      Comments: Room air  Abdominal:      General: Bowel sounds are normal. There is no distension.      Palpations: Abdomen is soft.      Tenderness: There is no abdominal tenderness.   Musculoskeletal:         General: No swelling or tenderness. Normal range of motion.      Cervical back: Normal range of motion. No rigidity or tenderness.   Skin:     General: Skin is warm and dry.      Capillary Refill: Capillary refill takes less than 2 seconds.      Findings: No bruising, lesion or rash.   Neurological:      Mental Status: He is alert and oriented to person, place, and time. Mental status is at baseline.      Motor: No weakness.      Comments: Agitated, hateful to staff      Discharge " Disposition:  Home    Discharge Medications:     Discharge Medications      New Medications      Instructions Start Date   metoprolol succinate XL 25 MG 24 hr tablet  Commonly known as: TOPROL-XL   25 mg, Oral, Every 24 Hours Scheduled   Start Date: July 20, 2022     nicotine 21 MG/24HR patch  Commonly known as: NICODERM CQ   1 patch, Transdermal, Every 24 Hours Scheduled   Start Date: July 20, 2022        Continue These Medications      Instructions Start Date   cyclobenzaprine 5 MG tablet  Commonly known as: FLEXERIL   5 mg, Oral, 3 Times Daily PRN      HYDROcodone-acetaminophen 5-325 MG per tablet  Commonly known as: NORCO   1 tablet, Oral, Every 6 Hours PRN      ibuprofen 800 MG tablet  Commonly known as: ADVIL,MOTRIN   800 mg, Oral, Every 8 Hours PRN      Insulin Aspart 100 UNIT/ML injection  Commonly known as: novoLOG   Subcutaneous, 3 Times Daily Before Meals, Per sliding scale      insulin glargine 100 UNIT/ML injection  Commonly known as: LANTUS, SEMGLEE   40 Units, Subcutaneous, 2 Times Daily      prazosin 1 MG capsule  Commonly known as: MINIPRESS   1 mg, Oral, Nightly      traZODone 50 MG tablet  Commonly known as: DESYREL   100 mg, Oral, Nightly             Discharge Diet:   Diet Instructions     Diet: Regular, Consistent Carbohydrate      Discharge Diet:  Regular  Consistent Carbohydrate             Activity at Discharge:   Activity Instructions     Activity as Tolerated            Discharge Care Plan/Instructions:   1.  Follow-up with PCP in 1 week  2.  Begin Toprol-XL 25 mg daily  3.  Continue nicotine patch  4.  Return to the emergency department for any new or worsening chest pain    Follow-up Appointments:   No future appointments.    Test Results Pending at Discharge: None    Electronically signed by JAY Jane, 07/19/22, 14:29 CDT.    Time: 35 minutes    Electronically signed by Reginaldo Nagy DO, 07/19/22, 16:59 CDT.

## 2022-07-19 NOTE — SIGNIFICANT NOTE
"Pt called out asking where his breakfast was, saying he \"should have ate an hour ago\". I explained to him that he is ordered to have a stress test today since he came in with chest pain and that he cannot eat before that test. Patient became animated and said that he \"was leaving, this is the weirdest place I've ever been. They woke me up at 5 am to check my vision. They gave me a trazodone last night and an hour later they were asking me all kinds of questions\". He had raised his voice and was being very hateful to staff. I expressed that I understood any frustration but that these were hospital policies. He again stated  He was going to leave. I explained the importance of staying to ensure that the stress test would not show anything abnormal. He asked me to leave the room. When the aide went back in to get vitals, the patient had ripped out his IV. He would not let her help with the bleeding. He said he was leaving. I went in with an AMA form. I explained again the risks of leaving and asked him to sign the form. He said \"I'm not signing that. You all have treated me wrong from that time I got here and I'm not signing anything of yours\". JAY Espinoza happened to be on the floor at this time and she came to explain the risks of leaving. He again stated all of the reasons why he was upset. Then he said \"I guess I should stay\". As of this time patient is agreeing to stay for testing.   "

## 2022-07-19 NOTE — PLAN OF CARE
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
## 2022-07-19 NOTE — ED PROVIDER NOTES
"EMERGENCY DEPARTMENT HISTORY AND PHYSICAL EXAM    Patient Name: Reyna Ayon    Chief Complaint   Patient presents with   • Chest Pain     PT states he got out of rehab today and has chest pain, blurred vision and shoulder pain.  Recent TIA 2.5 weeks ago.  Pt has not had subutex in 4 days       History of Presenting Illness:  Reyna Ayon is a 51 y.o. male with history of poorly controlled type 1 diabetes mellitus as well as hypertension presents emergency department due to chest pain and with blurry vision.    Patient states that he was recently admitted to an outside hospital rehab facility in [place] after he reportedly was admitted for a \"mini stroke.\"  He cannot describe his exact deficits in the setting of that stroke.  He presents the emergency department complaining of symptoms that began about 48 hours prior to arrival.  He complains of retrosternal chest pain described as moderate nature rating to his back and shoulder blades and into his neck.  Some associated shortness of breath.  No dyspnea on exertion.  No nausea or vomiting or abdominal pain.  Also complaining of some visual disturbances described as blurry vision.  States it is more in his left eye than his right.  He states he is having visual issues in the setting of his \"mini stroke.  \"Unclear if he had deficits from that.  Denies any new numbness or tingling in his face arms or legs or any new weakness or any new difficulty walking.      Past Medical History:   Past Medical History:   Diagnosis Date   • Bipolar 1 disorder (HCC)    • Diabetic neuropathy (HCC)    • Hypertension    • Type 2 diabetes mellitus, with long-term current use of insulin (HCC)        Past Surgical History:   Denies prior surgeries    Social History:   Denies tobacco  Denies EtOH  Denies marijuana, cocaine, or IV drugs recently; former user    Family History:   No known family history of myocardial infarction or stroke but patient was adopted    Allergies:   No Known " Allergies    Medications:     Current Facility-Administered Medications:   •  acetaminophen (TYLENOL) tablet 650 mg, 650 mg, Oral, Q4H PRN **OR** acetaminophen (TYLENOL) 160 MG/5ML solution 650 mg, 650 mg, Oral, Q4H PRN **OR** acetaminophen (TYLENOL) suppository 650 mg, 650 mg, Rectal, Q4H PRN, Clifton Brown, DO  •  aspirin EC tablet 81 mg, 81 mg, Oral, Daily, Clifton Brown, DO  •  cyclobenzaprine (FLEXERIL) tablet 5 mg, 5 mg, Oral, TID PRN, Clifton Brown, DO  •  dextrose (D50W) (25 g/50 mL) IV injection 25 g, 25 g, Intravenous, Q15 Min PRN, Clifton Brown, DO  •  dextrose (GLUTOSE) oral gel 15 g, 15 g, Oral, Q15 Min PRN, Clifton Brown, DO  •  Enoxaparin Sodium (LOVENOX) syringe 40 mg, 40 mg, Subcutaneous, Q24H, Clifton Brown, DO, 40 mg at 07/18/22 2309  •  glucagon (human recombinant) (GLUCAGEN DIAGNOSTIC) injection 1 mg, 1 mg, Intramuscular, Q15 Min PRN, Clifton Brown, DO  •  HYDROcodone-acetaminophen (NORCO) 5-325 MG per tablet 1 tablet, 1 tablet, Oral, Q6H PRN, Clifton Brown, DO  •  insulin detemir (LEVEMIR) injection 30 Units, 30 Units, Subcutaneous, Nightly, Clifton Brown, DO  •  Insulin Lispro (humaLOG) injection 0-14 Units, 0-14 Units, Subcutaneous, TID AC, Clifton Brown, DO  •  labetalol (NORMODYNE,TRANDATE) injection 10 mg, 10 mg, Intravenous, Q6H PRN, Clifton Brown, DO  •  metoprolol succinate XL (TOPROL-XL) 24 hr tablet 25 mg, 25 mg, Oral, Q24H, Clifton Brown, DO  •  nicotine (NICODERM CQ) 21 MG/24HR patch 1 patch, 1 patch, Transdermal, Q24H, Clifton Brown, DO  •  nitroglycerin (NITROSTAT) SL tablet 0.4 mg, 0.4 mg, Sublingual, Q5 Min PRN, Juan Antonio Bhakta MD, 0.4 mg at 07/18/22 2234  •  ondansetron (ZOFRAN) injection 4 mg, 4 mg, Intravenous, Q6H PRN, Clifton Brown, DO  •  sodium chloride 0.9 % flush 10 mL, 10 mL, Intravenous, PRN, Juan Antonio Bhakta MD  •  sodium chloride 0.9 % flush 10 mL, 10 mL, Intravenous, Q12H, Clifton Brown,   •  sodium chloride 0.9 % flush 10 mL, 10  "mL, Intravenous, PRN, Clifton Brown DO  •  sodium chloride 0.9 % infusion, 75 mL/hr, Intravenous, Continuous, Clifton Brown DO, Last Rate: 75 mL/hr at 07/19/22 0036, 75 mL/hr at 07/19/22 0036  •  terazosin (HYTRIN) capsule 1 mg, 1 mg, Oral, Nightly, Clifton Brown DO, 1 mg at 07/18/22 2320  •  traZODone (DESYREL) tablet 100 mg, 100 mg, Oral, Nightly, Clifton Brown DO, 100 mg at 07/18/22 2320    Review of Systems:  A full review of systems was obtained and is negative unless otherwise stated in HPI.    Physical Exam:   VS: BP 90/60 (BP Location: Right arm, Patient Position: Lying)   Pulse 104   Temp 98.3 °F (36.8 °C) (Oral)   Resp 18   Ht 177.8 cm (70\")   Wt 71.7 kg (158 lb)   SpO2 100%   BMI 22.67 kg/m²   GENERAL: Well-appearing middle-age man sitting up in stretcher no acute distress; well nourished, well developed, awake, alert, no acute distress, nontoxic appearing, comfortable  EYES: PERRL, visual acuity 20/30 on the right but count fingers on the left, sclera anicteric, extra-occular movements grossly intact, symmetric lids  EARS, NOSE, MOUTH, THROAT: atraumatic external nose and ears, moist mucous membranes  NECK: Symmetric, trachea midline, no thyromegaly, no adenopathy, no meningismus  RESPIRATORY: Unlabored respiratory effort, clear to auscultation bilaterally, good air movement  CARDIOVASCULAR: No murmurs or gallops, peripheral pulses 2+ and equal in all extremities  GI: Soft, nontender, nondistended, bowel sounds present, no hepatosplenomegaly  LYMPHATIC: no lymphadenopathy  MUSCULOSKELETAL/EXTREMITIES: Extremities without obvious deformity, no cyanosis or clubbing  SKIN: warm and dry with no obvious rashes  NEUROLOGIC: moving all 4 extremities symmetrically, CN II-XII grossly intact; no dysmetria on finger-nose-finger or heel-to-shin bilaterally; no ataxia with gait; 5+ strength bilateral wrist bilateral ankles; reporting good sensation distal feet hands bilaterally  PSYCHIATRIC: alert, " pleasant and cooperative. Appropriate mood and affect.      Labs:   Labs Reviewed   COMPREHENSIVE METABOLIC PANEL - Abnormal; Notable for the following components:       Result Value    Glucose 526 (*)     BUN 24 (*)     Sodium 129 (*)     Chloride 92 (*)     Total Bilirubin 1.4 (*)     All other components within normal limits    Narrative:     GFR Normal >60  Chronic Kidney Disease <60  Kidney Failure <15     CBC WITH AUTO DIFFERENTIAL - Abnormal; Notable for the following components:    WBC 14.32 (*)     Neutrophils, Absolute 9.29 (*)     Lymphocytes, Absolute 3.58 (*)     Monocytes, Absolute 1.27 (*)     Immature Grans, Absolute 0.07 (*)     All other components within normal limits   HEMOGLOBIN A1C - Abnormal; Notable for the following components:    Hemoglobin A1C 10.90 (*)     All other components within normal limits    Narrative:     Hemoglobin A1C Ranges:    Increased Risk for Diabetes  5.7% to 6.4%  Diabetes                     >= 6.5%  Diabetic Goal                < 7.0%   POCT GLUCOSE FINGERSTICK - Abnormal; Notable for the following components:    Glucose 464 (*)     All other components within normal limits   POCT GLUCOSE FINGERSTICK - Abnormal; Notable for the following components:    Glucose 492 (*)     All other components within normal limits   POCT GLUCOSE FINGERSTICK - Abnormal; Notable for the following components:    Glucose 141 (*)     All other components within normal limits   COVID-19,[facility] IN-HOUSE,NASAL SWAB NO TRANSPORT MEDIA 2 HR TAT - Normal    Narrative:     Fact sheet for providers: https://www.fda.gov/media/809128/download     Fact sheet for patients: https://www.fda.gov/media/958807/download    Test performed by PCR.    Consider negative results in combination with clinical observations, patient history, and epidemiological information.  Fact sheet for providers: https://www.fda.gov/media/693167/download     Fact sheet for patients:  https://www.fda.gov/media/217114/download    Test performed by PCR.    Consider negative results in combination with clinical observations, patient history, and epidemiological information.   TROPONIN (IN-HOUSE) - Normal    Narrative:     Troponin T Reference Range:  <= 0.03 ng/mL-   Negative for AMI  >0.03 ng/mL-     Abnormal for myocardial necrosis.  Clinicians would have to utilize clinical acumen, EKG, Troponin and serial changes to determine if it is an Acute Myocardial Infarction or myocardial injury due to an underlying chronic condition.       Results may be falsely decreased if patient taking Biotin.     COVID PRE-OP / PRE-PROCEDURE SCREENING ORDER (NO ISOLATION)    Narrative:     The following orders were created for panel order COVID PRE-OP / PRE-PROCEDURE SCREENING ORDER (NO ISOLATION) - Swab, Nasal Cavity.  Procedure                               Abnormality         Status                     ---------                               -----------         ------                     COVID-19,[facility]...[[id number]]  Normal              Final result                 Please view results for these tests on the individual orders.   RAINBOW DRAW    Narrative:     The following orders were created for panel order [place] Draw.  Procedure                               Abnormality         Status                     ---------                               -----------         ------                     Green Top (Gel)[[id number]]                                  Final result               Lavender Top[[id number]]                                     Final result               Red Top[[id number]]                                          Final result               Light Blue Top[[id number]]                                   Final result                 Please view results for these tests on the individual orders.   BLOOD GAS, VENOUS   BLOOD GAS, VENOUS   BASIC METABOLIC PANEL   CBC (NO DIFF)   URINE DRUG SCREEN   LIPID  PANEL   TROPONIN (IN-HOUSE)   POCT GLUCOSE FINGERSTICK   POCT GLUCOSE FINGERSTICK   POCT GLUCOSE FINGERSTICK   CBC AND DIFFERENTIAL    Narrative:     The following orders were created for panel order CBC & Differential.  Procedure                               Abnormality         Status                     ---------                               -----------         ------                     CBC Auto Differential[[id number]]        Abnormal            Final result                 Please view results for these tests on the individual orders.   GREEN TOP   LAVENDER TOP   RED TOP   LIGHT BLUE TOP         Radiology:   XR Chest 1 View   Final Result   No acute cardiopulmonary abnormality.   This report was finalized on 07/18/2022 21:58 by Dr. Yang Juan MD.      CT Head Without Contrast    (Results Pending)   CT Angiogram Neck    (Results Pending)   CT Angiogram Head    (Results Pending)   CT Angiogram Chest    (Results Pending)           Medical Decision Making:  Reyna Ayon is a 51 y.o. male with history of poorly controlled type 1 diabetes mellitus who presents emergency department due to chest pain as well as with symptoms of blurry vision in his left eye.    Initial vital signs with notable hypertension 142/110.  Otherwise slight tachycardia 105.  Otherwise reassuring vital signs.    I have reviewed and interpreted the EKG and it shows: NSR, rate of 100. Normal axis and intervals. No signs of acute ischemia such as ST elevation, ST depression, or T wave inversion. No signs of Hyperkalemia, WPW, PE, Pericarditis, LV aneurysm, Brugada, Heart Block, Atrial fibrillation or A flutter.    Labs were ordered and reviewed.  Most notable for significant hyperglycemia 526.  Pseudohyponatremia in the setting of notably elevated glucose.  Otherwise unremarkable labs.  Negative troponin.    Imaging was ordered and reviewed.  Chest x-ray with no acute abnormality.  CT angio of the chest radiology read is pending but I see no  evidence of pulmonary embolism or acute aortic dissection.  CT of the head as well as angiogram head and neck radiology reads are pending but I see no obvious acute abnormalities.    The patient was given IV fluids and IV insulin.  Also given full dose aspirin.    HEART score of 4.     Exactly always the patiently presentation is unclear at this time but concern for possible acute coronary syndrome or unstable angina given patient's notably poorly controlled type 1 diabetes mellitus with core occurring since hypertension and history with heart score of 4.  Patient with no prior stress testing.  No evidence of pneumothorax to explain his symptoms.  Would will consider acute aortic dissection in the setting of his chest pain rating to his back with essential hypertension.  Radiology read is pending but I see no evidence of dissection.  No infectious symptoms concerning for viral or bacterial infection no evidence of pneumonia on chest x-ray.  Regarding his blurry vision appears that he had the symptoms prior to consider TIA.  Given patient symptoms have been ongoing from 124 hours no acute indication for stroke alert or tPA.    Patient was admitted to medicine for further management.      ED Diagnosis:  Acute chest pain  Monocular vision loss  Essential hypertension  Hyperglycemia due to type 1 diabetes mellitus with long-term insulin use  History of essential hypertension      Disposition: admit to medicine    Signed:  Juan Antonio Bhakta MD  Emergency Medicine Physician    This note was generated using speech recognition software and may contain homophonic word substitutions or errors.     Juan Antonio Bhakta MD  07/19/22 0051

## 2022-07-20 NOTE — OUTREACH NOTE
Prep Survey    Flowsheet Row Responses   [facility] facility patient discharged from? [place]   Is LACE score < 7 ? No   Emergency Room discharge w/ pulse ox? No   Eligibility Not Eligible   What are the reasons patient is not eligible? Other  [substance abuse]   Does the patient have one of the following disease processes/diagnoses(primary or secondary)? Other  [Acute chest pain]   Prep survey completed? Yes          YAMILEX REYES - Registered Nurse

## 2022-07-21 LAB
QT INTERVAL: 370 MS
QTC INTERVAL: 477 MS

## 2022-08-30 ENCOUNTER — TELEPHONE (OUTPATIENT)
Dept: FAMILY MEDICINE CLINIC | Age: 52
End: 2022-08-30

## 2022-08-30 ENCOUNTER — OFFICE VISIT (OUTPATIENT)
Dept: FAMILY MEDICINE CLINIC | Age: 52
End: 2022-08-30
Payer: MEDICAID

## 2022-08-30 VITALS
HEART RATE: 108 BPM | BODY MASS INDEX: 21.59 KG/M2 | WEIGHT: 150.8 LBS | SYSTOLIC BLOOD PRESSURE: 110 MMHG | DIASTOLIC BLOOD PRESSURE: 60 MMHG | OXYGEN SATURATION: 100 % | HEIGHT: 70 IN | TEMPERATURE: 97.6 F | RESPIRATION RATE: 16 BRPM

## 2022-08-30 DIAGNOSIS — F33.1 MODERATE EPISODE OF RECURRENT MAJOR DEPRESSIVE DISORDER (HCC): ICD-10-CM

## 2022-08-30 DIAGNOSIS — E10.65 TYPE 1 DIABETES MELLITUS WITH HYPERGLYCEMIA (HCC): ICD-10-CM

## 2022-08-30 DIAGNOSIS — E10.65 TYPE 1 DIABETES MELLITUS WITH HYPERGLYCEMIA (HCC): Primary | ICD-10-CM

## 2022-08-30 DIAGNOSIS — Z13.31 POSITIVE DEPRESSION SCREENING: ICD-10-CM

## 2022-08-30 DIAGNOSIS — F41.9 ANXIETY: ICD-10-CM

## 2022-08-30 DIAGNOSIS — R45.4 OUTBURSTS OF ANGER: ICD-10-CM

## 2022-08-30 DIAGNOSIS — E11.42 DIABETIC PERIPHERAL NEUROPATHY (HCC): ICD-10-CM

## 2022-08-30 PROBLEM — E11.65 CONTROLLED TYPE 2 DIABETES MELLITUS WITH HYPERGLYCEMIA (HCC): Status: RESOLVED | Noted: 2020-10-22 | Resolved: 2022-08-30

## 2022-08-30 LAB
ALBUMIN SERPL-MCNC: 4.1 G/DL (ref 3.5–5.2)
ALP BLD-CCNC: 83 U/L (ref 40–130)
ALT SERPL-CCNC: 9 U/L (ref 5–41)
ANION GAP SERPL CALCULATED.3IONS-SCNC: 18 MMOL/L (ref 7–19)
AST SERPL-CCNC: 13 U/L (ref 5–40)
BASOPHILS ABSOLUTE: 0 K/UL (ref 0–0.2)
BASOPHILS RELATIVE PERCENT: 0.4 % (ref 0–1)
BILIRUB SERPL-MCNC: 1.1 MG/DL (ref 0.2–1.2)
BUN BLDV-MCNC: 20 MG/DL (ref 6–20)
CALCIUM SERPL-MCNC: 9.2 MG/DL (ref 8.6–10)
CHLORIDE BLD-SCNC: 89 MMOL/L (ref 98–111)
CO2: 21 MMOL/L (ref 22–29)
CREAT SERPL-MCNC: 0.8 MG/DL (ref 0.5–1.2)
EOSINOPHILS ABSOLUTE: 0.1 K/UL (ref 0–0.6)
EOSINOPHILS RELATIVE PERCENT: 0.6 % (ref 0–5)
GFR AFRICAN AMERICAN: >59
GFR NON-AFRICAN AMERICAN: >60
GLUCOSE BLD-MCNC: 674 MG/DL (ref 74–109)
HBA1C MFR BLD: 13 % (ref 4–6)
HCT VFR BLD CALC: 47.6 % (ref 42–52)
HEMOGLOBIN: 15.5 G/DL (ref 14–18)
IMMATURE GRANULOCYTES #: 0.1 K/UL
LYMPHOCYTES ABSOLUTE: 3.4 K/UL (ref 1.1–4.5)
LYMPHOCYTES RELATIVE PERCENT: 29.8 % (ref 20–40)
MCH RBC QN AUTO: 30 PG (ref 27–31)
MCHC RBC AUTO-ENTMCNC: 32.6 G/DL (ref 33–37)
MCV RBC AUTO: 92.1 FL (ref 80–94)
MONOCYTES ABSOLUTE: 0.6 K/UL (ref 0–0.9)
MONOCYTES RELATIVE PERCENT: 4.9 % (ref 0–10)
NEUTROPHILS ABSOLUTE: 7.3 K/UL (ref 1.5–7.5)
NEUTROPHILS RELATIVE PERCENT: 63.9 % (ref 50–65)
PDW BLD-RTO: 13.3 % (ref 11.5–14.5)
PLATELET # BLD: 296 K/UL (ref 130–400)
PMV BLD AUTO: 10.2 FL (ref 9.4–12.4)
POTASSIUM SERPL-SCNC: 4.7 MMOL/L (ref 3.5–5)
RBC # BLD: 5.17 M/UL (ref 4.7–6.1)
SODIUM BLD-SCNC: 128 MMOL/L (ref 136–145)
TOTAL PROTEIN: 7.4 G/DL (ref 6.6–8.7)
TSH SERPL DL<=0.05 MIU/L-ACNC: 0.99 UIU/ML (ref 0.27–4.2)
WBC # BLD: 11.3 K/UL (ref 4.8–10.8)

## 2022-08-30 PROCEDURE — 99214 OFFICE O/P EST MOD 30 MIN: CPT | Performed by: FAMILY MEDICINE

## 2022-08-30 PROCEDURE — 3046F HEMOGLOBIN A1C LEVEL >9.0%: CPT | Performed by: FAMILY MEDICINE

## 2022-08-30 RX ORDER — INSULIN ASPART 100 [IU]/ML
20 INJECTION, SOLUTION INTRAVENOUS; SUBCUTANEOUS
Qty: 18 ML | Refills: 1 | Status: SHIPPED | OUTPATIENT
Start: 2022-08-30 | End: 2022-09-29

## 2022-08-30 RX ORDER — INSULIN GLARGINE 100 [IU]/ML
40 INJECTION, SOLUTION SUBCUTANEOUS 2 TIMES DAILY
Qty: 24 ML | Refills: 1 | Status: SHIPPED | OUTPATIENT
Start: 2022-08-30 | End: 2022-09-29

## 2022-08-30 RX ORDER — QUETIAPINE FUMARATE 50 MG/1
50 TABLET, FILM COATED ORAL NIGHTLY
Qty: 30 TABLET | Refills: 2 | Status: SHIPPED | OUTPATIENT
Start: 2022-08-30

## 2022-08-30 RX ORDER — FLUOXETINE HYDROCHLORIDE 20 MG/1
20 CAPSULE ORAL DAILY
Qty: 30 CAPSULE | Refills: 2 | Status: SHIPPED | OUTPATIENT
Start: 2022-08-30

## 2022-08-30 RX ORDER — GABAPENTIN 600 MG/1
600 TABLET ORAL 3 TIMES DAILY
Qty: 90 TABLET | Refills: 1 | Status: SHIPPED | OUTPATIENT
Start: 2022-08-30 | End: 2022-09-29

## 2022-08-30 SDOH — ECONOMIC STABILITY: FOOD INSECURITY: WITHIN THE PAST 12 MONTHS, YOU WORRIED THAT YOUR FOOD WOULD RUN OUT BEFORE YOU GOT MONEY TO BUY MORE.: NEVER TRUE

## 2022-08-30 SDOH — ECONOMIC STABILITY: FOOD INSECURITY: WITHIN THE PAST 12 MONTHS, THE FOOD YOU BOUGHT JUST DIDN'T LAST AND YOU DIDN'T HAVE MONEY TO GET MORE.: NEVER TRUE

## 2022-08-30 ASSESSMENT — PATIENT HEALTH QUESTIONNAIRE - PHQ9
4. FEELING TIRED OR HAVING LITTLE ENERGY: 1
SUM OF ALL RESPONSES TO PHQ QUESTIONS 1-9: 8
SUM OF ALL RESPONSES TO PHQ QUESTIONS 1-9: 7
2. FEELING DOWN, DEPRESSED OR HOPELESS: 1
5. POOR APPETITE OR OVEREATING: 1
SUM OF ALL RESPONSES TO PHQ9 QUESTIONS 1 & 2: 2
10. IF YOU CHECKED OFF ANY PROBLEMS, HOW DIFFICULT HAVE THESE PROBLEMS MADE IT FOR YOU TO DO YOUR WORK, TAKE CARE OF THINGS AT HOME, OR GET ALONG WITH OTHER PEOPLE: 1
6. FEELING BAD ABOUT YOURSELF - OR THAT YOU ARE A FAILURE OR HAVE LET YOURSELF OR YOUR FAMILY DOWN: 1
SUM OF ALL RESPONSES TO PHQ QUESTIONS 1-9: 8
7. TROUBLE CONCENTRATING ON THINGS, SUCH AS READING THE NEWSPAPER OR WATCHING TELEVISION: 1
SUM OF ALL RESPONSES TO PHQ QUESTIONS 1-9: 8
7. TROUBLE CONCENTRATING ON THINGS, SUCH AS READING THE NEWSPAPER OR WATCHING TELEVISION: 1
4. FEELING TIRED OR HAVING LITTLE ENERGY: 1
10. IF YOU CHECKED OFF ANY PROBLEMS, HOW DIFFICULT HAVE THESE PROBLEMS MADE IT FOR YOU TO DO YOUR WORK, TAKE CARE OF THINGS AT HOME, OR GET ALONG WITH OTHER PEOPLE: 1
8. MOVING OR SPEAKING SO SLOWLY THAT OTHER PEOPLE COULD HAVE NOTICED. OR THE OPPOSITE, BEING SO FIGETY OR RESTLESS THAT YOU HAVE BEEN MOVING AROUND A LOT MORE THAN USUAL: 0
SUM OF ALL RESPONSES TO PHQ QUESTIONS 1-9: 7
8. MOVING OR SPEAKING SO SLOWLY THAT OTHER PEOPLE COULD HAVE NOTICED. OR THE OPPOSITE, BEING SO FIGETY OR RESTLESS THAT YOU HAVE BEEN MOVING AROUND A LOT MORE THAN USUAL: 0
SUM OF ALL RESPONSES TO PHQ QUESTIONS 1-9: 7
3. TROUBLE FALLING OR STAYING ASLEEP: 1
6. FEELING BAD ABOUT YOURSELF - OR THAT YOU ARE A FAILURE OR HAVE LET YOURSELF OR YOUR FAMILY DOWN: 1
SUM OF ALL RESPONSES TO PHQ QUESTIONS 1-9: 7
9. THOUGHTS THAT YOU WOULD BE BETTER OFF DEAD, OR OF HURTING YOURSELF: 0
1. LITTLE INTEREST OR PLEASURE IN DOING THINGS: 1
SUM OF ALL RESPONSES TO PHQ9 QUESTIONS 1 & 2: 2
3. TROUBLE FALLING OR STAYING ASLEEP: 1
5. POOR APPETITE OR OVEREATING: 1
2. FEELING DOWN, DEPRESSED OR HOPELESS: 1
9. THOUGHTS THAT YOU WOULD BE BETTER OFF DEAD, OR OF HURTING YOURSELF: 1
SUM OF ALL RESPONSES TO PHQ QUESTIONS 1-9: 7
1. LITTLE INTEREST OR PLEASURE IN DOING THINGS: 1

## 2022-08-30 ASSESSMENT — COLUMBIA-SUICIDE SEVERITY RATING SCALE - C-SSRS
2. HAVE YOU ACTUALLY HAD ANY THOUGHTS OF KILLING YOURSELF?: NO
1. WITHIN THE PAST MONTH, HAVE YOU WISHED YOU WERE DEAD OR WISHED YOU COULD GO TO SLEEP AND NOT WAKE UP?: NO
6. HAVE YOU EVER DONE ANYTHING, STARTED TO DO ANYTHING, OR PREPARED TO DO ANYTHING TO END YOUR LIFE?: NO
6. HAVE YOU EVER DONE ANYTHING, STARTED TO DO ANYTHING, OR PREPARED TO DO ANYTHING TO END YOUR LIFE?: NO
2. HAVE YOU ACTUALLY HAD ANY THOUGHTS OF KILLING YOURSELF?: NO
1. WITHIN THE PAST MONTH, HAVE YOU WISHED YOU WERE DEAD OR WISHED YOU COULD GO TO SLEEP AND NOT WAKE UP?: NO

## 2022-08-30 ASSESSMENT — SOCIAL DETERMINANTS OF HEALTH (SDOH): HOW HARD IS IT FOR YOU TO PAY FOR THE VERY BASICS LIKE FOOD, HOUSING, MEDICAL CARE, AND HEATING?: NOT HARD AT ALL

## 2022-08-30 NOTE — PROGRESS NOTES
[facility] [address]  Dept: [phone]  Dept Fax: [phone]    Visit type: Established patient    Reason for Visit: Medication Refill, Back Pain, and Headache         Assessment and Plan       1. Type 1 diabetes mellitus with hyperglycemia (HCC)  -     insulin glargine (BASAGLAR KWIKPEN) 100 UNIT/ML injection pen; Inject 40 Units into the skin 2 times daily, Disp-24 mL, R-1Please include any needed needlesNormal  -     insulin aspart (NOVOLOG FLEXPEN) 100 UNIT/ML injection pen; Inject 20 Units into the skin 3 times daily (before meals), Disp-18 mL, R-1Please include any needed needlesNormal  -     CBC with Auto Differential; Future  -     Comprehensive Metabolic Panel; Future  -     Hemoglobin A1C; Future  -     TSH; Future  2. Diabetic peripheral neuropathy (HCC)  -     gabapentin (NEURONTIN) 600 MG tablet; Take 1 tablet by mouth 3 times daily for 30 days. , Disp-90 tablet, R-1Normal  3. Positive depression screening  4. Moderate episode of recurrent major depressive disorder (HCC)  -     FLUoxetine (PROZAC) 20 MG capsule; Take 1 capsule by mouth daily, Disp-30 capsule, R-2Normal  -     QUEtiapine (SEROQUEL) 50 MG tablet; Take 1 tablet by mouth at bedtime, Disp-30 tablet, R-2Normal  5. Anxiety  -     FLUoxetine (PROZAC) 20 MG capsule; Take 1 capsule by mouth daily, Disp-30 capsule, R-2Normal  6. Outbursts of anger  -     FLUoxetine (PROZAC) 20 MG capsule; Take 1 capsule by mouth daily, Disp-30 capsule, R-2Normal    Discussed importance of being diligent with his insulin and diabetic diet. Discussed continued home monitoring of his blood sugar and potential need for adjustments moving forward. Discussed proper use of Neurontin. Discussed his mood and potential medication management and patient was agreeable with a trial of Prozac and getting back on his Seroquel. Discussed proper use of medication potential side effects.   Discussed lifestyle modifications that may beneficial.  Discussed signs symptoms require medical attention. All questions were answered and patient voiced understanding and agreement with plan as discussed. Return in about 2 months (around 10/30/2022), or if symptoms worsen or fail to improve. Subjective       HPI   Patient has a history of type 1 diabetes and has not been taking his medication has not been do anything for his diabetes and was not really being diligent with his diet. He states that he started having more problems with his vision and worsening neuropathy that has resulted in him losing his balance which finally got him to present to attempt to pursue improvement of his diabetes control. He was previously on gabapentin for his neuropathy symptoms and did well with the medication. Patient also has a history of depression and had a positive depression screening today in office. He also suffers from anxiety issues and has issues with outburst of anger. He was previously on medication for his mood but stopped taking the Wellbutrin and does not feel like it was helping anyway. He has been on several medications in the past but states that the Seroquel seem to help with his sleep and help with his mood but otherwise does not recall any other medications that been beneficial for his mood. Review of Systems   Constitutional:  Negative for activity change, appetite change, fatigue and fever. HENT:  Negative for congestion and rhinorrhea. Eyes:  Negative for pain and discharge. Respiratory:  Negative for cough, shortness of breath and wheezing. Cardiovascular:  Negative for chest pain and palpitations. Gastrointestinal:  Negative for abdominal pain, constipation, diarrhea, nausea and vomiting. Endocrine: Negative for cold intolerance and heat intolerance. Genitourinary:  Negative for dysuria and hematuria. Musculoskeletal:  Negative for back pain, gait problem and neck pain. Skin:  Negative for rash and wound. Neurological:  Negative for syncope and weakness. Hematological:  Negative for adenopathy. Does not bruise/bleed easily. Psychiatric/Behavioral:  Positive for agitation and dysphoric mood. Negative for self-injury, sleep disturbance and suicidal ideas. The patient is nervous/anxious. No Known Allergies    Outpatient Medications Prior to Visit   Medication Sig Dispense Refill    vitamin D (ERGOCALCIFEROL) 1.25 MG (61948 UT) CAPS capsule Take 1 capsule by mouth once a week for 11 doses 11 capsule 1    melatonin 3 MG TABS tablet Take 1 tablet by mouth nightly 30 tablet 1    Insulin Syringe-Needle U-100 ([employer] INS SYR .3CC/30GX5/16\") 30G X 5/16\" 0.3 ML MISC 500 each by Does not apply route in the morning, at noon, and at bedtime 500 each 1    glucose monitoring (FREESTYLE FREEDOM) kit 1 kit by Does not apply route daily 1 kit 0    blood glucose monitor strips Test 5 times a day & as needed for symptoms of irregular blood glucose. Dispense sufficient amount for indicated testing frequency plus additional to accommodate PRN testing needs. 200 strip 5    Lancets MISC 1 each by Does not apply route 5 times daily 200 each 5    buPROPion (WELLBUTRIN XL) 150 MG extended release tablet Take 1 tablet by mouth daily 30 tablet 1    doxepin (SINEQUAN) 50 MG capsule Take 1 capsule by mouth nightly 30 capsule 1    gabapentin (NEURONTIN) 600 MG tablet Take 1 tablet by mouth 3 times daily for 14 days. 42 tablet 0    insulin aspart (NOVOLOG FLEXPEN) 100 UNIT/ML injection pen Inject 20 Units into the skin 3 times daily (before meals) 18 mL 1    insulin glargine (BASAGLAR KWIKPEN) 100 UNIT/ML injection pen Inject 40 Units into the skin 2 times daily 24 mL 1     No facility-administered medications prior to visit.         Past Medical History:   Diagnosis Date    ADHD     Anxiety     Depression     Diabetes ([address]     Drug addiction ([address]     Pt states he got out rehab 07/10/2022        Social History     Tobacco Use    Smoking status: Every Day     Packs/day: 0.50     Types: Cigarettes     Start date: 5/3/1999    Smokeless tobacco: Former   Substance Use Topics    Alcohol use: Not Currently        Past Surgical History:   Procedure Laterality Date    NECK SURGERY         Family History   Adopted: Yes       Objective       /60 (Site: Left Upper Arm, Position: Sitting, Cuff Size: Medium Adult)   Pulse (!) 108   Temp 97.6 °F (36.4 °C) (Infrared)   Resp 16   Ht 5' 10\" (1.778 m)   Wt 150 lb 12.8 oz (68.4 kg)   SpO2 100%   BMI 21.64 kg/m²   Physical Exam  Vitals and nursing note reviewed. Constitutional:       General: He is not in acute distress. Appearance: Normal appearance. He is well-developed. He is not diaphoretic. HENT:      Head: Normocephalic and atraumatic. Right Ear: External ear normal.      Left Ear: External ear normal.      Mouth/Throat:      Comments: Mask in place  Eyes:      General: No scleral icterus. Right eye: No discharge. Left eye: No discharge. Conjunctiva/sclera: Conjunctivae normal.   Neck:      Trachea: No tracheal deviation. Cardiovascular:      Rate and Rhythm: Normal rate and regular rhythm. Heart sounds: Normal heart sounds. No murmur heard. No friction rub. No gallop. Pulmonary:      Effort: Pulmonary effort is normal. No respiratory distress. Breath sounds: Normal breath sounds. No wheezing or rales. Abdominal:      General: Bowel sounds are normal. There is no distension. Palpations: Abdomen is soft. Tenderness: There is no abdominal tenderness. Musculoskeletal:         General: No deformity (No gross deformities of upper or lower extremities) or signs of injury. Normal range of motion. Cervical back: Normal range of motion and neck supple. No muscular tenderness. Right lower leg: No edema. Left lower leg: No edema. Lymphadenopathy:      Cervical: No cervical adenopathy. Skin:     General: Skin is warm and dry. Findings: No erythema or rash. Neurological:      Mental Status: He is alert and oriented to person, place, and time. Cranial Nerves: No cranial nerve deficit. Psychiatric:         Behavior: Behavior normal.         Thought Content: Thought content normal. Thought content does not include homicidal or suicidal ideation. Thought content does not include homicidal or suicidal plan. Data Reviewed and Summarized       Labs:     Imaging/Testing:        Ronenll Vergara MD        PHQ-9 score today: (PHQ-9 Total Score: 7), additional evaluation and assessment performed, follow-up plan includes but not limited to: Medication management and Referral to /Specialist  for evaluation and management.

## 2022-08-31 ENCOUNTER — TELEPHONE (OUTPATIENT)
Dept: FAMILY MEDICINE CLINIC | Age: 52
End: 2022-08-31

## 2022-08-31 NOTE — PROGRESS NOTES
I have attempted multiple phone calls to patient due to abnormal or critical lab level of glucose greater than 600. Patients phone number is disconnected or not accepting phone calls at this time. Will attempt again throughout the evening and first thing in the morning if I do not get her this afternoon.

## 2022-09-06 ASSESSMENT — ENCOUNTER SYMPTOMS
VOMITING: 0
ABDOMINAL PAIN: 0
NAUSEA: 0
SHORTNESS OF BREATH: 0
DIARRHEA: 0
EYE DISCHARGE: 0
EYE PAIN: 0
CONSTIPATION: 0
BACK PAIN: 0
COUGH: 0
RHINORRHEA: 0
WHEEZING: 0

## 2022-09-08 ENCOUNTER — DOCUMENTATION (OUTPATIENT)
Dept: CT IMAGING | Facility: HOSPITAL | Age: 52
End: 2022-09-08

## 2023-07-04 ENCOUNTER — OFFICE VISIT (OUTPATIENT)
Dept: PRIMARY CARE CLINIC | Age: 53
End: 2023-07-04
Payer: MEDICAID

## 2023-07-04 VITALS
TEMPERATURE: 97.8 F | DIASTOLIC BLOOD PRESSURE: 80 MMHG | WEIGHT: 142 LBS | OXYGEN SATURATION: 100 % | HEART RATE: 113 BPM | BODY MASS INDEX: 20.37 KG/M2 | SYSTOLIC BLOOD PRESSURE: 120 MMHG

## 2023-07-04 DIAGNOSIS — L08.9 INFECTED ABRASION OF SECOND TOE OF LEFT FOOT, INITIAL ENCOUNTER: Primary | ICD-10-CM

## 2023-07-04 DIAGNOSIS — L03.116 CELLULITIS OF LEFT FOOT: ICD-10-CM

## 2023-07-04 DIAGNOSIS — S90.415A INFECTED ABRASION OF SECOND TOE OF LEFT FOOT, INITIAL ENCOUNTER: Primary | ICD-10-CM

## 2023-07-04 PROCEDURE — 3079F DIAST BP 80-89 MM HG: CPT | Performed by: NURSE PRACTITIONER

## 2023-07-04 PROCEDURE — 3074F SYST BP LT 130 MM HG: CPT | Performed by: NURSE PRACTITIONER

## 2023-07-04 PROCEDURE — 99213 OFFICE O/P EST LOW 20 MIN: CPT | Performed by: NURSE PRACTITIONER

## 2023-07-04 RX ORDER — SULFAMETHOXAZOLE AND TRIMETHOPRIM 800; 160 MG/1; MG/1
1 TABLET ORAL 2 TIMES DAILY
Qty: 20 TABLET | Refills: 0 | Status: SHIPPED | OUTPATIENT
Start: 2023-07-04 | End: 2023-07-14

## 2023-07-04 ASSESSMENT — ENCOUNTER SYMPTOMS
BLOOD IN STOOL: 0
EYE DISCHARGE: 0
WHEEZING: 0
VOMITING: 0
ABDOMINAL PAIN: 0
CONSTIPATION: 0
SINUS PRESSURE: 0
DIARRHEA: 0
RHINORRHEA: 0
SHORTNESS OF BREATH: 0
NAUSEA: 0
COUGH: 0
SORE THROAT: 0
EYE ITCHING: 0
COLOR CHANGE: 0

## 2023-07-04 NOTE — PROGRESS NOTES
and sore throat. Eyes:  Negative for discharge and itching. Respiratory:  Negative for cough, shortness of breath and wheezing. Cardiovascular:  Negative for chest pain. Gastrointestinal:  Negative for abdominal pain, blood in stool, constipation, diarrhea, nausea and vomiting. Musculoskeletal:  Negative for myalgias. Skin:  Positive for wound. Negative for color change and rash. Neurological:  Negative for dizziness and headaches. All other systems reviewed and are negative. Objective    Physical Exam  Vitals and nursing note reviewed. Constitutional:       General: He is not in acute distress. Appearance: Normal appearance. HENT:      Head: Normocephalic and atraumatic. Mouth/Throat:      Mouth: Mucous membranes are moist.      Pharynx: No posterior oropharyngeal erythema. Eyes:      Extraocular Movements: Extraocular movements intact. Pupils: Pupils are equal, round, and reactive to light. Cardiovascular:      Rate and Rhythm: Normal rate and regular rhythm. Pulses:           Dorsalis pedis pulses are 1+ on the left side. Posterior tibial pulses are 1+ on the left side. Heart sounds: Normal heart sounds. No murmur heard. Pulmonary:      Effort: Pulmonary effort is normal. No respiratory distress. Breath sounds: Normal breath sounds. No wheezing. Musculoskeletal:        Feet:    Feet:      Left foot:      Skin integrity: Skin breakdown, erythema and warmth present. Comments: +1 nonpitting edema to left foot and +1 pitting edema to left second toe with 0.5cm skin abrasion with purulent drainage. Erythema noted to 2nd and 3rd toes and to a small area on the top of the foot; abnormal sensation noted bilaterally  Skin:     General: Skin is warm. Capillary Refill: Capillary refill takes less than 2 seconds. Coloration: Skin is not pale. Findings: Abrasion and erythema present. No rash.    Neurological:      General: No focal deficit

## 2023-07-11 ENCOUNTER — OFFICE VISIT (OUTPATIENT)
Dept: FAMILY MEDICINE CLINIC | Age: 53
End: 2023-07-11

## 2023-07-11 VITALS
HEIGHT: 70 IN | WEIGHT: 142 LBS | DIASTOLIC BLOOD PRESSURE: 82 MMHG | HEART RATE: 125 BPM | BODY MASS INDEX: 20.33 KG/M2 | SYSTOLIC BLOOD PRESSURE: 118 MMHG | TEMPERATURE: 98.3 F | OXYGEN SATURATION: 99 %

## 2023-07-11 DIAGNOSIS — L03.119 CELLULITIS AND ABSCESS OF FOOT: Primary | ICD-10-CM

## 2023-07-11 DIAGNOSIS — L02.619 CELLULITIS AND ABSCESS OF FOOT: Primary | ICD-10-CM

## 2023-07-11 PROCEDURE — 99999 PR OFFICE/OUTPT VISIT,PROCEDURE ONLY: CPT | Performed by: NURSE PRACTITIONER

## 2023-07-11 NOTE — PROGRESS NOTES
SUBJECTIVE:  Pt set to [facility]   Patient ID: Kj Cohen is a 45 y.o. male. HPI:   HPI     Past Medical History:   Diagnosis Date    ADHD     Anxiety     Depression     Diabetes ([address] [facility])     Drug addiction ([facility])     Pt states he got out rehab 07/10/2022      Prior to Visit Medications    Medication Sig Taking? Authorizing Provider   sulfamethoxazole-trimethoprim (BACTRIM DS;SEPTRA DS) 800-160 MG per tablet Take 1 tablet by mouth 2 times daily for 10 days Yes ALEX Johnson CNP   mupirocin (BACTROBAN) 2 % ointment Apply topically 3 times daily. Yes ALEX Johnson CNP   gabapentin (NEURONTIN) 600 MG tablet Take 1 tablet by mouth 3 times daily for 30 days. Yes Josie Gallardo MD   FLUoxetine (PROZAC) 20 MG capsule Take 1 capsule by mouth daily Yes Josie Gallardo MD   QUEtiapine (SEROQUEL) 50 MG tablet Take 1 tablet by mouth at bedtime Yes Josie Gallardo MD   insulin glargine (BASAGLAR KWIKPEN) 100 UNIT/ML injection pen Inject 40 Units into the skin 2 times daily Yes Josie Gallardo MD   insulin aspart (NOVOLOG FLEXPEN) 100 UNIT/ML injection pen Inject 20 Units into the skin 3 times daily (before meals) Yes Josie Gallardo MD   vitamin D (ERGOCALCIFEROL) 1.25 MG (49472 UT) CAPS capsule Take 1 capsule by mouth once a week for 11 doses Yes Marilyn Pisano MD   melatonin 3 MG TABS tablet Take 1 tablet by mouth nightly Yes Marilyn Pisano MD   Insulin Syringe-Needle U-100 (AIMSCO INS SYR .3CC/30GX5/16\") 30G X 5/16\" 0.3 ML MISC 500 each by Does not apply route in the morning, at noon, and at bedtime Yes Marilyn Pisano MD   glucose monitoring (FREESTYLE FREEDOM) kit 1 kit by Does not apply route daily Yes Josie Gallardo MD   blood glucose monitor strips Test 5 times a day & as needed for symptoms of irregular blood glucose. Dispense sufficient amount for indicated testing frequency plus additional to accommodate PRN testing needs.  Yes Josie Gallardo MD   Lancets MISC 1 each by Does not apply route 5 times daily Yes Kisha Ochoa

## 2023-07-14 DIAGNOSIS — E11.42 DIABETIC PERIPHERAL NEUROPATHY (HCC): ICD-10-CM

## 2023-07-14 RX ORDER — GABAPENTIN 600 MG/1
600 TABLET ORAL 3 TIMES DAILY
Qty: 90 TABLET | Refills: 0 | Status: SHIPPED | OUTPATIENT
Start: 2023-07-14 | End: 2023-08-13

## 2023-07-17 ENCOUNTER — TELEPHONE (OUTPATIENT)
Dept: FAMILY MEDICINE CLINIC | Age: 53
End: 2023-07-17

## 2023-07-17 NOTE — TELEPHONE ENCOUNTER
Care Transitions Initial Follow Up Call    Outreach made within 2 business days of discharge: Yes    Patient: Kymberly Siddiqui Patient : 1970   MRN: [id number]  Reason for Admission: Left Foot Pain   Discharge Date: 22       Spoke with: Patient     Discharge department/facility: [facility]     TCM Interactive Patient Contact:  Was patient able to fill all prescriptions: No  Was patient instructed to bring all medications to the follow-up visit: Yes  Is patient taking all medications as directed in the discharge summary?  Yes  Does patient understand their discharge instructions: Yes  Does patient have questions or concerns that need addressed prior to 7-14 day follow up office visit: no    Scheduled appointment with PCP within 7-14 days    Follow Up  Future Appointments   Date Time Provider [address] [place] [address]   2023  9:45 AM Cyntha Skiff, APRN - CNP [facility]   2023 12:00 PM Cory Montano MD [place], [place]

## 2023-07-18 ENCOUNTER — HOSPITAL ENCOUNTER (OUTPATIENT)
Dept: WOUND CARE | Age: 53
Discharge: HOME | End: 2023-07-18
Payer: MEDICAID

## 2023-07-18 VITALS
BODY MASS INDEX: 20.33 KG/M2 | DIASTOLIC BLOOD PRESSURE: 91 MMHG | RESPIRATION RATE: 20 BRPM | HEART RATE: 111 BPM | HEIGHT: 70 IN | SYSTOLIC BLOOD PRESSURE: 141 MMHG | WEIGHT: 142 LBS | TEMPERATURE: 97.5 F

## 2023-07-18 DIAGNOSIS — M86.9 TOE OSTEOMYELITIS, LEFT (HCC): ICD-10-CM

## 2023-07-18 DIAGNOSIS — E11.42 DIABETIC PERIPHERAL NEUROPATHY (HCC): ICD-10-CM

## 2023-07-18 DIAGNOSIS — E11.621 DIABETIC ULCER OF TOE OF LEFT FOOT ASSOCIATED WITH TYPE 2 DIABETES MELLITUS, WITH FAT LAYER EXPOSED (HCC): Primary | ICD-10-CM

## 2023-07-18 DIAGNOSIS — L97.522 DIABETIC ULCER OF TOE OF LEFT FOOT ASSOCIATED WITH TYPE 2 DIABETES MELLITUS, WITH FAT LAYER EXPOSED (HCC): Primary | ICD-10-CM

## 2023-07-18 DIAGNOSIS — N18.1 CHRONIC RENAL DISEASE, STAGE I: ICD-10-CM

## 2023-07-18 PROCEDURE — 99214 OFFICE O/P EST MOD 30 MIN: CPT | Performed by: NURSE PRACTITIONER

## 2023-07-18 PROCEDURE — 99214 OFFICE O/P EST MOD 30 MIN: CPT

## 2023-07-18 RX ORDER — LIDOCAINE 50 MG/G
OINTMENT TOPICAL ONCE
OUTPATIENT
Start: 2023-07-18 | End: 2023-07-18

## 2023-07-18 RX ORDER — BETAMETHASONE DIPROPIONATE 0.05 %
OINTMENT (GRAM) TOPICAL ONCE
OUTPATIENT
Start: 2023-07-18 | End: 2023-07-18

## 2023-07-18 RX ORDER — LIDOCAINE 40 MG/G
CREAM TOPICAL ONCE
OUTPATIENT
Start: 2023-07-18 | End: 2023-07-18

## 2023-07-18 RX ORDER — GENTAMICIN SULFATE 1 MG/G
OINTMENT TOPICAL ONCE
OUTPATIENT
Start: 2023-07-18 | End: 2023-07-18

## 2023-07-18 RX ORDER — SODIUM CHLOR/HYPOCHLOROUS ACID 0.033 %
SOLUTION, IRRIGATION IRRIGATION ONCE
OUTPATIENT
Start: 2023-07-18 | End: 2023-07-18

## 2023-07-18 RX ORDER — BACITRACIN ZINC AND POLYMYXIN B SULFATE 500; 1000 [USP'U]/G; [USP'U]/G
OINTMENT TOPICAL ONCE
OUTPATIENT
Start: 2023-07-18 | End: 2023-07-18

## 2023-07-18 RX ORDER — IBUPROFEN 200 MG
TABLET ORAL ONCE
OUTPATIENT
Start: 2023-07-18 | End: 2023-07-18

## 2023-07-18 RX ORDER — CIPROFLOXACIN 750 MG/1
1 TABLET, FILM COATED ORAL
COMMUNITY
Start: 2023-07-14

## 2023-07-18 RX ORDER — CLOBETASOL PROPIONATE 0.5 MG/G
OINTMENT TOPICAL ONCE
OUTPATIENT
Start: 2023-07-18 | End: 2023-07-18

## 2023-07-18 RX ORDER — LIDOCAINE HYDROCHLORIDE 40 MG/ML
SOLUTION TOPICAL ONCE
OUTPATIENT
Start: 2023-07-18 | End: 2023-07-18

## 2023-07-18 RX ORDER — LIDOCAINE HYDROCHLORIDE 20 MG/ML
JELLY TOPICAL ONCE
OUTPATIENT
Start: 2023-07-18 | End: 2023-07-18

## 2023-07-18 RX ORDER — GINSENG 100 MG
CAPSULE ORAL ONCE
OUTPATIENT
Start: 2023-07-18 | End: 2023-07-18

## 2023-07-18 ASSESSMENT — PAIN - FUNCTIONAL ASSESSMENT: PAIN_FUNCTIONAL_ASSESSMENT: PREVENTS OR INTERFERES SOME ACTIVE ACTIVITIES AND ADLS

## 2023-07-18 ASSESSMENT — PAIN DESCRIPTION - LOCATION: LOCATION: FOOT

## 2023-07-18 ASSESSMENT — PAIN SCALES - GENERAL: PAINLEVEL_OUTOF10: 6

## 2023-07-18 ASSESSMENT — PAIN DESCRIPTION - ONSET: ONSET: ON-GOING

## 2023-07-18 ASSESSMENT — PAIN DESCRIPTION - DESCRIPTORS: DESCRIPTORS: STABBING;BURNING;THROBBING

## 2023-07-18 ASSESSMENT — PAIN DESCRIPTION - FREQUENCY: FREQUENCY: INTERMITTENT

## 2023-07-18 ASSESSMENT — PAIN DESCRIPTION - ORIENTATION: ORIENTATION: LEFT

## 2023-07-18 ASSESSMENT — PAIN DESCRIPTION - PAIN TYPE: TYPE: ACUTE PAIN

## 2023-07-18 ASSESSMENT — VISUAL ACUITY: OU: 1

## 2023-07-18 NOTE — PROGRESS NOTES
[phone]    NAME:  Chepe Monteiro OF BIRTH:  1970  MEDICAL RECORD NUMBER:  [id number]  DATE:  7/18/2023    Discharge condition: Stable    Discharge to: Home    Left via:Private automobile    Accompanied by:  self    ECF/HHA:       You are scheduled for Lower extremity arterial study (RAYSHAWN) is scheduled  on  July 25th @ 1:30 pm, register in [address], downstairs in this building, then proceed to [facility] for the test.    Please do not smoke for 2 hours before this test.          Dressing Orders:Wash with soap and water, pat dry. Place Aquacel Ag to wound, cover with dry gauze, and medipore tape. Treatment Orders:  Protein rich diet (unless restricted by your physician); Multivitamin daily; Elevate legs above the level of your heart when sitting 3-4 times daily for at least one hour each time, avoid standing for long periods of time. Continue antibiotic as directed    [facility] follow up visit ________1 week_____________________  (Please note your next appointment above and if you are unable to keep, kindly give a 24 hour notice. Thank you.)          If you experience any of the following, please call the [facility] during business hours:    * Increase in Pain  * Temperature over 101  * Increase in drainage from your wound  * Drainage with a foul odor  * Bleeding  * Increase in swelling  * Need for compression bandage changes due to slippage, breakthrough drainage. If you need medical attention outside of the business hours of the [facility] please contact your PCP or go to the nearest emergency room.

## 2023-07-18 NOTE — PLAN OF CARE
Problem: Chronic Conditions and Co-morbidities  Goal: Patient's chronic conditions and co-morbidity symptoms are monitored and maintained or improved  Outcome: Progressing     Problem: Discharge Planning  Goal: Discharge to home or other facility with appropriate resources  Outcome: Progressing     Problem: Pain  Goal: Verbalizes/displays adequate comfort level or baseline comfort level  Outcome: Progressing     Problem: Safety - Adult  Goal: Free from fall injury  Outcome: Progressing     Problem: Wound:  Goal: Will show signs of wound healing; wound closure and no evidence of infection  Description: Will show signs of wound healing; wound closure and no evidence of infection  Outcome: Progressing     Problem: Smoking cessation:  Goal: Ability to formulate a plan to maintain a tobacco-free life will be supported  Description: Ability to formulate a plan to maintain a tobacco-free life will be supported  Outcome: Progressing     Problem: Weight control:  Goal: Ability to maintain an optimal weight for height and age will be supported  Description: Ability to maintain an optimal weight for height and age will be supported  Outcome: Progressing     Problem: Falls - Risk of:  Goal: Will remain free from falls  Description: Will remain free from falls  Outcome: Progressing     Problem: Blood Glucose:  Goal: Ability to maintain appropriate glucose levels will improve  Description: Ability to maintain appropriate glucose levels will improve  Outcome: Progressing

## 2023-07-18 NOTE — DISCHARGE INSTRUCTIONS
[address] and Hyperbaric Oxygen Therapy   Physician Orders and Discharge Instructions  [address] [facility],[address], [address]  Telephone: [phone]    NAME:  Sarah Menchaca OF BIRTH:  1970  MEDICAL RECORD NUMBER:  [id number]  DATE:  7/18/2023    Discharge condition: Stable    Discharge to: Home    Left via:Private automobile    Accompanied by:  self    ECF/HHA:       You are scheduled for Lower extremity arterial study (RAYSHAWN) is scheduled  on  July 25th @ 1:30 pm, register in [address], downstairs in this building, then proceed to [facility] for the test.    Please do not smoke for 2 hours before this test.          Dressing Orders:Wash with soap and water, pat dry. Place Aquacel Ag to wound, cover with dry gauze, and medipore tape. Treatment Orders:  Protein rich diet (unless restricted by your physician); Multivitamin daily; Elevate legs above the level of your heart when sitting 3-4 times daily for at least one hour each time, avoid standing for long periods of time. Continue antibiotic as directed    [facility] follow up visit ________1 week_____________________  (Please note your next appointment above and if you are unable to keep, kindly give a 24 hour notice. Thank you.)          If you experience any of the following, please call the [address] [place], [place] [address] during business hours:    * Increase in Pain  * Temperature over 101  * Increase in drainage from your wound  * Drainage with a foul odor  * Bleeding  * Increase in swelling  * Need for compression bandage changes due to slippage, breakthrough drainage. If you need medical attention outside of the business hours of the [address] [place] [address] please contact your PCP or go to the nearest emergency room.

## 2023-07-21 NOTE — DISCHARGE INSTRUCTIONS
[address] and Hyperbaric Oxygen Therapy   Physician Orders and Discharge Instructions  [address] [facility],[address] [facility] [facility] [address], [address]  Telephone: [phone]    NAME:  Nahid Rahman OF BIRTH:  1970  MEDICAL RECORD NUMBER:  [id number]  DATE:  7/21/2023    Discharge condition: Stable    Discharge to: Home    Left via:Private automobile    Accompanied by:  friend    ECF/HHA:      Linnette Vasquez are scheduled for Lower extremity arterial study (RAYSHAWN) is scheduled  on  today @ 3:30, register in [address], downstairs in this building, then proceed to [address] for the test.    Please do not smoke for 2 hours before this test.     Dressing Orders:Wash with soap and water, pat dry. Place Aquacel Ag to wound, cover with dry gauze, and medipore tape. Treatment Orders:  Protein rich diet (unless restricted by your physician); Multivitamin daily; Elevate legs above the level of your heart when sitting 3-4 times daily for at least one hour each time, avoid standing for long periods of time. Continue antibiotic as directed    [address] [place] [address] follow up visit ____1 week with Dr. Maria_________________________  (Please note your next appointment above and if you are unable to keep, kindly give a 24 hour notice. Thank you.)          If you experience any of the following, please call the [address] [place] [address] during business hours:    * Increase in Pain  * Temperature over 101  * Increase in drainage from your wound  * Drainage with a foul odor  * Bleeding  * Increase in swelling  * Need for compression bandage changes due to slippage, breakthrough drainage. If you need medical attention outside of the business hours of the [address] [place] [address] please contact your PCP or go to the nearest emergency room.

## 2023-07-25 ENCOUNTER — HOSPITAL ENCOUNTER (OUTPATIENT)
Dept: NON INVASIVE DIAGNOSTICS | Age: 53
Discharge: HOME | End: 2023-07-25
Payer: MEDICAID

## 2023-07-25 ENCOUNTER — HOSPITAL ENCOUNTER (OUTPATIENT)
Dept: WOUND CARE | Age: 53
Discharge: HOME | End: 2023-07-25
Payer: MEDICAID

## 2023-07-25 VITALS
HEART RATE: 111 BPM | TEMPERATURE: 97.5 F | HEIGHT: 70 IN | RESPIRATION RATE: 20 BRPM | DIASTOLIC BLOOD PRESSURE: 91 MMHG | BODY MASS INDEX: 20.33 KG/M2 | WEIGHT: 142 LBS | SYSTOLIC BLOOD PRESSURE: 141 MMHG

## 2023-07-25 DIAGNOSIS — N18.1 CHRONIC RENAL DISEASE, STAGE I: ICD-10-CM

## 2023-07-25 DIAGNOSIS — F41.9 ANXIETY: ICD-10-CM

## 2023-07-25 DIAGNOSIS — M86.9 TOE OSTEOMYELITIS, LEFT (HCC): Primary | ICD-10-CM

## 2023-07-25 DIAGNOSIS — Z91.199 NON-COMPLIANCE: ICD-10-CM

## 2023-07-25 DIAGNOSIS — L97.522 DIABETIC ULCER OF TOE OF LEFT FOOT ASSOCIATED WITH TYPE 2 DIABETES MELLITUS, WITH FAT LAYER EXPOSED (HCC): ICD-10-CM

## 2023-07-25 DIAGNOSIS — F17.200 TOBACCO USE DISORDER: ICD-10-CM

## 2023-07-25 DIAGNOSIS — E11.621 DIABETIC ULCER OF TOE OF LEFT FOOT ASSOCIATED WITH TYPE 2 DIABETES MELLITUS, WITH FAT LAYER EXPOSED (HCC): ICD-10-CM

## 2023-07-25 PROCEDURE — 99212 OFFICE O/P EST SF 10 MIN: CPT | Performed by: NURSE PRACTITIONER

## 2023-07-25 PROCEDURE — 99213 OFFICE O/P EST LOW 20 MIN: CPT

## 2023-07-25 PROCEDURE — 93923 UPR/LXTR ART STDY 3+ LVLS: CPT

## 2023-07-25 NOTE — PLAN OF CARE
Problem: Chronic Conditions and Co-morbidities  Goal: Patient's chronic conditions and co-morbidity symptoms are monitored and maintained or improved  Outcome: Progressing     Problem: Discharge Planning  Goal: Discharge to home or other facility with appropriate resources  Outcome: Progressing     Problem: Wound:  Goal: Will show signs of wound healing; wound closure and no evidence of infection  Description: Will show signs of wound healing; wound closure and no evidence of infection  Outcome: Progressing     Problem: Smoking cessation:  Goal: Ability to formulate a plan to maintain a tobacco-free life will be supported  Description: Ability to formulate a plan to maintain a tobacco-free life will be supported  Outcome: Progressing     Problem: Weight control:  Goal: Ability to maintain an optimal weight for height and age will be supported  Description: Ability to maintain an optimal weight for height and age will be supported  Outcome: Progressing     Problem: Falls - Risk of:  Goal: Will remain free from falls  Description: Will remain free from falls  Outcome: Progressing     Problem: Blood Glucose:  Goal: Ability to maintain appropriate glucose levels will improve  Description: Ability to maintain appropriate glucose levels will improve  Outcome: Progressing

## 2023-07-26 PROBLEM — Z91.199 NON-COMPLIANCE: Status: ACTIVE | Noted: 2023-07-26

## 2023-07-26 NOTE — PROGRESS NOTES
[address]   Progress Note and Procedure Note      [address] [facility] 9 RECORD NUMBER:  [id number]  AGE: 45 y.o. GENDER: male  : 1970  EPISODE DATE:  2023    Subjective:     Chief Complaint   Patient presents with    Wound Check     Left toe wound         HISTORY of PRESENT ILLNESS HPI     Kevin Saleh is a 45 y.o. male who presents today for wound/ulcer evaluation. History of Wound Context: left toe wound follow up/eval and treat    Ulcer Identification:  Ulcer Type: diabetic  Contributing Factors: diabetes, poor glucose control, smoking, and non-adherence    Wound: N/A        PAST MEDICAL HISTORY        Diagnosis Date    ADHD     Anxiety     Depression     Diabetes ([address])     Diabetic neuropathy ([address])     Drug addiction ([facility])     Pt states he got out rehab 07/10/2022       PAST SURGICAL HISTORY    Past Surgical History:   Procedure Laterality Date    NECK SURGERY         FAMILY HISTORY    Family History   Adopted: Yes       SOCIAL HISTORY    Social History     Tobacco Use    Smoking status: Every Day     Packs/day: 0.50     Types: Cigarettes     Start date: 5/3/1999    Smokeless tobacco: Former   Vaping Use    Vaping Use: Never used   Substance Use Topics    Alcohol use: Not Currently    Drug use: Not Currently     Types: Methamphetamines (Crystal Meth)     Comment: \"A couple months ago\"       ALLERGIES    No Known Allergies    MEDICATIONS    Current Outpatient Medications on File Prior to Encounter   Medication Sig Dispense Refill    ciprofloxacin (CIPRO) 750 MG tablet 1 tablet      gabapentin (NEURONTIN) 600 MG tablet Take 1 tablet by mouth 3 times daily for 30 days.  (Patient not taking: Reported on 2023) 90 tablet 0    FLUoxetine (PROZAC) 20 MG capsule Take 1 capsule by mouth daily (Patient not taking: Reported on 2023) 30 capsule 2    QUEtiapine (SEROQUEL) 50 MG tablet Take 1 tablet by mouth at bedtime (Patient not taking: Reported on 2023) 30 tablet

## 2023-08-03 ENCOUNTER — HOSPITAL ENCOUNTER (OUTPATIENT)
Dept: WOUND CARE | Age: 53
Discharge: HOME | End: 2023-08-03
Payer: MEDICAID

## 2023-08-03 VITALS
RESPIRATION RATE: 18 BRPM | HEIGHT: 70 IN | BODY MASS INDEX: 20.33 KG/M2 | DIASTOLIC BLOOD PRESSURE: 93 MMHG | HEART RATE: 106 BPM | TEMPERATURE: 97.8 F | WEIGHT: 142 LBS | SYSTOLIC BLOOD PRESSURE: 152 MMHG

## 2023-08-03 DIAGNOSIS — L97.522 DIABETIC ULCER OF TOE OF LEFT FOOT ASSOCIATED WITH TYPE 2 DIABETES MELLITUS, WITH FAT LAYER EXPOSED (HCC): ICD-10-CM

## 2023-08-03 DIAGNOSIS — E10.51 TYPE I DIABETES MELLITUS WITH PERIPHERAL CIRCULATORY DISORDER (HCC): ICD-10-CM

## 2023-08-03 DIAGNOSIS — E11.621 DIABETIC ULCER OF TOE OF LEFT FOOT ASSOCIATED WITH TYPE 2 DIABETES MELLITUS, WITH FAT LAYER EXPOSED (HCC): ICD-10-CM

## 2023-08-03 DIAGNOSIS — Z91.199 NON-COMPLIANCE: Primary | ICD-10-CM

## 2023-08-03 DIAGNOSIS — M86.9 TOE OSTEOMYELITIS, LEFT (HCC): ICD-10-CM

## 2023-08-03 DIAGNOSIS — L97.124: ICD-10-CM

## 2023-08-03 PROCEDURE — 99213 OFFICE O/P EST LOW 20 MIN: CPT

## 2023-08-03 PROCEDURE — 6370000000 HC RX 637 (ALT 250 FOR IP): Performed by: NURSE PRACTITIONER

## 2023-08-03 PROCEDURE — 99213 OFFICE O/P EST LOW 20 MIN: CPT | Performed by: SURGERY

## 2023-08-03 RX ORDER — LIDOCAINE HYDROCHLORIDE 40 MG/ML
SOLUTION TOPICAL ONCE
OUTPATIENT
Start: 2023-08-03 | End: 2023-08-03

## 2023-08-03 RX ORDER — GENTAMICIN SULFATE 1 MG/G
OINTMENT TOPICAL ONCE
OUTPATIENT
Start: 2023-08-03 | End: 2023-08-03

## 2023-08-03 RX ORDER — LIDOCAINE HYDROCHLORIDE 20 MG/ML
JELLY TOPICAL ONCE
OUTPATIENT
Start: 2023-08-03 | End: 2023-08-03

## 2023-08-03 RX ORDER — IBUPROFEN 200 MG
TABLET ORAL ONCE
OUTPATIENT
Start: 2023-08-03 | End: 2023-08-03

## 2023-08-03 RX ORDER — GINSENG 100 MG
CAPSULE ORAL ONCE
OUTPATIENT
Start: 2023-08-03 | End: 2023-08-03

## 2023-08-03 RX ORDER — BETAMETHASONE DIPROPIONATE 0.05 %
OINTMENT (GRAM) TOPICAL ONCE
OUTPATIENT
Start: 2023-08-03 | End: 2023-08-03

## 2023-08-03 RX ORDER — LIDOCAINE 40 MG/G
CREAM TOPICAL ONCE
OUTPATIENT
Start: 2023-08-03 | End: 2023-08-03

## 2023-08-03 RX ORDER — BACITRACIN ZINC AND POLYMYXIN B SULFATE 500; 1000 [USP'U]/G; [USP'U]/G
OINTMENT TOPICAL ONCE
OUTPATIENT
Start: 2023-08-03 | End: 2023-08-03

## 2023-08-03 RX ORDER — CLOBETASOL PROPIONATE 0.5 MG/G
OINTMENT TOPICAL ONCE
OUTPATIENT
Start: 2023-08-03 | End: 2023-08-03

## 2023-08-03 RX ORDER — LIDOCAINE HYDROCHLORIDE 20 MG/ML
JELLY TOPICAL ONCE
Status: COMPLETED | OUTPATIENT
Start: 2023-08-03 | End: 2023-08-03

## 2023-08-03 RX ORDER — LIDOCAINE 50 MG/G
OINTMENT TOPICAL ONCE
OUTPATIENT
Start: 2023-08-03 | End: 2023-08-03

## 2023-08-03 RX ORDER — SODIUM CHLOR/HYPOCHLOROUS ACID 0.033 %
SOLUTION, IRRIGATION IRRIGATION ONCE
OUTPATIENT
Start: 2023-08-03 | End: 2023-08-03

## 2023-08-03 RX ADMIN — LIDOCAINE HYDROCHLORIDE: 20 JELLY TOPICAL at 15:07

## 2023-08-03 NOTE — PROGRESS NOTES
Tunneling Position ___ O'Clock 0 08/03/23 1507   Undermining Starts ___ O'Clock 0 08/03/23 1507   Undermining Ends___ O'Clock 0 08/03/23 1507   Undermining Maxium Distance (cm) 0 08/03/23 1507   Wound Assessment Slough;Pink/red 08/03/23 1507   Drainage Amount Moderate 08/03/23 1507   Drainage Description Serosanguinous 08/03/23 1507   Odor None 08/03/23 1507   Magalis-wound Assessment Intact 08/03/23 1507   Margins Attached edges 08/03/23 1507   Wound Thickness Description not for Pressure Injury Full thickness 08/03/23 1507   Number of days: 16           The patients pain is   . Wound(s) is unchanged. Please refer to nursing measurements and assessment regarding wound pre and post debridement. Assessment:      Patient Active Problem List   Diagnosis    Essential hypertension    Diabetic peripheral neuropathy ([facility])    Insomnia    Anxiety    COPD (chronic obstructive pulmonary disease) ([facility])    Tobacco use disorder    Depression, unspecified    Chronic renal disease, stage I    Toe osteomyelitis, left ([facility])    Diabetic ulcer of toe of left foot associated with type 2 diabetes mellitus, with fat layer exposed ([address])    Non-compliance          Plan:          Plan for wound - Dress per physician order  Treatment:     Compression : No   Offloading : Yes   Dressing : Alginate AG   Additional Therapy : patient will need angiogram with possible intervention for his left leg. He will need to improve glucose control that we discussed in  detail     1. Discussed appropriate home care of this wound. Wound redressed. 2. Patient instructions were given. 3. Follow up: 2 week(s). Plan for angiogram left leg. Then he will need 2nd left toe amputation. Patient stated that he does not control his blood glucose due to depression. He has bilateral feet neuropathy. He is not taking medication or controlling his diet.                        Electronically signed by Yu Barry DO on 8/3/2023 at 3:40 PM

## 2023-08-03 NOTE — DISCHARGE INSTRUCTIONS
dose, and none the morning of the procedure. You may take all regularly scheduled heart, cholesterol, and blood pressure medicines with a sip of water. If you take Glucophage, Metformin, Actos Plus Met, Janumet/Januvia, or Glucovance,please tell our nurse. it may interfere with some of the medications given during surgery. Hold Plavix/Coumadin for  . . N/A . Tanner Presume days prior to surgery. If you have sleep apnea and require C-PAP, please bring this with you to the hospital.  Bring a list of all of your allergies and medications with you to the hospital.  Please let our nurse know if you have had an allergy to iodine, shellfish, or x-ray dye. Let the nurse know if you take any of the following:  Over the counter herbal supplements  Diclofenec, indomethacin, ketoprofen, Caridopa/levadopa, naproxen, sulindac, piroxicam, glucosamine, Chondrotin, cocchine, or methotrexate. Plan to stay at the hospital for 4 - 6 hours before being released  by the physician. You will need someone to drive you home after the procedure. Medications instructed to hold: see above  Please stop at your local [facility] or pharmacy and buy a bottle of Hibiclens. Wash thoroughly with this the night before and the morning of the procedure, paying special attention to the area that will be operated on(wash both groins and legs). Make sure you rinse very well. The Hibiclens should only be used prior to surgery. PLEASE NOTE:  If the patient is unable to sign his/her own paperwork, the appointed caregiver (POA, child, sibling, etc) must be present at the time of registration for all testing and procedures. Transportation to and from all testing and procedure appointments is the sole responsibility of the patient, caregiver, and/or nursing facility in which they reside. Please remember you will not be able to drive after you are discharged. Please call the office at [phone] with any questions or concerns.  Please allow [phone]

## 2023-08-14 ENCOUNTER — PREP FOR PROCEDURE (OUTPATIENT)
Dept: WOUND CARE | Age: 53
End: 2023-08-14

## 2023-08-14 DIAGNOSIS — E10.51 TYPE I DIABETES MELLITUS WITH PERIPHERAL CIRCULATORY DISORDER (HCC): ICD-10-CM

## 2023-08-14 DIAGNOSIS — L97.124: ICD-10-CM

## 2023-08-14 DIAGNOSIS — I10 ESSENTIAL HYPERTENSION: Primary | ICD-10-CM

## 2023-08-14 DIAGNOSIS — M86.9 TOE OSTEOMYELITIS, LEFT (HCC): ICD-10-CM

## 2023-08-14 RX ORDER — SODIUM CHLORIDE 9 MG/ML
INJECTION, SOLUTION INTRAVENOUS PRN
OUTPATIENT
Start: 2023-08-14

## 2023-08-14 RX ORDER — ASPIRIN 81 MG/1
81 TABLET, CHEWABLE ORAL ONCE
OUTPATIENT
Start: 2023-08-14 | End: 2023-08-14

## 2023-08-14 RX ORDER — SODIUM CHLORIDE 0.9 % (FLUSH) 0.9 %
5-40 SYRINGE (ML) INJECTION PRN
OUTPATIENT
Start: 2023-08-14

## 2023-08-14 RX ORDER — CLONIDINE HYDROCHLORIDE 0.1 MG/1
0.1 TABLET ORAL ONCE
OUTPATIENT
Start: 2023-08-14 | End: 2023-08-14

## 2023-08-14 RX ORDER — SODIUM CHLORIDE 0.9 % (FLUSH) 0.9 %
5-40 SYRINGE (ML) INJECTION EVERY 12 HOURS SCHEDULED
OUTPATIENT
Start: 2023-08-14

## 2023-08-14 NOTE — H&P
[address]   Progress Note and Procedure Note      [address] [facility] 9 RECORD NUMBER:  [id number]  AGE: 52 y.o. GENDER: male  : 1970  EPISODE DATE:  2023    Subjective:     No chief complaint on file. HISTORY of PRESENT ILLNESS HPI     Gisselle Blanco is a 52 y.o. male who presents today for wound/ulcer evaluation. History of Wound Context: left foot wound follow up/eval and treat    Ulcer Identification:  Ulcer Type: diabetic  Contributing Factors: diabetes, smoking, and arterial insufficiency    Wound: N/A        PAST MEDICAL HISTORY        Diagnosis Date    ADHD     Anxiety     Depression     Diabetes ([address]     Diabetic neuropathy ([facility])     Drug addiction ([facility])     Pt states he got out rehab 07/10/2022       PAST SURGICAL HISTORY    Past Surgical History:   Procedure Laterality Date    NECK SURGERY         FAMILY HISTORY    Family History   Adopted: Yes       SOCIAL HISTORY    Social History     Tobacco Use    Smoking status: Every Day     Packs/day: 0.50     Types: Cigarettes     Start date: 5/3/1999    Smokeless tobacco: Former   Vaping Use    Vaping Use: Never used   Substance Use Topics    Alcohol use: Not Currently    Drug use: Not Currently     Types: Methamphetamines (Crystal Meth)     Comment: \"A couple months ago\"       ALLERGIES    No Known Allergies    MEDICATIONS    Current Outpatient Medications on File Prior to Visit   Medication Sig Dispense Refill    ciprofloxacin (CIPRO) 750 MG tablet 1 tablet      gabapentin (NEURONTIN) 600 MG tablet Take 1 tablet by mouth 3 times daily for 30 days.  (Patient not taking: Reported on 8/3/2023) 90 tablet 0    FLUoxetine (PROZAC) 20 MG capsule Take 1 capsule by mouth daily (Patient not taking: Reported on 8/3/2023) 30 capsule 2    QUEtiapine (SEROQUEL) 50 MG tablet Take 1 tablet by mouth at bedtime (Patient not taking: Reported on 2023) 30 tablet 2    insulin glargine (BASAGLAR KWIKPEN) 100 UNIT/ML

## 2023-08-15 ENCOUNTER — TELEPHONE (OUTPATIENT)
Dept: VASCULAR SURGERY | Age: 53
End: 2023-08-15

## 2023-08-15 NOTE — TELEPHONE ENCOUNTER
Tammie Brown from wound care called and said the patient wants to cancel his procedure as he is with his mom at the hospital. He is wanting to reschedule.

## 2023-08-24 ENCOUNTER — HOSPITAL ENCOUNTER (OUTPATIENT)
Dept: WOUND CARE | Age: 53
Discharge: HOME | End: 2023-08-24

## 2023-09-14 ENCOUNTER — HOSPITAL ENCOUNTER (OUTPATIENT)
Dept: WOUND CARE | Age: 53
Discharge: HOME | End: 2023-09-14

## 2023-09-14 VITALS
HEART RATE: 92 BPM | DIASTOLIC BLOOD PRESSURE: 94 MMHG | SYSTOLIC BLOOD PRESSURE: 151 MMHG | BODY MASS INDEX: 20.33 KG/M2 | RESPIRATION RATE: 18 BRPM | TEMPERATURE: 97.8 F | WEIGHT: 142 LBS | HEIGHT: 70 IN

## 2023-09-14 DIAGNOSIS — E11.621 DIABETIC ULCER OF TOE OF LEFT FOOT ASSOCIATED WITH TYPE 2 DIABETES MELLITUS, WITH FAT LAYER EXPOSED (HCC): ICD-10-CM

## 2023-09-14 DIAGNOSIS — Z91.199 NON-COMPLIANCE: ICD-10-CM

## 2023-09-14 DIAGNOSIS — M86.9 TOE OSTEOMYELITIS, LEFT (HCC): Primary | ICD-10-CM

## 2023-09-14 DIAGNOSIS — L97.522 DIABETIC ULCER OF TOE OF LEFT FOOT ASSOCIATED WITH TYPE 2 DIABETES MELLITUS, WITH FAT LAYER EXPOSED (HCC): ICD-10-CM

## 2023-09-14 RX ORDER — LIDOCAINE 40 MG/G
CREAM TOPICAL ONCE
OUTPATIENT
Start: 2023-09-14 | End: 2023-09-14

## 2023-09-14 RX ORDER — BACITRACIN ZINC AND POLYMYXIN B SULFATE 500; 1000 [USP'U]/G; [USP'U]/G
OINTMENT TOPICAL ONCE
OUTPATIENT
Start: 2023-09-14 | End: 2023-09-14

## 2023-09-14 RX ORDER — GINSENG 100 MG
CAPSULE ORAL ONCE
OUTPATIENT
Start: 2023-09-14 | End: 2023-09-14

## 2023-09-14 RX ORDER — LIDOCAINE 50 MG/G
OINTMENT TOPICAL ONCE
OUTPATIENT
Start: 2023-09-14 | End: 2023-09-14

## 2023-09-14 RX ORDER — BETAMETHASONE DIPROPIONATE 0.05 %
OINTMENT (GRAM) TOPICAL ONCE
OUTPATIENT
Start: 2023-09-14 | End: 2023-09-14

## 2023-09-14 RX ORDER — LIDOCAINE HYDROCHLORIDE 40 MG/ML
SOLUTION TOPICAL ONCE
OUTPATIENT
Start: 2023-09-14 | End: 2023-09-14

## 2023-09-14 RX ORDER — GENTAMICIN SULFATE 1 MG/G
OINTMENT TOPICAL ONCE
OUTPATIENT
Start: 2023-09-14 | End: 2023-09-14

## 2023-09-14 RX ORDER — CLOBETASOL PROPIONATE 0.5 MG/G
OINTMENT TOPICAL ONCE
OUTPATIENT
Start: 2023-09-14 | End: 2023-09-14

## 2023-09-14 RX ORDER — LIDOCAINE HYDROCHLORIDE 20 MG/ML
JELLY TOPICAL ONCE
OUTPATIENT
Start: 2023-09-14 | End: 2023-09-14

## 2023-09-14 RX ORDER — SODIUM CHLOR/HYPOCHLOROUS ACID 0.033 %
SOLUTION, IRRIGATION IRRIGATION ONCE
OUTPATIENT
Start: 2023-09-14 | End: 2023-09-14

## 2023-09-14 RX ORDER — IBUPROFEN 200 MG
TABLET ORAL ONCE
OUTPATIENT
Start: 2023-09-14 | End: 2023-09-14

## 2023-09-14 NOTE — DISCHARGE INSTRUCTIONS
[address] and Hyperbaric Oxygen Therapy   Physician Orders and Discharge Instructions  [address] [facility],[address] [place] [address] Blvd, [address]  Telephone: [phone]    NAME:  Ivana Ny OF BIRTH:  1970  MEDICAL RECORD NUMBER:  [id number]  DATE:  9/14/2023    Discharge condition: {STABLE/UNSTABLE:532988704}    Discharge to: {CHP Wound Discharge To:01894}    Left via:{Left EWX:17081}    Accompanied by:  {:[id number]}    ECF/HHA:      Dressing Orders  Left 2nd Toe:  Wash with soap and water, pat dry. Place Aquacel Ag to wound, cover with dry gauze, and medipore tape. Treatment Orders:  Protein rich diet (unless restricted by your physician)  Multivitamin daily  Elevate legs above the level of your heart when sitting 3-4 times daily for at least one hour each time, avoid standing for long periods of time. Stop smoking or start decreasing the amount you smoke. See Dr. Karmen Duarte and get started back on your medications. Let us know if we can do anything to help you accomplish this. [facility] or [facility]. We will call you with date and time to arrive for procedure. Sherron Tracy     Arteriogram Instructions     Report to the [facility] center at [facility] (go in the front door and to the left) on _______________, at _____________ . You may get an automated call telling you your procedure is at another time and your My Chart account will also tell you a different time. Unless we call you personally and tell you a different time, please arrive at the time listed above. It is approximately 2 hours before your actual procedure. Nothing to eat or drink after midnight the night before the procedure. Please take all medications as normally scheduled to take, including heart and blood pressure medicines with a sip of water. Do not take Lasix, insulin, or any diabetic medicine the morning of the procedure.  If you take

## 2023-09-14 NOTE — PLAN OF CARE
Problem: Chronic Conditions and Co-morbidities  Goal: Patient's chronic conditions and co-morbidity symptoms are monitored and maintained or improved  Outcome: Progressing     Problem: Pain  Goal: Verbalizes/displays adequate comfort level or baseline comfort level  Outcome: Progressing     Problem: Chronic Conditions and Co-morbidities  Goal: Patient's chronic conditions and co-morbidity symptoms are monitored and maintained or improved  Outcome: Progressing     Problem: Wound:  Goal: Will show signs of wound healing; wound closure and no evidence of infection  Description: Will show signs of wound healing; wound closure and no evidence of infection  Outcome: Progressing     Problem: Smoking cessation:  Goal: Ability to formulate a plan to maintain a tobacco-free life will be supported  Description: Ability to formulate a plan to maintain a tobacco-free life will be supported  Outcome: Progressing     Problem: Weight control:  Goal: Ability to maintain an optimal weight for height and age will be supported  Description: Ability to maintain an optimal weight for height and age will be supported  Outcome: Progressing     Problem: Falls - Risk of:  Goal: Will remain free from falls  Description: Will remain free from falls  Outcome: Progressing     Problem: Blood Glucose:  Goal: Ability to maintain appropriate glucose levels will improve  Description: Ability to maintain appropriate glucose levels will improve  Outcome: Progressing

## 2024-01-01 ENCOUNTER — OUTSIDE FACILITY SERVICE (OUTPATIENT)
Age: 54
End: 2024-01-01
Payer: MEDICAID

## 2024-01-01 ENCOUNTER — OUTSIDE FACILITY SERVICE (OUTPATIENT)
Age: 54
End: 2024-01-01

## 2024-01-01 PROCEDURE — OUTSIDEPOS PR OUTSIDE POS PLACEHOLDER: Performed by: INTERNAL MEDICINE

## 2024-09-06 DIAGNOSIS — S81.801A WOUND OF RIGHT LOWER EXTREMITY, INITIAL ENCOUNTER: Primary | ICD-10-CM

## 2024-09-06 RX ORDER — OXYCODONE AND ACETAMINOPHEN 5; 325 MG/1; MG/1
1 TABLET ORAL EVERY 6 HOURS PRN
Qty: 30 TABLET | Refills: 0 | Status: SHIPPED | OUTPATIENT
Start: 2024-09-06 | End: 2024-09-14

## 2024-11-10 ENCOUNTER — APPOINTMENT (OUTPATIENT)
Dept: CT IMAGING | Age: 54
DRG: 871 | End: 2024-11-10
Payer: MEDICAID

## 2024-11-10 ENCOUNTER — APPOINTMENT (OUTPATIENT)
Dept: GENERAL RADIOLOGY | Age: 54
DRG: 871 | End: 2024-11-10
Payer: MEDICAID

## 2024-11-10 ENCOUNTER — HOSPITAL ENCOUNTER (INPATIENT)
Age: 54
LOS: 10 days | DRG: 871 | End: 2024-11-20
Attending: EMERGENCY MEDICINE | Admitting: INTERNAL MEDICINE
Payer: MEDICAID

## 2024-11-10 DIAGNOSIS — E87.1 HYPONATREMIA: ICD-10-CM

## 2024-11-10 DIAGNOSIS — R65.21 SEPTIC SHOCK (HCC): Primary | ICD-10-CM

## 2024-11-10 DIAGNOSIS — A40.1: ICD-10-CM

## 2024-11-10 DIAGNOSIS — N17.9: ICD-10-CM

## 2024-11-10 DIAGNOSIS — G93.40 ACUTE ENCEPHALOPATHY: ICD-10-CM

## 2024-11-10 DIAGNOSIS — F15.10 METHAMPHETAMINE ABUSE (HCC): ICD-10-CM

## 2024-11-10 DIAGNOSIS — A41.9 SEPTIC SHOCK (HCC): Primary | ICD-10-CM

## 2024-11-10 DIAGNOSIS — R07.9 CHEST PAIN: ICD-10-CM

## 2024-11-10 DIAGNOSIS — R65.21: ICD-10-CM

## 2024-11-10 LAB
A BAUMANNII DNA BLD POS QL NAA+NON-PROBE: NOT DETECTED
ACETONE SERPL QL SCN: NEGATIVE
ALBUMIN SERPL-MCNC: 2.8 G/DL (ref 3.5–5.2)
ALP SERPL-CCNC: 213 U/L
ALT SERPL-CCNC: 14 U/L
AMPHET UR QL SCN: POSITIVE
ANION GAP SERPL CALCULATED.3IONS-SCNC: 16 MMOL/L (ref 7–19)
APPEARANCE CSF: CLEAR
AST SERPL-CCNC: 18 U/L
B PARAP IS1001 DNA NPH QL NAA+NON-PROBE: NOT DETECTED
B PERT.PT PRMT NPH QL NAA+NON-PROBE: NOT DETECTED
BACTERIA URNS QL MICRO: NEGATIVE /HPF
BARBITURATES UR QL SCN: NEGATIVE
BASE EXCESS ARTERIAL: 2.1 MMOL/L (ref -2–2)
BASOPHILS # BLD: 0 K/UL (ref 0–0.2)
BASOPHILS NFR BLD: 0.2 % (ref 0–1)
BENZODIAZ UR QL SCN: NEGATIVE
BILIRUB SERPL-MCNC: 1.1 MG/DL (ref 0.2–1.2)
BILIRUB UR QL STRIP: NEGATIVE
BUN SERPL-MCNC: 39 MG/DL (ref 6–20)
BUPRENORPHINE URINE: NEGATIVE
C ALBICANS DNA BLD POS QL NAA+NON-PROBE: NOT DETECTED
C AURIS DNA BLD POS QL NAA+PROBE: NOT DETECTED
C GATTII+NEOFOR DNA CSF QL NAA+NON-PROBE: NOT DETECTED
C GLABRATA DNA BLD POS QL NAA+NON-PROBE: NOT DETECTED
C KRUSEI DNA BLD POS QL NAA+NON-PROBE: NOT DETECTED
C PARAP DNA BLD POS QL NAA+NON-PROBE: NOT DETECTED
C PNEUM DNA NPH QL NAA+NON-PROBE: NOT DETECTED
C TROPICLS DNA BLD POS QL NAA+NON-PROBE: NOT DETECTED
CALCIUM SERPL-MCNC: 8.6 MG/DL (ref 8.6–10)
CANNABINOIDS UR QL SCN: NEGATIVE
CARBOXYHEMOGLOBIN ARTERIAL: 1.4 % (ref 0–5)
CHLORIDE SERPL-SCNC: 86 MMOL/L (ref 98–111)
CLARITY UR: CLEAR
CLOT EVALUATION CSF: ABNORMAL
CMV DNA CSF QL NAA+NON-PROBE: NOT DETECTED
CO2 SERPL-SCNC: 25 MMOL/L (ref 22–29)
COCAINE UR QL SCN: NEGATIVE
COLOR CSF: COLORLESS
COLOR UR: YELLOW
CREAT SERPL-MCNC: 1.2 MG/DL
CRYPTOCOCCUS NEOFORMANS/GATTII BY PCR: NOT DETECTED
CRYSTALS URNS MICRO: ABNORMAL /HPF
DRUG SCREEN COMMENT UR-IMP: ABNORMAL
E CLOAC COMP DNA BLD POS NAA+NON-PROBE: NOT DETECTED
E COLI DNA BLD POS QL NAA+NON-PROBE: NOT DETECTED
E COLI K1 DNA CSF QL NAA+NON-PROBE: NOT DETECTED
E FAECALIS DNA BLD POS QL NAA+PROBE: NOT DETECTED
E FAECIUM DNA BLD POS QL NAA+PROBE: NOT DETECTED
ENTEROBACT DNA BLD POS QL NAA+NON-PROBE: NOT DETECTED
ENTEROCOC DNA BLD POS QL NAA+NON-PROBE: NOT DETECTED
EOSINOPHIL # BLD: 0 K/UL (ref 0–0.6)
EOSINOPHIL NFR BLD: 0 % (ref 0–5)
EPI CELLS #/AREA URNS AUTO: 2 /HPF (ref 0–5)
ERYTHROCYTE [DISTWIDTH] IN BLOOD BY AUTOMATED COUNT: 13.5 % (ref 11.5–14.5)
ETHANOLAMINE SERPL-MCNC: <10 MG/DL (ref 0–0.08)
EV RNA CSF QL NAA+NON-PROBE: NOT DETECTED
FENTANYL SCREEN, URINE: NEGATIVE
FLUAV RNA NPH QL NAA+NON-PROBE: NOT DETECTED
FLUBV RNA NPH QL NAA+NON-PROBE: NOT DETECTED
GLUCOSE BLD-MCNC: 312 MG/DL (ref 70–99)
GLUCOSE BLD-MCNC: 336 MG/DL (ref 70–99)
GLUCOSE BLD-MCNC: 369 MG/DL (ref 70–99)
GLUCOSE CSF-MCNC: 192 MG/DL (ref 40–70)
GLUCOSE SERPL-MCNC: 368 MG/DL (ref 70–99)
GLUCOSE UR STRIP.AUTO-MCNC: =>1000 MG/DL
GN BLD CULTURE PNL BLD POS NAA+PROBE: NOT DETECTED
GP B STREP DNA BLD POS QL NAA+NON-PROBE: DETECTED
GP B STREP DNA CSF QL NAA+NON-PROBE: NOT DETECTED
HADV DNA NPH QL NAA+NON-PROBE: NOT DETECTED
HAEM INFLU DNA CSF QL NAA+NON-PROBE: NOT DETECTED
HCO3 ARTERIAL: 25.3 MMOL/L (ref 22–26)
HCOV 229E RNA NPH QL NAA+NON-PROBE: NOT DETECTED
HCOV HKU1 RNA NPH QL NAA+NON-PROBE: NOT DETECTED
HCOV NL63 RNA NPH QL NAA+NON-PROBE: NOT DETECTED
HCOV OC43 RNA NPH QL NAA+NON-PROBE: NOT DETECTED
HCT VFR BLD AUTO: 38.6 %
HEMOGLOBIN, ART, EXTENDED: 13.1 G/DL
HGB BLD-MCNC: 13 G/DL
HGB UR STRIP.AUTO-MCNC: ABNORMAL MG/L
HHV6 DNA CSF QL NAA+NON-PROBE: NOT DETECTED
HMPV RNA NPH QL NAA+NON-PROBE: NOT DETECTED
HPIV1 RNA NPH QL NAA+NON-PROBE: NOT DETECTED
HPIV2 RNA NPH QL NAA+NON-PROBE: NOT DETECTED
HPIV3 RNA NPH QL NAA+NON-PROBE: NOT DETECTED
HPIV4 RNA NPH QL NAA+NON-PROBE: NOT DETECTED
HSV1 DNA CSF QL NAA+NON-PROBE: NOT DETECTED
HSV2 DNA CSF QL NAA+NON-PROBE: NOT DETECTED
HYALINE CASTS #/AREA URNS AUTO: 9 /HPF (ref 0–8)
IMM GRANULOCYTES # BLD: 0.3 K/UL
K OXYTOCA DNA BLD POS QL NAA+NON-PROBE: NOT DETECTED
K PNEUMON DNA SPEC QL NAA+PROBE: NOT DETECTED
K. AEROGENES DNA SPEC QL NAA+PROBE: NOT DETECTED
KETONES UR STRIP.AUTO-MCNC: 15 MG/DL
L MONOCYTOG DNA BLD POS QL NAA+NON-PROBE: NOT DETECTED
L MONOCYTOG DNA CSF QL NAA+NON-PROBE: NOT DETECTED
LACTATE BLDV-SCNC: 2.4 MG/DL (ref 0.5–1.9)
LACTATE BLDV-SCNC: 2.5 MG/DL (ref 0.5–1.9)
LEUKOCYTE ESTERASE UR QL STRIP.AUTO: NEGATIVE
LYMPHOCYTES # BLD: 0.5 K/UL (ref 1.1–4.5)
LYMPHOCYTES NFR BLD: 4.7 % (ref 20–40)
M PNEUMO DNA NPH QL NAA+NON-PROBE: NOT DETECTED
MAGNESIUM SERPL-MCNC: 2.4 MG/DL (ref 1.6–2.6)
MCH RBC QN AUTO: 29.7 PG (ref 27–31)
MCHC RBC AUTO-ENTMCNC: 33.7 G/DL (ref 33–37)
MCV RBC AUTO: 88.3 FL
METHADONE UR QL SCN: NEGATIVE
METHAMPHETAMINE, URINE: POSITIVE
METHEMOGLOBIN ARTERIAL: 0.8 %
MONOCYTES # BLD: 0.3 K/UL (ref 0–0.9)
MONOCYTES NFR BLD: 3 % (ref 0–10)
N MEN DNA BLD POS QL NAA+NON-PROBE: NOT DETECTED
N MEN DNA CSF QL NAA+NON-PROBE: NOT DETECTED
NEUTROPHILS # BLD: 9.8 K/UL (ref 1.5–7.5)
NEUTS SEG NFR BLD: 89.6 % (ref 50–65)
NITRITE UR QL STRIP.AUTO: NEGATIVE
NO DIFFERENTIAL CSF: ABNORMAL
O2 CONTENT ARTERIAL: 17.3 ML/DL
O2 DELIVERY DEVICE: ABNORMAL
O2 SAT, ARTERIAL: 93.9 %
O2 THERAPY: ABNORMAL
OPIATES UR QL SCN: NEGATIVE
OXYCODONE UR QL SCN: NEGATIVE
OXYGEN FLOW: 2
P AERUGINOSA DNA BLD POS NAA+NON-PROBE: NOT DETECTED
PARECHOVIRUS A RNA CSF QL NAA+NON-PROBE: NOT DETECTED
PCO2 ARTERIAL: 34 MMHG (ref 35–45)
PCP UR QL SCN: NEGATIVE
PERFORMED ON: ABNORMAL
PH ARTERIAL: 7.48 (ref 7.35–7.45)
PH UR STRIP.AUTO: 5.5 [PH] (ref 5–8)
PHOSPHATE SERPL-MCNC: 2.5 MG/DL (ref 2.5–4.5)
PLATELET # BLD AUTO: 106 K/UL (ref 130–400)
PMV BLD AUTO: 12.5 FL
PO2 ARTERIAL: 67 MMHG (ref 80–100)
POTASSIUM BLD-SCNC: 3.6 MMOL/L
POTASSIUM SERPL-SCNC: 4.1 MMOL/L (ref 3.5–5)
PROT CSF-MCNC: 67 MG/DL (ref 15–45)
PROT SERPL-MCNC: 6.6 G/DL (ref 6.4–8.3)
PROT UR STRIP.AUTO-MCNC: 30 MG/DL
PROTEUS SP DNA BLD POS QL NAA+NON-PROBE: NOT DETECTED
RBC # BLD AUTO: 4.37 M/UL
RBC #/AREA URNS AUTO: 6 /HPF (ref 0–4)
RBC CSF: 5 /CUMM (ref 0–5)
RSV RNA NPH QL NAA+NON-PROBE: NOT DETECTED
RV+EV RNA NPH QL NAA+NON-PROBE: NOT DETECTED
S AUREUS DNA BLD POS QL NAA+NON-PROBE: NOT DETECTED
S AUREUS+CONS DNA BLD POS NAA+NON-PROBE: NOT DETECTED
S EPIDERMIDIS DNA BLD POS QL NAA+PROBE: NOT DETECTED
S LUGDUNENSIS DNA BLD POS QL NAA+PROBE: NOT DETECTED
S MALTOPH DNA BLD POS QL NAA+PROBE: NOT DETECTED
S MARCESCENS DNA BLD POS NAA+NON-PROBE: NOT DETECTED
S PNEUM DNA BLD POS QL NAA+NON-PROBE: NOT DETECTED
S PNEUM DNA CSF QL NAA+NON-PROBE: NOT DETECTED
S PYO DNA BLD POS QL NAA+NON-PROBE: NOT DETECTED
SALMONELLA DNA BLD POS QL NAA+PROBE: NOT DETECTED
SAMPLE SOURCE: ABNORMAL
SARS-COV-2 RNA NPH QL NAA+NON-PROBE: NOT DETECTED
SODIUM SERPL-SCNC: 127 MMOL/L (ref 136–145)
SP GR UR STRIP.AUTO: 1.03 (ref 1–1.03)
STREPTOCOCCUS DNA BLD POS NAA+NON-PROBE: DETECTED
TRICYCLIC ANTIDEPRESSANTS, UR: NEGATIVE
TROPONIN, HIGH SENSITIVITY: 13 NG/L (ref 0–22)
TUBE # CSF: ABNORMAL
UROBILINOGEN UR STRIP.AUTO-MCNC: 1 E.U./DL
VZV DNA CSF QL NAA+NON-PROBE: NOT DETECTED
WBC # BLD AUTO: 11 K/UL (ref 4.8–10.8)
WBC #/AREA URNS AUTO: 2 /HPF (ref 0–5)
WBC CSF: 9 /CUMM (ref 0–8)

## 2024-11-10 PROCEDURE — 51702 INSERT TEMP BLADDER CATH: CPT

## 2024-11-10 PROCEDURE — 82945 GLUCOSE OTHER FLUID: CPT

## 2024-11-10 PROCEDURE — 87077 CULTURE AEROBIC IDENTIFY: CPT

## 2024-11-10 PROCEDURE — 87086 URINE CULTURE/COLONY COUNT: CPT

## 2024-11-10 PROCEDURE — 82009 KETONE BODYS QUAL: CPT

## 2024-11-10 PROCEDURE — 99285 EMERGENCY DEPT VISIT HI MDM: CPT

## 2024-11-10 PROCEDURE — 83605 ASSAY OF LACTIC ACID: CPT

## 2024-11-10 PROCEDURE — 2000000000 HC ICU R&B

## 2024-11-10 PROCEDURE — 84484 ASSAY OF TROPONIN QUANT: CPT

## 2024-11-10 PROCEDURE — 93005 ELECTROCARDIOGRAM TRACING: CPT | Performed by: EMERGENCY MEDICINE

## 2024-11-10 PROCEDURE — 6360000004 HC RX CONTRAST MEDICATION: Performed by: EMERGENCY MEDICINE

## 2024-11-10 PROCEDURE — 36415 COLL VENOUS BLD VENIPUNCTURE: CPT

## 2024-11-10 PROCEDURE — 84100 ASSAY OF PHOSPHORUS: CPT

## 2024-11-10 PROCEDURE — 80053 COMPREHEN METABOLIC PANEL: CPT

## 2024-11-10 PROCEDURE — 87040 BLOOD CULTURE FOR BACTERIA: CPT

## 2024-11-10 PROCEDURE — 83735 ASSAY OF MAGNESIUM: CPT

## 2024-11-10 PROCEDURE — 2500000003 HC RX 250 WO HCPCS

## 2024-11-10 PROCEDURE — 87205 SMEAR GRAM STAIN: CPT

## 2024-11-10 PROCEDURE — 82962 GLUCOSE BLOOD TEST: CPT

## 2024-11-10 PROCEDURE — 87075 CULTR BACTERIA EXCEPT BLOOD: CPT

## 2024-11-10 PROCEDURE — 81001 URINALYSIS AUTO W/SCOPE: CPT

## 2024-11-10 PROCEDURE — 009U3ZX DRAINAGE OF SPINAL CANAL, PERCUTANEOUS APPROACH, DIAGNOSTIC: ICD-10-PCS | Performed by: EMERGENCY MEDICINE

## 2024-11-10 PROCEDURE — 87150 DNA/RNA AMPLIFIED PROBE: CPT

## 2024-11-10 PROCEDURE — 84157 ASSAY OF PROTEIN OTHER: CPT

## 2024-11-10 PROCEDURE — 70450 CT HEAD/BRAIN W/O DYE: CPT

## 2024-11-10 PROCEDURE — 87070 CULTURE OTHR SPECIMN AEROBIC: CPT

## 2024-11-10 PROCEDURE — 36600 WITHDRAWAL OF ARTERIAL BLOOD: CPT

## 2024-11-10 PROCEDURE — 6360000002 HC RX W HCPCS: Performed by: INTERNAL MEDICINE

## 2024-11-10 PROCEDURE — 96375 TX/PRO/DX INJ NEW DRUG ADDON: CPT

## 2024-11-10 PROCEDURE — 0202U NFCT DS 22 TRGT SARS-COV-2: CPT

## 2024-11-10 PROCEDURE — 85025 COMPLETE CBC W/AUTO DIFF WBC: CPT

## 2024-11-10 PROCEDURE — 71045 X-RAY EXAM CHEST 1 VIEW: CPT

## 2024-11-10 PROCEDURE — 6370000000 HC RX 637 (ALT 250 FOR IP)

## 2024-11-10 PROCEDURE — 6370000000 HC RX 637 (ALT 250 FOR IP): Performed by: EMERGENCY MEDICINE

## 2024-11-10 PROCEDURE — G0480 DRUG TEST DEF 1-7 CLASSES: HCPCS

## 2024-11-10 PROCEDURE — 96361 HYDRATE IV INFUSION ADD-ON: CPT

## 2024-11-10 PROCEDURE — 82077 ASSAY SPEC XCP UR&BREATH IA: CPT

## 2024-11-10 PROCEDURE — 87186 SC STD MICRODIL/AGAR DIL: CPT

## 2024-11-10 PROCEDURE — 71260 CT THORAX DX C+: CPT

## 2024-11-10 PROCEDURE — 2580000003 HC RX 258: Performed by: INTERNAL MEDICINE

## 2024-11-10 PROCEDURE — 96365 THER/PROPH/DIAG IV INF INIT: CPT

## 2024-11-10 PROCEDURE — 82803 BLOOD GASES ANY COMBINATION: CPT

## 2024-11-10 PROCEDURE — 6360000002 HC RX W HCPCS: Performed by: EMERGENCY MEDICINE

## 2024-11-10 PROCEDURE — 89050 BODY FLUID CELL COUNT: CPT

## 2024-11-10 PROCEDURE — 80307 DRUG TEST PRSMV CHEM ANLYZR: CPT

## 2024-11-10 PROCEDURE — 2500000003 HC RX 250 WO HCPCS: Performed by: STUDENT IN AN ORGANIZED HEALTH CARE EDUCATION/TRAINING PROGRAM

## 2024-11-10 PROCEDURE — 87483 CNS DNA AMP PROBE TYPE 12-25: CPT

## 2024-11-10 PROCEDURE — 2580000003 HC RX 258: Performed by: EMERGENCY MEDICINE

## 2024-11-10 PROCEDURE — 74177 CT ABD & PELVIS W/CONTRAST: CPT

## 2024-11-10 RX ORDER — DEXMEDETOMIDINE HYDROCHLORIDE 4 UG/ML
.1-1.5 INJECTION, SOLUTION INTRAVENOUS CONTINUOUS
Status: DISCONTINUED | OUTPATIENT
Start: 2024-11-10 | End: 2024-11-15

## 2024-11-10 RX ORDER — IBUPROFEN 100 MG/5ML
400 SUSPENSION ORAL EVERY 6 HOURS PRN
Status: DISCONTINUED | OUTPATIENT
Start: 2024-11-10 | End: 2024-11-10

## 2024-11-10 RX ORDER — NOREPINEPHRINE BITARTRATE 0.06 MG/ML
INJECTION, SOLUTION INTRAVENOUS
Status: COMPLETED
Start: 2024-11-10 | End: 2024-11-10

## 2024-11-10 RX ORDER — ONDANSETRON 2 MG/ML
4 INJECTION INTRAMUSCULAR; INTRAVENOUS EVERY 6 HOURS PRN
Status: DISCONTINUED | OUTPATIENT
Start: 2024-11-10 | End: 2024-11-21 | Stop reason: HOSPADM

## 2024-11-10 RX ORDER — ACETAMINOPHEN 650 MG/1
650 SUPPOSITORY RECTAL EVERY 6 HOURS PRN
Status: DISCONTINUED | OUTPATIENT
Start: 2024-11-10 | End: 2024-11-21 | Stop reason: HOSPADM

## 2024-11-10 RX ORDER — SODIUM CHLORIDE, SODIUM LACTATE, POTASSIUM CHLORIDE, CALCIUM CHLORIDE 600; 310; 30; 20 MG/100ML; MG/100ML; MG/100ML; MG/100ML
INJECTION, SOLUTION INTRAVENOUS CONTINUOUS
Status: ACTIVE | OUTPATIENT
Start: 2024-11-10 | End: 2024-11-11

## 2024-11-10 RX ORDER — ACETAMINOPHEN 500 MG
1000 TABLET ORAL ONCE
Status: DISCONTINUED | OUTPATIENT
Start: 2024-11-10 | End: 2024-11-10

## 2024-11-10 RX ORDER — ONDANSETRON 2 MG/ML
4 INJECTION INTRAMUSCULAR; INTRAVENOUS ONCE
Status: COMPLETED | OUTPATIENT
Start: 2024-11-10 | End: 2024-11-10

## 2024-11-10 RX ORDER — LIDOCAINE HYDROCHLORIDE 20 MG/ML
JELLY TOPICAL
Status: COMPLETED
Start: 2024-11-10 | End: 2024-11-10

## 2024-11-10 RX ORDER — POLYETHYLENE GLYCOL 3350 17 G/17G
17 POWDER, FOR SOLUTION ORAL DAILY PRN
Status: DISCONTINUED | OUTPATIENT
Start: 2024-11-10 | End: 2024-11-21 | Stop reason: HOSPADM

## 2024-11-10 RX ORDER — 0.9 % SODIUM CHLORIDE 0.9 %
1000 INTRAVENOUS SOLUTION INTRAVENOUS ONCE
Status: COMPLETED | OUTPATIENT
Start: 2024-11-10 | End: 2024-11-10

## 2024-11-10 RX ORDER — SODIUM CHLORIDE 0.9 % (FLUSH) 0.9 %
5-40 SYRINGE (ML) INJECTION EVERY 12 HOURS SCHEDULED
Status: DISCONTINUED | OUTPATIENT
Start: 2024-11-10 | End: 2024-11-13

## 2024-11-10 RX ORDER — SODIUM CHLORIDE 9 MG/ML
INJECTION, SOLUTION INTRAVENOUS PRN
Status: DISCONTINUED | OUTPATIENT
Start: 2024-11-10 | End: 2024-11-21 | Stop reason: HOSPADM

## 2024-11-10 RX ORDER — ONDANSETRON 4 MG/1
4 TABLET, ORALLY DISINTEGRATING ORAL EVERY 8 HOURS PRN
Status: DISCONTINUED | OUTPATIENT
Start: 2024-11-10 | End: 2024-11-21 | Stop reason: HOSPADM

## 2024-11-10 RX ORDER — ACETAMINOPHEN 325 MG/1
650 TABLET ORAL EVERY 6 HOURS PRN
Status: DISCONTINUED | OUTPATIENT
Start: 2024-11-10 | End: 2024-11-21 | Stop reason: HOSPADM

## 2024-11-10 RX ORDER — INSULIN LISPRO 100 [IU]/ML
0-8 INJECTION, SOLUTION INTRAVENOUS; SUBCUTANEOUS
Status: DISCONTINUED | OUTPATIENT
Start: 2024-11-11 | End: 2024-11-14 | Stop reason: ALTCHOICE

## 2024-11-10 RX ORDER — ACETAMINOPHEN 650 MG/1
650 SUPPOSITORY RECTAL ONCE
Status: COMPLETED | OUTPATIENT
Start: 2024-11-10 | End: 2024-11-10

## 2024-11-10 RX ORDER — IOPAMIDOL 755 MG/ML
70 INJECTION, SOLUTION INTRAVASCULAR
Status: COMPLETED | OUTPATIENT
Start: 2024-11-10 | End: 2024-11-10

## 2024-11-10 RX ORDER — VANCOMYCIN 1 G/200ML
1000 INJECTION, SOLUTION INTRAVENOUS ONCE
Status: COMPLETED | OUTPATIENT
Start: 2024-11-10 | End: 2024-11-10

## 2024-11-10 RX ORDER — DEXAMETHASONE SODIUM PHOSPHATE 10 MG/ML
0.15 INJECTION, SOLUTION INTRAMUSCULAR; INTRAVENOUS EVERY 6 HOURS
Status: DISCONTINUED | OUTPATIENT
Start: 2024-11-10 | End: 2024-11-12

## 2024-11-10 RX ORDER — LIDOCAINE HYDROCHLORIDE 20 MG/ML
JELLY TOPICAL PRN
Status: DISCONTINUED | OUTPATIENT
Start: 2024-11-10 | End: 2024-11-10

## 2024-11-10 RX ORDER — SODIUM CHLORIDE 0.9 % (FLUSH) 0.9 %
5-40 SYRINGE (ML) INJECTION PRN
Status: DISCONTINUED | OUTPATIENT
Start: 2024-11-10 | End: 2024-11-21 | Stop reason: HOSPADM

## 2024-11-10 RX ORDER — NOREPINEPHRINE BITARTRATE 0.06 MG/ML
1-100 INJECTION, SOLUTION INTRAVENOUS CONTINUOUS
Status: DISCONTINUED | OUTPATIENT
Start: 2024-11-10 | End: 2024-11-15

## 2024-11-10 RX ADMIN — ACETAMINOPHEN 650 MG: 650 SUPPOSITORY RECTAL at 13:36

## 2024-11-10 RX ADMIN — DEXMEDETOMIDINE HYDROCHLORIDE 0.2 MCG/KG/HR: 400 INJECTION, SOLUTION INTRAVENOUS at 20:34

## 2024-11-10 RX ADMIN — LIDOCAINE HYDROCHLORIDE: 20 JELLY TOPICAL at 13:36

## 2024-11-10 RX ADMIN — WATER 2000 MG: 1 INJECTION INTRAMUSCULAR; INTRAVENOUS; SUBCUTANEOUS at 21:58

## 2024-11-10 RX ADMIN — ONDANSETRON 4 MG: 2 INJECTION INTRAMUSCULAR; INTRAVENOUS at 13:15

## 2024-11-10 RX ADMIN — SODIUM CHLORIDE 1000 ML: 9 INJECTION, SOLUTION INTRAVENOUS at 12:45

## 2024-11-10 RX ADMIN — SODIUM CHLORIDE, PRESERVATIVE FREE 10 ML: 5 INJECTION INTRAVENOUS at 20:35

## 2024-11-10 RX ADMIN — IBUPROFEN 400 MG: 100 SUSPENSION ORAL at 16:05

## 2024-11-10 RX ADMIN — ACYCLOVIR SODIUM 500 MG: 50 INJECTION, SOLUTION INTRAVENOUS at 20:29

## 2024-11-10 RX ADMIN — SODIUM CHLORIDE 1000 ML: 9 INJECTION, SOLUTION INTRAVENOUS at 15:37

## 2024-11-10 RX ADMIN — Medication 5 MCG/MIN: at 16:52

## 2024-11-10 RX ADMIN — DEXAMETHASONE SODIUM PHOSPHATE 7.8 MG: 10 INJECTION, SOLUTION INTRAMUSCULAR; INTRAVENOUS at 20:25

## 2024-11-10 RX ADMIN — VANCOMYCIN 1000 MG: 1 INJECTION, SOLUTION INTRAVENOUS at 14:10

## 2024-11-10 RX ADMIN — IOPAMIDOL 70 ML: 755 INJECTION, SOLUTION INTRAVENOUS at 15:31

## 2024-11-10 RX ADMIN — CEFEPIME 2000 MG: 2 INJECTION, POWDER, FOR SOLUTION INTRAVENOUS at 13:17

## 2024-11-10 RX ADMIN — SODIUM CHLORIDE, POTASSIUM CHLORIDE, SODIUM LACTATE AND CALCIUM CHLORIDE: 600; 310; 30; 20 INJECTION, SOLUTION INTRAVENOUS at 19:25

## 2024-11-10 RX ADMIN — NOREPINEPHRINE BITARTRATE 5 MCG/MIN: 0.06 INJECTION, SOLUTION INTRAVENOUS at 16:52

## 2024-11-10 SDOH — ECONOMIC STABILITY: INCOME INSECURITY: HOW HARD IS IT FOR YOU TO PAY FOR THE VERY BASICS LIKE FOOD, HOUSING, MEDICAL CARE, AND HEATING?: SOMEWHAT HARD

## 2024-11-10 SDOH — ECONOMIC STABILITY: FOOD INSECURITY: WITHIN THE PAST 12 MONTHS, YOU WORRIED THAT YOUR FOOD WOULD RUN OUT BEFORE YOU GOT MONEY TO BUY MORE.: SOMETIMES TRUE

## 2024-11-10 SDOH — ECONOMIC STABILITY: INCOME INSECURITY: IN THE PAST 12 MONTHS, HAS THE ELECTRIC, GAS, OIL, OR WATER COMPANY THREATENED TO SHUT OFF SERVICE IN YOUR HOME?: NO

## 2024-11-10 ASSESSMENT — PATIENT HEALTH QUESTIONNAIRE - PHQ9
SUM OF ALL RESPONSES TO PHQ QUESTIONS 1-9: 0
SUM OF ALL RESPONSES TO PHQ QUESTIONS 1-9: 0
SUM OF ALL RESPONSES TO PHQ9 QUESTIONS 1 & 2: 0
SUM OF ALL RESPONSES TO PHQ QUESTIONS 1-9: 0
SUM OF ALL RESPONSES TO PHQ QUESTIONS 1-9: 0
1. LITTLE INTEREST OR PLEASURE IN DOING THINGS: NOT AT ALL
2. FEELING DOWN, DEPRESSED OR HOPELESS: NOT AT ALL

## 2024-11-10 NOTE — H&P
[facility] Medicine H&P    Patient:  Linh Nicholson  MRN: [id number]    Consulting Physician: Shahriar Castro DO  Reason for Consult: Medical Management  Primacy Care Physician: Igor Atkins MD    HISTORY OF PRESENT ILLNESS:   The patient is a 54 y.o. adult was brought in by EMS for altered mental status.  [place]  Family tried to contact him earlier today and wasn't able to get ahold of him.  They went to check on him and found him altered.  EMS was summoned.  He has a history of type 1 diabetes and was given glucose, but this did not affect his mentation.  On work up in the ER, he was febrile and hypotensive.  He was cultured and started on broad spectrum antibiotics.  An LP was also done because of fever and altered status.  Report from ED attending that fluid was clear.  Lab analysis is pending.    His urine drug screen was positive for methamphetamine.  He will be admitted to ICU via meningitis protocol.    Past Medical History:  No past medical history on file.    Past Surgical History:  No past surgical history on file.    Medications: Scheduled Meds:  Continuous Infusions:   norepinephrine 5 mcg/min (11/10/24 1652)     PRN Meds:.lidocaine, ibuprofen    Allergies:  Patient has no allergy information on record.    Social History:   TOBACCO:   has no history on file for tobacco use.  ETOH:   has no history on file for alcohol use.    Family History:   No family history on file.    ROS:  Review of Systems   Unable to perform ROS: Mental status change       Physical Exam:    Vitals: BP 93/69   Pulse (!) 110   Temp (!) 101.8 °F (38.8 °C) (Bladder)   Resp 19   Ht 1.778 m (5' 10\")   Wt 52.2 kg (115 lb)   SpO2 98%   BMI 16.50 kg/m²     Physical Exam  Vitals and nursing note reviewed.   Constitutional:       Appearance: Linh is ill-appearing and toxic-appearing.   HENT:      Head: Normocephalic and atraumatic.      Right Ear: External ear normal.      Left

## 2024-11-10 NOTE — ED NOTES
Patient is hypotensive and temperature remains at 103.1 despite cooling measures. Dr. Wheeler notified

## 2024-11-10 NOTE — ED PROVIDER NOTES
[facility] EMERGENCY DEPT  eMERGENCY dEPARTMENT eNCOUnter      Pt Name: Linh Nicholson  MRN: [id number]  Birthdate 1970  Date of evaluation: 11/10/2024  Provider: Ranjit Wheeler MD    CHIEF COMPLAINT       Chief Complaint   Patient presents with    Hyperglycemia     412, 469, 353         HISTORY OF PRESENT ILLNESS   (Location/Symptom, Timing/Onset,Context/Setting, Quality, Duration, Modifying Factors, Severity)  Note limiting factors.   Linh Nicholson is a 54 y.o. adult who presents to the emergency department via EMS for evaluation regarding altered mental status, hyperglycemia.  Patient arrived to the ED altered and not able to provide any relevant history.  EMS noted that patient's friends and family last saw him yesterday afternoon and today they found him to be confused and altered at his home.  Overall history is severely limited.  Patient is noted to be tachycardic and hyperglycemic and route.  Family did note a prior history of type 1 diabetes.  Patient has received about 500 cc of IV fluids and route per EMS.    HPI    NursingNotes were reviewed.    REVIEW OF SYSTEMS    (2-9 systems for level 4, 10 or more for level 5)     Review of Systems   Unable to perform ROS: Mental status change            PAST MEDICALHISTORY   Diabetes      SURGICAL HISTORY     No past surgical history on file.      CURRENT MEDICATIONS     Previous Medications    No medications on file       ALLERGIES     Patient has no allergy information on record.    FAMILY HISTORY     No family history on file.       SOCIAL HISTORY       Social History     Socioeconomic History    Marital status: Single       SCREENINGS    Yasmeen Coma Scale  Eye Opening: Spontaneous  Best Verbal Response: Confused  Best Motor Response: Obeys commands  [place] Coma Scale Score: 14        PHYSICAL EXAM    (up to 7 for level 4, 8 or more for level 5)     ED Triage Vitals   BP Systolic BP Percentile Diastolic BP Percentile Temp Temp Source Pulse Respirations SpO2   11/10/24

## 2024-11-11 ENCOUNTER — APPOINTMENT (OUTPATIENT)
Age: 54
DRG: 871 | End: 2024-11-11
Attending: INTERNAL MEDICINE
Payer: MEDICAID

## 2024-11-11 PROBLEM — E43 SEVERE MALNUTRITION (HCC): Status: ACTIVE | Noted: 2024-11-11

## 2024-11-11 LAB
ALBUMIN SERPL-MCNC: 2.5 G/DL (ref 3.5–5.2)
ALP SERPL-CCNC: 137 U/L
ALT SERPL-CCNC: 12 U/L
ANION GAP SERPL CALCULATED.3IONS-SCNC: 13 MMOL/L (ref 7–19)
AST SERPL-CCNC: 18 U/L
BACTERIA BLD CULT ORG #2: ABNORMAL
BACTERIA BLD CULT ORG #2: ABNORMAL
BASOPHILS # BLD: 0 K/UL (ref 0–0.2)
BASOPHILS NFR BLD: 0.1 % (ref 0–1)
BILIRUB SERPL-MCNC: 0.6 MG/DL (ref 0.2–1.2)
BUN SERPL-MCNC: 36 MG/DL (ref 6–20)
CALCIUM SERPL-MCNC: 8 MG/DL (ref 8.6–10)
CHLORIDE SERPL-SCNC: 95 MMOL/L (ref 98–111)
CO2 SERPL-SCNC: 24 MMOL/L (ref 22–29)
CREAT SERPL-MCNC: 0.8 MG/DL
ECHO AO ASC DIAM: 2.9 CM
ECHO AO ASCENDING AORTA INDEX: 1.76 CM/M2
ECHO AO ROOT DIAM: 1.9 CM
ECHO AO ROOT INDEX: 1.15 CM/M2
ECHO AO SINUS VALSALVA DIAM: 2.8 CM
ECHO AO SINUS VALSALVA INDEX: 1.7 CM/M2
ECHO AO ST JNCT DIAM: 2.5 CM
ECHO AR MAX VEL PISA: 3.6 M/S
ECHO AV AREA PEAK VELOCITY: 1.8 CM2
ECHO AV AREA VTI: 1.7 CM2
ECHO AV AREA/BSA PEAK VELOCITY: 1.1 CM2/M2
ECHO AV AREA/BSA VTI: 1 CM2/M2
ECHO AV MEAN GRADIENT: 4 MMHG
ECHO AV MEAN VELOCITY: 0.9 M/S
ECHO AV PEAK GRADIENT: 8 MMHG
ECHO AV PEAK VELOCITY: 1.5 M/S
ECHO AV REGURGITANT PHT: 729 MS
ECHO AV VELOCITY RATIO: 0.47
ECHO AV VTI: 28.6 CM
ECHO BSA: 1.6 M2
ECHO EST RA PRESSURE: 3 MMHG
ECHO IVC PROX: 1.5 CM
ECHO LA AREA 2C: 12.7 CM2
ECHO LA AREA 4C: 9 CM2
ECHO LA DIAMETER INDEX: 1.7 CM/M2
ECHO LA DIAMETER: 2.8 CM
ECHO LA MAJOR AXIS: 3.9 CM
ECHO LA MINOR AXIS: 4.1 CM
ECHO LA TO AORTIC ROOT RATIO: 1.47
ECHO LA VOL BP: 22 ML (ref 18–58)
ECHO LA VOL MOD A2C: 32 ML (ref 18–58)
ECHO LA VOL MOD A4C: 15 ML (ref 18–58)
ECHO LA VOL/BSA BIPLANE: 13 ML/M2 (ref 16–34)
ECHO LA VOLUME INDEX MOD A2C: 19 ML/M2 (ref 16–34)
ECHO LA VOLUME INDEX MOD A4C: 9 ML/M2 (ref 16–34)
ECHO LV E' LATERAL VELOCITY: 5.9 CM/S
ECHO LV E' SEPTAL VELOCITY: 6.4 CM/S
ECHO LV EDV A2C: 72 ML
ECHO LV EDV A4C: 100 ML
ECHO LV EDV INDEX A4C: 61 ML/M2
ECHO LV EDV NDEX A2C: 44 ML/M2
ECHO LV EJECTION FRACTION A2C: 42 %
ECHO LV EJECTION FRACTION A4C: 58 %
ECHO LV EJECTION FRACTION BIPLANE: 55 % (ref 55–100)
ECHO LV ESV A2C: 41 ML
ECHO LV ESV A4C: 42 ML
ECHO LV ESV INDEX A2C: 25 ML/M2
ECHO LV ESV INDEX A4C: 25 ML/M2
ECHO LV FRACTIONAL SHORTENING: 28 % (ref 28–44)
ECHO LV GLOBAL LONGITUDINAL STRAIN (GLS): -15.8 %
ECHO LV GLOBAL LONGITUDINAL STRAIN (GLS): -16.7 %
ECHO LV GLOBAL LONGITUDINAL STRAIN (GLS): -17.4 %
ECHO LV GLOBAL LONGITUDINAL STRAIN (GLS): -19.8 %
ECHO LV INTERNAL DIMENSION DIASTOLE INDEX: 2.18 CM/M2
ECHO LV INTERNAL DIMENSION DIASTOLIC: 3.6 CM (ref 4.2–5.9)
ECHO LV INTERNAL DIMENSION SYSTOLIC INDEX: 1.58 CM/M2
ECHO LV INTERNAL DIMENSION SYSTOLIC: 2.6 CM
ECHO LV IVSD: 1.4 CM (ref 0.6–1)
ECHO LV MASS 2D: 190.3 G (ref 88–224)
ECHO LV MASS INDEX 2D: 115.3 G/M2 (ref 49–115)
ECHO LV POSTERIOR WALL DIASTOLIC: 1.5 CM (ref 0.6–1)
ECHO LV RELATIVE WALL THICKNESS RATIO: 0.83
ECHO LVOT AREA: 3.5 CM2
ECHO LVOT AV VTI INDEX: 0.49
ECHO LVOT DIAM: 2.1 CM
ECHO LVOT MEAN GRADIENT: 1 MMHG
ECHO LVOT PEAK GRADIENT: 2 MMHG
ECHO LVOT PEAK VELOCITY: 0.7 M/S
ECHO LVOT STROKE VOLUME INDEX: 29.6 ML/M2
ECHO LVOT SV: 48.8 ML
ECHO LVOT VTI: 14.1 CM
ECHO MV A VELOCITY: 0.56 M/S
ECHO MV AREA VTI: 1.8 CM2
ECHO MV E DECELERATION TIME (DT): 246 MS
ECHO MV E VELOCITY: 0.72 M/S
ECHO MV E/A RATIO: 1.29
ECHO MV E/E' LATERAL: 12.2
ECHO MV E/E' RATIO (AVERAGED): 11.73
ECHO MV E/E' SEPTAL: 11.25
ECHO MV LVOT VTI INDEX: 1.88
ECHO MV MAX VELOCITY: 0.9 M/S
ECHO MV MEAN GRADIENT: 2 MMHG
ECHO MV MEAN VELOCITY: 0.6 M/S
ECHO MV PEAK GRADIENT: 3 MMHG
ECHO MV REGURGITANT PEAK GRADIENT: 52 MMHG
ECHO MV REGURGITANT PEAK VELOCITY: 3.6 M/S
ECHO MV VTI: 26.5 CM
ECHO RA AREA 4C: 8 CM2
ECHO RA END SYSTOLIC VOLUME APICAL 4 CHAMBER INDEX BSA: 8 ML/M2
ECHO RA VOLUME: 14 ML
ECHO RV BASAL DIMENSION: 2.5 CM
ECHO RV INTERNAL DIMENSION: 3.3 CM
ECHO RV LONGITUDINAL DIMENSION: 6.6 CM
ECHO RV MID DIMENSION: 2.8 CM
ECHO RV TAPSE: 1.7 CM (ref 1.7–?)
EKG P AXIS: 82 DEGREES
EKG P-R INTERVAL: 136 MS
EKG Q-T INTERVAL: 316 MS
EKG QRS DURATION: 96 MS
EKG QTC CALCULATION (BAZETT): 433 MS
EKG T AXIS: 70 DEGREES
EOSINOPHIL # BLD: 0 K/UL (ref 0–0.6)
EOSINOPHIL NFR BLD: 0 % (ref 0–5)
ERYTHROCYTE [DISTWIDTH] IN BLOOD BY AUTOMATED COUNT: 13.8 % (ref 11.5–14.5)
ERYTHROCYTE [SEDIMENTATION RATE] IN BLOOD BY WESTERGREN METHOD: 50 MM/HR (ref 0–15)
GLUCOSE BLD-MCNC: 269 MG/DL (ref 70–99)
GLUCOSE BLD-MCNC: 270 MG/DL (ref 70–99)
GLUCOSE BLD-MCNC: 287 MG/DL (ref 70–99)
GLUCOSE BLD-MCNC: 291 MG/DL (ref 70–99)
GLUCOSE BLD-MCNC: 297 MG/DL (ref 70–99)
GLUCOSE BLD-MCNC: 301 MG/DL (ref 70–99)
GLUCOSE BLD-MCNC: 319 MG/DL (ref 70–99)
GLUCOSE SERPL-MCNC: 316 MG/DL (ref 70–99)
HCT VFR BLD AUTO: 35.6 %
HGB BLD-MCNC: 11.8 G/DL
IMM GRANULOCYTES # BLD: 0.1 K/UL
LACTATE BLDV-SCNC: 1.8 MMOL/L (ref 0.5–1.9)
LYMPHOCYTES # BLD: 1.1 K/UL (ref 1.1–4.5)
LYMPHOCYTES NFR BLD: 7.4 % (ref 20–40)
MCH RBC QN AUTO: 29.6 PG (ref 27–31)
MCHC RBC AUTO-ENTMCNC: 33.1 G/DL (ref 33–37)
MCV RBC AUTO: 89.2 FL
MONOCYTES # BLD: 0.6 K/UL (ref 0–0.9)
MONOCYTES NFR BLD: 3.7 % (ref 0–10)
NEUTROPHILS # BLD: 13.3 K/UL (ref 1.5–7.5)
NEUTS SEG NFR BLD: 88.1 % (ref 50–65)
ORGANISM: ABNORMAL
PERFORMED ON: ABNORMAL
PHOSPHATE SERPL-MCNC: 2.7 MG/DL (ref 2.5–4.5)
PLATELET # BLD AUTO: 87 K/UL (ref 130–400)
PMV BLD AUTO: 12.8 FL
POTASSIUM SERPL-SCNC: 3.8 MMOL/L (ref 3.5–5)
PROT SERPL-MCNC: 5.8 G/DL (ref 6.4–8.3)
RBC # BLD AUTO: 3.99 M/UL
SODIUM SERPL-SCNC: 132 MMOL/L (ref 136–145)
WBC # BLD AUTO: 15.1 K/UL (ref 4.8–10.8)

## 2024-11-11 PROCEDURE — C8929 TTE W OR WO FOL WCON,DOPPLER: HCPCS

## 2024-11-11 PROCEDURE — 6360000002 HC RX W HCPCS: Performed by: INTERNAL MEDICINE

## 2024-11-11 PROCEDURE — 2000000000 HC ICU R&B

## 2024-11-11 PROCEDURE — 36415 COLL VENOUS BLD VENIPUNCTURE: CPT

## 2024-11-11 PROCEDURE — 83605 ASSAY OF LACTIC ACID: CPT

## 2024-11-11 PROCEDURE — 93306 TTE W/DOPPLER COMPLETE: CPT | Performed by: INTERNAL MEDICINE

## 2024-11-11 PROCEDURE — 84100 ASSAY OF PHOSPHORUS: CPT

## 2024-11-11 PROCEDURE — 6360000004 HC RX CONTRAST MEDICATION: Performed by: INTERNAL MEDICINE

## 2024-11-11 PROCEDURE — 93356 MYOCRD STRAIN IMG SPCKL TRCK: CPT | Performed by: INTERNAL MEDICINE

## 2024-11-11 PROCEDURE — 82962 GLUCOSE BLOOD TEST: CPT

## 2024-11-11 PROCEDURE — 85652 RBC SED RATE AUTOMATED: CPT

## 2024-11-11 PROCEDURE — 2500000003 HC RX 250 WO HCPCS: Performed by: STUDENT IN AN ORGANIZED HEALTH CARE EDUCATION/TRAINING PROGRAM

## 2024-11-11 PROCEDURE — 2580000003 HC RX 258: Performed by: INTERNAL MEDICINE

## 2024-11-11 PROCEDURE — 6370000000 HC RX 637 (ALT 250 FOR IP): Performed by: STUDENT IN AN ORGANIZED HEALTH CARE EDUCATION/TRAINING PROGRAM

## 2024-11-11 PROCEDURE — 80053 COMPREHEN METABOLIC PANEL: CPT

## 2024-11-11 PROCEDURE — 85025 COMPLETE CBC W/AUTO DIFF WBC: CPT

## 2024-11-11 PROCEDURE — 6370000000 HC RX 637 (ALT 250 FOR IP): Performed by: INTERNAL MEDICINE

## 2024-11-11 PROCEDURE — 93010 ELECTROCARDIOGRAM REPORT: CPT | Performed by: INTERNAL MEDICINE

## 2024-11-11 RX ORDER — INSULIN GLARGINE 100 [IU]/ML
15 INJECTION, SOLUTION SUBCUTANEOUS 2 TIMES DAILY
Status: DISCONTINUED | OUTPATIENT
Start: 2024-11-11 | End: 2024-11-14

## 2024-11-11 RX ORDER — DEXTROSE MONOHYDRATE 100 MG/ML
INJECTION, SOLUTION INTRAVENOUS CONTINUOUS PRN
Status: DISCONTINUED | OUTPATIENT
Start: 2024-11-11 | End: 2024-11-21 | Stop reason: HOSPADM

## 2024-11-11 RX ADMIN — INSULIN GLARGINE 15 UNITS: 100 INJECTION, SOLUTION SUBCUTANEOUS at 21:03

## 2024-11-11 RX ADMIN — INSULIN LISPRO 6 UNITS: 100 INJECTION, SOLUTION INTRAVENOUS; SUBCUTANEOUS at 06:41

## 2024-11-11 RX ADMIN — DEXMEDETOMIDINE HYDROCHLORIDE 0.5 MCG/KG/HR: 400 INJECTION, SOLUTION INTRAVENOUS at 05:44

## 2024-11-11 RX ADMIN — VANCOMYCIN HYDROCHLORIDE 750 MG: 750 INJECTION, POWDER, LYOPHILIZED, FOR SOLUTION INTRAVENOUS at 14:54

## 2024-11-11 RX ADMIN — DEXAMETHASONE SODIUM PHOSPHATE 7.8 MG: 10 INJECTION, SOLUTION INTRAMUSCULAR; INTRAVENOUS at 20:08

## 2024-11-11 RX ADMIN — ACYCLOVIR SODIUM 500 MG: 50 INJECTION, SOLUTION INTRAVENOUS at 05:17

## 2024-11-11 RX ADMIN — INSULIN LISPRO 4 UNITS: 100 INJECTION, SOLUTION INTRAVENOUS; SUBCUTANEOUS at 11:42

## 2024-11-11 RX ADMIN — SODIUM CHLORIDE, PRESERVATIVE FREE 10 ML: 5 INJECTION INTRAVENOUS at 08:11

## 2024-11-11 RX ADMIN — PERFLUTREN 1.5 ML: 6.52 INJECTION, SUSPENSION INTRAVENOUS at 13:43

## 2024-11-11 RX ADMIN — DEXAMETHASONE SODIUM PHOSPHATE 7.8 MG: 10 INJECTION, SOLUTION INTRAMUSCULAR; INTRAVENOUS at 14:46

## 2024-11-11 RX ADMIN — VANCOMYCIN HYDROCHLORIDE 750 MG: 750 INJECTION, POWDER, LYOPHILIZED, FOR SOLUTION INTRAVENOUS at 02:32

## 2024-11-11 RX ADMIN — DEXAMETHASONE SODIUM PHOSPHATE 7.8 MG: 10 INJECTION, SOLUTION INTRAMUSCULAR; INTRAVENOUS at 02:18

## 2024-11-11 RX ADMIN — INSULIN LISPRO 6 UNITS: 100 INJECTION, SOLUTION INTRAVENOUS; SUBCUTANEOUS at 00:11

## 2024-11-11 RX ADMIN — DEXMEDETOMIDINE HYDROCHLORIDE 1 MCG/KG/HR: 400 INJECTION, SOLUTION INTRAVENOUS at 19:43

## 2024-11-11 RX ADMIN — INSULIN GLARGINE 15 UNITS: 100 INJECTION, SOLUTION SUBCUTANEOUS at 14:45

## 2024-11-11 RX ADMIN — SODIUM CHLORIDE: 9 INJECTION, SOLUTION INTRAVENOUS at 22:01

## 2024-11-11 RX ADMIN — SODIUM CHLORIDE, POTASSIUM CHLORIDE, SODIUM LACTATE AND CALCIUM CHLORIDE: 600; 310; 30; 20 INJECTION, SOLUTION INTRAVENOUS at 06:43

## 2024-11-11 RX ADMIN — INSULIN LISPRO 4 UNITS: 100 INJECTION, SOLUTION INTRAVENOUS; SUBCUTANEOUS at 21:03

## 2024-11-11 RX ADMIN — DEXAMETHASONE SODIUM PHOSPHATE 7.8 MG: 10 INJECTION, SOLUTION INTRAMUSCULAR; INTRAVENOUS at 08:10

## 2024-11-11 RX ADMIN — SODIUM CHLORIDE, PRESERVATIVE FREE 10 ML: 5 INJECTION INTRAVENOUS at 21:02

## 2024-11-11 RX ADMIN — WATER 2000 MG: 1 INJECTION INTRAMUSCULAR; INTRAVENOUS; SUBCUTANEOUS at 22:01

## 2024-11-11 RX ADMIN — INSULIN LISPRO 4 UNITS: 100 INJECTION, SOLUTION INTRAVENOUS; SUBCUTANEOUS at 18:16

## 2024-11-11 RX ADMIN — WATER 2000 MG: 1 INJECTION INTRAMUSCULAR; INTRAVENOUS; SUBCUTANEOUS at 11:07

## 2024-11-11 ASSESSMENT — PAIN SCALES - GENERAL
PAINLEVEL_OUTOF10: 0

## 2024-11-11 NOTE — ED NOTES
Upon chart review. At initial triage, patient did not meet SIRS criteria. As the patient was in ED, his vitals began meeting SIRS criteria.

## 2024-11-11 NOTE — DISCHARGE INSTRUCTIONS
[id number]  [phone]    Food Pantry  Food distribution. Most require person to bring ID/documentation. Contact for information.    [facility]   [address], [place] [address]  [phone]  [facility]  [address] [address]  [phone]  [facility]  [address] [place], [place] [address]  [phone]    [facility]   Typically serve a daily meal and serve as point of contact for Meals on Wheels program  [facility]  [address], [place] [address]   [phone]    Food Pantry  Food distribution. Most require person to bring ID/documentation. Contact for information.    [facility]  [address], [place] [address]  [phone]  [facility]  [address] [place], [place] [address]   [facility]  [address], [place] [address]  [phone]    [facility]  Typically serve a daily lunch during the week- contact for meal times.  [facility]   [address]  [place], [place] [address]  [phone]    [facility]   Typically serve a daily meal and serve as point of contact for Meals on Wheels program  [facility]  [address] [place], [place] [address]  [phone]    Food Pantry  Food distribution. Most require person to bring ID/documentation. Contact for information.    [facility]  [address] [place], [place] [address]  [phone]  [facility]  [address] [place], [place] [address]  [phone]  [facility]   [address] [place] [address]  [phone]  [address] [place] [address]  [phone]  [facility]  [address] [place] [address]  [phone]  [facility]   [phone]  [facility]

## 2024-11-12 ENCOUNTER — OUTSIDE FACILITY SERVICE (OUTPATIENT)
Age: 54
End: 2024-11-12
Payer: MEDICAID

## 2024-11-12 LAB
ALBUMIN SERPL-MCNC: 2.3 G/DL (ref 3.5–5.2)
ALP SERPL-CCNC: 108 U/L
ALT SERPL-CCNC: 11 U/L
ANION GAP SERPL CALCULATED.3IONS-SCNC: 12 MMOL/L (ref 7–19)
AST SERPL-CCNC: 13 U/L
BACTERIA BLD CULT: ABNORMAL
BACTERIA BLD CULT: ABNORMAL
BACTERIA UR CULT: NORMAL
BASOPHILS # BLD: 0 K/UL (ref 0–0.2)
BASOPHILS NFR BLD: 0.2 % (ref 0–1)
BILIRUB SERPL-MCNC: 0.3 MG/DL (ref 0.2–1.2)
BUN SERPL-MCNC: 37 MG/DL (ref 6–20)
CALCIUM SERPL-MCNC: 8 MG/DL (ref 8.6–10)
CHLORIDE SERPL-SCNC: 101 MMOL/L (ref 98–111)
CO2 SERPL-SCNC: 20 MMOL/L (ref 22–29)
CREAT SERPL-MCNC: 0.6 MG/DL
EOSINOPHIL # BLD: 0 K/UL (ref 0–0.6)
EOSINOPHIL NFR BLD: 0 % (ref 0–5)
ERYTHROCYTE [DISTWIDTH] IN BLOOD BY AUTOMATED COUNT: 14 % (ref 11.5–14.5)
GLUCOSE BLD-MCNC: 107 MG/DL (ref 70–99)
GLUCOSE BLD-MCNC: 163 MG/DL (ref 70–99)
GLUCOSE BLD-MCNC: 205 MG/DL (ref 70–99)
GLUCOSE BLD-MCNC: 276 MG/DL (ref 70–99)
GLUCOSE BLD-MCNC: 303 MG/DL (ref 70–99)
GLUCOSE SERPL-MCNC: 287 MG/DL (ref 70–99)
HCT VFR BLD AUTO: 33.6 %
HGB BLD-MCNC: 11.4 G/DL
IMM GRANULOCYTES # BLD: 0.2 K/UL
LYMPHOCYTES # BLD: 2.3 K/UL (ref 1.1–4.5)
LYMPHOCYTES NFR BLD: 13 % (ref 20–40)
MAGNESIUM SERPL-MCNC: 2.3 MG/DL (ref 1.6–2.6)
MCH RBC QN AUTO: 29.5 PG (ref 27–31)
MCHC RBC AUTO-ENTMCNC: 33.9 G/DL (ref 33–37)
MCV RBC AUTO: 87 FL
MONOCYTES # BLD: 0.9 K/UL (ref 0–0.9)
MONOCYTES NFR BLD: 5.2 % (ref 0–10)
NEUTROPHILS # BLD: 13.9 K/UL (ref 1.5–7.5)
NEUTS SEG NFR BLD: 80.4 % (ref 50–65)
ORGANISM: ABNORMAL
PERFORMED ON: ABNORMAL
PHOSPHATE SERPL-MCNC: 3 MG/DL (ref 2.5–4.5)
PLATELET # BLD AUTO: 84 K/UL (ref 130–400)
PMV BLD AUTO: 12.5 FL
POTASSIUM SERPL-SCNC: 4.5 MMOL/L (ref 3.5–5)
PROT SERPL-MCNC: 5.3 G/DL (ref 6.4–8.3)
RBC # BLD AUTO: 3.86 M/UL
SODIUM SERPL-SCNC: 133 MMOL/L (ref 136–145)
VANCOMYCIN TROUGH SERPL-MCNC: 6 UG/ML (ref 10–20)
WBC # BLD AUTO: 17.3 K/UL (ref 4.8–10.8)

## 2024-11-12 PROCEDURE — 6370000000 HC RX 637 (ALT 250 FOR IP): Performed by: STUDENT IN AN ORGANIZED HEALTH CARE EDUCATION/TRAINING PROGRAM

## 2024-11-12 PROCEDURE — 82962 GLUCOSE BLOOD TEST: CPT

## 2024-11-12 PROCEDURE — 2580000003 HC RX 258: Performed by: INTERNAL MEDICINE

## 2024-11-12 PROCEDURE — 6370000000 HC RX 637 (ALT 250 FOR IP): Performed by: INTERNAL MEDICINE

## 2024-11-12 PROCEDURE — 2500000003 HC RX 250 WO HCPCS: Performed by: STUDENT IN AN ORGANIZED HEALTH CARE EDUCATION/TRAINING PROGRAM

## 2024-11-12 PROCEDURE — 6360000002 HC RX W HCPCS: Performed by: INTERNAL MEDICINE

## 2024-11-12 PROCEDURE — 80053 COMPREHEN METABOLIC PANEL: CPT

## 2024-11-12 PROCEDURE — 2000000000 HC ICU R&B

## 2024-11-12 PROCEDURE — 83735 ASSAY OF MAGNESIUM: CPT

## 2024-11-12 PROCEDURE — 87040 BLOOD CULTURE FOR BACTERIA: CPT

## 2024-11-12 PROCEDURE — 84100 ASSAY OF PHOSPHORUS: CPT

## 2024-11-12 PROCEDURE — 85025 COMPLETE CBC W/AUTO DIFF WBC: CPT

## 2024-11-12 PROCEDURE — 6360000002 HC RX W HCPCS: Performed by: STUDENT IN AN ORGANIZED HEALTH CARE EDUCATION/TRAINING PROGRAM

## 2024-11-12 PROCEDURE — 80202 ASSAY OF VANCOMYCIN: CPT

## 2024-11-12 PROCEDURE — 36415 COLL VENOUS BLD VENIPUNCTURE: CPT

## 2024-11-12 RX ORDER — HYDROMORPHONE HYDROCHLORIDE 1 MG/ML
0.5 INJECTION, SOLUTION INTRAMUSCULAR; INTRAVENOUS; SUBCUTANEOUS EVERY 4 HOURS PRN
Status: DISCONTINUED | OUTPATIENT
Start: 2024-11-12 | End: 2024-11-14

## 2024-11-12 RX ADMIN — VANCOMYCIN HYDROCHLORIDE 750 MG: 750 INJECTION, POWDER, LYOPHILIZED, FOR SOLUTION INTRAVENOUS at 01:59

## 2024-11-12 RX ADMIN — DEXAMETHASONE SODIUM PHOSPHATE 7.8 MG: 10 INJECTION, SOLUTION INTRAMUSCULAR; INTRAVENOUS at 01:38

## 2024-11-12 RX ADMIN — DEXAMETHASONE SODIUM PHOSPHATE 7.8 MG: 10 INJECTION, SOLUTION INTRAMUSCULAR; INTRAVENOUS at 14:28

## 2024-11-12 RX ADMIN — WATER 2000 MG: 1 INJECTION INTRAMUSCULAR; INTRAVENOUS; SUBCUTANEOUS at 21:31

## 2024-11-12 RX ADMIN — HYDROMORPHONE HYDROCHLORIDE 0.5 MG: 1 INJECTION, SOLUTION INTRAMUSCULAR; INTRAVENOUS; SUBCUTANEOUS at 00:11

## 2024-11-12 RX ADMIN — DEXAMETHASONE SODIUM PHOSPHATE 7.8 MG: 10 INJECTION, SOLUTION INTRAMUSCULAR; INTRAVENOUS at 08:27

## 2024-11-12 RX ADMIN — DEXMEDETOMIDINE HYDROCHLORIDE 1 MCG/KG/HR: 400 INJECTION, SOLUTION INTRAVENOUS at 03:12

## 2024-11-12 RX ADMIN — INSULIN GLARGINE 15 UNITS: 100 INJECTION, SOLUTION SUBCUTANEOUS at 08:27

## 2024-11-12 RX ADMIN — DEXMEDETOMIDINE HYDROCHLORIDE 1 MCG/KG/HR: 400 INJECTION, SOLUTION INTRAVENOUS at 08:37

## 2024-11-12 RX ADMIN — OLANZAPINE 10 MG: 10 INJECTION, POWDER, FOR SOLUTION INTRAMUSCULAR at 07:18

## 2024-11-12 RX ADMIN — VANCOMYCIN HYDROCHLORIDE 750 MG: 750 INJECTION, POWDER, LYOPHILIZED, FOR SOLUTION INTRAVENOUS at 10:10

## 2024-11-12 RX ADMIN — DEXMEDETOMIDINE HYDROCHLORIDE 0.6 MCG/KG/HR: 400 INJECTION, SOLUTION INTRAVENOUS at 16:54

## 2024-11-12 RX ADMIN — INSULIN LISPRO 4 UNITS: 100 INJECTION, SOLUTION INTRAVENOUS; SUBCUTANEOUS at 08:26

## 2024-11-12 RX ADMIN — INSULIN LISPRO 6 UNITS: 100 INJECTION, SOLUTION INTRAVENOUS; SUBCUTANEOUS at 10:53

## 2024-11-12 RX ADMIN — WATER 2000 MG: 1 INJECTION INTRAMUSCULAR; INTRAVENOUS; SUBCUTANEOUS at 10:08

## 2024-11-12 RX ADMIN — INSULIN GLARGINE 15 UNITS: 100 INJECTION, SOLUTION SUBCUTANEOUS at 21:30

## 2024-11-12 ASSESSMENT — PAIN SCALES - GENERAL
PAINLEVEL_OUTOF10: 8
PAINLEVEL_OUTOF10: 1

## 2024-11-12 ASSESSMENT — PAIN DESCRIPTION - LOCATION: LOCATION: NECK

## 2024-11-13 ENCOUNTER — APPOINTMENT (OUTPATIENT)
Age: 54
DRG: 871 | End: 2024-11-13
Attending: INTERNAL MEDICINE
Payer: MEDICAID

## 2024-11-13 ENCOUNTER — HOSPITAL ENCOUNTER (INPATIENT)
Age: 54
Discharge: HOME | DRG: 871 | End: 2024-11-15
Attending: INTERNAL MEDICINE
Payer: MEDICAID

## 2024-11-13 VITALS — OXYGEN SATURATION: 100 %

## 2024-11-13 DIAGNOSIS — A41.9 SEPSIS (HCC): Primary | ICD-10-CM

## 2024-11-13 PROBLEM — R93.1 ABNORMAL ECHOCARDIOGRAM: Status: ACTIVE | Noted: 2024-11-13

## 2024-11-13 LAB
ALBUMIN SERPL-MCNC: 2.4 G/DL (ref 3.5–5.2)
ALP SERPL-CCNC: 113 U/L
ALT SERPL-CCNC: 18 U/L
ANION GAP SERPL CALCULATED.3IONS-SCNC: 13 MMOL/L (ref 7–19)
AST SERPL-CCNC: 28 U/L
BASOPHILS # BLD: 0 K/UL (ref 0–0.2)
BASOPHILS NFR BLD: 0 % (ref 0–1)
BILIRUB SERPL-MCNC: 0.3 MG/DL (ref 0.2–1.2)
BNP BLD-MCNC: 1337 PG/ML (ref 0–124)
BUN SERPL-MCNC: 51 MG/DL (ref 6–20)
CALCIUM SERPL-MCNC: 8.3 MG/DL (ref 8.6–10)
CHLORIDE SERPL-SCNC: 105 MMOL/L (ref 98–111)
CO2 SERPL-SCNC: 21 MMOL/L (ref 22–29)
CREAT SERPL-MCNC: 0.9 MG/DL
ECHO BSA: 1.6 M2
ECHO BSA: 1.6 M2
EOSINOPHIL # BLD: 0 K/UL (ref 0–0.6)
EOSINOPHIL NFR BLD: 0 % (ref 0–5)
ERYTHROCYTE [DISTWIDTH] IN BLOOD BY AUTOMATED COUNT: 14.8 % (ref 11.5–14.5)
GLUCOSE BLD-MCNC: 122 MG/DL (ref 70–99)
GLUCOSE BLD-MCNC: 49 MG/DL (ref 70–99)
GLUCOSE BLD-MCNC: 71 MG/DL (ref 70–99)
GLUCOSE BLD-MCNC: 78 MG/DL (ref 70–99)
GLUCOSE BLD-MCNC: 80 MG/DL (ref 70–99)
GLUCOSE BLD-MCNC: 85 MG/DL (ref 70–99)
GLUCOSE BLD-MCNC: 88 MG/DL (ref 70–99)
GLUCOSE BLD-MCNC: 96 MG/DL (ref 70–99)
GLUCOSE SERPL-MCNC: 121 MG/DL (ref 70–99)
HCT VFR BLD AUTO: 41.7 %
HGB BLD-MCNC: 13.7 G/DL
IMM GRANULOCYTES # BLD: 1 K/UL
LYMPHOCYTES # BLD: 1.8 K/UL (ref 1.1–4.5)
LYMPHOCYTES NFR BLD: 5 % (ref 20–40)
MAGNESIUM SERPL-MCNC: 2.6 MG/DL (ref 1.6–2.6)
MCH RBC QN AUTO: 29.3 PG (ref 27–31)
MCHC RBC AUTO-ENTMCNC: 32.9 G/DL (ref 33–37)
MCV RBC AUTO: 89.1 FL
MONOCYTES # BLD: 1.5 K/UL (ref 0–0.9)
MONOCYTES NFR BLD: 5 % (ref 0–10)
NEUTROPHILS # BLD: 27.2 K/UL (ref 1.5–7.5)
NEUTS BAND NFR BLD MANUAL: 5 % (ref 0–5)
NEUTS SEG NFR BLD: 84 % (ref 50–65)
PERFORMED ON: ABNORMAL
PERFORMED ON: ABNORMAL
PERFORMED ON: NORMAL
PHOSPHATE SERPL-MCNC: 3.3 MG/DL (ref 2.5–4.5)
PLATELET # BLD AUTO: 122 K/UL (ref 130–400)
PLATELET SLIDE REVIEW: ABNORMAL
PMV BLD AUTO: 12.9 FL
POTASSIUM SERPL-SCNC: 4.2 MMOL/L (ref 3.5–5)
PROT SERPL-MCNC: 5.9 G/DL (ref 6.4–8.3)
RBC # BLD AUTO: 4.68 M/UL
RBC MORPH BLD: NORMAL
SODIUM SERPL-SCNC: 139 MMOL/L (ref 136–145)
TROPONIN, HIGH SENSITIVITY: 13 NG/L (ref 0–22)
TROPONIN, HIGH SENSITIVITY: 16 NG/L (ref 0–22)
VARIANT LYMPHS NFR BLD: 1 % (ref 0–8)
WBC # BLD AUTO: 30.6 K/UL (ref 4.8–10.8)

## 2024-11-13 PROCEDURE — 99152 MOD SED SAME PHYS/QHP 5/>YRS: CPT | Performed by: INTERNAL MEDICINE

## 2024-11-13 PROCEDURE — 83735 ASSAY OF MAGNESIUM: CPT

## 2024-11-13 PROCEDURE — 99222 1ST HOSP IP/OBS MODERATE 55: CPT | Performed by: INTERNAL MEDICINE

## 2024-11-13 PROCEDURE — 80053 COMPREHEN METABOLIC PANEL: CPT

## 2024-11-13 PROCEDURE — 82962 GLUCOSE BLOOD TEST: CPT

## 2024-11-13 PROCEDURE — 93320 DOPPLER ECHO COMPLETE: CPT

## 2024-11-13 PROCEDURE — 93320 DOPPLER ECHO COMPLETE: CPT | Performed by: INTERNAL MEDICINE

## 2024-11-13 PROCEDURE — B24BZZ4 ULTRASONOGRAPHY OF HEART WITH AORTA, TRANSESOPHAGEAL: ICD-10-PCS | Performed by: INTERNAL MEDICINE

## 2024-11-13 PROCEDURE — 6360000002 HC RX W HCPCS: Performed by: INTERNAL MEDICINE

## 2024-11-13 PROCEDURE — 2580000003 HC RX 258: Performed by: INTERNAL MEDICINE

## 2024-11-13 PROCEDURE — 36415 COLL VENOUS BLD VENIPUNCTURE: CPT

## 2024-11-13 PROCEDURE — 84484 ASSAY OF TROPONIN QUANT: CPT

## 2024-11-13 PROCEDURE — 2500000003 HC RX 250 WO HCPCS: Performed by: STUDENT IN AN ORGANIZED HEALTH CARE EDUCATION/TRAINING PROGRAM

## 2024-11-13 PROCEDURE — 2000000000 HC ICU R&B

## 2024-11-13 PROCEDURE — 84100 ASSAY OF PHOSPHORUS: CPT

## 2024-11-13 PROCEDURE — 83880 ASSAY OF NATRIURETIC PEPTIDE: CPT

## 2024-11-13 PROCEDURE — 93325 DOPPLER ECHO COLOR FLOW MAPG: CPT | Performed by: INTERNAL MEDICINE

## 2024-11-13 PROCEDURE — 93312 ECHO TRANSESOPHAGEAL: CPT | Performed by: INTERNAL MEDICINE

## 2024-11-13 PROCEDURE — 85025 COMPLETE CBC W/AUTO DIFF WBC: CPT

## 2024-11-13 PROCEDURE — 2709999900 HC NON-CHARGEABLE SUPPLY: Performed by: INTERNAL MEDICINE

## 2024-11-13 RX ORDER — SODIUM CHLORIDE, SODIUM LACTATE, POTASSIUM CHLORIDE, CALCIUM CHLORIDE 600; 310; 30; 20 MG/100ML; MG/100ML; MG/100ML; MG/100ML
INJECTION, SOLUTION INTRAVENOUS CONTINUOUS
Status: DISCONTINUED | OUTPATIENT
Start: 2024-11-13 | End: 2024-11-16

## 2024-11-13 RX ORDER — MIDAZOLAM HYDROCHLORIDE 1 MG/ML
INJECTION, SOLUTION INTRAMUSCULAR; INTRAVENOUS PRN
Status: COMPLETED | OUTPATIENT
Start: 2024-11-13 | End: 2024-11-13

## 2024-11-13 RX ORDER — INSULIN LISPRO 100 [IU]/ML
0-4 INJECTION, SOLUTION INTRAVENOUS; SUBCUTANEOUS
Status: DISCONTINUED | OUTPATIENT
Start: 2024-11-13 | End: 2024-11-21 | Stop reason: HOSPADM

## 2024-11-13 RX ADMIN — MIDAZOLAM 2 MG: 1 INJECTION INTRAMUSCULAR; INTRAVENOUS at 10:44

## 2024-11-13 RX ADMIN — WATER 2000 MG: 1 INJECTION INTRAMUSCULAR; INTRAVENOUS; SUBCUTANEOUS at 22:46

## 2024-11-13 RX ADMIN — DEXMEDETOMIDINE HYDROCHLORIDE 1.4 MCG/KG/HR: 400 INJECTION, SOLUTION INTRAVENOUS at 07:00

## 2024-11-13 RX ADMIN — DEXMEDETOMIDINE HYDROCHLORIDE 1.2 MCG/KG/HR: 400 INJECTION, SOLUTION INTRAVENOUS at 22:42

## 2024-11-13 RX ADMIN — SODIUM CHLORIDE, POTASSIUM CHLORIDE, SODIUM LACTATE AND CALCIUM CHLORIDE: 600; 310; 30; 20 INJECTION, SOLUTION INTRAVENOUS at 12:56

## 2024-11-13 RX ADMIN — WATER 2000 MG: 1 INJECTION INTRAMUSCULAR; INTRAVENOUS; SUBCUTANEOUS at 09:50

## 2024-11-13 RX ADMIN — DEXTROSE MONOHYDRATE 250 ML: 100 INJECTION, SOLUTION INTRAVENOUS at 20:08

## 2024-11-13 RX ADMIN — DEXMEDETOMIDINE HYDROCHLORIDE 1.4 MCG/KG/HR: 400 INJECTION, SOLUTION INTRAVENOUS at 01:10

## 2024-11-13 RX ADMIN — DEXMEDETOMIDINE HYDROCHLORIDE 0.6 MCG/KG/HR: 400 INJECTION, SOLUTION INTRAVENOUS at 16:14

## 2024-11-13 ASSESSMENT — PAIN SCALES - GENERAL: PAINLEVEL_OUTOF10: 0

## 2024-11-13 NOTE — PROGRESS NOTES
TRANSFER - OUT REPORT:    Verbal report given to ICU RN on Linh Nicholson being transferred to ICU  for routine progression of patient care       Report consisted of patient's Situation, Background, Assessment and   Recommendations(SBAR).     Information from the following report(s) Nurse Handoff Report was reviewed with the receiving nurse.    Opportunity for questions and clarification was provided.      Patient transported with:   Registered Nurse

## 2024-11-14 LAB
ALBUMIN SERPL-MCNC: 2.3 G/DL (ref 3.5–5.2)
ALP SERPL-CCNC: 99 U/L
ALT SERPL-CCNC: 20 U/L
ANION GAP SERPL CALCULATED.3IONS-SCNC: 11 MMOL/L (ref 7–19)
AST SERPL-CCNC: 22 U/L
BASOPHILS # BLD: 0 K/UL (ref 0–0.2)
BASOPHILS NFR BLD: 0 % (ref 0–1)
BILIRUB SERPL-MCNC: 0.4 MG/DL (ref 0.2–1.2)
BUN SERPL-MCNC: 36 MG/DL (ref 6–20)
CALCIUM SERPL-MCNC: 8.1 MG/DL (ref 8.6–10)
CHLORIDE SERPL-SCNC: 111 MMOL/L (ref 98–111)
CO2 SERPL-SCNC: 20 MMOL/L (ref 22–29)
CREAT SERPL-MCNC: 0.7 MG/DL
EOSINOPHIL # BLD: 0 K/UL (ref 0–0.6)
EOSINOPHIL NFR BLD: 0 % (ref 0–5)
ERYTHROCYTE [DISTWIDTH] IN BLOOD BY AUTOMATED COUNT: 14.7 % (ref 11.5–14.5)
GLUCOSE BLD-MCNC: 165 MG/DL (ref 70–99)
GLUCOSE BLD-MCNC: 183 MG/DL (ref 70–99)
GLUCOSE BLD-MCNC: 204 MG/DL (ref 70–99)
GLUCOSE BLD-MCNC: 41 MG/DL (ref 70–99)
GLUCOSE BLD-MCNC: 74 MG/DL (ref 70–99)
GLUCOSE SERPL-MCNC: 111 MG/DL (ref 70–99)
HCT VFR BLD AUTO: 40 %
HGB BLD-MCNC: 13.3 G/DL
IMM GRANULOCYTES # BLD: 1 K/UL
LYMPHOCYTES # BLD: 3.2 K/UL (ref 1.1–4.5)
LYMPHOCYTES NFR BLD: 13 % (ref 20–40)
MAGNESIUM SERPL-MCNC: 2.1 MG/DL (ref 1.6–2.6)
MCH RBC QN AUTO: 29 PG (ref 27–31)
MCHC RBC AUTO-ENTMCNC: 33.3 G/DL (ref 33–37)
MCV RBC AUTO: 87.3 FL
METAMYELOCYTES NFR BLD MANUAL: 1 %
MONOCYTES # BLD: 2.7 K/UL (ref 0–0.9)
MONOCYTES NFR BLD: 11 % (ref 0–10)
NEUTROPHILS # BLD: 18.5 K/UL (ref 1.5–7.5)
NEUTS BAND NFR BLD MANUAL: 1 % (ref 0–5)
NEUTS SEG NFR BLD: 74 % (ref 50–65)
PERFORMED ON: ABNORMAL
PERFORMED ON: NORMAL
PLATELET # BLD AUTO: 154 K/UL (ref 130–400)
PLATELET SLIDE REVIEW: ADEQUATE
PMV BLD AUTO: 12.1 FL
POTASSIUM SERPL-SCNC: 3.7 MMOL/L (ref 3.5–5)
PROT SERPL-MCNC: 5.8 G/DL (ref 6.4–8.3)
RBC # BLD AUTO: 4.58 M/UL
SODIUM SERPL-SCNC: 142 MMOL/L (ref 136–145)
WBC # BLD AUTO: 24.3 K/UL (ref 4.8–10.8)

## 2024-11-14 PROCEDURE — 2580000003 HC RX 258: Performed by: INTERNAL MEDICINE

## 2024-11-14 PROCEDURE — 2500000003 HC RX 250 WO HCPCS: Performed by: STUDENT IN AN ORGANIZED HEALTH CARE EDUCATION/TRAINING PROGRAM

## 2024-11-14 PROCEDURE — 82962 GLUCOSE BLOOD TEST: CPT

## 2024-11-14 PROCEDURE — 85025 COMPLETE CBC W/AUTO DIFF WBC: CPT

## 2024-11-14 PROCEDURE — 83735 ASSAY OF MAGNESIUM: CPT

## 2024-11-14 PROCEDURE — 6360000002 HC RX W HCPCS: Performed by: INTERNAL MEDICINE

## 2024-11-14 PROCEDURE — 36415 COLL VENOUS BLD VENIPUNCTURE: CPT

## 2024-11-14 PROCEDURE — 2000000000 HC ICU R&B

## 2024-11-14 PROCEDURE — 99232 SBSQ HOSP IP/OBS MODERATE 35: CPT | Performed by: INTERNAL MEDICINE

## 2024-11-14 PROCEDURE — 80053 COMPREHEN METABOLIC PANEL: CPT

## 2024-11-14 PROCEDURE — 6370000000 HC RX 637 (ALT 250 FOR IP): Performed by: INTERNAL MEDICINE

## 2024-11-14 RX ORDER — LORAZEPAM 2 MG/ML
2 INJECTION INTRAMUSCULAR
Status: DISCONTINUED | OUTPATIENT
Start: 2024-11-14 | End: 2024-11-14

## 2024-11-14 RX ADMIN — DEXMEDETOMIDINE HYDROCHLORIDE 1.2 MCG/KG/HR: 400 INJECTION, SOLUTION INTRAVENOUS at 09:25

## 2024-11-14 RX ADMIN — SODIUM CHLORIDE, POTASSIUM CHLORIDE, SODIUM LACTATE AND CALCIUM CHLORIDE: 600; 310; 30; 20 INJECTION, SOLUTION INTRAVENOUS at 23:22

## 2024-11-14 RX ADMIN — SODIUM CHLORIDE, POTASSIUM CHLORIDE, SODIUM LACTATE AND CALCIUM CHLORIDE: 600; 310; 30; 20 INJECTION, SOLUTION INTRAVENOUS at 09:25

## 2024-11-14 RX ADMIN — INSULIN LISPRO 1 UNITS: 100 INJECTION, SOLUTION INTRAVENOUS; SUBCUTANEOUS at 20:35

## 2024-11-14 RX ADMIN — DEXMEDETOMIDINE HYDROCHLORIDE 1.2 MCG/KG/HR: 400 INJECTION, SOLUTION INTRAVENOUS at 02:56

## 2024-11-14 RX ADMIN — WATER 2000 MG: 1 INJECTION INTRAMUSCULAR; INTRAVENOUS; SUBCUTANEOUS at 10:15

## 2024-11-14 RX ADMIN — DEXTROSE MONOHYDRATE 250 ML: 100 INJECTION, SOLUTION INTRAVENOUS at 07:41

## 2024-11-14 RX ADMIN — WATER 2000 MG: 1 INJECTION INTRAMUSCULAR; INTRAVENOUS; SUBCUTANEOUS at 21:52

## 2024-11-15 LAB
ANION GAP SERPL CALCULATED.3IONS-SCNC: 11 MMOL/L (ref 7–19)
BASOPHILS # BLD: 0 K/UL (ref 0–0.2)
BASOPHILS NFR BLD: 0.1 % (ref 0–1)
BNP BLD-MCNC: 2584 PG/ML (ref 0–124)
BUN SERPL-MCNC: 16 MG/DL (ref 6–20)
CALCIUM SERPL-MCNC: 7.8 MG/DL (ref 8.6–10)
CHLORIDE SERPL-SCNC: 101 MMOL/L (ref 98–111)
CO2 SERPL-SCNC: 23 MMOL/L (ref 22–29)
CREAT SERPL-MCNC: 0.6 MG/DL
EOSINOPHIL # BLD: 0 K/UL (ref 0–0.6)
EOSINOPHIL NFR BLD: 0.1 % (ref 0–5)
ERYTHROCYTE [DISTWIDTH] IN BLOOD BY AUTOMATED COUNT: 14.6 % (ref 11.5–14.5)
GLUCOSE BLD-MCNC: 153 MG/DL (ref 70–99)
GLUCOSE BLD-MCNC: 170 MG/DL (ref 70–99)
GLUCOSE BLD-MCNC: 189 MG/DL (ref 70–99)
GLUCOSE BLD-MCNC: 280 MG/DL (ref 70–99)
GLUCOSE SERPL-MCNC: 170 MG/DL (ref 70–99)
HCT VFR BLD AUTO: 35.7 %
HGB BLD-MCNC: 11.7 G/DL
IMM GRANULOCYTES # BLD: 0.4 K/UL
LYMPHOCYTES # BLD: 2.8 K/UL (ref 1.1–4.5)
LYMPHOCYTES NFR BLD: 18.7 % (ref 20–40)
MAGNESIUM SERPL-MCNC: 1.8 MG/DL (ref 1.6–2.6)
MCH RBC QN AUTO: 29.1 PG (ref 27–31)
MCHC RBC AUTO-ENTMCNC: 32.8 G/DL (ref 33–37)
MCV RBC AUTO: 88.8 FL
MONOCYTES # BLD: 0.8 K/UL (ref 0–0.9)
MONOCYTES NFR BLD: 5.2 % (ref 0–10)
NEUTROPHILS # BLD: 11.1 K/UL (ref 1.5–7.5)
NEUTS SEG NFR BLD: 73.5 % (ref 50–65)
PERFORMED ON: ABNORMAL
PLATELET # BLD AUTO: 126 K/UL (ref 130–400)
PMV BLD AUTO: 11.5 FL
POTASSIUM SERPL-SCNC: 3.7 MMOL/L (ref 3.5–5)
RBC # BLD AUTO: 4.02 M/UL
SODIUM SERPL-SCNC: 135 MMOL/L (ref 136–145)
WBC # BLD AUTO: 15.2 K/UL (ref 4.8–10.8)

## 2024-11-15 PROCEDURE — 92523 SPEECH SOUND LANG COMPREHEN: CPT

## 2024-11-15 PROCEDURE — 80048 BASIC METABOLIC PNL TOTAL CA: CPT

## 2024-11-15 PROCEDURE — 6370000000 HC RX 637 (ALT 250 FOR IP)

## 2024-11-15 PROCEDURE — 2580000003 HC RX 258: Performed by: INTERNAL MEDICINE

## 2024-11-15 PROCEDURE — 82962 GLUCOSE BLOOD TEST: CPT

## 2024-11-15 PROCEDURE — 85025 COMPLETE CBC W/AUTO DIFF WBC: CPT

## 2024-11-15 PROCEDURE — 6370000000 HC RX 637 (ALT 250 FOR IP): Performed by: INTERNAL MEDICINE

## 2024-11-15 PROCEDURE — 99232 SBSQ HOSP IP/OBS MODERATE 35: CPT | Performed by: INTERNAL MEDICINE

## 2024-11-15 PROCEDURE — 36415 COLL VENOUS BLD VENIPUNCTURE: CPT

## 2024-11-15 PROCEDURE — 2140000000 HC CCU INTERMEDIATE R&B

## 2024-11-15 PROCEDURE — 83735 ASSAY OF MAGNESIUM: CPT

## 2024-11-15 PROCEDURE — 83880 ASSAY OF NATRIURETIC PEPTIDE: CPT

## 2024-11-15 PROCEDURE — 6360000002 HC RX W HCPCS: Performed by: INTERNAL MEDICINE

## 2024-11-15 RX ADMIN — LIDOCAINE HYDROCHLORIDE: 20 SOLUTION ORAL at 23:13

## 2024-11-15 RX ADMIN — WATER 2000 MG: 1 INJECTION INTRAMUSCULAR; INTRAVENOUS; SUBCUTANEOUS at 22:48

## 2024-11-15 RX ADMIN — INSULIN LISPRO 2 UNITS: 100 INJECTION, SOLUTION INTRAVENOUS; SUBCUTANEOUS at 16:02

## 2024-11-15 RX ADMIN — ONDANSETRON 4 MG: 4 TABLET, ORALLY DISINTEGRATING ORAL at 19:10

## 2024-11-15 RX ADMIN — WATER 2000 MG: 1 INJECTION INTRAMUSCULAR; INTRAVENOUS; SUBCUTANEOUS at 09:35

## 2024-11-15 ASSESSMENT — PAIN SCALES - GENERAL
PAINLEVEL_OUTOF10: 0
PAINLEVEL_OUTOF10: 0

## 2024-11-15 NOTE — ACP (ADVANCE CARE PLANNING)
Advance Care Planning     Palliative Team Advance Care Planning (ACP) Conversation    Date of Conversation: 11/15/24    Individuals present for the conversation: Patient with decision making capacity, Sister Nhi, and mother Maynor.     ACP documents on file prior to discussion:  -None        Healthcare Decision Maker:    Primary Decision Maker: Nhi Santiago - Brother/Sister - [phone]    Secondary Decision Maker: Maynor Recio - Parent - [phone]    Secondary Decision Maker: Moises Nicholson - Child     Conversation Summary:  Discussed pt's choice for primary decision maker and he names his sister, Nhi Santiago.   Palliative Care  followed up with execution of a living will.      Resuscitation Status:   Code Status: Full Code     Documentation Completed:  -Living Will    I spent 20 minutes with the patient and/or surrogate decision maker discussing the patient's wishes and goals.      Dominga Edmond RN

## 2024-11-16 LAB
ANION GAP SERPL CALCULATED.3IONS-SCNC: 14 MMOL/L (ref 7–19)
BASOPHILS # BLD: 0 K/UL (ref 0–0.2)
BASOPHILS NFR BLD: 0.2 % (ref 0–1)
BUN SERPL-MCNC: 7 MG/DL (ref 6–20)
CALCIUM SERPL-MCNC: 7.8 MG/DL (ref 8.6–10)
CHLORIDE SERPL-SCNC: 93 MMOL/L (ref 98–111)
CO2 SERPL-SCNC: 23 MMOL/L (ref 22–29)
CREAT SERPL-MCNC: 0.4 MG/DL
EOSINOPHIL # BLD: 0 K/UL (ref 0–0.6)
EOSINOPHIL NFR BLD: 0.2 % (ref 0–5)
ERYTHROCYTE [DISTWIDTH] IN BLOOD BY AUTOMATED COUNT: 14.3 % (ref 11.5–14.5)
GLUCOSE BLD-MCNC: 149 MG/DL (ref 70–99)
GLUCOSE BLD-MCNC: 191 MG/DL (ref 70–99)
GLUCOSE BLD-MCNC: 207 MG/DL (ref 70–99)
GLUCOSE BLD-MCNC: 227 MG/DL (ref 70–99)
GLUCOSE BLD-MCNC: 250 MG/DL (ref 70–99)
GLUCOSE SERPL-MCNC: 198 MG/DL (ref 70–99)
HCT VFR BLD AUTO: 37.2 %
HGB BLD-MCNC: 11.7 G/DL
IMM GRANULOCYTES # BLD: 0.3 K/UL
LYMPHOCYTES # BLD: 1.8 K/UL (ref 1.1–4.5)
LYMPHOCYTES NFR BLD: 13.2 % (ref 20–40)
MAGNESIUM SERPL-MCNC: 1.7 MG/DL (ref 1.6–2.6)
MCH RBC QN AUTO: 28.5 PG (ref 27–31)
MCHC RBC AUTO-ENTMCNC: 31.5 G/DL (ref 33–37)
MCV RBC AUTO: 90.7 FL
MONOCYTES # BLD: 0.7 K/UL (ref 0–0.9)
MONOCYTES NFR BLD: 5.1 % (ref 0–10)
NEUTROPHILS # BLD: 10.5 K/UL (ref 1.5–7.5)
NEUTS SEG NFR BLD: 79 % (ref 50–65)
PERFORMED ON: ABNORMAL
PLATELET # BLD AUTO: 164 K/UL (ref 130–400)
PMV BLD AUTO: 10.8 FL
POTASSIUM SERPL-SCNC: 4.2 MMOL/L (ref 3.5–5)
RBC # BLD AUTO: 4.1 M/UL
SODIUM SERPL-SCNC: 130 MMOL/L (ref 136–145)
TROPONIN, HIGH SENSITIVITY: 23 NG/L (ref 0–22)
WBC # BLD AUTO: 13.3 K/UL (ref 4.8–10.8)

## 2024-11-16 PROCEDURE — 36415 COLL VENOUS BLD VENIPUNCTURE: CPT

## 2024-11-16 PROCEDURE — 6360000002 HC RX W HCPCS: Performed by: INTERNAL MEDICINE

## 2024-11-16 PROCEDURE — 84484 ASSAY OF TROPONIN QUANT: CPT

## 2024-11-16 PROCEDURE — 2140000000 HC CCU INTERMEDIATE R&B

## 2024-11-16 PROCEDURE — 6370000000 HC RX 637 (ALT 250 FOR IP): Performed by: INTERNAL MEDICINE

## 2024-11-16 PROCEDURE — 99254 IP/OBS CNSLTJ NEW/EST MOD 60: CPT | Performed by: THORACIC SURGERY (CARDIOTHORACIC VASCULAR SURGERY)

## 2024-11-16 PROCEDURE — 83735 ASSAY OF MAGNESIUM: CPT

## 2024-11-16 PROCEDURE — 82962 GLUCOSE BLOOD TEST: CPT

## 2024-11-16 PROCEDURE — 2580000003 HC RX 258: Performed by: INTERNAL MEDICINE

## 2024-11-16 PROCEDURE — 80048 BASIC METABOLIC PNL TOTAL CA: CPT

## 2024-11-16 PROCEDURE — 85025 COMPLETE CBC W/AUTO DIFF WBC: CPT

## 2024-11-16 PROCEDURE — 94760 N-INVAS EAR/PLS OXIMETRY 1: CPT

## 2024-11-16 RX ORDER — CALCIUM CARBONATE 500 MG/1
500 TABLET, CHEWABLE ORAL 3 TIMES DAILY PRN
Status: DISCONTINUED | OUTPATIENT
Start: 2024-11-16 | End: 2024-11-21 | Stop reason: HOSPADM

## 2024-11-16 RX ORDER — NICOTINE 21 MG/24HR
1 PATCH, TRANSDERMAL 24 HOURS TRANSDERMAL DAILY
Status: DISCONTINUED | OUTPATIENT
Start: 2024-11-16 | End: 2024-11-21 | Stop reason: HOSPADM

## 2024-11-16 RX ADMIN — ONDANSETRON 4 MG: 2 INJECTION INTRAMUSCULAR; INTRAVENOUS at 03:32

## 2024-11-16 RX ADMIN — MAGNESIUM HYDROXIDE 30 ML: 400 SUSPENSION ORAL at 14:43

## 2024-11-16 RX ADMIN — INSULIN LISPRO 1 UNITS: 100 INJECTION, SOLUTION INTRAVENOUS; SUBCUTANEOUS at 12:13

## 2024-11-16 RX ADMIN — WATER 2000 MG: 1 INJECTION INTRAMUSCULAR; INTRAVENOUS; SUBCUTANEOUS at 10:47

## 2024-11-16 RX ADMIN — INSULIN LISPRO 2 UNITS: 100 INJECTION, SOLUTION INTRAVENOUS; SUBCUTANEOUS at 20:23

## 2024-11-16 RX ADMIN — WATER 2000 MG: 1 INJECTION INTRAMUSCULAR; INTRAVENOUS; SUBCUTANEOUS at 20:26

## 2024-11-16 RX ADMIN — ANTACID TABLETS 500 MG: 500 TABLET, CHEWABLE ORAL at 12:14

## 2024-11-16 RX ADMIN — ONDANSETRON 4 MG: 2 INJECTION INTRAMUSCULAR; INTRAVENOUS at 14:41

## 2024-11-16 RX ADMIN — INSULIN LISPRO 1 UNITS: 100 INJECTION, SOLUTION INTRAVENOUS; SUBCUTANEOUS at 09:07

## 2024-11-16 RX ADMIN — ACETAMINOPHEN 650 MG: 325 TABLET ORAL at 10:12

## 2024-11-16 ASSESSMENT — PAIN SCALES - GENERAL
PAINLEVEL_OUTOF10: 1
PAINLEVEL_OUTOF10: 5

## 2024-11-16 ASSESSMENT — PAIN DESCRIPTION - ORIENTATION: ORIENTATION: ANTERIOR

## 2024-11-16 ASSESSMENT — PAIN DESCRIPTION - LOCATION: LOCATION: HEAD

## 2024-11-16 ASSESSMENT — PAIN - FUNCTIONAL ASSESSMENT: PAIN_FUNCTIONAL_ASSESSMENT: PREVENTS OR INTERFERES SOME ACTIVE ACTIVITIES AND ADLS

## 2024-11-16 ASSESSMENT — PAIN DESCRIPTION - DESCRIPTORS: DESCRIPTORS: ACHING

## 2024-11-16 NOTE — CONSULTS
INFECTIOUS DISEASES CONSULT NOTE    Patient:  Linh Nicholson 54 y.o. adult  ROOM # [unfilled]  YOB: 1970  MRN: [id number]  [facility]:  [id number]  Admit date: 11/10/2024   Admitting Physician: Shahriar Castro DO  Primary Care Physician: Igor Atkins MD  REFERRING PROVIDER: No ref. provider found    Reason for Consultation: \"Possible Streptococcus agalactiae endocarditis\"    History of Present Illness/Chief Complaint: 54-year-old man.  Currently in the intensive care unit.  He is on Precedex.  Unable to get history from the patient.  No family available at bedside at this time.  History is obtained from nursing and chart review.  Per review of the admit H&P [place]  Family checked on him when they were unable to reach him.  They found him with altered mental status.  He was brought to the emergency room.  He was febrile and hypotensive.  He underwent spinal fluid analysis the results of which are outlined below.  He was started on empiric antibiotic treatment.  He had a positive urine drug screen for methamphetamine.  He subsequently has had positive blood cultures for Streptococcus agalactiae.  He has had transthoracic echocardiogram suggesting the possibility of aortic valve endocarditis.  Infectious diseases asked to evaluate and offer recommendations.    Current Scheduled Medications:    vancomycin  15 mg/kg IntraVENous Q8H    insulin glargine  15 Units SubCUTAneous BID    sodium chloride flush  5-40 mL IntraVENous 2 times per day    cefTRIAXone (ROCEPHIN) IV  2,000 mg IntraVENous Q12H    dexAMETHasone  0.15 mg/kg IntraVENous Q6H    vancomycin (VANCOCIN) intermittent dosing (placeholder)   Other RX Placeholder    insulin lispro  0-8 Units SubCUTAneous 4x Daily AC & HS     Current PRN Medications:  HYDROmorphone, glucose, dextrose bolus **OR** dextrose bolus, glucagon (rDNA), dextrose, sodium chloride flush, sodium chloride, ondansetron **OR** ondansetron, polyethylene 
drug screen was positive for methamphetamine.  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  There is a mostly sessile vegetation on the noncoronary leaflet of his aortic valve.  There is associated significant aortic insufficiency as well.  I have discussed the patient's case with Dr. Silva, the infectious disease consultant.  Follow-up blood cultures are still pending.  He is no longer toxic and appears to have responded so far to appropriate medical therapy.  Considering the organism and the aortic insufficiency he will require aortic valve replacement at some point.  The plan going forward is to treat him with IV antibiotics for several weeks.  He will likely need a cardiac catheterization before heart surgery considering his age his smoking history and type 1 diabetes.  He will also need to be seen and evaluated by an oral surgeon to consider total dental extraction prior to undergoing aortic valve surgery.  I have discussed the above with the patient who appears to understand the magnitude of his problem.  I will discuss proceeding with cardiac catheterization with Dr. Jones.    Electronically signed by Manas Simon MD on 11/16/2024 at 9:32 AM    
reconstruction technique.  ______________________________________ Electronically signed by: MASSIMO GONSALES M.D. Date:     11/10/2024 Time:    16:07     CT ABDOMEN PELVIS W IV CONTRAST Additional Contrast? None    Result Date: 11/10/2024  EXAM:  CT ABDOMEN/PELVIS WITH CONTRAST  HISTORY:  Abdominal pain and fever  COMPARISON:  None.  TECHNIQUE:  Multi-slice postcontrast transaxial helical images are acquired through the abdomen and pelvis with coronal and sagittal reconstructed images.    All CT scans are performed using dose optimization techniques as appropriate to the performed exam and includes at least one of the following:  Automated exposure control, adjustment of the mA and/or kV according to size, and the use of iterative reconstruction technique.  CONTRAST: Intravenous  FINDINGS:  Lung bases:  A solid nodule suggested in the right middle lobe measuring a solid nodule suggested in the right middle lobe measuring 0.6 cm on axial image #1.  An adjacent nodule suggested measuring 0.3 cm.  No acute infiltrates.  Liver:  Diffuse low attenuation relative to the spleen.  Gallbladder/bilary tree:  Normal.  Pancreas:  Normal.  Adrenal glands: Normal.  Spleen:  Normal.  Kidneys:  Normal.  Retroperitoneum:  Normal.  Peritoneum:  There is severe stenosis in the left common iliac artery with minimal residual intraluminal blood flow suggested.  The aorta has normal caliber and patency.  No retroperitoneal lymphadenopathy or hematomas.  Bowel:  Normal.  Pelvis:  There is gas in the bladder lumen.  A Mendoza catheter is in place.  Addominal wall/bones:  Normal.       - Two solid nodules in the middle lobe suggested measuring 0.3 cm and 0.6 cm respectively.  Please refer to the CT chest report. - Diffuse hepatic steatosis. - Severe left common iliac artery stenosis with near complete occlusion. - Mendoza catheter.     .  All CT scans are performed using dose optimization techniques as appropriate to the performed exam and include at

## 2024-11-17 LAB
ANION GAP SERPL CALCULATED.3IONS-SCNC: 11 MMOL/L (ref 7–19)
BACTERIA BLD CULT ORG #2: NORMAL
BACTERIA BLD CULT: NORMAL
BACTERIA CSF CULT: NORMAL
BACTERIA SPEC ANAEROBE CULT: NORMAL
BASOPHILS # BLD: 0 K/UL (ref 0–0.2)
BASOPHILS NFR BLD: 0.1 % (ref 0–1)
BUN SERPL-MCNC: 8 MG/DL (ref 6–20)
CALCIUM SERPL-MCNC: 8.1 MG/DL (ref 8.6–10)
CHLORIDE SERPL-SCNC: 92 MMOL/L (ref 98–111)
CO2 SERPL-SCNC: 28 MMOL/L (ref 22–29)
CREAT SERPL-MCNC: 0.5 MG/DL
EOSINOPHIL # BLD: 0 K/UL (ref 0–0.6)
EOSINOPHIL NFR BLD: 0.2 % (ref 0–5)
ERYTHROCYTE [DISTWIDTH] IN BLOOD BY AUTOMATED COUNT: 14.3 % (ref 11.5–14.5)
GLUCOSE BLD-MCNC: 188 MG/DL (ref 70–99)
GLUCOSE BLD-MCNC: 219 MG/DL (ref 70–99)
GLUCOSE BLD-MCNC: 234 MG/DL (ref 70–99)
GLUCOSE BLD-MCNC: 244 MG/DL (ref 70–99)
GLUCOSE SERPL-MCNC: 204 MG/DL (ref 70–99)
GRAM STN SPEC: NORMAL
HBA1C MFR BLD: 12.2 % (ref 4–5.6)
HCT VFR BLD AUTO: 33.5 %
HGB BLD-MCNC: 11 G/DL
IMM GRANULOCYTES # BLD: 0.2 K/UL
LYMPHOCYTES # BLD: 2.1 K/UL (ref 1.1–4.5)
LYMPHOCYTES NFR BLD: 14.9 % (ref 20–40)
MAGNESIUM SERPL-MCNC: 1.9 MG/DL (ref 1.6–2.6)
MCH RBC QN AUTO: 29.6 PG (ref 27–31)
MCHC RBC AUTO-ENTMCNC: 32.8 G/DL (ref 33–37)
MCV RBC AUTO: 90.1 FL
MONOCYTES # BLD: 1 K/UL (ref 0–0.9)
MONOCYTES NFR BLD: 7.1 % (ref 0–10)
NEUTROPHILS # BLD: 10.9 K/UL (ref 1.5–7.5)
NEUTS SEG NFR BLD: 76.5 % (ref 50–65)
PERFORMED ON: ABNORMAL
PLATELET # BLD AUTO: 180 K/UL (ref 130–400)
PMV BLD AUTO: 10.7 FL
POTASSIUM SERPL-SCNC: 4 MMOL/L (ref 3.5–5)
RBC # BLD AUTO: 3.72 M/UL
SODIUM SERPL-SCNC: 131 MMOL/L (ref 136–145)
TROPONIN, HIGH SENSITIVITY: 27 NG/L (ref 0–22)
WBC # BLD AUTO: 14.3 K/UL (ref 4.8–10.8)

## 2024-11-17 PROCEDURE — 2140000000 HC CCU INTERMEDIATE R&B

## 2024-11-17 PROCEDURE — 84484 ASSAY OF TROPONIN QUANT: CPT

## 2024-11-17 PROCEDURE — 94760 N-INVAS EAR/PLS OXIMETRY 1: CPT

## 2024-11-17 PROCEDURE — 83735 ASSAY OF MAGNESIUM: CPT

## 2024-11-17 PROCEDURE — 6370000000 HC RX 637 (ALT 250 FOR IP): Performed by: INTERNAL MEDICINE

## 2024-11-17 PROCEDURE — 85025 COMPLETE CBC W/AUTO DIFF WBC: CPT

## 2024-11-17 PROCEDURE — 2580000003 HC RX 258: Performed by: INTERNAL MEDICINE

## 2024-11-17 PROCEDURE — 99232 SBSQ HOSP IP/OBS MODERATE 35: CPT | Performed by: INTERNAL MEDICINE

## 2024-11-17 PROCEDURE — 80048 BASIC METABOLIC PNL TOTAL CA: CPT

## 2024-11-17 PROCEDURE — 93005 ELECTROCARDIOGRAM TRACING: CPT | Performed by: INTERNAL MEDICINE

## 2024-11-17 PROCEDURE — 6360000002 HC RX W HCPCS: Performed by: INTERNAL MEDICINE

## 2024-11-17 PROCEDURE — 82962 GLUCOSE BLOOD TEST: CPT

## 2024-11-17 PROCEDURE — 83036 HEMOGLOBIN GLYCOSYLATED A1C: CPT

## 2024-11-17 PROCEDURE — 36415 COLL VENOUS BLD VENIPUNCTURE: CPT

## 2024-11-17 RX ORDER — PANTOPRAZOLE SODIUM 40 MG/1
40 TABLET, DELAYED RELEASE ORAL
Status: DISCONTINUED | OUTPATIENT
Start: 2024-11-17 | End: 2024-11-21 | Stop reason: HOSPADM

## 2024-11-17 RX ADMIN — WATER 2000 MG: 1 INJECTION INTRAMUSCULAR; INTRAVENOUS; SUBCUTANEOUS at 20:13

## 2024-11-17 RX ADMIN — MAGNESIUM HYDROXIDE 30 ML: 400 SUSPENSION ORAL at 20:17

## 2024-11-17 RX ADMIN — INSULIN LISPRO 1 UNITS: 100 INJECTION, SOLUTION INTRAVENOUS; SUBCUTANEOUS at 20:13

## 2024-11-17 RX ADMIN — WATER 2000 MG: 1 INJECTION INTRAMUSCULAR; INTRAVENOUS; SUBCUTANEOUS at 09:52

## 2024-11-17 RX ADMIN — PANTOPRAZOLE SODIUM 40 MG: 40 TABLET, DELAYED RELEASE ORAL at 11:53

## 2024-11-17 RX ADMIN — MAGNESIUM HYDROXIDE 30 ML: 400 SUSPENSION ORAL at 04:50

## 2024-11-17 RX ADMIN — ANTACID TABLETS 500 MG: 500 TABLET, CHEWABLE ORAL at 23:51

## 2024-11-17 RX ADMIN — INSULIN LISPRO 1 UNITS: 100 INJECTION, SOLUTION INTRAVENOUS; SUBCUTANEOUS at 08:10

## 2024-11-17 RX ADMIN — INSULIN LISPRO 1 UNITS: 100 INJECTION, SOLUTION INTRAVENOUS; SUBCUTANEOUS at 11:38

## 2024-11-17 RX ADMIN — ANTACID TABLETS 500 MG: 500 TABLET, CHEWABLE ORAL at 10:09

## 2024-11-17 RX ADMIN — INSULIN LISPRO 1 UNITS: 100 INJECTION, SOLUTION INTRAVENOUS; SUBCUTANEOUS at 17:09

## 2024-11-17 NOTE — CARE COORDINATION
Case Management Assessment  Initial Evaluation    Date/Time of Evaluation: 11/17/2024 10:12 AM  Assessment Completed by: RIANNA MCCALLUM    If patient is discharged prior to next notation, then this note serves as note for discharge by case management.    Patient Name: Linh Nicholson                   YOB: 1970  Diagnosis: Hyponatremia [E87.1]  Methamphetamine abuse (HCC) [F15.10]  Acute encephalopathy [G93.40]  Septic shock (HCC) [A41.9, R65.21]  Sepsis (HCC) [A41.9]                   Date / Time: 11/10/2024 12:39 PM    Patient Admission Status: Inpatient   Readmission Risk (Low < 19, Mod (19-27), High > 27): Readmission Risk Score: 9.8    Current PCP: Igor Atkins MD  PCP verified by CM? (P) Yes    Chart Reviewed: Yes      History Provided by: (P) Patient  Patient Orientation: (P) Alert and Oriented, Person, Place, Situation, Self    Patient Cognition: (P) Alert    Hospitalization in the last 30 days (Readmission):  No    If yes, Readmission Assessment in CM Navigator will be completed.    Advance Directives:      Code Status: Full Code   Patient's Primary Decision Maker is: (P) Legal Next of Kin    Primary Decision Maker: Nhi Santiago - Brother/Sister - [phone]    Secondary Decision Maker: Maynor Recio - Parent - [phone]    Secondary Decision Maker: BharatMoises Johnathan - Child - [phone]    Discharge Planning:    Patient lives with: (P) Children Type of Home: (P) House  Primary Care Giver: (P) Self  Patient Support Systems include: (P) Children   Current Financial resources: (P) Medicaid  Current community resources: (P) None  Current services prior to admission: (P) None            Current DME:              Type of Home Care services:  (P) None    ADLS  Prior functional level: (P) Independent in ADLs/IADLs  Current functional level: (P) Independent in ADLs/IADLs    PT AM-PAC:   /24  OT AM-PAC:   /24    Family can provide assistance at DC: (P) Yes  Would you like Case Management to

## 2024-11-18 PROBLEM — R07.9 CHEST PAIN: Status: ACTIVE | Noted: 2024-11-10

## 2024-11-18 LAB
ANION GAP SERPL CALCULATED.3IONS-SCNC: 8 MMOL/L (ref 7–19)
BASOPHILS # BLD: 0 K/UL (ref 0–0.2)
BASOPHILS NFR BLD: 0 % (ref 0–1)
BUN SERPL-MCNC: 8 MG/DL (ref 6–20)
CALCIUM SERPL-MCNC: 7.7 MG/DL (ref 8.6–10)
CHLORIDE SERPL-SCNC: 95 MMOL/L (ref 98–111)
CO2 SERPL-SCNC: 29 MMOL/L (ref 22–29)
CREAT SERPL-MCNC: 0.6 MG/DL
EKG P AXIS: 71 DEGREES
EKG P-R INTERVAL: 144 MS
EKG Q-T INTERVAL: 358 MS
EKG QRS DURATION: 84 MS
EKG QTC CALCULATION (BAZETT): 412 MS
EKG T AXIS: 63 DEGREES
EOSINOPHIL # BLD: 0 K/UL (ref 0–0.6)
EOSINOPHIL NFR BLD: 0.2 % (ref 0–5)
ERYTHROCYTE [DISTWIDTH] IN BLOOD BY AUTOMATED COUNT: 14.3 % (ref 11.5–14.5)
GLUCOSE BLD-MCNC: 166 MG/DL (ref 70–99)
GLUCOSE BLD-MCNC: 216 MG/DL (ref 70–99)
GLUCOSE BLD-MCNC: 248 MG/DL (ref 70–99)
GLUCOSE BLD-MCNC: 307 MG/DL (ref 70–99)
GLUCOSE SERPL-MCNC: 222 MG/DL (ref 70–99)
HCT VFR BLD AUTO: 31.4 %
HGB BLD-MCNC: 10.2 G/DL
IMM GRANULOCYTES # BLD: 0.2 K/UL
LYMPHOCYTES # BLD: 1.6 K/UL (ref 1.1–4.5)
LYMPHOCYTES NFR BLD: 13 % (ref 20–40)
MAGNESIUM SERPL-MCNC: 2.2 MG/DL (ref 1.6–2.6)
MCH RBC QN AUTO: 29.3 PG (ref 27–31)
MCHC RBC AUTO-ENTMCNC: 32.5 G/DL (ref 33–37)
MCV RBC AUTO: 90.2 FL
MONOCYTES # BLD: 0.8 K/UL (ref 0–0.9)
MONOCYTES NFR BLD: 6.1 % (ref 0–10)
NEUTROPHILS # BLD: 10 K/UL (ref 1.5–7.5)
NEUTS SEG NFR BLD: 79.5 % (ref 50–65)
PERFORMED ON: ABNORMAL
PLATELET # BLD AUTO: 199 K/UL (ref 130–400)
PMV BLD AUTO: 10.4 FL
POTASSIUM SERPL-SCNC: 4.3 MMOL/L (ref 3.5–5)
RBC # BLD AUTO: 3.48 M/UL
SODIUM SERPL-SCNC: 132 MMOL/L (ref 136–145)
WBC # BLD AUTO: 12.6 K/UL (ref 4.8–10.8)

## 2024-11-18 PROCEDURE — 85025 COMPLETE CBC W/AUTO DIFF WBC: CPT

## 2024-11-18 PROCEDURE — 6370000000 HC RX 637 (ALT 250 FOR IP): Performed by: INTERNAL MEDICINE

## 2024-11-18 PROCEDURE — 94760 N-INVAS EAR/PLS OXIMETRY 1: CPT

## 2024-11-18 PROCEDURE — 6360000002 HC RX W HCPCS: Performed by: INTERNAL MEDICINE

## 2024-11-18 PROCEDURE — 2140000000 HC CCU INTERMEDIATE R&B

## 2024-11-18 PROCEDURE — 82962 GLUCOSE BLOOD TEST: CPT

## 2024-11-18 PROCEDURE — 80048 BASIC METABOLIC PNL TOTAL CA: CPT

## 2024-11-18 PROCEDURE — 83735 ASSAY OF MAGNESIUM: CPT

## 2024-11-18 PROCEDURE — 99232 SBSQ HOSP IP/OBS MODERATE 35: CPT | Performed by: INTERNAL MEDICINE

## 2024-11-18 PROCEDURE — 2580000003 HC RX 258: Performed by: INTERNAL MEDICINE

## 2024-11-18 PROCEDURE — 93010 ELECTROCARDIOGRAM REPORT: CPT | Performed by: INTERNAL MEDICINE

## 2024-11-18 PROCEDURE — 36415 COLL VENOUS BLD VENIPUNCTURE: CPT

## 2024-11-18 RX ADMIN — ANTACID TABLETS 500 MG: 500 TABLET, CHEWABLE ORAL at 20:00

## 2024-11-18 RX ADMIN — INSULIN LISPRO 3 UNITS: 100 INJECTION, SOLUTION INTRAVENOUS; SUBCUTANEOUS at 13:20

## 2024-11-18 RX ADMIN — WATER 2000 MG: 1 INJECTION INTRAMUSCULAR; INTRAVENOUS; SUBCUTANEOUS at 10:38

## 2024-11-18 RX ADMIN — ONDANSETRON 4 MG: 2 INJECTION INTRAMUSCULAR; INTRAVENOUS at 19:53

## 2024-11-18 RX ADMIN — PANTOPRAZOLE SODIUM 40 MG: 40 TABLET, DELAYED RELEASE ORAL at 08:03

## 2024-11-18 RX ADMIN — SODIUM CHLORIDE, PRESERVATIVE FREE 10 ML: 5 INJECTION INTRAVENOUS at 08:03

## 2024-11-18 RX ADMIN — WATER 2000 MG: 1 INJECTION INTRAMUSCULAR; INTRAVENOUS; SUBCUTANEOUS at 21:42

## 2024-11-18 RX ADMIN — INSULIN LISPRO 1 UNITS: 100 INJECTION, SOLUTION INTRAVENOUS; SUBCUTANEOUS at 08:02

## 2024-11-18 RX ADMIN — ACETAMINOPHEN 650 MG: 325 TABLET ORAL at 19:55

## 2024-11-18 RX ADMIN — ONDANSETRON 4 MG: 4 TABLET, ORALLY DISINTEGRATING ORAL at 10:46

## 2024-11-18 RX ADMIN — INSULIN LISPRO 1 UNITS: 100 INJECTION, SOLUTION INTRAVENOUS; SUBCUTANEOUS at 19:55

## 2024-11-19 LAB
ANION GAP SERPL CALCULATED.3IONS-SCNC: 7 MMOL/L (ref 7–19)
BASOPHILS # BLD: 0 K/UL (ref 0–0.2)
BASOPHILS NFR BLD: 0 % (ref 0–1)
BUN SERPL-MCNC: 9 MG/DL (ref 6–20)
CALCIUM SERPL-MCNC: 7.8 MG/DL (ref 8.6–10)
CHLORIDE SERPL-SCNC: 95 MMOL/L (ref 98–111)
CO2 SERPL-SCNC: 26 MMOL/L (ref 22–29)
CREAT SERPL-MCNC: 0.6 MG/DL
ECHO BSA: 1.6 M2
EOSINOPHIL # BLD: 0 K/UL (ref 0–0.6)
EOSINOPHIL NFR BLD: 0.2 % (ref 0–5)
ERYTHROCYTE [DISTWIDTH] IN BLOOD BY AUTOMATED COUNT: 14 % (ref 11.5–14.5)
GLUCOSE BLD-MCNC: 158 MG/DL (ref 70–99)
GLUCOSE BLD-MCNC: 185 MG/DL (ref 70–99)
GLUCOSE BLD-MCNC: 200 MG/DL (ref 70–99)
GLUCOSE BLD-MCNC: 231 MG/DL (ref 70–99)
GLUCOSE BLD-MCNC: 253 MG/DL (ref 70–99)
GLUCOSE SERPL-MCNC: 206 MG/DL (ref 70–99)
HCT VFR BLD AUTO: 32.2 %
HGB BLD-MCNC: 10.3 G/DL
IMM GRANULOCYTES # BLD: 0.1 K/UL
LYMPHOCYTES # BLD: 2.1 K/UL (ref 1.1–4.5)
LYMPHOCYTES NFR BLD: 17 % (ref 20–40)
MAGNESIUM SERPL-MCNC: 2.3 MG/DL (ref 1.6–2.6)
MCH RBC QN AUTO: 28.9 PG (ref 27–31)
MCHC RBC AUTO-ENTMCNC: 32 G/DL (ref 33–37)
MCV RBC AUTO: 90.4 FL
MONOCYTES # BLD: 0.8 K/UL (ref 0–0.9)
MONOCYTES NFR BLD: 6.1 % (ref 0–10)
NEUTROPHILS # BLD: 9.3 K/UL (ref 1.5–7.5)
NEUTS SEG NFR BLD: 75.9 % (ref 50–65)
PERFORMED ON: ABNORMAL
PLATELET # BLD AUTO: 205 K/UL (ref 130–400)
PMV BLD AUTO: 9.9 FL
POTASSIUM SERPL-SCNC: 4.4 MMOL/L (ref 3.5–5)
RBC # BLD AUTO: 3.56 M/UL
SODIUM SERPL-SCNC: 128 MMOL/L (ref 136–145)
WBC # BLD AUTO: 12.3 K/UL (ref 4.8–10.8)

## 2024-11-19 PROCEDURE — 93005 ELECTROCARDIOGRAM TRACING: CPT | Performed by: INTERNAL MEDICINE

## 2024-11-19 PROCEDURE — 36415 COLL VENOUS BLD VENIPUNCTURE: CPT

## 2024-11-19 PROCEDURE — 83735 ASSAY OF MAGNESIUM: CPT

## 2024-11-19 PROCEDURE — 85025 COMPLETE CBC W/AUTO DIFF WBC: CPT

## 2024-11-19 PROCEDURE — 6360000002 HC RX W HCPCS: Performed by: INTERNAL MEDICINE

## 2024-11-19 PROCEDURE — 2580000003 HC RX 258: Performed by: INTERNAL MEDICINE

## 2024-11-19 PROCEDURE — 6370000000 HC RX 637 (ALT 250 FOR IP)

## 2024-11-19 PROCEDURE — C1769 GUIDE WIRE: HCPCS | Performed by: INTERNAL MEDICINE

## 2024-11-19 PROCEDURE — 82962 GLUCOSE BLOOD TEST: CPT

## 2024-11-19 PROCEDURE — 2140000000 HC CCU INTERMEDIATE R&B

## 2024-11-19 PROCEDURE — 6370000000 HC RX 637 (ALT 250 FOR IP): Performed by: INTERNAL MEDICINE

## 2024-11-19 PROCEDURE — B2111ZZ FLUOROSCOPY OF MULTIPLE CORONARY ARTERIES USING LOW OSMOLAR CONTRAST: ICD-10-PCS | Performed by: INTERNAL MEDICINE

## 2024-11-19 PROCEDURE — B2151ZZ FLUOROSCOPY OF LEFT HEART USING LOW OSMOLAR CONTRAST: ICD-10-PCS | Performed by: INTERNAL MEDICINE

## 2024-11-19 PROCEDURE — 80048 BASIC METABOLIC PNL TOTAL CA: CPT

## 2024-11-19 PROCEDURE — 93458 L HRT ARTERY/VENTRICLE ANGIO: CPT | Performed by: INTERNAL MEDICINE

## 2024-11-19 PROCEDURE — 4A023N7 MEASUREMENT OF CARDIAC SAMPLING AND PRESSURE, LEFT HEART, PERCUTANEOUS APPROACH: ICD-10-PCS | Performed by: INTERNAL MEDICINE

## 2024-11-19 PROCEDURE — C1894 INTRO/SHEATH, NON-LASER: HCPCS | Performed by: INTERNAL MEDICINE

## 2024-11-19 PROCEDURE — 2500000003 HC RX 250 WO HCPCS: Performed by: INTERNAL MEDICINE

## 2024-11-19 PROCEDURE — 2709999900 HC NON-CHARGEABLE SUPPLY: Performed by: INTERNAL MEDICINE

## 2024-11-19 PROCEDURE — 6360000004 HC RX CONTRAST MEDICATION: Performed by: INTERNAL MEDICINE

## 2024-11-19 RX ORDER — IOPAMIDOL 755 MG/ML
INJECTION, SOLUTION INTRAVASCULAR PRN
Status: DISCONTINUED | OUTPATIENT
Start: 2024-11-19 | End: 2024-11-19 | Stop reason: HOSPADM

## 2024-11-19 RX ORDER — ASPIRIN 81 MG/1
81 TABLET, CHEWABLE ORAL DAILY
Status: DISCONTINUED | OUTPATIENT
Start: 2024-11-19 | End: 2024-11-21 | Stop reason: HOSPADM

## 2024-11-19 RX ORDER — HEPARIN SODIUM 1000 [USP'U]/ML
INJECTION, SOLUTION INTRAVENOUS; SUBCUTANEOUS PRN
Status: DISCONTINUED | OUTPATIENT
Start: 2024-11-19 | End: 2024-11-19 | Stop reason: HOSPADM

## 2024-11-19 RX ORDER — MIDAZOLAM HYDROCHLORIDE 1 MG/ML
INJECTION, SOLUTION INTRAMUSCULAR; INTRAVENOUS PRN
Status: DISCONTINUED | OUTPATIENT
Start: 2024-11-19 | End: 2024-11-19 | Stop reason: HOSPADM

## 2024-11-19 RX ORDER — NITROGLYCERIN 0.4 MG/1
TABLET SUBLINGUAL
Status: COMPLETED
Start: 2024-11-19 | End: 2024-11-19

## 2024-11-19 RX ORDER — VERAPAMIL HYDROCHLORIDE 2.5 MG/ML
INJECTION, SOLUTION INTRAVENOUS PRN
Status: DISCONTINUED | OUTPATIENT
Start: 2024-11-19 | End: 2024-11-19 | Stop reason: HOSPADM

## 2024-11-19 RX ORDER — ASPIRIN 81 MG/1
81 TABLET ORAL ONCE
Status: COMPLETED | OUTPATIENT
Start: 2024-11-19 | End: 2024-11-19

## 2024-11-19 RX ORDER — METOPROLOL SUCCINATE 25 MG/1
25 TABLET, EXTENDED RELEASE ORAL DAILY
Status: DISCONTINUED | OUTPATIENT
Start: 2024-11-19 | End: 2024-11-21 | Stop reason: HOSPADM

## 2024-11-19 RX ORDER — FENTANYL CITRATE 50 UG/ML
INJECTION, SOLUTION INTRAMUSCULAR; INTRAVENOUS PRN
Status: DISCONTINUED | OUTPATIENT
Start: 2024-11-19 | End: 2024-11-19 | Stop reason: HOSPADM

## 2024-11-19 RX ORDER — SODIUM CHLORIDE 9 MG/ML
INJECTION, SOLUTION INTRAVENOUS CONTINUOUS
Status: ACTIVE | OUTPATIENT
Start: 2024-11-19 | End: 2024-11-19

## 2024-11-19 RX ORDER — NITROGLYCERIN 20 MG/100ML
INJECTION INTRAVENOUS PRN
Status: DISCONTINUED | OUTPATIENT
Start: 2024-11-19 | End: 2024-11-19 | Stop reason: HOSPADM

## 2024-11-19 RX ORDER — NITROGLYCERIN 0.4 MG/1
0.4 TABLET SUBLINGUAL EVERY 5 MIN PRN
Status: DISCONTINUED | OUTPATIENT
Start: 2024-11-19 | End: 2024-11-21 | Stop reason: HOSPADM

## 2024-11-19 RX ORDER — SODIUM CHLORIDE 9 MG/ML
INJECTION, SOLUTION INTRAVENOUS CONTINUOUS
Status: DISCONTINUED | OUTPATIENT
Start: 2024-11-19 | End: 2024-11-19 | Stop reason: HOSPADM

## 2024-11-19 RX ORDER — ISOSORBIDE MONONITRATE 30 MG/1
30 TABLET, EXTENDED RELEASE ORAL DAILY
Status: DISCONTINUED | OUTPATIENT
Start: 2024-11-19 | End: 2024-11-21 | Stop reason: HOSPADM

## 2024-11-19 RX ORDER — MORPHINE SULFATE 2 MG/ML
2 INJECTION, SOLUTION INTRAMUSCULAR; INTRAVENOUS EVERY 4 HOURS PRN
Status: DISCONTINUED | OUTPATIENT
Start: 2024-11-19 | End: 2024-11-21 | Stop reason: HOSPADM

## 2024-11-19 RX ADMIN — NITROGLYCERIN 0.4 MG: 0.4 TABLET, ORALLY DISINTEGRATING SUBLINGUAL at 14:04

## 2024-11-19 RX ADMIN — METOPROLOL SUCCINATE 25 MG: 25 TABLET, EXTENDED RELEASE ORAL at 14:10

## 2024-11-19 RX ADMIN — WATER 2000 MG: 1 INJECTION INTRAMUSCULAR; INTRAVENOUS; SUBCUTANEOUS at 10:31

## 2024-11-19 RX ADMIN — INSULIN LISPRO 1 UNITS: 100 INJECTION, SOLUTION INTRAVENOUS; SUBCUTANEOUS at 17:32

## 2024-11-19 RX ADMIN — MORPHINE SULFATE 2 MG: 2 INJECTION, SOLUTION INTRAMUSCULAR; INTRAVENOUS at 17:33

## 2024-11-19 RX ADMIN — MORPHINE SULFATE 2 MG: 2 INJECTION, SOLUTION INTRAMUSCULAR; INTRAVENOUS at 21:18

## 2024-11-19 RX ADMIN — LIDOCAINE HYDROCHLORIDE: 20 SOLUTION ORAL at 14:33

## 2024-11-19 RX ADMIN — INSULIN LISPRO 2 UNITS: 100 INJECTION, SOLUTION INTRAVENOUS; SUBCUTANEOUS at 05:16

## 2024-11-19 RX ADMIN — ISOSORBIDE MONONITRATE 30 MG: 30 TABLET, EXTENDED RELEASE ORAL at 17:32

## 2024-11-19 RX ADMIN — ASPIRIN 81 MG: 81 TABLET, COATED ORAL at 10:31

## 2024-11-19 RX ADMIN — ASPIRIN 81 MG: 81 TABLET, CHEWABLE ORAL at 14:10

## 2024-11-19 RX ADMIN — ONDANSETRON 4 MG: 2 INJECTION INTRAMUSCULAR; INTRAVENOUS at 06:04

## 2024-11-19 RX ADMIN — SODIUM CHLORIDE: 9 INJECTION, SOLUTION INTRAVENOUS at 10:33

## 2024-11-19 RX ADMIN — PANTOPRAZOLE SODIUM 40 MG: 40 TABLET, DELAYED RELEASE ORAL at 05:16

## 2024-11-19 RX ADMIN — MAGNESIUM HYDROXIDE 30 ML: 400 SUSPENSION ORAL at 17:39

## 2024-11-19 RX ADMIN — MORPHINE SULFATE 2 MG: 2 INJECTION, SOLUTION INTRAMUSCULAR; INTRAVENOUS at 14:32

## 2024-11-19 RX ADMIN — ANTACID TABLETS 500 MG: 500 TABLET, CHEWABLE ORAL at 01:45

## 2024-11-19 RX ADMIN — SODIUM CHLORIDE: 9 INJECTION, SOLUTION INTRAVENOUS at 15:32

## 2024-11-19 RX ADMIN — WATER 2000 MG: 1 INJECTION INTRAMUSCULAR; INTRAVENOUS; SUBCUTANEOUS at 21:18

## 2024-11-19 RX ADMIN — ANTACID TABLETS 500 MG: 500 TABLET, CHEWABLE ORAL at 21:18

## 2024-11-19 RX ADMIN — NITROGLYCERIN 0.4 MG: 0.4 TABLET, ORALLY DISINTEGRATING SUBLINGUAL at 14:08

## 2024-11-19 ASSESSMENT — PAIN SCALES - GENERAL
PAINLEVEL_OUTOF10: 8
PAINLEVEL_OUTOF10: 0
PAINLEVEL_OUTOF10: 2
PAINLEVEL_OUTOF10: 6
PAINLEVEL_OUTOF10: 10
PAINLEVEL_OUTOF10: 0
PAINLEVEL_OUTOF10: 10

## 2024-11-19 ASSESSMENT — PAIN DESCRIPTION - ORIENTATION
ORIENTATION: MID;ANTERIOR
ORIENTATION: MID;ANTERIOR

## 2024-11-19 ASSESSMENT — PAIN DESCRIPTION - LOCATION
LOCATION: CHEST

## 2024-11-19 ASSESSMENT — PAIN - FUNCTIONAL ASSESSMENT
PAIN_FUNCTIONAL_ASSESSMENT: PREVENTS OR INTERFERES WITH MANY ACTIVE NOT PASSIVE ACTIVITIES
PAIN_FUNCTIONAL_ASSESSMENT: PREVENTS OR INTERFERES SOME ACTIVE ACTIVITIES AND ADLS

## 2024-11-19 ASSESSMENT — PAIN DESCRIPTION - DESCRIPTORS
DESCRIPTORS: BURNING
DESCRIPTORS: HEAVINESS
DESCRIPTORS: PRESSURE
DESCRIPTORS: PRESSURE

## 2024-11-19 ASSESSMENT — PAIN DESCRIPTION - FREQUENCY: FREQUENCY: INTERMITTENT

## 2024-11-20 ENCOUNTER — PREP FOR PROCEDURE (OUTPATIENT)
Dept: CARDIOLOGY CLINIC | Age: 54
End: 2024-11-20

## 2024-11-20 ENCOUNTER — APPOINTMENT (OUTPATIENT)
Dept: GENERAL RADIOLOGY | Age: 54
DRG: 871 | End: 2024-11-20
Payer: MEDICAID

## 2024-11-20 VITALS
HEIGHT: 70 IN | BODY MASS INDEX: 19.81 KG/M2 | WEIGHT: 138.38 LBS | HEART RATE: 106 BPM | OXYGEN SATURATION: 93 % | TEMPERATURE: 97 F | RESPIRATION RATE: 112 BRPM | SYSTOLIC BLOOD PRESSURE: 129 MMHG | DIASTOLIC BLOOD PRESSURE: 44 MMHG

## 2024-11-20 PROBLEM — I25.10 CORONARY ARTERY DISEASE: Status: ACTIVE | Noted: 2024-11-20

## 2024-11-20 LAB
ANION GAP SERPL CALCULATED.3IONS-SCNC: 10 MMOL/L (ref 7–19)
BASOPHILS # BLD: 0 K/UL (ref 0–0.2)
BASOPHILS NFR BLD: 0.1 % (ref 0–1)
BUN SERPL-MCNC: 13 MG/DL (ref 6–20)
CALCIUM SERPL-MCNC: 7.8 MG/DL (ref 8.6–10)
CHLORIDE SERPL-SCNC: 96 MMOL/L (ref 98–111)
CO2 SERPL-SCNC: 26 MMOL/L (ref 22–29)
CREAT SERPL-MCNC: 0.7 MG/DL
EKG P AXIS: 71 DEGREES
EKG P-R INTERVAL: 126 MS
EKG Q-T INTERVAL: 342 MS
EKG QRS DURATION: 80 MS
EKG QTC CALCULATION (BAZETT): 403 MS
EKG T AXIS: 51 DEGREES
EOSINOPHIL # BLD: 0 K/UL (ref 0–0.6)
EOSINOPHIL NFR BLD: 0.1 % (ref 0–5)
ERYTHROCYTE [DISTWIDTH] IN BLOOD BY AUTOMATED COUNT: 14.4 % (ref 11.5–14.5)
FERRITIN SERPL-MCNC: 651.1 NG/ML
FOLATE SERPL-MCNC: 13.3 NG/ML
GLUCOSE BLD-MCNC: 251 MG/DL (ref 70–99)
GLUCOSE BLD-MCNC: 257 MG/DL (ref 70–99)
GLUCOSE BLD-MCNC: 270 MG/DL (ref 70–99)
GLUCOSE SERPL-MCNC: 186 MG/DL (ref 70–99)
HCT VFR BLD AUTO: 35.3 %
HGB BLD-MCNC: 11.2 G/DL
IMM GRANULOCYTES # BLD: 0.1 K/UL
IRON SATN MFR SERPL: 18 % (ref 14–50)
IRON SERPL-MCNC: 32 UG/DL
LYMPHOCYTES # BLD: 1.9 K/UL (ref 1.1–4.5)
LYMPHOCYTES NFR BLD: 13.5 % (ref 20–40)
MAGNESIUM SERPL-MCNC: 2.5 MG/DL (ref 1.6–2.6)
MCH RBC QN AUTO: 28.8 PG (ref 27–31)
MCHC RBC AUTO-ENTMCNC: 31.7 G/DL (ref 33–37)
MCV RBC AUTO: 90.7 FL
MONOCYTES # BLD: 0.8 K/UL (ref 0–0.9)
MONOCYTES NFR BLD: 5.7 % (ref 0–10)
NEUTROPHILS # BLD: 11.2 K/UL (ref 1.5–7.5)
NEUTS SEG NFR BLD: 80 % (ref 50–65)
PERFORMED ON: ABNORMAL
PLATELET # BLD AUTO: 236 K/UL (ref 130–400)
PMV BLD AUTO: 10.4 FL
POTASSIUM SERPL-SCNC: 4.6 MMOL/L (ref 3.5–5)
RBC # BLD AUTO: 3.89 M/UL
SODIUM SERPL-SCNC: 132 MMOL/L (ref 136–145)
TIBC SERPL-MCNC: 177 UG/DL (ref 250–400)
VIT B12 SERPL-MCNC: 1589 PG/ML (ref 232–1245)
WBC # BLD AUTO: 14 K/UL (ref 4.8–10.8)

## 2024-11-20 PROCEDURE — 83540 ASSAY OF IRON: CPT

## 2024-11-20 PROCEDURE — 6360000002 HC RX W HCPCS: Performed by: INTERNAL MEDICINE

## 2024-11-20 PROCEDURE — 31500 INSERT EMERGENCY AIRWAY: CPT

## 2024-11-20 PROCEDURE — 82607 VITAMIN B-12: CPT

## 2024-11-20 PROCEDURE — 99232 SBSQ HOSP IP/OBS MODERATE 35: CPT | Performed by: INTERNAL MEDICINE

## 2024-11-20 PROCEDURE — 80048 BASIC METABOLIC PNL TOTAL CA: CPT

## 2024-11-20 PROCEDURE — 82746 ASSAY OF FOLIC ACID SERUM: CPT

## 2024-11-20 PROCEDURE — 82962 GLUCOSE BLOOD TEST: CPT

## 2024-11-20 PROCEDURE — 94760 N-INVAS EAR/PLS OXIMETRY 1: CPT

## 2024-11-20 PROCEDURE — 83550 IRON BINDING TEST: CPT

## 2024-11-20 PROCEDURE — 92950 HEART/LUNG RESUSCITATION CPR: CPT

## 2024-11-20 PROCEDURE — 85025 COMPLETE CBC W/AUTO DIFF WBC: CPT

## 2024-11-20 PROCEDURE — 2580000003 HC RX 258: Performed by: INTERNAL MEDICINE

## 2024-11-20 PROCEDURE — 92610 EVALUATE SWALLOWING FUNCTION: CPT

## 2024-11-20 PROCEDURE — 83735 ASSAY OF MAGNESIUM: CPT

## 2024-11-20 PROCEDURE — 82728 ASSAY OF FERRITIN: CPT

## 2024-11-20 PROCEDURE — 6370000000 HC RX 637 (ALT 250 FOR IP): Performed by: INTERNAL MEDICINE

## 2024-11-20 PROCEDURE — 92522 EVALUATE SPEECH PRODUCTION: CPT

## 2024-11-20 PROCEDURE — 36415 COLL VENOUS BLD VENIPUNCTURE: CPT

## 2024-11-20 RX ORDER — INSULIN GLARGINE 100 [IU]/ML
10 INJECTION, SOLUTION SUBCUTANEOUS 2 TIMES DAILY
Status: DISCONTINUED | OUTPATIENT
Start: 2024-11-20 | End: 2024-11-21 | Stop reason: HOSPADM

## 2024-11-20 RX ADMIN — MORPHINE SULFATE 2 MG: 2 INJECTION, SOLUTION INTRAMUSCULAR; INTRAVENOUS at 05:00

## 2024-11-20 RX ADMIN — MORPHINE SULFATE 2 MG: 2 INJECTION, SOLUTION INTRAMUSCULAR; INTRAVENOUS at 08:50

## 2024-11-20 RX ADMIN — ONDANSETRON 4 MG: 2 INJECTION INTRAMUSCULAR; INTRAVENOUS at 01:20

## 2024-11-20 RX ADMIN — MORPHINE SULFATE 2 MG: 2 INJECTION, SOLUTION INTRAMUSCULAR; INTRAVENOUS at 13:50

## 2024-11-20 RX ADMIN — WATER 2000 MG: 1 INJECTION INTRAMUSCULAR; INTRAVENOUS; SUBCUTANEOUS at 11:44

## 2024-11-20 RX ADMIN — EPINEPHRINE 1 MCG/MIN: 1 INJECTION INTRAMUSCULAR; INTRAVENOUS; SUBCUTANEOUS at 15:42

## 2024-11-20 RX ADMIN — SODIUM CHLORIDE, PRESERVATIVE FREE 10 ML: 5 INJECTION INTRAVENOUS at 08:44

## 2024-11-20 RX ADMIN — ONDANSETRON 4 MG: 2 INJECTION INTRAMUSCULAR; INTRAVENOUS at 11:44

## 2024-11-20 RX ADMIN — MORPHINE SULFATE 2 MG: 2 INJECTION, SOLUTION INTRAMUSCULAR; INTRAVENOUS at 01:21

## 2024-11-20 ASSESSMENT — PAIN SCALES - GENERAL
PAINLEVEL_OUTOF10: 10
PAINLEVEL_OUTOF10: 6
PAINLEVEL_OUTOF10: 2
PAINLEVEL_OUTOF10: 6
PAINLEVEL_OUTOF10: 10

## 2024-11-20 ASSESSMENT — ENCOUNTER SYMPTOMS
SHORTNESS OF BREATH: 0
DIARRHEA: 0
NAUSEA: 0
CONSTIPATION: 0
COLOR CHANGE: 0
VOICE CHANGE: 0
BACK PAIN: 0
VOMITING: 0

## 2024-11-20 ASSESSMENT — PAIN DESCRIPTION - ORIENTATION
ORIENTATION: MID
ORIENTATION: MID

## 2024-11-20 ASSESSMENT — PAIN DESCRIPTION - LOCATION
LOCATION: CHEST
LOCATION: CHEST

## 2024-11-20 ASSESSMENT — PAIN DESCRIPTION - DESCRIPTORS
DESCRIPTORS: DISCOMFORT
DESCRIPTORS: BURNING

## 2024-11-20 NOTE — PROCEDURES
PROCEDURE NOTE  Date: 11/20/2024   Name: Linh Nicholson  YOB: 1970    Procedures  The left medial tibial plateau was prepped with Betadine.  An intraosseous needle and catheter was introduced in one fluid step.  The device flushed easily and small amount of marrow was withdrawn.  The device was patent and used for emergent patient management.

## 2024-11-20 NOTE — PLAN OF CARE
Problem: Discharge Planning  Goal: Discharge to home or other facility with appropriate resources  11/12/2024 0023 by Dex Allen RN  Outcome: Progressing  11/11/2024 1830 by Amaris Stinson RN  Outcome: Progressing  Flowsheets (Taken 11/11/2024 0800 by Meg Cruz RN)  Discharge to home or other facility with appropriate resources: Identify barriers to discharge with patient and caregiver     Problem: Safety - Adult  Goal: Free from fall injury  11/12/2024 0023 by Dex Allen RN  Outcome: Progressing  11/11/2024 1830 by Amaris Stinson RN  Outcome: Progressing     Problem: Risk for Elopement  Goal: Patient will not exit the unit/facility without proper excort  11/12/2024 0023 by Dex Allen RN  Outcome: Progressing  Flowsheets (Taken 11/11/2024 2000)  Nursing Interventions for Elopement Risk:   Collaborate with treatment team for drug withdrawal symptoms treatment   Communicate/escalate to charge nurse the risk of elopement   Assist with personal care needs such as toileting, eating, dressing, as needed to reduce the risk of wandering  11/11/2024 1830 by Amaris Stinson RN  Outcome: Progressing  Flowsheets (Taken 11/11/2024 0800 by Meg Cruz RN)  Nursing Interventions for Elopement Risk:   Collaborate with treatment team for drug withdrawal symptoms treatment   Communicate/escalate to charge nurse the risk of elopement   Assist with personal care needs such as toileting, eating, dressing, as needed to reduce the risk of wandering     Problem: Pain  Goal: Verbalizes/displays adequate comfort level or baseline comfort level  11/12/2024 0023 by Dex Allen RN  Outcome: Progressing  11/11/2024 1830 by Amaris Stinson RN  Outcome: Progressing  Flowsheets  Taken 11/11/2024 1600 by Meg Cruz RN  Verbalizes/displays adequate comfort level or baseline comfort level: Encourage patient to monitor pain and request assistance  Taken 11/11/2024 1200 by Meg Cruz 
  Problem: Discharge Planning  Goal: Discharge to home or other facility with appropriate resources  11/15/2024 1034 by Nancy Martinez RN  Outcome: Progressing  11/15/2024 0219 by Ranjit Osorio RN  Outcome: Not Progressing  Flowsheets (Taken 11/14/2024 2000 by Maddie Andrade, RN)  Discharge to home or other facility with appropriate resources: Identify barriers to discharge with patient and caregiver     Problem: Safety - Adult  Goal: Free from fall injury  11/15/2024 1034 by Nancy Martinez RN  Outcome: Progressing  11/15/2024 0219 by Ranjit Osorio RN  Outcome: Not Progressing     Problem: Risk for Elopement  Goal: Patient will not exit the unit/facility without proper excort  11/15/2024 1034 by Nancy Martinez RN  Outcome: Progressing  Flowsheets (Taken 11/15/2024 0400 by Ranjit Osorio, RN)  Nursing Interventions for Elopement Risk:   Assist with personal care needs such as toileting, eating, dressing, as needed to reduce the risk of wandering   Collaborate with treatment team for drug withdrawal symptoms treatment   Collaborate with family members/caregivers to mitigate the elopement risk   Communicate/escalate to charge nurse the risk of elopement   Collaborate with treatment team for nicotine replacement   Communicate/escalate to /other team member the risk of elopement   Communicate/escalate to nursing supervisor the risk of elopement   Communicate to physician the risk for elopement   Escort with two staff members if patient must leave the unit   Reduce environmental triggers   Place patient in room far away from exits and stairways   Make sure patient has all necessary personal care items   Shoes and clothing collected and placed in gown attire  11/15/2024 0219 by Ranjit Osorio, RN  Outcome: Not Progressing  Flowsheets (Taken 11/14/2024 2000 by Maddie Andrade, RN)  Nursing Interventions for Elopement Risk:   Assist with personal care needs such as toileting, eating, 
  Problem: Discharge Planning  Goal: Discharge to home or other facility with appropriate resources  Outcome: Not Progressing  Flowsheets (Taken 11/14/2024 2000 by Maddie Andrade, RN)  Discharge to home or other facility with appropriate resources: Identify barriers to discharge with patient and caregiver     Problem: Safety - Adult  Goal: Free from fall injury  Outcome: Not Progressing     Problem: Risk for Elopement  Goal: Patient will not exit the unit/facility without proper excort  Outcome: Not Progressing  Flowsheets (Taken 11/14/2024 2000 by Maddie Andrade, RN)  Nursing Interventions for Elopement Risk:   Assist with personal care needs such as toileting, eating, dressing, as needed to reduce the risk of wandering   Collaborate with treatment team for drug withdrawal symptoms treatment   Collaborate with family members/caregivers to mitigate the elopement risk   Communicate/escalate to charge nurse the risk of elopement   Collaborate with treatment team for nicotine replacement   Communicate/escalate to /other team member the risk of elopement   Communicate/escalate to nursing supervisor the risk of elopement   Communicate to physician the risk for elopement   Escort with two staff members if patient must leave the unit   Reduce environmental triggers   Place patient in room far away from exits and stairways   Make sure patient has all necessary personal care items   Shoes and clothing collected and placed in gown attire     Problem: Pain  Goal: Verbalizes/displays adequate comfort level or baseline comfort level  Outcome: Not Progressing     Problem: Nutrition Deficit:  Goal: Optimize nutritional status  Outcome: Not Progressing     Problem: Skin/Tissue Integrity  Goal: Absence of new skin breakdown  Description: 1.  Monitor for areas of redness and/or skin breakdown  2.  Assess vascular access sites hourly  3.  Every 4-6 hours minimum:  Change oxygen saturation probe site  4.  Every 4-6 
  Problem: Discharge Planning  Goal: Discharge to home or other facility with appropriate resources  Outcome: Progressing     Problem: Safety - Adult  Goal: Free from fall injury  Outcome: Progressing     Problem: Risk for Elopement  Goal: Patient will not exit the unit/facility without proper excort  Outcome: Progressing  Flowsheets (Taken 11/10/2024 2000)  Nursing Interventions for Elopement Risk:   Collaborate with treatment team for drug withdrawal symptoms treatment   Communicate/escalate to charge nurse the risk of elopement   Make sure patient has all necessary personal care items   Reduce environmental triggers   Shoes and clothing collected and placed in gown attire     
  Problem: Discharge Planning  Goal: Discharge to home or other facility with appropriate resources  Outcome: Progressing  Flowsheets  Taken 11/13/2024 2309  Discharge to home or other facility with appropriate resources: Identify barriers to discharge with patient and caregiver  Taken 11/13/2024 1929  Discharge to home or other facility with appropriate resources: Identify barriers to discharge with patient and caregiver     Problem: Safety - Adult  Goal: Free from fall injury  Outcome: Progressing     Problem: Risk for Elopement  Goal: Patient will not exit the unit/facility without proper excort  Outcome: Not Progressing  Flowsheets  Taken 11/13/2024 2309  Nursing Interventions for Elopement Risk:   Collaborate with treatment team for drug withdrawal symptoms treatment   Communicate/escalate to charge nurse the risk of elopement   Assist with personal care needs such as toileting, eating, dressing, as needed to reduce the risk of wandering  Taken 11/13/2024 1929  Nursing Interventions for Elopement Risk:   Collaborate with treatment team for drug withdrawal symptoms treatment   Communicate/escalate to charge nurse the risk of elopement   Assist with personal care needs such as toileting, eating, dressing, as needed to reduce the risk of wandering     Problem: Pain  Goal: Verbalizes/displays adequate comfort level or baseline comfort level  Outcome: Progressing  Flowsheets (Taken 11/13/2024 1900)  Verbalizes/displays adequate comfort level or baseline comfort level: Encourage patient to monitor pain and request assistance     Problem: Nutrition Deficit:  Goal: Optimize nutritional status  Outcome: Progressing     Problem: Skin/Tissue Integrity  Goal: Absence of new skin breakdown  Description: 1.  Monitor for areas of redness and/or skin breakdown  2.  Assess vascular access sites hourly  3.  Every 4-6 hours minimum:  Change oxygen saturation probe site  4.  Every 4-6 hours:  If on nasal continuous positive airway 
  Problem: Discharge Planning  Goal: Discharge to home or other facility with appropriate resources  Outcome: Progressing  Flowsheets (Taken 11/11/2024 0800 by Meg Cruz, RN)  Discharge to home or other facility with appropriate resources: Identify barriers to discharge with patient and caregiver     Problem: Safety - Adult  Goal: Free from fall injury  Outcome: Progressing     Problem: Risk for Elopement  Goal: Patient will not exit the unit/facility without proper excort  Outcome: Progressing  Flowsheets (Taken 11/11/2024 0800 by Meg Cruz, RN)  Nursing Interventions for Elopement Risk:   Collaborate with treatment team for drug withdrawal symptoms treatment   Communicate/escalate to charge nurse the risk of elopement   Assist with personal care needs such as toileting, eating, dressing, as needed to reduce the risk of wandering     Problem: Pain  Goal: Verbalizes/displays adequate comfort level or baseline comfort level  Outcome: Progressing  Flowsheets  Taken 11/11/2024 1600 by Meg Cruz RN  Verbalizes/displays adequate comfort level or baseline comfort level: Encourage patient to monitor pain and request assistance  Taken 11/11/2024 1200 by Meg Cruz RN  Verbalizes/displays adequate comfort level or baseline comfort level: Encourage patient to monitor pain and request assistance     Problem: Nutrition Deficit:  Goal: Optimize nutritional status  Outcome: Progressing     
  Problem: Discharge Planning  Goal: Discharge to home or other facility with appropriate resources  Outcome: Progressing  Flowsheets (Taken 11/15/2024 1322 by Kimberly Sethi, RN)  Discharge to home or other facility with appropriate resources: Identify barriers to discharge with patient and caregiver     Problem: Safety - Adult  Goal: Free from fall injury  Outcome: Progressing     Problem: Risk for Elopement  Goal: Patient will not exit the unit/facility without proper excort  Outcome: Progressing  Flowsheets (Taken 11/15/2024 1322 by Kimberly Sethi, RN)  Nursing Interventions for Elopement Risk:   Assist with personal care needs such as toileting, eating, dressing, as needed to reduce the risk of wandering   Collaborate with treatment team for drug withdrawal symptoms treatment   Collaborate with family members/caregivers to mitigate the elopement risk   Communicate/escalate to charge nurse the risk of elopement   Collaborate with treatment team for nicotine replacement   Communicate/escalate to /other team member the risk of elopement   Communicate/escalate to nursing supervisor the risk of elopement   Communicate to physician the risk for elopement   Escort with two staff members if patient must leave the unit   Reduce environmental triggers   Place patient in room far away from exits and stairways   Make sure patient has all necessary personal care items   Shoes and clothing collected and placed in gown attire     Problem: Pain  Goal: Verbalizes/displays adequate comfort level or baseline comfort level  Outcome: Progressing     Problem: Skin/Tissue Integrity  Goal: Absence of new skin breakdown  Description: 1.  Monitor for areas of redness and/or skin breakdown  2.  Assess vascular access sites hourly  3.  Every 4-6 hours minimum:  Change oxygen saturation probe site  4.  Every 4-6 hours:  If on nasal continuous positive airway pressure, respiratory therapy assess nares and determine need for 
  Problem: Discharge Planning  Goal: Discharge to home or other facility with appropriate resources  Outcome: Progressing  Flowsheets (Taken 11/17/2024 0818)  Discharge to home or other facility with appropriate resources: Identify barriers to discharge with patient and caregiver     Problem: Safety - Adult  Goal: Free from fall injury  Outcome: Progressing     Problem: Risk for Elopement  Goal: Patient will not exit the unit/facility without proper excort  Outcome: Progressing  Flowsheets (Taken 11/17/2024 0818)  Nursing Interventions for Elopement Risk:   Assist with personal care needs such as toileting, eating, dressing, as needed to reduce the risk of wandering   Collaborate with treatment team for drug withdrawal symptoms treatment   Collaborate with family members/caregivers to mitigate the elopement risk   Communicate/escalate to charge nurse the risk of elopement   Collaborate with treatment team for nicotine replacement   Communicate/escalate to /other team member the risk of elopement   Communicate/escalate to nursing supervisor the risk of elopement   Communicate to physician the risk for elopement   Escort with two staff members if patient must leave the unit   Reduce environmental triggers   Place patient in room far away from exits and stairways   Make sure patient has all necessary personal care items   Shoes and clothing collected and placed in gown attire     Problem: Pain  Goal: Verbalizes/displays adequate comfort level or baseline comfort level  Outcome: Progressing     Problem: Nutrition Deficit:  Goal: Optimize nutritional status  Outcome: Progressing     Problem: Skin/Tissue Integrity  Goal: Absence of new skin breakdown  Description: 1.  Monitor for areas of redness and/or skin breakdown  2.  Assess vascular access sites hourly  3.  Every 4-6 hours minimum:  Change oxygen saturation probe site  4.  Every 4-6 hours:  If on nasal continuous positive airway pressure, respiratory 
  Problem: Nutrition Deficit:  Goal: Optimize nutritional status  11/14/2024 0928 by Angelia Boogie RN  Outcome: Progressing  Flowsheets (Taken 11/14/2024 0925 by Amy Yan, MS, RD, LD)  Nutrient intake appropriate for improving, restoring, or maintaining nutritional needs:   Assess nutritional status and recommend course of action   Monitor oral intake, labs, and treatment plans   Recommend appropriate diets, oral nutritional supplements, and vitamin/mineral supplements  11/13/2024 2351 by Tarsha Johnson RN  Outcome: Progressing     Problem: Risk for Elopement  Goal: Patient will not exit the unit/facility without proper excort  11/14/2024 0928 by Angelia Boogie RN  Outcome: Progressing  Flowsheets  Taken 11/14/2024 0800 by Angelia Boogie RN  Nursing Interventions for Elopement Risk:   Assist with personal care needs such as toileting, eating, dressing, as needed to reduce the risk of wandering   Collaborate with treatment team for drug withdrawal symptoms treatment   Collaborate with family members/caregivers to mitigate the elopement risk   Communicate/escalate to charge nurse the risk of elopement   Collaborate with treatment team for nicotine replacement   Communicate/escalate to /other team member the risk of elopement   Communicate/escalate to nursing supervisor the risk of elopement   Communicate to physician the risk for elopement   Escort with two staff members if patient must leave the unit   Reduce environmental triggers   Place patient in room far away from exits and stairways   Make sure patient has all necessary personal care items   Shoes and clothing collected and placed in gown attire  Taken 11/14/2024 0324 by Tarsha Johnson, RN  Nursing Interventions for Elopement Risk:   Collaborate with treatment team for drug withdrawal symptoms treatment   Communicate/escalate to charge nurse the risk of elopement   Assist with personal care needs such as toileting, eating, 
Nutrition Problem #1: Inadequate oral intake, Underweight  Intervention: Food and/or Nutrient Delivery: Continue NPO  Nutritional      
SUBJECTIVE:    Patient has been off Levophed & Precedex drips for over a day as per ICU RN report, Pt seen and examined. Will transfer to [facility] as per his valve infection. Levophed GGT has been off since 6pm, yesterday and Precedex was d/c at noon yesterday. He had elevated MAPs from the time the Precedex was d/c.      OBJECTIVE:    BP (!) 126/39   Pulse 95   Temp 99.7 °F (37.6 °C)   Resp 26   Ht 1.778 m (5' 10\")   Wt 52.2 kg (115 lb)   SpO2 95%   BMI 16.50 kg/m²       ASSESSMENTS & PLANS:    Precedex GGT & Levophed GGT needs rresolved  Transfer to [facility]  
Every 4-6 hours minimum:  Change oxygen saturation probe site  4.  Every 4-6 hours:  If on nasal continuous positive airway pressure, respiratory therapy assess nares and determine need for appliance change or resting period.  Outcome: Progressing     Problem: ABCDS Injury Assessment  Goal: Absence of physical injury  Outcome: Progressing     Problem: Safety - Medical Restraint  Goal: Remains free of injury from restraints (Restraint for Interference with Medical Device)  Description: INTERVENTIONS:  1. Determine that other, less restrictive measures have been tried or would not be effective before applying the restraint  2. Evaluate the patient's condition at the time of restraint application  3. Inform patient/family regarding the reason for restraint  4. Q2H: Monitor safety, psychosocial status, comfort, nutrition and hydration  Outcome: Progressing     Problem: Confusion  Goal: Confusion, delirium, dementia, or psychosis is improved or at baseline  Description: INTERVENTIONS:  1. Assess for possible contributors to thought disturbance, including medications, impaired vision or hearing, underlying metabolic abnormalities, dehydration, psychiatric diagnoses, and notify attending LIP  2. [place] high risk fall precautions, as indicated  3. Provide frequent short contacts to provide reality reorientation, refocusing and direction  4. Decrease environmental stimuli, including noise as appropriate  5. Monitor and intervene to maintain adequate nutrition, hydration, elimination, sleep and activity  6. If unable to ensure safety without constant attention obtain sitter and review sitter guidelines with assigned personnel  7. Initiate Psychosocial CNS and Spiritual Care consult, as indicated  Outcome: Progressing     
RN)  Nursing Interventions for Elopement Risk:   Assist with personal care needs such as toileting, eating, dressing, as needed to reduce the risk of wandering   Collaborate with treatment team for drug withdrawal symptoms treatment   Collaborate with family members/caregivers to mitigate the elopement risk   Communicate/escalate to charge nurse the risk of elopement   Collaborate with treatment team for nicotine replacement   Communicate/escalate to /other team member the risk of elopement   Communicate/escalate to nursing supervisor the risk of elopement   Communicate to physician the risk for elopement   Escort with two staff members if patient must leave the unit   Reduce environmental triggers   Place patient in room far away from exits and stairways   Make sure patient has all necessary personal care items   Shoes and clothing collected and placed in gown attire     Problem: Pain  Goal: Verbalizes/displays adequate comfort level or baseline comfort level  11/16/2024 1003 by Amy Green RN  Outcome: Progressing  11/16/2024 0127 by Yenny Mandujano RN  Outcome: Progressing     Problem: Nutrition Deficit:  Goal: Optimize nutritional status  11/16/2024 1003 by Amy Green RN  Outcome: Progressing  11/16/2024 0127 by Yenny Mandujano RN  Outcome: Not Progressing     Problem: Skin/Tissue Integrity  Goal: Absence of new skin breakdown  Description: 1.  Monitor for areas of redness and/or skin breakdown  2.  Assess vascular access sites hourly  3.  Every 4-6 hours minimum:  Change oxygen saturation probe site  4.  Every 4-6 hours:  If on nasal continuous positive airway pressure, respiratory therapy assess nares and determine need for appliance change or resting period.  11/16/2024 1003 by Amy Green RN  Outcome: Progressing  11/16/2024 0127 by Yenny Mandujano RN  Outcome: Progressing     Problem: ABCDS Injury Assessment  Goal: Absence of physical injury  11/16/2024 1003 by Amy Green 
Restraint  Goal: Remains free of injury from restraints (Restraint for Interference with Medical Device)  Description: INTERVENTIONS:  1. Determine that other, less restrictive measures have been tried or would not be effective before applying the restraint  2. Evaluate the patient's condition at the time of restraint application  3. Inform patient/family regarding the reason for restraint  4. Q2H: Monitor safety, psychosocial status, comfort, nutrition and hydration  11/15/2024 0219 by Ranjit Osorio RN  Outcome: Not Progressing     Problem: Confusion  Goal: Confusion, delirium, dementia, or psychosis is improved or at baseline  Description: INTERVENTIONS:  1. Assess for possible contributors to thought disturbance, including medications, impaired vision or hearing, underlying metabolic abnormalities, dehydration, psychiatric diagnoses, and notify attending LIP  2. [place] high risk fall precautions, as indicated  3. Provide frequent short contacts to provide reality reorientation, refocusing and direction  4. Decrease environmental stimuli, including noise as appropriate  5. Monitor and intervene to maintain adequate nutrition, hydration, elimination, sleep and activity  6. If unable to ensure safety without constant attention obtain sitter and review sitter guidelines with assigned personnel  7. Initiate Psychosocial CNS and Spiritual Care consult, as indicated  11/15/2024 0219 by Ranjit Osorio RN  Outcome: Not Progressing  Flowsheets (Taken 11/14/2024 2000 by Maddie Andrade, RN)  Effect of thought disturbance (confusion, delirium, dementia, or psychosis) are managed with adequate functional status: Assess for contributors to thought disturbance, including medications, impaired vision or hearing, underlying metabolic abnormalities, dehydration, psychiatric diagnoses, notify LIP

## 2024-11-20 NOTE — DISCHARGE SUMMARY
have not been prescribed any medications.         Time spent on discharge 50 minutes.    Signed:  Shahriar Castro DO

## 2024-11-21 NOTE — PROGRESS NOTES
DIS Nurse Note  SUBJECTIVE: Patient assessed secondary to transfer from critical care.      Deterioration Index Score:  Predictive Model Details          29 (Normal)  Factor Value    Calculated 11/15/2024 13:31 32% Age 54 years old    Deterioration Index Model 31% Neurological exam X     12% WBC count abnormal (15.2 K/uL)     7% Systolic 145     7% Pulse 99     5% Sodium abnormal (135 mmol/L)     2% Pulse oximetry 99 %     2% Hematocrit 35.7 %     2% Platelet count abnormal (126 K/uL)     1% Potassium 3.7 mmol/L     0% Temperature 97.9 °F (36.6 °C)     0% Respiratory rate 16        Vital Signs:  Vitals:    11/15/24 1010 11/15/24 1102 11/15/24 1202 11/15/24 1312   BP:  (!) 145/49 (!) 145/59 (!) 145/60   Pulse:  (!) 102 100 99   Resp:  25 17 16   Temp:   98.4 °F (36.9 °C) 97.9 °F (36.6 °C)   TempSrc:   Temporal Temporal   SpO2:  96% 95% 99%   Weight:       Height: 1.778 m (5' 10\")           Provider Notified: Reviewed patient status  & DIS score with Provider Dr. Villa    ASSESSMENT:  Just transferred from ICU    Plan of Care: Assess skin, Maintain fluid orders, provide for comfort, prevent falls, reorient patient as needed.    Electronically signed by Kimberly Sethi RN   
   11/12/24 1612   Encounter Summary   Encounter Overview/Reason Initial Encounter   Service Provided For Patient  (in ICU)   Referral/Consult From Rounding   Support System Family members   Complexity of Encounter Moderate   Begin Time 1030   End Time  1100   Total Time Calculated 30 min   Spiritual/Emotional needs   Type Spiritual Support   Assessment/Intervention/Outcome   Assessment Compromised coping   Intervention Active listening;Prayer (assurance of)/[place]   Outcome Did not respond   Plan and Referrals   Plan/Referrals Continue to visit, (comment)     Electronically signed by ebenezer Vera Mai on 11/12/2024 at 4:14 PM  
   11/15/24 1520   Encounter Summary   Encounter Overview/Reason Advance Care Planning;Spiritual/Emotional Needs   Encounter Code  Assessment by  services   Service Provided For Patient   Referral/Consult From Palliative Care   Support System Parent;Family members   Complexity of Encounter Moderate   Begin Time 1442   End Time  1522   Total Time Calculated 40 min   Spiritual/Emotional needs   Type Spiritual Support  (Assisted pt with completing a LW.)   Palliative Care   Type Palliative Care, Initial/Spiritual Assessment   Advance Care Planning   Type Care Preferences Addressed   Assessment/Intervention/Outcome   Assessment Coping   Intervention Active listening;Prayer (assurance of)/[place];Sustaining Presence/Ministry of presence  (Assisted pt with completing a LW and provided a pair of sweat pants, a pair of pants from the clothes closet.)   Outcome Acceptance;Engaged in conversation;Expressed Gratitude   Plan and Referrals   Plan/Referrals Continue to visit, (comment);Continue Support (comment)   Does the patient have a [facility] PCP? No         This patient visited with pt, his sister, and his mother to help initiate palliative care and provide spiritual care. Pt was previously in ICU and was moved to PCU. Pt shared that he was [place] of [place]. He says his barbara helps him make his healthcare decisions. Domingajanet Edmond began the visit and this  followed after her. Provided a LW for pt and he was willing to complete. Pt was identified with his armband with his MRN, and . Pt named his sister Nhi Santiago as his primary decision maker, and his mom Maynor Recio and son Moises Nicholson as secondary decision makers. Pt also made an advance care planning decision. Pt is full code and wants CPR and Ventilator. Pt signed the notary log and initialed and signed the document. This  witnessed the pt initial and sign and notarized the document. Original and copies were given to pt and a copy was 
   11/20/24 1602   Encounter Summary   Encounter Overview/Reason Crisis   Service Provided For Patient   Referral/Consult From Palliative Care   Support System Parent;Family members   Complexity of Encounter High   Begin Time 1510   End Time  1540   Total Time Calculated 30 min   Crisis   Type Code Blue   Spiritual/Emotional needs   Type Spiritual Support   Palliative Care   Type Palliative Care, Follow-up   Assessment/Intervention/Outcome   Assessment Coping  (Pt had a friend in the room when he coded.)   Intervention Active listening;Sustaining Presence/Ministry of presence   Outcome Acceptance;Engaged in conversation;Expressed Gratitude   Plan and Referrals   Plan/Referrals Continue to visit, (comment);Continue Support (comment)   Does the patient have a [facility] PCP? No         This  responded to a code blue and pt had a friend in the room when he coded. Pt was stepped out of the room to speak to the pt's son and this  was with her during the CPR and subsequently the pt recovered a pulse. Pt son was coming from [facility]. This  was with the pt's friend and walked her down to ICU to await his son's arrival. Pt's friend expressed gratitude for this  being with her during this event. This  called ICU and informed them pt has a LW and his sister Nhi Santiago is primary decision maker. His mom Maynor Recio and his son Moises Nicholson are secondary decision makers. These are listed in pt's chart.       
  Palliative Care:  Consulted for goals of care and support.  Joint visit with .  Pt's sister and mother were present.  He has relocated to 7th floor from ICU.  Pt was able to answer questions r/t his baseline.  He has lived alone in a camper [place].  He has limited support. Distant from family due to his drug abuse.    He is nearly blind and startles easily.  Pt has named his sister as his Health Care Surrogate.  Amaris assisted with his living will document.    Pt's mother and sister are here being supportive of pt.  His goal of care is undetermined at this time. He will likely need a higher level of care.  Palliative will continue to follow.    Electronically signed by Dominga Edmond RN on 11/15/2024 at 3:25 PM    
  Physician Progress Note      PATIENT:               KENDRA YUSUF  CSN #:                  [id number]  :                       1970  ADMIT DATE:       11/10/2024 12:39 PM  DISCH DATE:  RESPONDING  PROVIDER #:        Markie Villa MD          QUERY TEXT:    Patient admitted with sepsis group B strep and septic shock. Noted to have   severe malnutrition in Dietician progress note dated .  If possible,   please document in progress notes and discharge summary if you are evaluating   and /or treating any of the following:    The medical record reflects the following:  Risk Factors: Acute on chronic illness.  Clinical Indicators: Per Dietician consult note dated , Energy Intake:    Mild decrease in energy intake. Body Fat Loss:  Severe body fat loss Buccal   region, Triceps, Orbital. Muscle Mass Loss:  Severe muscle mass loss Temples   (temporalis), Clavicles (pectoralis & deltoids). Low BMI 16.5  Treatment: Dietician consult, regular diet carb control 5.    ASPEN Criteria:    https://aspenjournals.onlinelibrary.julian.com/doi/full/10.1177/876303667588170  5  Options provided:  -- Protein calorie malnutrition severe  -- Other - I will add my own diagnosis  -- Disagree - Not applicable / Not valid  -- Disagree - Clinically unable to determine / Unknown  -- Refer to Clinical Documentation Reviewer    PROVIDER RESPONSE TEXT:    This patient has severe protein calorie malnutrition.    Query created by: Alia Ozuna on 2024 3:13 PM      Electronically signed by:  Markie Villa MD 2024 3:43 PM          
4 Eyes Skin Assessment     NAME:  Linh Nicholson  YOB: 1970  MEDICAL RECORD NUMBER:  [id number]    The patient is being assessed for  Admission    I agree that at least one RN has performed a thorough Head to Toe Skin Assessment on the patient. ALL assessment sites listed below have been assessed.      Areas assessed by both nurses:    Head, Face, Ears, Shoulders, Back, Chest, Arms, Elbows, Hands, Sacrum. Buttock, Coccyx, Ischium, Legs. Feet and Heels, and Under Medical Devices         Does the Patient have a Wound? Yes wound(s) were present on assessment. LDA wound assessment was Initiated and completed by RN       Harry Prevention initiated by RN: No  Wound Care Orders initiated by RN: No    Pressure Injury (Stage 3,4, Unstageable, DTI, NWPT, and Complex wounds) if present, place Wound referral order by RN under : No    New Ostomies, if present place, Ostomy referral order under : No     Nurse 1 eSignature: Electronically signed by EWA GARCIA RN on 11/10/24 at 9:00 PM CST    **SHARE this note so that the co-signing nurse can place an eSignature**    Nurse 2 eSignature: Electronically signed by Chris Ridley RN on 11/10/24 at 10:40 PM CST  
4 Eyes Skin Assessment     NAME:  Linh Nicholson  YOB: 1970  MEDICAL RECORD NUMBER:  [id number]    The patient is being assessed for  Transfer to New Unit    I agree that at least one RN has performed a thorough Head to Toe Skin Assessment on the patient. ALL assessment sites listed below have been assessed.      Areas assessed by both nurses:    Head, Face, Ears, Shoulders, Back, Chest, Arms, Elbows, Hands, Sacrum. Buttock, Coccyx, Ischium, Legs. Feet and Heels, and Under Medical Devices         Does the Patient have a Wound? Yes wound(s) were present on assessment. LDA wound assessment was Initiated and completed by RN     Abrasions on alonzo arms, wound on R leg  Harry Prevention initiated by RN: Yes  Wound Care Orders initiated by RN: Yes  Cleaned and covered Leg wound with mepilex  Pressure Injury (Stage 3,4, Unstageable, DTI, NWPT, and Complex wounds) if present, place Wound referral order by RN under : No    New Ostomies, if present place, Ostomy referral order under : No     Nurse 1 eSignature: Electronically signed by Kimberly Sethi RN on 11/15/24 at 1:20 PM CST    **SHARE this note so that the co-signing nurse can place an eSignature**    Nurse 2 eSignature: Electronically signed by Allegra Burgess RN on 11/15/24 at 1:38 PM CST   
Arrived to cath holding to await a cardiac cath. Denies any c/o pain.  Iv site clear with NS infusing.  Pulses marked and recorded and wrist and groin site clipped.  
CODE BLUE DOCUMENTATION  1510 hrs-notified by monitor tech that Pt's heart rate dropping into 20's and junctional.  At bedside, Pt non responsive and apneic.  1512 hrs- Code Blue called overhead and CPR started.  1517 hrs-EPINEPHRINE 1 AMP (#1) GIVEN IVP by Karlie Figueroa RN. Continuing effective CPR.  1518 hrs-Rhythm check: Agonal per monitor. INTUBATED BY DR. Castro with 7.5 ETT at the 22 cm shaji.  1519 hrs-EPINEPHRINE 1 AMP GIVEN (#2) IVP  by Karlie Figueroa RN. CPR continues.  1519 hrs-CALCIUM 1 AMP GIVEN BY Karlie Figueroa RN. CPR continues.  1520 hrs.Heart rhythm check: Agonal per monitor. CPR continues.  1520 hrs- SODIUM BICARB 1 AMP GIVEN IVP by Karlie Figueroa RN.  1521 hrs- Heart rhythm check: Agonal per monitor. EPINEPHRINE 1 AM (#3) GIVEN IVP by Karlie Figueroa RN.  1522 hrs- Heart rhythm check: Accelerated Junctional rhythm with palpable femoral pulse.  1523 hrs-Heart rate dropping.   1523 hrs- EPINEPHRINE 1 AMP IVP BY Karlie Figueroa RN. CPR continuing.  1525 hrs: Heart rhythm check: accelerated idioventricular. Femoral pulse palpated.  1526 hrs- DOPAMINE STARTED. Pt transferred to ICU on bed, monitor, with  and RN's.          
Cardiac catheterization performed today by Dr. Jones shows preserved LV function with severe multivessel coronary disease.  There are tandem 90% stenotic lesions of the RCA with 90% lesions of the proximal mid LAD and diagonal branch.  He will require combination of aortic valve replacement and three-vessel coronary artery bypass grafting.  Awaiting assessment from oral surgery for possible dental extraction prior to proceeding with elective open heart surgery.  
Cardiology Daily Note Blaine Jones MD      Patient:  Linh Nicholson  [id number]    Patient Active Problem List    Diagnosis Date Noted    Abnormal echocardiogram 11/13/2024    Severe malnutrition (HCC) 11/11/2024    Sepsis (HCC) 11/10/2024       Admit Date:  11/10/2024    Admission Problem List: Present on Admission:   Sepsis (HCC)   Severe malnutrition (HCC)   Abnormal echocardiogram      Cardiac Specific Data:  Specialty Problems    None      Subjective:  Mr. Nicholson seen today resting comfortably discussed case with Dr. Simon will need teeth addressed prior to any scheduled surgery and will also require cardiac catheterization.  Tentatively will schedule for Tuesday morning.  Stable at this time denies dyspnea denies chest pain or other complaints.  Blood pressure 118/53 heart 95.    Objective:   BP (!) 118/53   Pulse 95   Temp 99 °F (37.2 °C) (Temporal)   Resp 16   Ht 1.778 m (5' 10\")   Wt 63.3 kg (139 lb 8 oz)   SpO2 97%   BMI 20.02 kg/m²       Intake/Output Summary (Last 24 hours) at 11/17/2024 1003  Last data filed at 11/17/2024 0818  Gross per 24 hour   Intake 200 ml   Output --   Net 200 ml       Prior to Admission medications    Not on File        nicotine  1 patch TransDERmal Daily    insulin lispro  0-4 Units SubCUTAneous 4x Daily AC & HS    cefTRIAXone (ROCEPHIN) IV  2,000 mg IntraVENous Q12H       TELEMETRY: Sinus     Physical Exam:      Physical Exam      General:  Awake, alert, NAD  Skin:  Warm and dry  Neck:  no jvd , no carotid bruits  Chest:  Clear to auscultation, no wheezing or rales  Cardiovascular:  RRR S1S2 no murmurs, clicks, gallups, or rubs  Abdomen:  Soft nontender, nondistended, bowel sounds present  Extremities:  Edema: none         Lab Data:  CBC:   Recent Labs     11/15/24  0134 11/16/24  0517 11/17/24  0116   WBC 15.2* 13.3* 14.3*   HGB 11.7 11.7 11.0   HCT 35.7 37.2 33.5   MCV 88.8 90.7 90.1   * 164 180     BMP:   Recent Labs     11/15/24  0134 11/16/24  0517 
Cardiology Daily Note Blaine Jones MD      Patient:  Linh Nicholson  [id number]    Patient Active Problem List    Diagnosis Date Noted    Abnormal echocardiogram 11/13/2024    Severe malnutrition (HCC) 11/11/2024    Sepsis (HCC) 11/10/2024    Chest pain 11/10/2024       Admit Date:  11/10/2024    Admission Problem List: Present on Admission:   Sepsis (HCC)   Severe malnutrition (HCC)   Abnormal echocardiogram      Cardiac Specific Data:  Specialty Problems          Cardiology Problems    Chest pain           Subjective:  Mr. Nicholson seen today resting comfortably continues to have intermittent low-grade pain some atypical features worse with deep breath and exacerbated by movement at times some of it may be noncardiac pain.  Blood pressure 80/50 heart rate 102.  Awaiting assessment by oral surgery.    Objective:   BP (!) 80/50   Pulse (!) 102   Temp 97 °F (36.1 °C) (Temporal)   Resp 18   Ht 1.778 m (5' 10\")   Wt 62.8 kg (138 lb 6 oz)   SpO2 95%   BMI 19.85 kg/m²       Intake/Output Summary (Last 24 hours) at 11/20/2024 1329  Last data filed at 11/19/2024 1539  Gross per 24 hour   Intake --   Output 475 ml   Net -475 ml       Prior to Admission medications    Not on File        insulin glargine  10 Units SubCUTAneous BID    metoprolol succinate  25 mg Oral Daily    aspirin  81 mg Oral Daily    isosorbide mononitrate  30 mg Oral Daily    pantoprazole  40 mg Oral QAM AC    nicotine  1 patch TransDERmal Daily    insulin lispro  0-4 Units SubCUTAneous 4x Daily AC & HS    cefTRIAXone (ROCEPHIN) IV  2,000 mg IntraVENous Q12H       TELEMETRY: Sinus     Physical Exam:      Physical Exam      General:  Awake, alert, NAD  Skin:  Warm and dry  Neck:  no jvd , no carotid bruits  Chest:  Clear to auscultation, no wheezing or rales  Cardiovascular:  RRR S1S2 no murmurs, clicks, gallups, or rubs  Abdomen:  Soft nontender, nondistended, bowel sounds present  Extremities:  Edema: none     Lab Data:  CBC:   Recent Labs     
Cardiology Daily Note Blaine Jones MD      Patient:  Linh Nicholson  [id number]    Patient Active Problem List    Diagnosis Date Noted    Abnormal echocardiogram 11/13/2024    Severe malnutrition (HCC) 11/11/2024    Sepsis (HCC) 11/10/2024       Admit Date:  11/10/2024    Admission Problem List: Present on Admission:   Sepsis (HCC)   Severe malnutrition (HCC)   Abnormal echocardiogram      Cardiac Specific Data:  Specialty Problems    None      Subjective:  Mr. Nicholson seen today resting comfortably no reported chest pain or dyspnea.  Blood pressure 119/47 heart rate 90.    Objective:   BP (!) 119/47   Pulse 90   Temp 98.9 °F (37.2 °C) (Temporal)   Resp 16   Ht 1.778 m (5' 10\")   Wt 62.8 kg (138 lb 6 oz)   SpO2 94%   BMI 19.85 kg/m²       Intake/Output Summary (Last 24 hours) at 11/18/2024 0909  Last data filed at 11/17/2024 1400  Gross per 24 hour   Intake 120 ml   Output --   Net 120 ml       Prior to Admission medications    Not on File        pantoprazole  40 mg Oral QAM AC    nicotine  1 patch TransDERmal Daily    insulin lispro  0-4 Units SubCUTAneous 4x Daily AC & HS    cefTRIAXone (ROCEPHIN) IV  2,000 mg IntraVENous Q12H       TELEMETRY: Sinus     Physical Exam:      Physical Exam      General:  Awake, alert, NAD  Skin:  Warm and dry  Neck:  no jvd , no carotid bruits  Chest:  Clear to auscultation, no wheezing or rales  Cardiovascular:  RRR S1S2 no murmurs, clicks, gallups, or rubs  Abdomen:  Soft nontender, nondistended, bowel sounds present  Extremities:  Edema: none       Lab Data:  CBC:   Recent Labs     11/16/24  0517 11/17/24  0116 11/18/24  0204   WBC 13.3* 14.3* 12.6*   HGB 11.7 11.0 10.2   HCT 37.2 33.5 31.4   MCV 90.7 90.1 90.2    180 199     BMP:   Recent Labs     11/16/24  0517 11/17/24  0116 11/18/24  0204   * 131* 132*   K 4.2 4.0 4.3   CL 93* 92* 95*   CO2 23 28 29   BUN 7 8 8   CREATININE 0.4 0.5 0.6     LIVER PROFILE: No results for input(s): \"AST\", \"ALT\", \"LIPASE\", 
Cardiology procedure note  Cardiac catheterization performed right radial artery approach tolerated well  Severe multivessel coronary disease Dr. Simon to review.  Stable at this time.  
Code Blue.Pt intubated with 7.5 et tube at the 22cm shaji.   
Comprehensive Nutrition Assessment    Type and Reason for Visit:  Initial    Nutrition Recommendations/Plan:   Follow for advancing diet, po intake     Malnutrition Assessment:  Malnutrition Status:  Severe malnutrition (11/11/24 0816)    Context:  Chronic Illness     Findings of the 6 clinical characteristics of malnutrition:  Energy Intake:  Mild decrease in energy intake  Weight Loss:  No weight loss     Body Fat Loss:  Severe body fat loss Buccal region, Triceps, Orbital   Muscle Mass Loss:  Severe muscle mass loss Temples (temporalis), Clavicles (pectoralis & deltoids)  Fluid Accumulation:  No fluid accumulation Extremities   Strength:  Not Performed    Nutrition Assessment:    Following patient for Low BMI 16.5.  At present time pt is NPO.    Glucose 316-368.  Accuchek's 301-336.    Nutrition Related Findings:    no edema noted Wound Type: Skin Tears (abrasion)       Current Nutrition Intake & Therapies:    Average Meal Intake: NPO  Average Supplements Intake: NPO  Diet NPO    Anthropometric Measures:  Height: 177.8 cm (5' 10\")  Ideal Body Weight (IBW):   ( )    Admission Body Weight: 52.2 kg (115 lb)  Current Body Weight: 52.2 kg (115 lb 1.3 oz) (estimated),   IBW.    Current BMI (kg/m2): 16.5  Weight Adjustment For: No Adjustment  BMI Categories: Underweight (BMI less than 18.5)    Estimated Daily Nutrient Needs:  Energy Requirements Based On: Kcal/kg  Weight Used for Energy Requirements: Current  Energy (kcal/day): 7165-1604 kcals  Weight Used for Protein Requirements: Current  Protein (g/day): 89g  Method Used for Fluid Requirements: 1 ml/kcal  Fluid (ml/day): 2018-9804 ml    Nutrition Diagnosis:   Inadequate oral intake, Underweight related to acute injury/trauma, inadequate protein-energy intake as evidenced by NPO or clear liquid status due to medical condition, BMI, loss of subcutaneous fat, muscle loss    Nutrition Interventions:   Food and/or Nutrient Delivery: Continue NPO  Nutrition 
Comprehensive Nutrition Assessment    Type and Reason for Visit:  Reassess    Nutrition Recommendations/Plan:   Continue POC.     Malnutrition Assessment:  Malnutrition Status:  Severe malnutrition (11/15/24 1019)    Context:  Chronic Illness     Findings of the 6 clinical characteristics of malnutrition:  Energy Intake:  Mild decrease in energy intake  Weight Loss:  No weight loss     Body Fat Loss:  Severe body fat loss Buccal region, Orbital   Muscle Mass Loss:  Severe muscle mass loss Temples (temporalis), Clavicles (pectoralis & deltoids)  Fluid Accumulation:  No fluid accumulation Extremities   Strength:  Not Performed    Nutrition Assessment:    Pt transferred to medical floor. Was out of room at the time of my visit for heart cath. Previous PO intake: 0%, 26-50%. Aware pt has had N/V the past couple of days. Aware of plan to evaluate for total dental extraction prior to heart surgery. Noted current standing scale wt down 13# from previous bedscale wt of 151#.    Nutrition Related Findings:    N/V. BM 11/18. Trace RLE edema. Na 128, A1c 12.2%, glu 198-222, accuchek's 191-307. Wound Type: Skin Tears (abrasion)       Current Nutrition Intake & Therapies:    Average Meal Intake: NPO  Average Supplements Intake: NPO  Diet NPO Exceptions are: Sips of Water with Meds    Anthropometric Measures:  Height: 177.8 cm (5' 10\")  Ideal Body Weight (IBW):   ( )    Admission Body Weight: 52.2 kg (115 lb)  Current Body Weight: 62.8 kg (138 lb 7.2 oz),   IBW. Weight Source: Standing scale  Current BMI (kg/m2): 19.9  BMI Categories: Normal Weight (BMI 18.5-24.9)    Estimated Daily Nutrient Needs:  Energy Requirements Based On: Kcal/kg  Weight Used for Energy Requirements: Current  Energy (kcal/day): 3548-7333 (25-30 kcal/kg)  Weight Used for Protein Requirements: Current  Protein (g/day):  (1-2 g/kg)  Method Used for Fluid Requirements: 1 ml/kcal  Fluid (ml/day): 4998-3686    Nutrition Diagnosis:   Inadequate oral 
Dr. Sullivan been to room and updated on time of death  
Facility/Department: [facility] ICU  Initial Speech/Language/Cognitive Assessment    NAME: Linh Nicholson  : 1970   MRN: [id number]  ADMISSION DATE: 11/10/2024  ADMITTING DIAGNOSIS: has Sepsis (HCC); Severe malnutrition (HCC); and Abnormal echocardiogram on their problem list.    Date of Eval: 11/15/2024   Evaluating Therapist: MAXIMO Banerjee    Pain:  Pain Assessment  Pain Assessment: None - Denies Pain  Pain Level: 0  Patient's Stated Pain Goal: 0 - No pain  Pain Location: Neck  Response to Pain Intervention: Patient satisfied  Side Effects: Drowsy    Vision/ Hearing  Patient reported glasses are not present.    Assessment:  Completed MINI MENTAL STATE EXAMINATION and patient obtained 15 out of 30 possible points, indicative of moderate cognitive-linguistic impairment (patient did not verbalize season, ERNST, month, county, or floor of hospital; patient was 1/5 attention, 0/3 recall, 2/3 multi-step command, 0/1 written comprehension, and 0/1 copy design). Subsequently transitioned to full assessment and patient exhibited decreased select and sustained attention, slow processing, delayed and decreased auditory comprehension of increased complexity, moderately delayed structured responsive speech with mild fragmentations observed where the patient started expressions but then discontinued, decreased immediate/short-term memory, and decreased reasoning/problem solving.    It is noted that during evaluation, patient demonstrated difficulty verbalizing PCP and pharmacy at independent level. Patient demonstrated difficulty verbalizing conditions in which he takes medications independently. Patient demonstrated delays and mild difficulty verbalizing appropriate simple solutions to situations that could occur during activities of daily living at independent level (ie allergies, cough, fever, headache, nausea/upset stomach, 911). Patient demonstrated significant difficulty calculating time for medication management 
[facility]   Pharmacy Pharmacokinetic Monitoring Service - Vancomycin     Linh Nicholson is a 54 y.o. adult starting on vancomycin therapy for CNS Infection. Pharmacy consulted by Dr Castro for monitoring and adjustment.    Target Concentration: Goal AUC/-600 mg*hr/L    Additional Antimicrobials: Acyclovir and Rocephin    Pertinent Laboratory Values:   Wt Readings from Last 1 Encounters:   11/10/24 52.2 kg (115 lb)     Temp Readings from Last 1 Encounters:   11/10/24 (!) 100.6 °F (38.1 °C) (Bladder)     Estimated Creatinine Clearance (based on SCr of 1.2 mg/dL)  Female: 43 mL/min  Male: 51 mL/min  Recent Labs     11/10/24  1245   CREATININE 1.2   BUN 39*   WBC 11.0*     Procalcitonin: N/A    Pertinent Cultures:  Culture Date Source Results   11/10/24  11/10/24  11/10/24 Meningitis Panel  CSF  Resp Panel Nothing detected  No organisms seen  Nothing Detected   MRSA Nasal Swab: N/A. Non-respiratory infection.    Plan:  Dosing recommendations based on Bayesian software  Start vancomycin 1000mg in ED, then 750mg Q12hr  Anticipated AUC of 575 and trough concentration of 16.1 at steady state  Renal labs as indicated   Vancomycin concentration ordered for 11/12 @ 0100   Pharmacy will continue to monitor patient and adjust therapy as indicated    Thank you for the consult,  Tiffany Stinson RPH  11/10/2024 8:08 PM   
[facility]   Pharmacy Pharmacokinetic Monitoring Service - Vancomycin    Consulting Provider: Dr Castro   Indication: CNS Infection  Target Concentration: Goal AUC/-600 mg*hr/L  Day of Therapy: 3  Additional Antimicrobials: Rocephin    Pertinent Laboratory Values:   Wt Readings from Last 1 Encounters:   11/10/24 52.2 kg (115 lb)     Temp Readings from Last 1 Encounters:   11/12/24 97.1 °F (36.2 °C)     Estimated Creatinine Clearance (based on SCr of 0.6 mg/dL)  Female: 86 mL/min  Male: 102 mL/min  Recent Labs     11/11/24  0218 11/12/24  0202   CREATININE 0.8 0.6   BUN 36* 37*   WBC 15.1* 17.3*     Procalcitonin: N/A    Pertinent Cultures:  Culture Date Source Results   11/10/24  11/10/24 CSF  Urine No growth  No growth   11/10/24 Blood Gram + cocci  Strep agalactiae grbB   11/10/24 Meningitis Panel Nothing detected   MRSA Nasal Swab: N/A. Non-respiratory infection.    Recent vancomycin administrations                     vancomycin (VANCOCIN) 750 mg in sodium chloride 0.9 % 250 mL IVPB ([id number]) (mg) 750 mg New Bag 11/12/24 0159     750 mg New Bag 11/11/24 1454     750 mg New Bag  0232    vancomycin (VANCOCIN) 1000 mg in 200 mL IVPB (mg) 1,000 mg New Bag 11/10/24 1410                    Assessment:  Date/Time Current Dose Concentration Timing of Concentration (h) AUC   11/12/24 750mg Q12hr 6 3min after start of infusion 294   Note: Serum concentrations collected for AUC dosing may appear elevated if collected in close proximity to the dose administered, this is not necessarily an indication of toxicity    Plan:  Current dosing regimen is sub-therapeutic  Increase dose to 750mg Q8hr (AUC= 439  Tr= 11.2)  Repeat vancomycin concentration ordered for 11/13 @ 0800   Pharmacy will continue to monitor patient and adjust therapy as indicated    Thank you for the consult,  Tiffany Stinson [facility]  11/12/2024 2:53 AM   
Hospitalist Progress Note    Patient:  Linh Nicholson  YOB: 1970  Date of Service: 11/20/2024  MRN: [id number]   Acct: [id number]   Primary Care Physician: Igor Atkins MD  Advance Directive: Full Code  Admit Date: 11/10/2024       Hospital Day: 10  Referring Provider: Shahriar Castro DO    Patient Seen, Chart, Consults, Notes, Labs, Radiology studies reviewed.    Subjective:  Linh Nicholson is a 54 y.o. adult  whom we are following for sepsis, streptococcal aortic valve endocarditis, acute kidney injury, methamphetamine use, altered mental status.  Chart reviewed in detail.  I admitted him on admission.  He has had significant clinical improvement since I last saw him.  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  He also underwent left heart catheterization was shown to have triple-vessel disease.  He is tentatively scheduled to have tooth extraction tomorrow by oral surgery.  Once stabilized from that standpoint, he will proceed with aortic valve replacement and three-vessel bypass.  From there, he will most likely need LTAC placement with long-term antibiotic coverage to treat his endocarditis.  He is much improved from a neurologic standpoint since I last saw him.    Allergies:  Patient has no allergy information on record.    Medicines:  Current Facility-Administered Medications   Medication Dose Route Frequency Provider Last Rate Last Admin    morphine (PF) injection 2 mg  2 mg IntraVENous Q4H PRN Blaine Jones MD   2 mg at 11/20/24 0850    nitroGLYCERIN (NITROSTAT) SL tablet 0.4 mg  0.4 mg SubLINGual Q5 Min PRN Blaine Jones MD   0.4 mg at 11/19/24 1408    metoprolol succinate (TOPROL XL) extended release tablet 25 mg  25 mg Oral Daily Blaine Jones MD   25 mg at 11/19/24 1410    aspirin chewable tablet 81 mg  81 mg Oral Daily Markie Villa MD   81 mg at 11/19/24 1410    isosorbide mononitrate (IMDUR) extended 
Hospitalist Progress Note  [facility]     Patient: Linh Nicholson  : 1970  MRN: [id number]  Code Status: Full Code    Hospital Day: 8   Date of Service: 2024    Subjective:   Patient seen and examined.  No current complaints.    No past medical history on file.    No past surgical history on file.    No family history on file.    Social History     Socioeconomic History    Marital status: Single     Spouse name: Not on file    Number of children: Not on file    Years of education: Not on file    Highest education level: Not on file   Occupational History    Not on file   Tobacco Use    Smoking status: Not on file    Smokeless tobacco: Not on file   Substance and Sexual Activity    Alcohol use: Not on file    Drug use: Not on file    Sexual activity: Not on file   Other Topics Concern    Not on file   Social History Narrative    Not on file     Social Determinants of Health     Financial Resource Strain: Medium Risk (11/10/2024)    Overall Financial Resource Strain (CARDIA)     Difficulty of Paying Living Expenses: Somewhat hard   Food Insecurity: Food Insecurity Present (11/10/2024)    Hunger Vital Sign     Worried About Running Out of Food in the Last Year: Sometimes true     Ran Out of Food in the Last Year: Never true   Transportation Needs: No Transportation Needs (11/10/2024)    Transportation Problems ([facility] HRSN)     In the past 12 months, has lack of reliable transportation kept you from medical appointments, meetings, work or from getting things needed for daily living?: No   Physical Activity: Sufficiently Active (11/10/2024)    Physical Activity ([facility] HRSN)     In the last 30 days, other than the activities you did for work, on average, how many days per week did you engage in moderate exercise (like walking fast, running, jogging, dancing, swimming, biking, or other similar activites)?: 3     Number of minutes of exercise per week:: 90   Stress: Not on file   Social Connections: 
Hospitalist Progress Note  [facility]     Patient: Linh Nicholson  : 1970  MRN: [id number]  Code Status: Full Code    Hospital Day: 6   Date of Service: 2024    Subjective:   Patient seen and examined.  No current complaints.    No past medical history on file.    No past surgical history on file.    No family history on file.    Social History     Socioeconomic History    Marital status: Single     Spouse name: Not on file    Number of children: Not on file    Years of education: Not on file    Highest education level: Not on file   Occupational History    Not on file   Tobacco Use    Smoking status: Not on file    Smokeless tobacco: Not on file   Substance and Sexual Activity    Alcohol use: Not on file    Drug use: Not on file    Sexual activity: Not on file   Other Topics Concern    Not on file   Social History Narrative    Not on file     Social Determinants of Health     Financial Resource Strain: Medium Risk (11/10/2024)    Overall Financial Resource Strain (CARDIA)     Difficulty of Paying Living Expenses: Somewhat hard   Food Insecurity: Food Insecurity Present (11/10/2024)    Hunger Vital Sign     Worried About Running Out of Food in the Last Year: Sometimes true     Ran Out of Food in the Last Year: Never true   Transportation Needs: No Transportation Needs (11/10/2024)    Transportation Problems ([facility] HRSN)     In the past 12 months, has lack of reliable transportation kept you from medical appointments, meetings, work or from getting things needed for daily living?: No   Physical Activity: Sufficiently Active (11/10/2024)    Physical Activity ([facility] HRSN)     In the last 30 days, other than the activities you did for work, on average, how many days per week did you engage in moderate exercise (like walking fast, running, jogging, dancing, swimming, biking, or other similar activites)?: 3     Number of minutes of exercise per week:: 90   Stress: Not on file   Social Connections: 
Hospitalist Progress Note  [facility]     Patient: Linh Nicholson  : 1970  MRN: [id number]  Code Status: Full Code    Hospital Day: 7   Date of Service: 2024    Subjective:   Patient seen and examined.  No current complaints.    No past medical history on file.    No past surgical history on file.    No family history on file.    Social History     Socioeconomic History    Marital status: Single     Spouse name: Not on file    Number of children: Not on file    Years of education: Not on file    Highest education level: Not on file   Occupational History    Not on file   Tobacco Use    Smoking status: Not on file    Smokeless tobacco: Not on file   Substance and Sexual Activity    Alcohol use: Not on file    Drug use: Not on file    Sexual activity: Not on file   Other Topics Concern    Not on file   Social History Narrative    Not on file     Social Determinants of Health     Financial Resource Strain: Medium Risk (11/10/2024)    Overall Financial Resource Strain (CARDIA)     Difficulty of Paying Living Expenses: Somewhat hard   Food Insecurity: Food Insecurity Present (11/10/2024)    Hunger Vital Sign     Worried About Running Out of Food in the Last Year: Sometimes true     Ran Out of Food in the Last Year: Never true   Transportation Needs: No Transportation Needs (11/10/2024)    Transportation Problems ([facility] HRSN)     In the past 12 months, has lack of reliable transportation kept you from medical appointments, meetings, work or from getting things needed for daily living?: No   Physical Activity: Sufficiently Active (11/10/2024)    Physical Activity ([facility] HRSN)     In the last 30 days, other than the activities you did for work, on average, how many days per week did you engage in moderate exercise (like walking fast, running, jogging, dancing, swimming, biking, or other similar activites)?: 3     Number of minutes of exercise per week:: 90   Stress: Not on file   Social Connections: 
Hospitalist Progress Note  [facility]     Patient: Linh Nicholson  : 1970  MRN: [id number]  Code Status: Full Code    Hospital Day: 3   Date of Service: 2024    Subjective:   Patient seen and examined.  Requiring Precedex gtt for significant agitation.    No past medical history on file.    No past surgical history on file.    No family history on file.    Social History     Socioeconomic History    Marital status: Single     Spouse name: Not on file    Number of children: Not on file    Years of education: Not on file    Highest education level: Not on file   Occupational History    Not on file   Tobacco Use    Smoking status: Not on file    Smokeless tobacco: Not on file   Substance and Sexual Activity    Alcohol use: Not on file    Drug use: Not on file    Sexual activity: Not on file   Other Topics Concern    Not on file   Social History Narrative    Not on file     Social Determinants of Health     Financial Resource Strain: Medium Risk (11/10/2024)    Overall Financial Resource Strain (CARDIA)     Difficulty of Paying Living Expenses: Somewhat hard   Food Insecurity: Food Insecurity Present (11/10/2024)    Hunger Vital Sign     Worried About Running Out of Food in the Last Year: Sometimes true     Ran Out of Food in the Last Year: Never true   Transportation Needs: No Transportation Needs (11/10/2024)    Transportation Problems ([facility] HRSN)     In the past 12 months, has lack of reliable transportation kept you from medical appointments, meetings, work or from getting things needed for daily living?: No   Physical Activity: Sufficiently Active (11/10/2024)    Physical Activity ([facility] HRSN)     In the last 30 days, other than the activities you did for work, on average, how many days per week did you engage in moderate exercise (like walking fast, running, jogging, dancing, swimming, biking, or other similar activites)?: 3     Number of minutes of exercise per week:: 90   Stress: Not on 
Hospitalist Progress Note  [facility]     Patient: Linh Nicholson  : 1970  MRN: [id number]  Code Status: Full Code    Hospital Day: 9   Date of Service: 2024    Subjective:   Patient seen and examined.  Plans for cardiac cath today.  No current complaints.    No past medical history on file.    No past surgical history on file.    No family history on file.    Social History     Socioeconomic History    Marital status: Single     Spouse name: Not on file    Number of children: Not on file    Years of education: Not on file    Highest education level: Not on file   Occupational History    Not on file   Tobacco Use    Smoking status: Not on file    Smokeless tobacco: Not on file   Substance and Sexual Activity    Alcohol use: Not on file    Drug use: Not on file    Sexual activity: Not on file   Other Topics Concern    Not on file   Social History Narrative    Not on file     Social Determinants of Health     Financial Resource Strain: Medium Risk (11/10/2024)    Overall Financial Resource Strain (CARDIA)     Difficulty of Paying Living Expenses: Somewhat hard   Food Insecurity: Food Insecurity Present (11/10/2024)    Hunger Vital Sign     Worried About Running Out of Food in the Last Year: Sometimes true     Ran Out of Food in the Last Year: Never true   Transportation Needs: No Transportation Needs (11/10/2024)    Transportation Problems ([facility] HRSN)     In the past 12 months, has lack of reliable transportation kept you from medical appointments, meetings, work or from getting things needed for daily living?: No   Physical Activity: Sufficiently Active (11/10/2024)    Physical Activity ([facility] HRSN)     In the last 30 days, other than the activities you did for work, on average, how many days per week did you engage in moderate exercise (like walking fast, running, jogging, dancing, swimming, biking, or other similar activites)?: 3     Number of minutes of exercise per week:: 90   Stress: 
Infectious Diseases Progress Note    Patient:  Linh Nicholson  YOB: 1970  MRN: [id number]   Admit date: 11/10/2024   Admitting Physician: Markie Villa MD  Primary Care Physician: Igor Atkins MD    Chief Complaint/Interval History: He had had some right-sided chest discomfort earlier.  He describes it as heartburn.  He is not dyspneic.  No left-sided chest discomfort.  No jaw or shoulder pain.  No nausea.    In/Out    Intake/Output Summary (Last 24 hours) at 11/17/2024 1348  Last data filed at 11/17/2024 0818  Gross per 24 hour   Intake 100 ml   Output --   Net 100 ml     Allergies: Not on File  Current Meds: pantoprazole (PROTONIX) tablet 40 mg, QAM AC  calcium carbonate (TUMS) chewable tablet 500 mg, TID PRN  magnesium hydroxide (MILK OF MAGNESIA) 400 MG/5ML suspension 30 mL, Daily PRN  nicotine (NICODERM CQ) 21 MG/24HR 1 patch, Daily  OLANZapine (ZyPREXA) 5 mg in sterile water 1 mL injection, BID PRN  insulin lispro (HUMALOG,ADMELOG) injection vial 0-4 Units, 4x Daily AC & HS  glucose chewable tablet 16 g, PRN  dextrose bolus 10% 125 mL, PRN   Or  dextrose bolus 10% 250 mL, PRN  glucagon injection 1 mg, PRN  dextrose 10 % infusion, Continuous PRN  sodium chloride flush 0.9 % injection 5-40 mL, PRN  0.9 % sodium chloride infusion, PRN  ondansetron (ZOFRAN-ODT) disintegrating tablet 4 mg, Q8H PRN   Or  ondansetron (ZOFRAN) injection 4 mg, Q6H PRN  polyethylene glycol (GLYCOLAX) packet 17 g, Daily PRN  acetaminophen (TYLENOL) tablet 650 mg, Q6H PRN   Or  acetaminophen (TYLENOL) suppository 650 mg, Q6H PRN  cefTRIAXone (ROCEPHIN) 2,000 mg in sterile water 20 mL IV syringe, Q12H      Review of Systems no symptoms to suggest TIA.    VitalSigns:  BP (!) 122/54   Pulse 91   Temp 98.1 °F (36.7 °C) (Temporal)   Resp 14   Ht 1.778 m (5' 10\")   Wt 62.8 kg (138 lb 6 oz)   SpO2 98%   BMI 19.85 kg/m²      Physical Exam  Line/IV site: No erythema, warmth, induration, or tenderness.  Abdomen soft and 
Infectious Diseases Progress Note    Patient:  Linh Nicholson  YOB: 1970  MRN: [id number]   Admit date: 11/10/2024   Admitting Physician: Markie Villa MD  Primary Care Physician: Igor Atkins MD    Chief Complaint/Interval History: He is comfortable at present.  No chest pain or chest pressure.  No cough or shortness of breath.  No symptoms to suggest TIA.  Probable heart catheterization tomorrow.  He was pleasant and appreciative of care.    In/Out  No intake or output data in the 24 hours ending 11/18/24 2004  Allergies: Not on File  Current Meds: pantoprazole (PROTONIX) tablet 40 mg, QAM AC  calcium carbonate (TUMS) chewable tablet 500 mg, TID PRN  magnesium hydroxide (MILK OF MAGNESIA) 400 MG/5ML suspension 30 mL, Daily PRN  nicotine (NICODERM CQ) 21 MG/24HR 1 patch, Daily  OLANZapine (ZyPREXA) 5 mg in sterile water 1 mL injection, BID PRN  insulin lispro (HUMALOG,ADMELOG) injection vial 0-4 Units, 4x Daily AC & HS  glucose chewable tablet 16 g, PRN  dextrose bolus 10% 125 mL, PRN   Or  dextrose bolus 10% 250 mL, PRN  glucagon injection 1 mg, PRN  dextrose 10 % infusion, Continuous PRN  sodium chloride flush 0.9 % injection 5-40 mL, PRN  0.9 % sodium chloride infusion, PRN  ondansetron (ZOFRAN-ODT) disintegrating tablet 4 mg, Q8H PRN   Or  ondansetron (ZOFRAN) injection 4 mg, Q6H PRN  polyethylene glycol (GLYCOLAX) packet 17 g, Daily PRN  acetaminophen (TYLENOL) tablet 650 mg, Q6H PRN   Or  acetaminophen (TYLENOL) suppository 650 mg, Q6H PRN  cefTRIAXone (ROCEPHIN) 2,000 mg in sterile water 20 mL IV syringe, Q12H      Review of Systems see HPI    VitalSigns:  BP (!) 137/57   Pulse 98   Temp 99.3 °F (37.4 °C) (Oral)   Resp 16   Ht 1.778 m (5' 10\")   Wt 62.8 kg (138 lb 6 oz)   SpO2 97%   BMI 19.85 kg/m²      Physical Exam  Line/IV site: No erythema, warmth, induration, or tenderness.  Comfortable appearing  No new findings on exam    Lab Results:  CBC:   Recent Labs     11/16/24  0517 
Infectious Diseases Progress Note    Patient:  Linh Nicholson  YOB: 1970  MRN: [id number]   Admit date: 11/10/2024   Admitting Physician: Markie Villa MD  Primary Care Physician: Igor Atkins MD    Chief Complaint/Interval History: He had some nausea and an episode of emesis overnight.  No abdominal pain.  No diarrhea.  No recurrent symptoms.  Etiology of nausea uncertain.  Interval notes reviewed.  Thoracic surgery consultation reviewed.  He has no symptoms to suggest TIA.  He has no rash or skin itching.  He has no orthopnea or PND.  He has no lower extremity edema.  He is breathing comfortably at present.    In/Out    Intake/Output Summary (Last 24 hours) at 11/16/2024 1605  Last data filed at 11/16/2024 1424  Gross per 24 hour   Intake 100 ml   Output 1200 ml   Net -1100 ml     Allergies: Not on File  Current Meds: calcium carbonate (TUMS) chewable tablet 500 mg, TID PRN  magnesium hydroxide (MILK OF MAGNESIA) 400 MG/5ML suspension 30 mL, Daily PRN  nicotine (NICODERM CQ) 21 MG/24HR 1 patch, Daily  OLANZapine (ZyPREXA) 5 mg in sterile water 1 mL injection, BID PRN  insulin lispro (HUMALOG,ADMELOG) injection vial 0-4 Units, 4x Daily AC & HS  glucose chewable tablet 16 g, PRN  dextrose bolus 10% 125 mL, PRN   Or  dextrose bolus 10% 250 mL, PRN  glucagon injection 1 mg, PRN  dextrose 10 % infusion, Continuous PRN  sodium chloride flush 0.9 % injection 5-40 mL, PRN  0.9 % sodium chloride infusion, PRN  ondansetron (ZOFRAN-ODT) disintegrating tablet 4 mg, Q8H PRN   Or  ondansetron (ZOFRAN) injection 4 mg, Q6H PRN  polyethylene glycol (GLYCOLAX) packet 17 g, Daily PRN  acetaminophen (TYLENOL) tablet 650 mg, Q6H PRN   Or  acetaminophen (TYLENOL) suppository 650 mg, Q6H PRN  cefTRIAXone (ROCEPHIN) 2,000 mg in sterile water 20 mL IV syringe, Q12H      Review of Systems see HPI    VitalSigns:  BP (!) 128/48   Pulse 92   Temp 98.9 °F (37.2 °C) (Temporal)   Resp 18   Ht 1.778 m (5' 10\")   Wt 63.3 kg (139 
Linh Nicholson received from icu to room # 723 .  Mental Status: Patient is oriented, alert, coherent, logical, thought processes intact, and able to concentrate and follow conversation.   Vitals:    11/15/24 1312   BP: (!) 145/60   Pulse: 99   Resp: 16   Temp: 97.9 °F (36.6 °C)   SpO2: 99%     Placed on cardiac monitor: Yes. Box # [id number] .  Belongings: None location is not applicable .  Family at bedside No.  Oriented Patient to room.  Call light within reach. Yes.  Transfer was: Well tolerated by patient..    Electronically signed by Kimberly Sethi RN on 11/15/2024 at 1:16 PM     
Linh Nicholson transferred to 723 from 151 via wheelchair.    Reason for transfer: Ready for next level of care  Explained reason for transfer to Patient.   Belongings:  shoes  with patient at bedside .   Soft chart transferred with patient: Yes.  Telemetry box number [id number] transferred with patient: no.  Report given to: ALICIA Harris    Electronically signed by Nancy Martinez RN on 11/15/2024   
[facility] [place] arrived to  decedent  
Nutrition Assessment     Type and Reason for Visit: Reassess    Nutrition Recommendations/Plan:   Follow for advancing diet, po intake, weight, labs     Malnutrition Assessment:  Malnutrition Status: Severe malnutrition    Nutrition Assessment:  Pt continues to meet criteria for severe malnutrition.  Pt NPO at time of visit for MARTHA.  New wt NA.  Accuchek's ., Glucose 111-121    Estimated Daily Nutrient Needs:  Energy (kcal):  7763-4470 kcals Weight Used for Energy Requirements: Current     Protein (g):  89g Weight Used for Protein Requirements: Current        Fluid (ml/day):  1780-1554 ml Method Used for Fluid Requirements: 1 ml/kcal    Nutrition Related Findings:   no edema noted.   On Lantus Wound Type:  (abrasions)    Current Nutrition Therapies:    ADULT DIET; Regular    Anthropometric Measures:  Height: 177.8 cm (5' 10\")  Current Body Wt: 52.2 kg (115 lb 1.3 oz) (estimated)   BMI: 16.5        Nutrition Diagnosis:   Inadequate oral intake, Underweight, Altered nutrition-related lab values related to acute injury/trauma, inadequate protein-energy intake, endocrine dysfunction, cardiac dysfunction as evidenced by NPO or clear liquid status due to medical condition, intake 0-25%, poor intake prior to admission, BMI, loss of subcutaneous fat, muscle loss, lab values    Nutrition Interventions:   Food and/or Nutrient Delivery: Continue NPO  Nutrition Education/Counseling: No recommendation at this time  Coordination of Nutrition Care: Continue to monitor while inpatient       Goals:  Goals: Meet at least 75% of estimated needs, PO intake 50% or greater  Type of Goal: Continue current goal  Previous Goal Met: No Progress toward Goal(s)    Nutrition Monitoring and Evaluation:   Behavioral-Environmental Outcomes: None Identified  Food/Nutrient Intake Outcomes: Diet Advancement/Tolerance, Food and Nutrient Intake  Physical Signs/Symptoms Outcomes: Biochemical Data, Fluid Status or Edema, Weight, Skin    Discharge 
Nutrition Assessment     Type and Reason for Visit: Reassess    Nutrition Recommendations/Plan:   Modify current diet order with addition of Carb Control     Malnutrition Assessment:  Malnutrition Status: Severe malnutrition    Nutrition Assessment:  Patient's diet has been advanced to Regular.  PO intake ranging 0-50%.  Pt stated that the food is bland.  Will send extra seasonings to help with this.  No other preferences voiced and no other requests made.   11/14.  Accuchek's 165-204--will modify current diet to include CArb control    Estimated Daily Nutrient Needs:  Energy (kcal):  2463-7261 kcals (20-25 kcals/kg) Weight Used for Energy Requirements: Current     Protein (g):  103g Weight Used for Protein Requirements: Current        Fluid (ml/day):  0107-0675 ml Method Used for Fluid Requirements: 1 ml/kcal    Nutrition Related Findings:   no edema noted.   On Lantus Wound Type: Skin Tears (abrasion)    Current Nutrition Therapies:    ADULT DIET; Regular; Coffee with all meals--cream and sugar.  EXTRA salt and pepper on trays    Anthropometric Measures:  Height: 177.8 cm (5' 10\")  Current Body Wt: 68.7 kg (151 lb 7.3 oz)   BMI: 21.7        Nutrition Diagnosis:   Inadequate oral intake, Altered nutrition-related lab values related to acute injury/trauma, inadequate protein-energy intake, endocrine dysfunction, increase demand for energy/nutrients as evidenced by intake 0-25%, intake 26-50%, wounds, lab values, poor intake prior to admission, loss of subcutaneous fat, muscle loss    Nutrition Interventions:   Food and/or Nutrient Delivery: Modify Current Diet  Nutrition Education/Counseling: No recommendation at this time  Coordination of Nutrition Care: Continue to monitor while inpatient  Plan of Care discussed with: pt    Goals:  Goals: Meet at least 75% of estimated needs, PO intake 50% or greater  Type of Goal: Continue current goal  Previous Goal Met: Progressing toward Goal(s)    Nutrition Monitoring and 
Patient had an episode of bradycardia and required CPR on the floor.  He was transferred to ICU.  He had cardiopulmonary arrest.  He could not be resuscitated.  Stop by to offer support to family.  Sister, mother, aunt, ex-wife, and friend at bedside.  Offered support.  They were appreciative of care.  They had no needs at this time.    RADHA BARRAGAN MD    
Patient hooked up to ICU defibrillator.   At 1538 Patient in vtach - shock given at 360 J then CPR was started  1538- 1 Amp Epi given  1540- 300 mg Amiodarone given.    1541 - 1 Amp Epi given  1542 - Epinephrine drip started at 20 mcg/min  1543- IO inserted into Left figueroa by Dr. Castro  1544 - Pulse check - patient in Asystole- CPR resumed  1544- 1 Amp Epi given  1546- Pulse check with patient found in PEA/ CPR resumed  1547 - 1 Amp Epi given  1549- Pulse check- Patient in PEA/ CPR resumed  1550- 1 Amp Epi given  1551- Pulse check with patient found in PEA.  CPR resumed. Dr. Jones also at bedside.   1552- 1 Amp Epi given  1554- Pulse check - Patient found be in PEA.   1555- Time of death called by Dr. Castro and Dr. Jones      
Pt called out stating wanted to take shower. Upon entering, pt says he stuck his finger down his throat to vomit. Pt indeed vomited on the floor next to his trash can and bed while his blue vomit bag was next to his hand in bed. RN assisted pt to and out of shower after cleaning up vomit off floor and remade/cleaned bed. Instructed pt to please use emesis bag if he vomits and not in the floor. Call light within reach, bed alarm on, telemetry leads re-applied. Denies any new needs at this time.  
Pt complained of chest pain and nausea today, BP 80/50 Dr. Castro notified, prn morphine and zofran administered. Dr. Jones notified of chest pain, EKG ordered. Pt npo per speech, Dr. Castro notified pt unable to take pills d/t nausea and npo status. BP checked multiple times today, remains 80/60.  At 1512 the pt heart rate dropped into the 20's on telemetry, this nurse responded immediately to bedside and pt was unresponsive, code blue called and ACLS protocall was initiated.   
Pt complaining of nausea at 1900, given zofran. Pt threw up again and hospitalist messaged. GI cocktail ordered. Emesis x4. Unable to tolerate oral GI cocktail. Messaged again, awaiting new orders  
Pt refused labs earlier this AM and wanted to be left alone. Educated on importance and is willing to allow lab to come draw blood now.  
Rapid response called at 1354 by nursing staff  Pt returned from heart cath at 1300 with no complaints  At 1354 pt states severe mid sternal chest pain aggrivated by swallowing. Stated it felt that something was stuck in his throat. STAT EKG ordered-performed-and sent to Dr. Gardner. Dr. Gardner notified via telephone and he ordered   -aspirin 81 mg daily, metoprolol 25 mg bid, nitro SL q5 min, GI cocktail, and morphine 2 mg q4 PRN  VS//Meds given  1351 137/59  1403 135/56  1404 Nitro SL given  1404 128/59  1408 Nitro SL given  1415 105/56  1432 Morphine given   1437 120/52  1515 107/43     Pt states that the pain in his chest is still a 10/10 and is not letting up. Pt lying in bed resting as much as he can at this time. Call light in hand.   
Responded to CODE BLUE.  Patient dropped his heart rate into the 20s.  Then went into PEA.  CODE BLUE was called.  ACLS protocol was initiated.  Multiple rounds of epinephrine, calcium chloride and sodium bicarbonate were given.  He did have a ROSC.  He was transferred back to ICU.  After arrival in ICU, he developed ventricular fibrillation.  ACLS protocol was initiated once again.  He received multiple rounds of epinephrine and amiodarone.  He subsequently developed refractory PEA.  Resuscitative efforts were carried out for another 10 minutes without restoration of heart rate or blood pressure.  Code was called at 1555.  
20 mL IV syringe, Q12H  dexmedeTOMIDine (PRECEDEX) 400 mcg in sodium chloride 0.9 % 100 mL infusion, Continuous      Review of Systems see HPI.  No diarrhea or rash.    VitalSigns:  BP (!) 129/43   Pulse 75   Temp 98.3 °F (36.8 °C)   Resp 18   Ht 1.778 m (5' 10\")   Wt 52.2 kg (115 lb)   SpO2 97%   BMI 16.50 kg/m²      Physical Exam  Line/IV site: No erythema, warmth, induration, or tenderness.  Lungs without crackles or wheezes  Skin without rash  Heart with systolic murmur  Abdomen soft and nontender    Lab Results:  CBC:   Recent Labs     11/12/24  0202 11/13/24  0130 11/14/24  0141   WBC 17.3* 30.6* 24.3*   HGB 11.4 13.7 13.3   PLT 84* 122* 154     BMP:  Recent Labs     11/12/24 0202 11/13/24  0131 11/14/24  0141   * 139 142   K 4.5 4.2 3.7    105 111   CO2 20* 21* 20*   BUN 37* 51* 36*   CREATININE 0.6 0.9 0.7   GLUCOSE 287* 121* 111*     Culture Results:  Blood cultures - 11/10/2024-Streptococcus agalactiae (susceptibility below)  Blood cultures - 11/10/2024-Streptococcus agalactiae    Blood cultures - 11/12/2024-no growth  Blood cultures - 11/12/2024-no growth    Urine cultures - 11/10/2024-no growth    Susceptibility   Beta Hemolytic Streptococcus - Group B (1)   Antibiotic Interpretation Microscan   Method Status     ampicillin Sensitive <=0.25 mcg/mL BACTERIAL SUSCEPTIBILITY PANEL BY KAM       benzylpenicillin Sensitive <=0.06 mcg/mL BACTERIAL SUSCEPTIBILITY PANEL BY KAM       cefTRIAXone Sensitive <=0.12 mcg/mL BACTERIAL SUSCEPTIBILITY PANEL BY KAM       clindamycin Sensitive <=0.25 mcg/mL BACTERIAL SUSCEPTIBILITY PANEL BY KAM       levofloxacin Sensitive 1 mcg/mL BACTERIAL SUSCEPTIBILITY PANEL BY KAM       vancomycin Sensitive 0.5 mcg/mL BACTERIAL SUSCEPTIBILITY PANEL BY KAM       Radiology: None    Additional Studies Reviewed:    Transthoracic echocardiogram done on November 11, 2024:    Left Ventricle: Normal left ventricular systolic function with a visually estimated EF of 50 - 
Outcomes: Biochemical Data, Fluid Status or Edema, Weight, Skin    Discharge Planning:    Continue current diet     Amy Yan MS, RD, LD  Contact: [phone]    
Yes     Recommended Diet and Intervention  Diet Solids Recommendation: NPO  Liquid Consistency Recommendation: NPO  Therapeutic Interventions: Patient/Family education;Therapeutic PO trials with SLP    Treatment/Goals  Timeframe for Short-term Goals: 1-2x/day for 3 days   Goal 1: Re-assessment of swallow function.   Goal 2: Patient staff will follow swallow safety recommendations.  Goal 3: Patient will receive daily oral care to decrease bacteria from the oral cavity.  Goal 4: Patient will complete breath support, oral motor, lingual, and pharyngeal exercises with provision of min cues/prompts.     General  Chart Reviewed: Yes  Behavior/Cognition: Alert;Cooperative  O2 Device: None (Room air)  Communication Observation: (Assessed patient's speech production. Patient exhibits slow, decreased lingual movements during verbalizations. SLP still ranked functional intelligibility of speech for unfamiliar listeners at 100% in utterances with background noise present.)  Follows Directions: Simple   Dentition: Edentulous   Patient Positioning: Upright in bed  Consistencies Administered: Ice chips;Dysphagia Pureed (Dysphagia I);Nectar - straw     Oral Motor Examination   Labial ROM: (Decreased, bilaterally, during labial retraction trials and labial protrusion trials.)  Labial Strength: (Adequate during labial compression trials.)  Labial Coordination: (Adequate movements.)  Lingual ROM: (Adequate during lingual extension trials with full point achieved; decreased during lingual elevation trials without use of accessory jaw movement; adequate movements bilaterally.)  Lingual Strength: (Decreased.)  Lingual Coordination: (Slowed movements.)     Assessed patient's swallowing function with the following observations noted:     Oral Phase  Mastication: Ice chips (Patient exhibited vertical jaw movement at the front of the mouth during oral prep of ice chip trials presented by SLP.)  Suspected Premature Bolus Loss: Puree (Oral 
induration, or tenderness.  Lungs without crackles or wheezes  Abdomen soft and nondistended  Heart distant heart sounds with faint systolic murmur left upper sternal border  Extremities no splinter hemorrhages or Janeway lesions    Lab Results:  CBC:   Recent Labs     11/11/24 0218 11/12/24 0202 11/13/24  0130   WBC 15.1* 17.3* 30.6*   HGB 11.8 11.4 13.7   PLT 87* 84* 122*     BMP:  Recent Labs     11/11/24 0218 11/12/24 0202 11/13/24  0131   * 133* 139   K 3.8 4.5 4.2   CL 95* 101 105   CO2 24 20* 21*   BUN 36* 37* 51*   CREATININE 0.8 0.6 0.9   GLUCOSE 316* 287* 121*     Culture Results:    Susceptibility    Beta Hemolytic Streptococcus - Group B (1)    Antibiotic Interpretation Microscan  Method Status    ampicillin Sensitive <=0.25 mcg/mL BACTERIAL SUSCEPTIBILITY PANEL BY KAM     benzylpenicillin Sensitive <=0.06 mcg/mL BACTERIAL SUSCEPTIBILITY PANEL BY KAM     cefTRIAXone Sensitive <=0.12 mcg/mL BACTERIAL SUSCEPTIBILITY PANEL BY KAM     clindamycin Sensitive <=0.25 mcg/mL BACTERIAL SUSCEPTIBILITY PANEL BY KAM     levofloxacin Sensitive 1 mcg/mL BACTERIAL SUSCEPTIBILITY PANEL BY KAM     vancomycin Sensitive 0.5 mcg/mL BACTERIAL SUSCEPTIBILITY PANEL BY KAM             Radiology: None    Additional Studies Reviewed:      MARTHA:    Left Ventricle: Normal left ventricular systolic function. Left ventricle size is normal. Moderately increased wall thickness. No EF was obtained with this study. Normal wall motion.    Aortic Valve: Moderate to severe regurgitation with an eccentrically directed jet and may underestimate severity.    Mitral Valve: Mild regurgitation.    Tricuspid Valve: Mild regurgitation.    Left Atrium: No left atrial appendage thrombus noted. No left atrial appendage mass noted.    Interatrial Septum: Agitated saline study was negative with and without provocation.    Image quality is adequate.    Impression:  1.  Bloodstream infection with Streptococcus agalactiae  2.  Possible aortic valve 
Shahriar Castro DO 20 mL/hr at 11/11/24 2201 New Bag at 11/11/24 2201    ondansetron (ZOFRAN-ODT) disintegrating tablet 4 mg  4 mg Oral Q8H PRN Shahriar Castro DO        Or    ondansetron (ZOFRAN) injection 4 mg  4 mg IntraVENous Q6H PRN Shahriar Castro DO        polyethylene glycol (GLYCOLAX) packet 17 g  17 g Oral Daily PRN Shahriar Csatro DO        acetaminophen (TYLENOL) tablet 650 mg  650 mg Oral Q6H PRN Shahriar Castro DO        Or    acetaminophen (TYLENOL) suppository 650 mg  650 mg Rectal Q6H PRN Shahriar Castro DO        cefTRIAXone (ROCEPHIN) 2,000 mg in sterile water 20 mL IV syringe  2,000 mg IntraVENous Q12H Shahriar Castro DO   2,000 mg at 11/14/24 1015    dexmedeTOMIDine (PRECEDEX) 400 mcg in sodium chloride 0.9 % 100 mL infusion  0.1-1.5 mcg/kg/hr IntraVENous Continuous Toi Mcdowell MD 15.7 mL/hr at 11/14/24 0925 1.2 mcg/kg/hr at 11/14/24 0925    insulin lispro (HUMALOG,ADMELOG) injection vial 0-8 Units  0-8 Units SubCUTAneous 4x Daily AC & HS Toi Mcdowell MD   6 Units at 11/12/24 1053         lactated ringers 100 mL/hr at 11/14/24 0925    dextrose      norepinephrine 4 mcg/min (11/13/24 2307)    sodium chloride 20 mL/hr at 11/11/24 2201    dexmedeTOMIDine HCl in NaCl 1.2 mcg/kg/hr (11/14/24 0925)        Objective:   BP (!) 132/41   Pulse 73   Temp 97.5 °F (36.4 °C)   Resp 18   Ht 1.778 m (5' 10\")   Wt 52.2 kg (115 lb)   SpO2 100%   BMI 16.50 kg/m²     HEENT: normocephalic  Lungs: clear to auscultation bilaterally   Cardiovascular: s1 and s2 normal  Abdomen: soft, positive bowel sounds, nontender  Extremities: chronic right lower extremity wounds  Neuro: alert, answering some questions    Recent Labs     11/12/24  0202 11/13/24  0130 11/14/24  0141   WBC 17.3* 30.6* 24.3*   RBC 3.86 4.68 4.58   HGB 11.4 13.7 13.3   HCT 33.6 41.7 40.0   MCV 87.0 89.1 87.3   MCH 29.5 29.3 29.0   MCHC 33.9 32.9* 33.3   PLT 84* 122* 154     Recent Labs     
mcg/mL BACTERIAL SUSCEPTIBILITY PANEL BY [facility]             Radiology:    Head CT November 10, 2024:  IMPRESSION:  No acute intracranial findings.    Additional Studies Reviewed:    Transesophageal echocardiogram done November 13, 2024:  Interpretation summary:    Left Ventricle: Normal left ventricular systolic function. Left ventricle size is normal. Moderately increased wall thickness. No EF was obtained with this study. Normal wall motion.    Aortic Valve: Moderate to severe regurgitation with an eccentrically directed jet and may underestimate severity.    Mitral Valve: Mild regurgitation.    Tricuspid Valve: Mild regurgitation.    Left Atrium: No left atrial appendage thrombus noted. No left atrial appendage mass noted.    Interatrial Septum: Agitated saline study was negative with and without provocation.    Image quality is adequate.  Echo findings:  Left Ventricle Normal left ventricular systolic function. Left ventricle size is normal. Moderately increased wall thickness. No EF was obtained with this study.Normal wall motion.   Left Atrium Left atrium size is normal. No left atrial appendage thrombus noted. No left atrial appendage mass noted.   Interatrial Septum Agitated saline study was negative with and without provocation.   Aortic Valve Vegetation smooth approximately 1.5 x 1.5 cm intermittently going back and forth across the aortic valve noncoronary cusp Moderate to severe regurgitation with an eccentrically directed jet and may underestimate severity.   Mitral Valve Valve structure is normal. Mild regurgitation.   Tricuspid Valve Valve structure is normal. Mild regurgitation.   Pulmonic Valve The pulmonic valve was not well visualized.   Aorta Atherosclerosis with moderate thickening.  Descending thoracic aorta also some in the aortic arch   Pericardium No pericardial effusion.     Impression:  1.  Bloodstream infection with Streptococcus agalactiae  2.  Aortic valve endocarditis-when I reviewed MARTHA 
1146  Last data filed at 11/15/2024 0602  Gross per 24 hour   Intake 1924.66 ml   Output 3725 ml   Net -1800.34 ml       MARTHA (with contrast/ 3D PRN)    Result Date: 11/13/2024    Left Ventricle: Normal left ventricular systolic function. Left ventricle size is normal. Moderately increased wall thickness. No EF was obtained with this study. Normal wall motion.   Aortic Valve: Moderate to severe regurgitation with an eccentrically directed jet and may underestimate severity.   Mitral Valve: Mild regurgitation.   Tricuspid Valve: Mild regurgitation.   Left Atrium: No left atrial appendage thrombus noted. No left atrial appendage mass noted.   Interatrial Septum: Agitated saline study was negative with and without provocation.   Image quality is adequate.     Assessment and Plan:   54-year-old male admitted to critical care unit for sepsis, methamphetamine use, and acute toxic metabolic encephalopathy.     Blood cultures 11/10/2024 grew Streptococcus agalactiae  Antimicrobials per ID  MARTHA 11/13/2024 revealed aortic valve vegetation with moderate to severe regurgitation  CTS consulted  Cardiology continues to follow  Follow repeat cultures  IVF  Lactate cleared  Since weaned off Precedex gtt for significant agitation  CTH 11/10/2024: No acute intracranial findings  CSF culture 11/10/2024 NGTD  Counseled on cessation of illicit drug use  SCDs for DVT prophylaxis  Discussed treatment plan with patient/RN    Total critical care time was 51 minutes. Time was exclusive of any procedures.    The above note was generated using voice recognition software. Inadvertent typographical errors in transcription may have occurred.    Markie Villa MD   11/15/2024 11:46 AM    
is in place.  Addominal wall/bones:  Normal.       - Two solid nodules in the middle lobe suggested measuring 0.3 cm and 0.6 cm respectively.  Please refer to the CT chest report. - Diffuse hepatic steatosis. - Severe left common iliac artery stenosis with near complete occlusion. - Mendoza catheter.     .  All CT scans are performed using dose optimization techniques as appropriate to the performed exam and include at least one of the following: Automated exposure control, adjustment of the mA and/or kV according to size, and the use of iterative reconstruction technique.  ______________________________________ Electronically signed by: ISRAEL MISTRY D.O. Date:     11/10/2024 Time:    16:05     CT HEAD WO CONTRAST    Result Date: 11/10/2024  EXAM: CT BRAIN WITHOUT CONTRAST  HISTORY: Confusion  TECHNIQUE: Multi-slice sequential.  Coronal and sagital reformations were performed.  COMPARISON: None  FINDINGS:  There is no acute intracranial hemorrhage, extraxial fluid collection, mass affect, or midlineshift.The ventricles are normal in size.The gray-white matter interface is maintained.The basal cisterns are patent.The visualized paranasal sinuses are clear. Mastoid air cells are well aerated.The calvarium is unremarkable.  ::::::::::::::::::::::::::::::       No acute intracranial findings.  ::::::::::::::::::::::::::::::  All CT scans are performed using dose optimization techniques as appropriate to the performed exam and include at least one of the following: Automated exposure control, adjustment of the mA and/or kV according to size, and the use of iterative reconstruction technique.  ______________________________________ Electronically signed by: EUNICE MCCORD M.D. Date:     11/10/2024 Time:    16:05     XR CHEST PORTABLE    Result Date: 11/10/2024  EXAM: CHEST RADIOGRAPH  TECHNIQUE: Single frontal chest radiograph.  HISTORY: Cough.  COMPARISON: None.  FINDINGS:  The patient is mildly leaning to the left.  
findings in the chest.    ______________________________________ Electronically signed by: MASSIMO GONSALES M.D. Date:     11/10/2024 Time:    14:02         Assessment:  Admission 11/12/2024 altered mental status  Febrile  Hypotension  Abnormal transthoracic echo with normal left ventricular function suggestion of aortic valve vegetation possible endocarditis  Urine drug screen positive for methamphetamines  Diabetes mellitus type 1  Sepsis  Malnutrition  Transesophageal echocardiography 11/13/2020 for a large vegetation aortic valve moderate to moderately severe aortic regurgitation    Plan:  Antibiotics per primary service  Check a baseline BNP level  Awaiting follow-up input per infectious disease suspect will eventually need aortic valve replacement at some point    Blaine Jones MD, MD 11/14/2024 10:42 AM   
iterative reconstruction technique.  CONTRAST: Intravenous  FINDINGS:  Lung bases:  A solid nodule suggested in the right middle lobe measuring a solid nodule suggested in the right middle lobe measuring 0.6 cm on axial image #1.  An adjacent nodule suggested measuring 0.3 cm.  No acute infiltrates.  Liver:  Diffuse low attenuation relative to the spleen.  Gallbladder/bilary tree:  Normal.  Pancreas:  Normal.  Adrenal glands: Normal.  Spleen:  Normal.  Kidneys:  Normal.  Retroperitoneum:  Normal.  Peritoneum:  There is severe stenosis in the left common iliac artery with minimal residual intraluminal blood flow suggested.  The aorta has normal caliber and patency.  No retroperitoneal lymphadenopathy or hematomas.  Bowel:  Normal.  Pelvis:  There is gas in the bladder lumen.  A Mendoza catheter is in place.  Addominal wall/bones:  Normal.       - Two solid nodules in the middle lobe suggested measuring 0.3 cm and 0.6 cm respectively.  Please refer to the CT chest report. - Diffuse hepatic steatosis. - Severe left common iliac artery stenosis with near complete occlusion. - Mendoza catheter.     .  All CT scans are performed using dose optimization techniques as appropriate to the performed exam and include at least one of the following: Automated exposure control, adjustment of the mA and/or kV according to size, and the use of iterative reconstruction technique.  ______________________________________ Electronically signed by: ISRAEL MISTRY D.O. Date:     11/10/2024 Time:    16:05     CT HEAD WO CONTRAST    Result Date: 11/10/2024  EXAM: CT BRAIN WITHOUT CONTRAST  HISTORY: Confusion  TECHNIQUE: Multi-slice sequential.  Coronal and sagital reformations were performed.  COMPARISON: None  FINDINGS:  There is no acute intracranial hemorrhage, extraxial fluid collection, mass affect, or midlineshift.The ventricles are normal in size.The gray-white matter interface is maintained.The basal cisterns are patent.The 
identified. Several old, healed right rib fractures.       1.  No acute findings in the chest.    ______________________________________ Electronically signed by: MASSIMO GONSALES M.D. Date:     11/10/2024 Time:    14:02        Assessment     Group B streptococcal sepsis.  Workup for meningitis negative.  De-escalate antibiotic therapy to ceftriaxone.  Infectious disease following.    Possible aortic valve endocarditis.  Consult cardiology for transesophageal echocardiogram.     Methamphetamine use.  Supportive care.     Please document 40 minutes of critical care time; excludes procedural time and excludes separately billable services' time.      Shahriar Castro, DO

## (undated) DEVICE — KIT MFLD ISOLATN NACL CNTRST PRT TBNG SPIK W/ PRSS TRNSDUC

## (undated) DEVICE — Device

## (undated) DEVICE — Device: Brand: NOMOLINE™ LH ADULT NASAL CO2 CANNULA WITH O2 4M

## (undated) DEVICE — CATHETER DIAG 6FR L100CM LUMN ID0.056IN JR4 CRV 0 SIDE H

## (undated) DEVICE — GLIDESHEATH SLENDER STAINLESS STEEL KIT: Brand: GLIDESHEATH SLENDER

## (undated) DEVICE — CATHETER COR DIAG JUDKINS L 3.5 CRV 6FR 100CM 0 SIDE H

## (undated) DEVICE — GUIDEWIRE VASC L260CM DIA0038IN TIP L3MM PTFE J TIP FIX COR

## (undated) DEVICE — KIT ANGIO W/ AT P65 PREM HND CTRL FOR CNTRST DEL ANGIOTOUCH

## (undated) DEVICE — TR BAND RADIAL ARTERY COMPRESSION DEVICE: Brand: TR BAND